# Patient Record
Sex: FEMALE | Race: BLACK OR AFRICAN AMERICAN | Employment: OTHER | URBAN - METROPOLITAN AREA
[De-identification: names, ages, dates, MRNs, and addresses within clinical notes are randomized per-mention and may not be internally consistent; named-entity substitution may affect disease eponyms.]

---

## 2018-01-16 PROBLEM — B19.20 HCV (HEPATITIS C VIRUS): Status: ACTIVE | Noted: 2018-01-16

## 2018-01-16 PROBLEM — J96.22 ACUTE ON CHRONIC RESPIRATORY FAILURE WITH HYPERCAPNIA (HCC): Status: ACTIVE | Noted: 2018-01-16

## 2018-07-25 ENCOUNTER — OFFICE VISIT (OUTPATIENT)
Dept: CARDIOLOGY CLINIC | Age: 58
End: 2018-07-25

## 2018-07-25 VITALS
OXYGEN SATURATION: 97 % | BODY MASS INDEX: 27.35 KG/M2 | HEART RATE: 60 BPM | WEIGHT: 191 LBS | DIASTOLIC BLOOD PRESSURE: 76 MMHG | HEIGHT: 70 IN | SYSTOLIC BLOOD PRESSURE: 126 MMHG

## 2018-07-25 DIAGNOSIS — R07.9 CHEST PAIN IN ADULT: Primary | ICD-10-CM

## 2018-07-25 DIAGNOSIS — F17.200 SMOKING ADDICTION: ICD-10-CM

## 2018-07-25 DIAGNOSIS — R06.02 SOB (SHORTNESS OF BREATH): ICD-10-CM

## 2018-07-25 DIAGNOSIS — I10 ESSENTIAL HYPERTENSION: ICD-10-CM

## 2018-07-25 DIAGNOSIS — R07.2 PRECORDIAL PAIN: ICD-10-CM

## 2018-07-25 PROCEDURE — G8598 ASA/ANTIPLAT THER USED: HCPCS | Performed by: INTERNAL MEDICINE

## 2018-07-25 PROCEDURE — 3017F COLORECTAL CA SCREEN DOC REV: CPT | Performed by: INTERNAL MEDICINE

## 2018-07-25 PROCEDURE — 99205 OFFICE O/P NEW HI 60 MIN: CPT | Performed by: INTERNAL MEDICINE

## 2018-07-25 PROCEDURE — G8419 CALC BMI OUT NRM PARAM NOF/U: HCPCS | Performed by: INTERNAL MEDICINE

## 2018-07-25 PROCEDURE — 93000 ELECTROCARDIOGRAM COMPLETE: CPT | Performed by: INTERNAL MEDICINE

## 2018-07-25 PROCEDURE — G8427 DOCREV CUR MEDS BY ELIG CLIN: HCPCS | Performed by: INTERNAL MEDICINE

## 2018-07-25 PROCEDURE — 4004F PT TOBACCO SCREEN RCVD TLK: CPT | Performed by: INTERNAL MEDICINE

## 2018-07-25 NOTE — PATIENT INSTRUCTIONS
Patient Education        Heart-Healthy Diet: Care Instructions  Your Care Instructions    A heart-healthy diet has lots of vegetables, fruits, nuts, beans, and whole grains, and is low in salt. It limits foods that are high in saturated fat, such as meats, cheeses, and fried foods. It may be hard to change your diet, but even small changes can lower your risk of heart attack and heart disease. Follow-up care is a key part of your treatment and safety. Be sure to make and go to all appointments, and call your doctor if you are having problems. It's also a good idea to know your test results and keep a list of the medicines you take. How can you care for yourself at home? Watch your portions  · Learn what a serving is. A \"serving\" and a \"portion\" are not always the same thing. Make sure that you are not eating larger portions than are recommended. For example, a serving of pasta is ½ cup. A serving size of meat is 2 to 3 ounces. A 3-ounce serving is about the size of a deck of cards. Measure serving sizes until you are good at [place]" them. Keep in mind that restaurants often serve portions that are 2 or 3 times the size of one serving. · To keep your energy level up and keep you from feeling hungry, eat often but in smaller portions. · Eat only the number of calories you need to stay at a healthy weight. If you need to lose weight, eat fewer calories than your body burns (through exercise and other physical activity). Eat more fruits and vegetables  · Eat a variety of fruit and vegetables every day. Dark green, deep orange, red, or yellow fruits and vegetables are especially good for you. Examples include spinach, carrots, peaches, and berries. · Keep carrots, celery, and other veggies handy for snacks. Buy fruit that is in season and store it where you can see it so that you will be tempted to eat it. · Cook dishes that have a lot of veggies in them, such as stir-fries and soups.   Limit saturated and trans fat  · Read food labels, and try to avoid saturated and trans fats. They increase your risk of heart disease. Trans fat is found in many processed foods such as cookies and crackers. · Use olive or canola oil when you cook. Try cholesterol-lowering spreads, such as Benecol or Take Control. · Bake, broil, grill, or steam foods instead of frying them. · Choose lean meats instead of high-fat meats such as hot dogs and sausages. Cut off all visible fat when you prepare meat. · Eat fish, skinless poultry, and meat alternatives such as soy products instead of high-fat meats. Soy products, such as tofu, may be especially good for your heart. · Choose low-fat or fat-free milk and dairy products. Eat fish  · Eat at least two servings of fish a week. Certain fish, such as salmon and tuna, contain omega-3 fatty acids, which may help reduce your risk of heart attack. Eat foods high in fiber  · Eat a variety of grain products every day. Include whole-grain foods that have lots of fiber and nutrients. Examples of whole-grain foods include oats, whole wheat bread, and brown rice. · Buy whole-grain breads and cereals, instead of white bread or pastries. Limit salt and sodium  · Limit how much salt and sodium you eat to help lower your blood pressure. · Taste food before you salt it. Add only a little salt when you think you need it. With time, your taste buds will adjust to less salt. · Eat fewer snack items, fast foods, and other high-salt, processed foods. Check food labels for the amount of sodium in packaged foods. · Choose low-sodium versions of canned goods (such as soups, vegetables, and beans). Limit sugar  · Limit drinks and foods with added sugar. These include candy, desserts, and soda pop. Limit alcohol  · Limit alcohol to no more than 2 drinks a day for men and 1 drink a day for women. Too much alcohol can cause health problems. When should you call for help?   Watch closely for changes in your

## 2018-07-25 NOTE — PROGRESS NOTES
descending artery and posterior lateral branch. There is DANE 3 flow     3.  The LAD arises in normal fashion from left coronary giving rise to diagonals and septal branches. There is DANE 3 flow     4.  The circumflex was given rise to several OMs and PDA and had luminal irregularities. There is DANE 3 flow              OM1:normal               OM2: normal   SUMMARY:  1. Normal coronary arteries  . Assessment/Plan:     Precordial pain  Patient is here as a new patient for chest pain evaluation. She had few episodes of chest pain and she went to emergency room in April 2018. She described her pain as sharp pain with radiation to her right arm. She had mild shortness of breath associated with this pain. EKG in the emergency room showed no acute changes. EKG today shows sinus rhythm with minor ST and T wave changes. Her chest pain appears atypical for angina but due to minor EKG changes, I will schedule her for a dobutamine stress echocardiogram to assess this further. Her coronary angiogram from 2014 was completely normal.    HTN (hypertension)  Blood pressure is stable today. Smoking addiction  Patient has a long-standing history of smoking but had recently quit. She unfortunately has started smoking back again after her mother's death. I have again stressed the importance of smoking cessation and spent 3-5 minutes doing so. Patient to follow up an as needed basis. Thank you very much for allowing me to participate in the care of your patient. Please do not hesitate to contact me if you have any questions.     Sincerely,  Murleen Nageotte, MD      [facility], [address] [facility]  Ph: [phone]  Fax: [phone]

## 2018-08-21 ENCOUNTER — HOSPITAL ENCOUNTER (OUTPATIENT)
Dept: NON INVASIVE DIAGNOSTICS | Age: 58
Discharge: HOME | End: 2018-08-21
Attending: INTERNAL MEDICINE | Admitting: INTERNAL MEDICINE

## 2018-08-21 VITALS — HEIGHT: 70 IN | BODY MASS INDEX: 28.06 KG/M2 | WEIGHT: 196 LBS

## 2018-08-21 DIAGNOSIS — R07.9 CHEST PAIN IN ADULT: ICD-10-CM

## 2018-08-21 DIAGNOSIS — R07.9 CHEST PAIN: ICD-10-CM

## 2018-08-21 DIAGNOSIS — R06.02 SOB (SHORTNESS OF BREATH): ICD-10-CM

## 2018-08-21 RX ORDER — ATROPINE SULFATE 0.1 MG/ML
1 INJECTION INTRAVENOUS ONCE
Status: COMPLETED | OUTPATIENT
Start: 2018-08-21 | End: 2018-08-21

## 2018-08-21 RX ORDER — DOBUTAMINE HYDROCHLORIDE 200 MG/100ML
10 INJECTION INTRAVENOUS CONTINUOUS
Status: ACTIVE | OUTPATIENT
Start: 2018-08-21 | End: 2018-08-21

## 2018-08-21 RX ADMIN — DOBUTAMINE HYDROCHLORIDE 10 MCG/KG/MIN: 200 INJECTION INTRAVENOUS at 13:14

## 2018-08-21 RX ADMIN — ATROPINE SULFATE 0.2 MG: 0.1 INJECTION INTRAVENOUS at 13:57

## 2018-09-17 ENCOUNTER — TELEPHONE (OUTPATIENT)
Dept: PULMONOLOGY | Age: 58
End: 2018-09-17

## 2018-09-17 NOTE — TELEPHONE ENCOUNTER
LVM to call back  Received a referral from [facility] to be seen for COPD. She had been seen in the hospital before with Dr Emmanuelle Haji consulting.  Schedule office visit with Emmanuelle Haji  Needs PFT

## 2018-10-10 LAB
HCV QNT BY NAAT IU/ML: NOT DETECTED IU/ML
HCV QNT BY NAAT LOG IU/ML: NOT DETECTED LOG IU/ML

## 2018-10-18 LAB — MISCELLANEOUS LAB TEST ORDER: NORMAL

## 2018-10-22 ENCOUNTER — ANESTHESIA EVENT (OUTPATIENT)
Dept: ENDOSCOPY | Age: 58
End: 2018-10-22
Payer: COMMERCIAL

## 2018-10-23 ENCOUNTER — ANESTHESIA (OUTPATIENT)
Dept: ENDOSCOPY | Age: 58
End: 2018-10-23
Payer: COMMERCIAL

## 2018-10-23 ENCOUNTER — HOSPITAL ENCOUNTER (OUTPATIENT)
Age: 58
Setting detail: OUTPATIENT SURGERY
Discharge: HOME | End: 2018-10-23
Attending: INTERNAL MEDICINE | Admitting: INTERNAL MEDICINE
Payer: COMMERCIAL

## 2018-10-23 VITALS
HEART RATE: 63 BPM | WEIGHT: 193.12 LBS | BODY MASS INDEX: 27.65 KG/M2 | RESPIRATION RATE: 14 BRPM | OXYGEN SATURATION: 93 % | TEMPERATURE: 97.4 F | HEIGHT: 70 IN | SYSTOLIC BLOOD PRESSURE: 115 MMHG | DIASTOLIC BLOOD PRESSURE: 86 MMHG

## 2018-10-23 VITALS — SYSTOLIC BLOOD PRESSURE: 110 MMHG | OXYGEN SATURATION: 98 % | DIASTOLIC BLOOD PRESSURE: 61 MMHG

## 2018-10-23 DIAGNOSIS — R19.5 FECAL OCCULT BLOOD TEST POSITIVE: ICD-10-CM

## 2018-10-23 PROCEDURE — 6360000002 HC RX W HCPCS: Performed by: NURSE ANESTHETIST, CERTIFIED REGISTERED

## 2018-10-23 PROCEDURE — 3609010600 HC COLONOSCOPY POLYPECTOMY SNARE/COLD BIOPSY: Performed by: INTERNAL MEDICINE

## 2018-10-23 PROCEDURE — 2709999900 HC NON-CHARGEABLE SUPPLY: Performed by: INTERNAL MEDICINE

## 2018-10-23 PROCEDURE — 3700000000 HC ANESTHESIA ATTENDED CARE: Performed by: INTERNAL MEDICINE

## 2018-10-23 PROCEDURE — 7100000011 HC PHASE II RECOVERY - ADDTL 15 MIN: Performed by: INTERNAL MEDICINE

## 2018-10-23 PROCEDURE — 3700000001 HC ADD 15 MINUTES (ANESTHESIA): Performed by: INTERNAL MEDICINE

## 2018-10-23 PROCEDURE — 88305 TISSUE EXAM BY PATHOLOGIST: CPT

## 2018-10-23 PROCEDURE — 7100000000 HC PACU RECOVERY - FIRST 15 MIN: Performed by: INTERNAL MEDICINE

## 2018-10-23 PROCEDURE — 2500000003 HC RX 250 WO HCPCS: Performed by: NURSE ANESTHETIST, CERTIFIED REGISTERED

## 2018-10-23 PROCEDURE — 2580000003 HC RX 258: Performed by: NURSE ANESTHETIST, CERTIFIED REGISTERED

## 2018-10-23 PROCEDURE — 2580000003 HC RX 258: Performed by: ANESTHESIOLOGY

## 2018-10-23 PROCEDURE — 7100000001 HC PACU RECOVERY - ADDTL 15 MIN: Performed by: INTERNAL MEDICINE

## 2018-10-23 PROCEDURE — 6360000002 HC RX W HCPCS

## 2018-10-23 PROCEDURE — 7100000010 HC PHASE II RECOVERY - FIRST 15 MIN: Performed by: INTERNAL MEDICINE

## 2018-10-23 RX ORDER — SODIUM CHLORIDE 9 MG/ML
INJECTION, SOLUTION INTRAVENOUS CONTINUOUS
Status: DISCONTINUED | OUTPATIENT
Start: 2018-10-23 | End: 2018-10-23 | Stop reason: HOSPADM

## 2018-10-23 RX ORDER — PROPOFOL 10 MG/ML
INJECTION, EMULSION INTRAVENOUS PRN
Status: DISCONTINUED | OUTPATIENT
Start: 2018-10-23 | End: 2018-10-23 | Stop reason: SDUPTHER

## 2018-10-23 RX ORDER — SODIUM CHLORIDE 0.9 % (FLUSH) 0.9 %
10 SYRINGE (ML) INJECTION PRN
Status: DISCONTINUED | OUTPATIENT
Start: 2018-10-23 | End: 2018-10-23 | Stop reason: HOSPADM

## 2018-10-23 RX ORDER — ONDANSETRON 2 MG/ML
4 INJECTION INTRAMUSCULAR; INTRAVENOUS
Status: DISCONTINUED | OUTPATIENT
Start: 2018-10-23 | End: 2018-10-23 | Stop reason: HOSPADM

## 2018-10-23 RX ORDER — SODIUM CHLORIDE 0.9 % (FLUSH) 0.9 %
10 SYRINGE (ML) INJECTION EVERY 12 HOURS SCHEDULED
Status: DISCONTINUED | OUTPATIENT
Start: 2018-10-23 | End: 2018-10-23 | Stop reason: HOSPADM

## 2018-10-23 RX ORDER — LIDOCAINE HYDROCHLORIDE 20 MG/ML
INJECTION, SOLUTION EPIDURAL; INFILTRATION; INTRACAUDAL; PERINEURAL PRN
Status: DISCONTINUED | OUTPATIENT
Start: 2018-10-23 | End: 2018-10-23 | Stop reason: SDUPTHER

## 2018-10-23 RX ADMIN — PROPOFOL 25 MG: 10 INJECTION, EMULSION INTRAVENOUS at 08:02

## 2018-10-23 RX ADMIN — PROPOFOL 25 MG: 10 INJECTION, EMULSION INTRAVENOUS at 08:13

## 2018-10-23 RX ADMIN — LIDOCAINE HYDROCHLORIDE 100 MG: 20 INJECTION, SOLUTION EPIDURAL; INFILTRATION; INTRACAUDAL; PERINEURAL at 07:57

## 2018-10-23 RX ADMIN — PROPOFOL 50 MG: 10 INJECTION, EMULSION INTRAVENOUS at 08:07

## 2018-10-23 RX ADMIN — PHENYLEPHRINE HYDROCHLORIDE 100 MCG: 10 INJECTION, SOLUTION INTRAMUSCULAR; INTRAVENOUS; SUBCUTANEOUS at 08:15

## 2018-10-23 RX ADMIN — PROPOFOL 50 MG: 10 INJECTION, EMULSION INTRAVENOUS at 08:16

## 2018-10-23 RX ADMIN — PROPOFOL 50 MG: 10 INJECTION, EMULSION INTRAVENOUS at 08:10

## 2018-10-23 RX ADMIN — PROPOFOL 50 MG: 10 INJECTION, EMULSION INTRAVENOUS at 08:04

## 2018-10-23 RX ADMIN — PROPOFOL 50 MG: 10 INJECTION, EMULSION INTRAVENOUS at 08:19

## 2018-10-23 RX ADMIN — SODIUM CHLORIDE: 9 INJECTION, SOLUTION INTRAVENOUS at 07:39

## 2018-10-23 RX ADMIN — PROPOFOL 25 MG: 10 INJECTION, EMULSION INTRAVENOUS at 08:00

## 2018-10-23 RX ADMIN — PROPOFOL 100 MG: 10 INJECTION, EMULSION INTRAVENOUS at 07:57

## 2018-10-23 RX ADMIN — PROPOFOL 50 MG: 10 INJECTION, EMULSION INTRAVENOUS at 08:22

## 2018-10-23 ASSESSMENT — PULMONARY FUNCTION TESTS
PIF_VALUE: 0

## 2018-10-23 ASSESSMENT — LIFESTYLE VARIABLES: SMOKING_STATUS: 1

## 2018-10-23 ASSESSMENT — PAIN SCALES - GENERAL
PAINLEVEL_OUTOF10: 0
PAINLEVEL_OUTOF10: 0

## 2018-10-23 ASSESSMENT — ENCOUNTER SYMPTOMS: SHORTNESS OF BREATH: 0

## 2018-10-23 ASSESSMENT — PAIN - FUNCTIONAL ASSESSMENT: PAIN_FUNCTIONAL_ASSESSMENT: 0-10

## 2018-10-23 NOTE — H&P
Pre-operative History and Physical    Patient: Chelsy Christiansne  : 1960  Acct#:     Intended Procedure:  Colonoscopy    HISTORY OF PRESENT ILLNESS:  The patient is a 56 y.o. female  who presents for colonoscopy due to colon cancer screening. 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 Current Outpatient Prescriptions on File Prior to Encounter   Medication Sig Dispense Refill    albuterol sulfate HFA (PROAIR HFA) 108 (90 Base) MCG/ACT inhaler Inhale 2 puffs into the lungs every 6 hours as needed for Wheezing 1 Inhaler 0    budesonide-formoterol (SYMBICORT) 160-4.5 MCG/ACT AERO Inhale 2 puffs into the lungs 2 times daily 1 Inhaler 0    albuterol (PROVENTIL) (5 MG/ML) 0.5% nebulizer solution Take 0.5 mLs by nebulization every 6 hours as needed for Wheezing 30 vial 0    ipratropium-albuterol (DUONEB) 0.5-2.5 (3) MG/3ML SOLN nebulizer solution Inhale 3 mLs into the lungs every 4 hours 360 mL 0    albuterol sulfate HFA (VENTOLIN HFA) 108 (90 Base) MCG/ACT inhaler Inhale 2 puffs into the lungs every 6 hours as needed for Wheezing 1 Inhaler 3    METHADONE HCL PO Take 80 mg by mouth daily       amLODIPine (NORVASC) 10 MG tablet Take 1 tablet by mouth daily. 30 tablet 3    diphenhydrAMINE (BENADRYL) 25 MG tablet Take 25 mg by mouth every 8 hours as needed for Itching or Allergies          Allergies:  Mucomyst [acetylcysteine];  Codeine; Levaquin [levofloxacin in d5w]; Pcn [penicillins]; and Ibuprofen    Social History:   TOBACCO:   reports that she has been smoking Cigarettes. She has been smoking about 0.25 packs per day. She has never used smokeless tobacco.  ETOH:   reports that she does not drink alcohol. DRUGS:   reports that she does not use drugs. PHYSICAL EXAM:      Vital Signs: BP (!) 132/91   Pulse 61   Temp 97.8 °F (36.6 °C) (Temporal)   Resp 16   Ht 5' 10\" (1.778 m)   Wt 193 lb 2 oz (87.6 kg)   LMP 09/13/2017   SpO2 95%   BMI 27.71 kg/m²    Airway: No stridor or wheezing noted. Good air movement  Pulmonary: without wheezes. Clear to auscultation  Cardiac:regular rate and rhythm without loud murmurs  Abdomen:soft, nontender,  Bowel sounds present    Pre-Procedure Assessment / Plan:  1) Colonoscopy    ASA Grade:  ASA 2 - Patient with mild systemic disease with no functional limitations  Mallampati Classification:  Class II    Level of Sedation Plan:MAC    Post Procedure plan: Return to same level of care    I assessed the patient and find that the patient is in satisfactory condition to proceed with the planned procedure and sedation plan. I have explained the risk, benefits, and alternatives to the procedure; the patient understands and agrees to proceed.        Lisseth Huang MD  10/23/2018

## 2018-10-23 NOTE — OP NOTE
Colonoscopy Procedure Note      Patient: Flaca Dallas  : 1960  Acct#:     Procedure: Colonoscopy with polypectomy (cold snare), polypectomy (cold biopsy)    Date:  10/23/2018    Surgeon:  Remington Posadas MD    Referring Physician:  Manuel Ovalle, APRN - CNP    Previous Colonoscopy: NO    Preoperative Diagnosis:  Colon cancer screening      Postoperative Diagnosis:    1. 6 mm sessile transverse colon polyp, resected  2. Three sessile polyps in the descending colon 2-5 mm in size, resected    Consent:  The patient or their legal guardian has signed a consent, and is aware of the potential risks, benefits, alternatives, and potential complications of this procedure. These include, but are not limited to hemorrhage, bleeding, post procedural pain, perforation, phlebitis, aspiration, hypotension, hypoxia, cardiovascular events such as arryhthmia, and possibly death. Additionally, the possibility of missed colonic polyps and interval colon cancer was discussed in the consent. Anesthesia:  MAC    Procedure: An informed consent was obtained from the patient after explanation of indications, benefits, possible risks and complications of the procedure. The patient was then taken to the endoscopy suite, placed in the left lateral decubitus position, and the above IV anesthesia was administered. A digital rectal examination was performed and revealed negative without mass, lesions or tenderness. The Olympus video colonoscope was placed in the patient's rectum under digital direction and advanced to the cecum. The cecum was identified by characteristic anatomy and ballottment. The ileocecal valve was identified. The preparation was good. The terminal ileum was not inspected. The scope was then withdrawn back through the cecum, ascending, transverse, descending, sigmoid colon, and rectum.   Careful circumferential examination of the mucosa in these areas demonstrated a 6 mm sessile transverse colon polyp, resected with cold snare and retrieved. Three sessile polyps were identified in the descending colon, ranging from 2-5 mm in size. The polyps were completely resected and retrieved with cold biopsy forceps and cold snare. In the rectosigmoid colon, multiple flat hyperplastic appearing 2-4 mm polyps were seen but not resected due to hyperplastic appearance. The scope was then withdrawn into the rectum and retroflexed. The retroflexed view of the anal verge and rectum was unremarkable. The scope was straightened, the colon was decompressed and the scope was withdrawn from the patient. The patient tolerated the procedure well and was taken to the PACU in good condition. Estimated blood loss: Minimal    Impression:  See post-procedure diagnoses. Recommendations:   1. Resume regular diet  2. Follow-up pathology results in 7 days  3. Repeat colonoscopy in 3 years for further surveillance.     GIANNI Byrd [address]  10/23/2018  [phone]

## 2018-11-05 ENCOUNTER — APPOINTMENT (OUTPATIENT)
Dept: GENERAL RADIOLOGY | Age: 58
End: 2018-11-05
Payer: COMMERCIAL

## 2018-11-05 ENCOUNTER — HOSPITAL ENCOUNTER (EMERGENCY)
Age: 58
Discharge: HOME | End: 2018-11-05
Attending: EMERGENCY MEDICINE
Payer: COMMERCIAL

## 2018-11-05 VITALS
DIASTOLIC BLOOD PRESSURE: 87 MMHG | BODY MASS INDEX: 28.69 KG/M2 | TEMPERATURE: 97.7 F | RESPIRATION RATE: 16 BRPM | HEART RATE: 72 BPM | WEIGHT: 200.4 LBS | OXYGEN SATURATION: 94 % | SYSTOLIC BLOOD PRESSURE: 122 MMHG | HEIGHT: 70 IN

## 2018-11-05 DIAGNOSIS — J44.1 COPD EXACERBATION (HCC): ICD-10-CM

## 2018-11-05 DIAGNOSIS — J06.9 ACUTE UPPER RESPIRATORY INFECTION: Primary | ICD-10-CM

## 2018-11-05 PROCEDURE — 6370000000 HC RX 637 (ALT 250 FOR IP): Performed by: EMERGENCY MEDICINE

## 2018-11-05 PROCEDURE — 94640 AIRWAY INHALATION TREATMENT: CPT

## 2018-11-05 PROCEDURE — 71046 X-RAY EXAM CHEST 2 VIEWS: CPT

## 2018-11-05 PROCEDURE — 99285 EMERGENCY DEPT VISIT HI MDM: CPT

## 2018-11-05 PROCEDURE — 94664 DEMO&/EVAL PT USE INHALER: CPT

## 2018-11-05 RX ORDER — PREDNISONE 20 MG/1
60 TABLET ORAL DAILY
Qty: 12 TABLET | Refills: 0 | Status: SHIPPED | OUTPATIENT
Start: 2018-11-05 | End: 2018-11-09

## 2018-11-05 RX ORDER — IPRATROPIUM BROMIDE AND ALBUTEROL SULFATE 2.5; .5 MG/3ML; MG/3ML
1 SOLUTION RESPIRATORY (INHALATION) ONCE
Status: COMPLETED | OUTPATIENT
Start: 2018-11-05 | End: 2018-11-05

## 2018-11-05 RX ORDER — PREDNISONE 20 MG/1
60 TABLET ORAL ONCE
Status: COMPLETED | OUTPATIENT
Start: 2018-11-05 | End: 2018-11-05

## 2018-11-05 RX ADMIN — IPRATROPIUM BROMIDE AND ALBUTEROL SULFATE 1 AMPULE: .5; 3 SOLUTION RESPIRATORY (INHALATION) at 09:14

## 2018-11-05 RX ADMIN — PREDNISONE 60 MG: 20 TABLET ORAL at 09:44

## 2018-11-05 ASSESSMENT — ENCOUNTER SYMPTOMS
VOMITING: 0
SHORTNESS OF BREATH: 0
TROUBLE SWALLOWING: 0
RHINORRHEA: 1
STRIDOR: 0
WHEEZING: 1
COUGH: 1
ABDOMINAL PAIN: 0
FACIAL SWELLING: 0
VOICE CHANGE: 0
COLOR CHANGE: 0
NAUSEA: 0

## 2018-11-05 NOTE — ED PROVIDER NOTES
Mother     Kidney Disease Mother     Heart Disease Mother           SOCIAL HISTORY       Social History     Social History    Marital status:      Spouse name: N/A    Number of children: N/A    Years of education: N/A     Social History Main Topics    Smoking status: Current Every Day Smoker     Packs/day: 0.25     Types: Cigarettes     Last attempt to quit: 6/14/2016    Smokeless tobacco: Never Used    Alcohol use No    Drug use: No      Comment: past h/o heroin on methadone    Sexual activity: No     Other Topics Concern    None     Social History Narrative    None         PHYSICAL EXAM    (up to 7 for level 4, 8 or more for level 5)     ED Triage Vitals [11/05/18 0908]   BP Temp Temp Source Pulse Resp SpO2 Height Weight   122/87 97.7 °F (36.5 °C) Oral 72 16 94 % 5' 10\" (1.778 m) 200 lb 6.4 oz (90.9 kg)       Physical Exam   Constitutional: She is oriented to person, place, and time. She appears well-developed and well-nourished. No distress (patient pleseant and cooperative. In no acute distress). HENT:   Head: Normocephalic and atraumatic. Right Ear: External ear normal.   Left Ear: External ear normal.   Eyes: Conjunctivae and EOM are normal.   Neck: Neck supple. No JVD present. No tracheal deviation present. Cardiovascular: Normal rate and intact distal pulses. Pulmonary/Chest: Effort normal. No respiratory distress. She has wheezes (scant end expiratory wheezing equal bilaterally). Abdominal: Soft. She exhibits no distension. There is no tenderness. There is no rebound and no guarding. Musculoskeletal: Normal range of motion. She exhibits no tenderness or deformity. Neurological: She is alert and oriented to person, place, and time. No cranial nerve deficit. 5/5 strength in all 4 extremities. Normal gait. Skin: Skin is warm and dry. She is not diaphoretic. Nursing note and vitals reviewed.       DIAGNOSTIC RESULTS         RADIOLOGY:     Interpretation per the Radiologist

## 2018-11-21 ENCOUNTER — TELEPHONE (OUTPATIENT)
Dept: PULMONOLOGY | Age: 58
End: 2018-11-21

## 2018-11-21 DIAGNOSIS — J43.9 PULMONARY EMPHYSEMA, UNSPECIFIED EMPHYSEMA TYPE (HCC): Primary | ICD-10-CM

## 2018-12-07 ENCOUNTER — OFFICE VISIT (OUTPATIENT)
Dept: PULMONOLOGY | Age: 58
End: 2018-12-07
Payer: COMMERCIAL

## 2018-12-07 VITALS
OXYGEN SATURATION: 98 % | BODY MASS INDEX: 28.06 KG/M2 | TEMPERATURE: 98 F | HEIGHT: 70 IN | HEART RATE: 74 BPM | SYSTOLIC BLOOD PRESSURE: 132 MMHG | RESPIRATION RATE: 20 BRPM | WEIGHT: 196 LBS | DIASTOLIC BLOOD PRESSURE: 85 MMHG

## 2018-12-07 DIAGNOSIS — B37.0 THRUSH: ICD-10-CM

## 2018-12-07 DIAGNOSIS — R09.02 HYPOXIA: ICD-10-CM

## 2018-12-07 DIAGNOSIS — J43.9 PULMONARY EMPHYSEMA, UNSPECIFIED EMPHYSEMA TYPE (HCC): ICD-10-CM

## 2018-12-07 DIAGNOSIS — J43.9 PULMONARY EMPHYSEMA, UNSPECIFIED EMPHYSEMA TYPE (HCC): Primary | ICD-10-CM

## 2018-12-07 PROBLEM — J96.22 ACUTE ON CHRONIC RESPIRATORY FAILURE WITH HYPERCAPNIA (HCC): Status: RESOLVED | Noted: 2018-01-16 | Resolved: 2018-12-07

## 2018-12-07 PROCEDURE — G8427 DOCREV CUR MEDS BY ELIG CLIN: HCPCS | Performed by: INTERNAL MEDICINE

## 2018-12-07 PROCEDURE — 3017F COLORECTAL CA SCREEN DOC REV: CPT | Performed by: INTERNAL MEDICINE

## 2018-12-07 PROCEDURE — G8926 SPIRO NO PERF OR DOC: HCPCS | Performed by: INTERNAL MEDICINE

## 2018-12-07 PROCEDURE — 4004F PT TOBACCO SCREEN RCVD TLK: CPT | Performed by: INTERNAL MEDICINE

## 2018-12-07 PROCEDURE — G8598 ASA/ANTIPLAT THER USED: HCPCS | Performed by: INTERNAL MEDICINE

## 2018-12-07 PROCEDURE — 99214 OFFICE O/P EST MOD 30 MIN: CPT | Performed by: INTERNAL MEDICINE

## 2018-12-07 PROCEDURE — 3023F SPIROM DOC REV: CPT | Performed by: INTERNAL MEDICINE

## 2018-12-07 PROCEDURE — G8484 FLU IMMUNIZE NO ADMIN: HCPCS | Performed by: INTERNAL MEDICINE

## 2018-12-07 PROCEDURE — G8419 CALC BMI OUT NRM PARAM NOF/U: HCPCS | Performed by: INTERNAL MEDICINE

## 2018-12-07 RX ORDER — HYDROXYZINE HYDROCHLORIDE 25 MG/1
25 TABLET, FILM COATED ORAL 2 TIMES DAILY PRN
COMMUNITY
End: 2021-09-18 | Stop reason: ALTCHOICE

## 2018-12-07 RX ORDER — INHALER, ASSIST DEVICES
1 SPACER (EA) MISCELLANEOUS DAILY
Qty: 1 DEVICE | Refills: 0 | COMMUNITY
Start: 2018-12-07 | End: 2021-07-13 | Stop reason: ALTCHOICE

## 2018-12-07 RX ORDER — ESCITALOPRAM OXALATE 10 MG/1
TABLET ORAL
COMMUNITY
End: 2019-01-05

## 2018-12-07 NOTE — PROGRESS NOTES
PO Take 80 mg by mouth daily       diphenhydrAMINE (BENADRYL) 25 MG tablet Take 25 mg by mouth every 8 hours as needed for Itching or Allergies      amLODIPine (NORVASC) 10 MG tablet Take 1 tablet by mouth daily. 30 tablet 3    escitalopram (LEXAPRO) 10 MG tablet escitalopram 10 mg tablet      hydrOXYzine (ATARAX) 25 MG tablet hydroxyzine HCl 25 mg tablet      Umeclidinium Bromide (INCRUSE ELLIPTA) 62.5 MCG/INH AEPB Incruse Ellipta 62.5 mcg/actuation powder for inhalation       No current facility-administered medications for this visit. Data Reviewed:   CBC and Renal reviewed  Last CBC  Lab Results   Component Value Date    WBC 5.3 08/15/2018    RBC 4.87 08/15/2018    HGB 14.7 08/15/2018    MCV 91.0 08/15/2018     08/15/2018     Last Renal  Lab Results   Component Value Date     08/15/2018    K 3.8 08/15/2018    K 4.1 04/10/2018    CL 99 08/15/2018    CO2 27 08/15/2018    CO2 29 04/10/2018    CO2 25 01/19/2018    BUN 8 08/15/2018    CREATININE 0.9 08/15/2018    GLUCOSE 88 08/15/2018    CALCIUM 9.5 08/15/2018       Last ABG  POC Blood Gas: No results found for: POCPH, POCPCO2, POCPO2, POCHCO3, NBEA, WNNN0BKS  No results for input(s): PH, PCO2, PO2, HCO3, BE, O2SAT in the last 72 hours. Radiology Review:  Pertinent images / reports were reviewed as a part of this visit. CT Chest w/ contrast: No results found for this or any previous visit. CT Chest w/o contrast: No results found for this or any previous visit. CTPA: No results found for this or any previous visit. CXR PA/LAT:   Results for orders placed during the hospital encounter of 11/05/18   XR CHEST STANDARD (2 VW)    Narrative EXAMINATION:  TWO VIEWS OF THE CHEST    11/5/2018 9:39 am    COMPARISON:  Two views of the chest 09/04/2018.     HISTORY:  ORDERING SYSTEM PROVIDED HISTORY: cough  TECHNOLOGIST PROVIDED HISTORY:  Reason for exam:->cough  Ordering Physician Provided Reason for Exam: sob and cough times couple

## 2018-12-07 NOTE — PATIENT INSTRUCTIONS
Daliresp  Adverse Reactions   2% to 10%:  Central nervous system: Headache (4%), dizziness (2%), insomnia (2%)  Endocrine & metabolic: Weight loss (5% to 10% of body weight: 8% to 20%; >10% loss: 7%)  Gastrointestinal: Diarrhea (10%), nausea (5%), decreased appetite (2%)  Infection: Influenza (3%)  Neuromuscular & skeletal: Back pain (3%)  <2%, postmarketing, and/or case reports: Abdominal pain, anemia, anxiety, arthralgia, arthritis, atrial fibrillation, constipation, depression, dysgeusia, dyspepsia, epistaxis, fatigue, gastritis, gastroesophageal reflux disease, gynecomastia, hematochezia, hypersensitivity, hypersensitivity reaction (including angioedema, urticaria, and rash), increased gamma-glutamyl transferase, increased lactate dehydrogenase, increased serum AST, limb pain, lung carcinoma, malaise, muscle spasm, myalgia, myasthenia, nervousness, pancreatitis, paresthesia, prostate carcinoma, renal failure, respiratory tract infection, rhinitis, sinusitis, suicidal ideation, suicidal tendencies, suicide, supraventricular cardiac arrhythmia, tremor, urinary tract infection, vertigo, vomiting, weakness

## 2018-12-10 ENCOUNTER — TELEPHONE (OUTPATIENT)
Dept: PULMONOLOGY | Age: 58
End: 2018-12-10

## 2018-12-10 RX ORDER — PREDNISONE 10 MG/1
TABLET ORAL
Qty: 30 TABLET | Refills: 0 | Status: SHIPPED | OUTPATIENT
Start: 2018-12-10 | End: 2018-12-20

## 2018-12-10 RX ORDER — PREDNISONE 10 MG/1
TABLET ORAL
Qty: 30 TABLET | Refills: 0 | Status: SHIPPED | OUTPATIENT
Start: 2018-12-10 | End: 2018-12-10 | Stop reason: SDUPTHER

## 2018-12-10 NOTE — TELEPHONE ENCOUNTER
Patient calling stating she couldn't get the Daliresp due to needing a PA done. She is requesting Prednisone. Advised patient she needs to get the PFT done per her insurance company to [address] [place] [address] and we can start her on the 250 mgs for now. Her PFT is schedule for later this week and she will come by for the 250 mg sample. She expressed understanding.     Please sign order

## 2018-12-10 NOTE — TELEPHONE ENCOUNTER
Patient advised and stated she wants the Prednisone to go to [facility] not her mail order. I will reorder for signature.

## 2018-12-12 ENCOUNTER — HOSPITAL ENCOUNTER (OUTPATIENT)
Dept: PULMONOLOGY | Age: 58
Discharge: HOME | End: 2018-12-12
Payer: COMMERCIAL

## 2018-12-12 DIAGNOSIS — J43.9 PULMONARY EMPHYSEMA, UNSPECIFIED EMPHYSEMA TYPE (HCC): ICD-10-CM

## 2018-12-12 PROCEDURE — 6370000000 HC RX 637 (ALT 250 FOR IP): Performed by: INTERNAL MEDICINE

## 2018-12-12 PROCEDURE — 94729 DIFFUSING CAPACITY: CPT | Performed by: INTERNAL MEDICINE

## 2018-12-12 PROCEDURE — 94060 EVALUATION OF WHEEZING: CPT | Performed by: INTERNAL MEDICINE

## 2018-12-12 PROCEDURE — 94726 PLETHYSMOGRAPHY LUNG VOLUMES: CPT | Performed by: INTERNAL MEDICINE

## 2018-12-12 RX ORDER — ALBUTEROL SULFATE 90 UG/1
4 AEROSOL, METERED RESPIRATORY (INHALATION) ONCE
Status: COMPLETED | OUTPATIENT
Start: 2018-12-12 | End: 2018-12-12

## 2018-12-12 RX ADMIN — ALBUTEROL SULFATE 4 PUFF: 90 AEROSOL, METERED RESPIRATORY (INHALATION) at 14:25

## 2018-12-13 ENCOUNTER — TELEPHONE (OUTPATIENT)
Dept: PULMONOLOGY | Age: 58
End: 2018-12-13

## 2018-12-13 DIAGNOSIS — B37.0 THRUSH: ICD-10-CM

## 2018-12-13 NOTE — TELEPHONE ENCOUNTER
Yadira from [employer] called and stated the medication sent in by Dr. Norberto Diaz is on back order and wanted to know if he wanted to send the prescription to a local pharmacy.  Please advise

## 2018-12-18 ENCOUNTER — TELEPHONE (OUTPATIENT)
Dept: PULMONOLOGY | Age: 58
End: 2018-12-18

## 2018-12-18 RX ORDER — NEBULIZER ACCESSORIES
1 KIT MISCELLANEOUS DAILY
Qty: 1 KIT | Refills: 0 | Status: ON HOLD | OUTPATIENT
Start: 2018-12-18 | End: 2022-02-05

## 2018-12-18 RX ORDER — IPRATROPIUM BROMIDE AND ALBUTEROL SULFATE 2.5; .5 MG/3ML; MG/3ML
1 SOLUTION RESPIRATORY (INHALATION) EVERY 4 HOURS
Qty: 360 ML | Refills: 5 | Status: SHIPPED | OUTPATIENT
Start: 2018-12-18 | End: 2019-08-22 | Stop reason: SDUPTHER

## 2019-01-02 ENCOUNTER — TELEPHONE (OUTPATIENT)
Dept: PULMONOLOGY | Age: 59
End: 2019-01-02

## 2019-01-02 RX ORDER — PREDNISONE 20 MG/1
40 TABLET ORAL DAILY
Qty: 10 TABLET | Refills: 0 | Status: SHIPPED | OUTPATIENT
Start: 2019-01-02 | End: 2019-01-07

## 2019-01-03 ENCOUNTER — TELEPHONE (OUTPATIENT)
Dept: PULMONOLOGY | Age: 59
End: 2019-01-03

## 2019-01-05 ENCOUNTER — HOSPITAL ENCOUNTER (EMERGENCY)
Age: 59
Discharge: HOME | End: 2019-01-06
Attending: EMERGENCY MEDICINE
Payer: COMMERCIAL

## 2019-01-05 VITALS
SYSTOLIC BLOOD PRESSURE: 135 MMHG | BODY MASS INDEX: 27.46 KG/M2 | RESPIRATION RATE: 18 BRPM | DIASTOLIC BLOOD PRESSURE: 82 MMHG | OXYGEN SATURATION: 94 % | WEIGHT: 191.8 LBS | HEIGHT: 70 IN | HEART RATE: 75 BPM | TEMPERATURE: 97.9 F

## 2019-01-05 DIAGNOSIS — B37.0 THRUSH: Primary | ICD-10-CM

## 2019-01-05 LAB — S PYO AG THROAT QL: NEGATIVE

## 2019-01-05 PROCEDURE — 87081 CULTURE SCREEN ONLY: CPT

## 2019-01-05 PROCEDURE — 99283 EMERGENCY DEPT VISIT LOW MDM: CPT

## 2019-01-05 PROCEDURE — 87880 STREP A ASSAY W/OPTIC: CPT

## 2019-01-05 ASSESSMENT — PAIN SCALES - GENERAL: PAINLEVEL_OUTOF10: 8

## 2019-01-05 ASSESSMENT — PAIN DESCRIPTION - LOCATION: LOCATION: THROAT

## 2019-01-06 ASSESSMENT — PAIN SCALES - GENERAL: PAINLEVEL_OUTOF10: 10

## 2019-01-08 ENCOUNTER — TELEPHONE (OUTPATIENT)
Dept: PULMONOLOGY | Age: 59
End: 2019-01-08

## 2019-01-08 LAB — S PYO THROAT QL CULT: NORMAL

## 2019-01-13 ENCOUNTER — HOSPITAL ENCOUNTER (EMERGENCY)
Age: 59
Discharge: HOME | End: 2019-01-13
Attending: EMERGENCY MEDICINE
Payer: COMMERCIAL

## 2019-01-13 VITALS
HEART RATE: 82 BPM | RESPIRATION RATE: 18 BRPM | TEMPERATURE: 98.8 F | HEIGHT: 70 IN | WEIGHT: 203.71 LBS | SYSTOLIC BLOOD PRESSURE: 121 MMHG | OXYGEN SATURATION: 97 % | BODY MASS INDEX: 29.16 KG/M2 | DIASTOLIC BLOOD PRESSURE: 92 MMHG

## 2019-01-13 DIAGNOSIS — K08.109 EDENTULOUS: ICD-10-CM

## 2019-01-13 DIAGNOSIS — R59.0 SUBMANDIBULAR LYMPHADENOPATHY: Primary | ICD-10-CM

## 2019-01-13 DIAGNOSIS — R05.9 COUGH: ICD-10-CM

## 2019-01-13 PROCEDURE — 99283 EMERGENCY DEPT VISIT LOW MDM: CPT

## 2019-01-13 RX ORDER — BENZONATATE 100 MG/1
100 CAPSULE ORAL 3 TIMES DAILY PRN
Qty: 20 CAPSULE | Refills: 0 | Status: SHIPPED | OUTPATIENT
Start: 2019-01-13 | End: 2019-01-20

## 2019-01-13 RX ORDER — CLINDAMYCIN HYDROCHLORIDE 150 MG/1
450 CAPSULE ORAL 3 TIMES DAILY
Qty: 90 CAPSULE | Refills: 0 | Status: SHIPPED | OUTPATIENT
Start: 2019-01-13 | End: 2019-01-23

## 2019-01-29 ENCOUNTER — TELEPHONE (OUTPATIENT)
Dept: PULMONOLOGY | Age: 59
End: 2019-01-29

## 2019-01-29 RX ORDER — FLUCONAZOLE 100 MG/1
100 TABLET ORAL DAILY
Qty: 7 TABLET | Refills: 0 | Status: SHIPPED | OUTPATIENT
Start: 2019-01-29 | End: 2019-02-05

## 2019-01-31 ENCOUNTER — HOSPITAL ENCOUNTER (EMERGENCY)
Age: 59
Discharge: HOME | End: 2019-01-31
Payer: COMMERCIAL

## 2019-01-31 VITALS
HEIGHT: 70 IN | WEIGHT: 204.81 LBS | HEART RATE: 91 BPM | OXYGEN SATURATION: 100 % | BODY MASS INDEX: 29.32 KG/M2 | DIASTOLIC BLOOD PRESSURE: 97 MMHG | SYSTOLIC BLOOD PRESSURE: 130 MMHG | TEMPERATURE: 98.5 F | RESPIRATION RATE: 16 BRPM

## 2019-01-31 DIAGNOSIS — K13.70 ORAL LESION: Primary | ICD-10-CM

## 2019-01-31 DIAGNOSIS — K14.8 DRY TONGUE: ICD-10-CM

## 2019-01-31 PROCEDURE — 99282 EMERGENCY DEPT VISIT SF MDM: CPT

## 2019-01-31 RX ORDER — FLUCONAZOLE 150 MG/1
150 TABLET ORAL ONCE
Qty: 1 TABLET | Refills: 0 | Status: SHIPPED | OUTPATIENT
Start: 2019-01-31 | End: 2019-01-31

## 2019-01-31 ASSESSMENT — ENCOUNTER SYMPTOMS
SORE THROAT: 0
TROUBLE SWALLOWING: 1
VOMITING: 0
NAUSEA: 0

## 2019-02-02 ENCOUNTER — HOSPITAL ENCOUNTER (EMERGENCY)
Age: 59
Discharge: HOME | End: 2019-02-02
Payer: COMMERCIAL

## 2019-02-02 ENCOUNTER — APPOINTMENT (OUTPATIENT)
Dept: CT IMAGING | Age: 59
End: 2019-02-02
Payer: COMMERCIAL

## 2019-02-02 VITALS
HEART RATE: 75 BPM | SYSTOLIC BLOOD PRESSURE: 124 MMHG | BODY MASS INDEX: 28.85 KG/M2 | DIASTOLIC BLOOD PRESSURE: 91 MMHG | OXYGEN SATURATION: 96 % | TEMPERATURE: 98 F | WEIGHT: 201.06 LBS | RESPIRATION RATE: 16 BRPM

## 2019-02-02 DIAGNOSIS — R13.10 DYSPHAGIA, UNSPECIFIED TYPE: Primary | ICD-10-CM

## 2019-02-02 LAB
ANION GAP SERPL CALCULATED.3IONS-SCNC: 11 MMOL/L (ref 3–16)
BASOPHILS ABSOLUTE: 0 K/UL (ref 0–0.2)
BASOPHILS RELATIVE PERCENT: 1.1 %
BUN BLDV-MCNC: 8 MG/DL (ref 7–20)
CALCIUM SERPL-MCNC: 9.7 MG/DL (ref 8.3–10.6)
CHLORIDE BLD-SCNC: 101 MMOL/L (ref 99–110)
CO2: 28 MMOL/L (ref 21–32)
CREAT SERPL-MCNC: 1 MG/DL (ref 0.6–1.1)
EOSINOPHILS ABSOLUTE: 0.3 K/UL (ref 0–0.6)
EOSINOPHILS RELATIVE PERCENT: 6 %
GFR AFRICAN AMERICAN: >60
GFR NON-AFRICAN AMERICAN: 57
GLUCOSE BLD-MCNC: 84 MG/DL (ref 70–99)
HCT VFR BLD CALC: 42.8 % (ref 36–48)
HEMOGLOBIN: 14.3 G/DL (ref 12–16)
LYMPHOCYTES ABSOLUTE: 1.7 K/UL (ref 1–5.1)
LYMPHOCYTES RELATIVE PERCENT: 39.3 %
MCH RBC QN AUTO: 29.7 PG (ref 26–34)
MCHC RBC AUTO-ENTMCNC: 33.4 G/DL (ref 31–36)
MCV RBC AUTO: 88.8 FL (ref 80–100)
MONOCYTES ABSOLUTE: 0.6 K/UL (ref 0–1.3)
MONOCYTES RELATIVE PERCENT: 12.5 %
NEUTROPHILS ABSOLUTE: 1.8 K/UL (ref 1.7–7.7)
NEUTROPHILS RELATIVE PERCENT: 41.1 %
PDW BLD-RTO: 13.9 % (ref 12.4–15.4)
PLATELET # BLD: 182 K/UL (ref 135–450)
PMV BLD AUTO: 8.3 FL (ref 5–10.5)
POTASSIUM SERPL-SCNC: 4.4 MMOL/L (ref 3.5–5.1)
RBC # BLD: 4.82 M/UL (ref 4–5.2)
SODIUM BLD-SCNC: 140 MMOL/L (ref 136–145)
WBC # BLD: 4.4 K/UL (ref 4–11)

## 2019-02-02 PROCEDURE — 99284 EMERGENCY DEPT VISIT MOD MDM: CPT

## 2019-02-02 PROCEDURE — 70491 CT SOFT TISSUE NECK W/DYE: CPT

## 2019-02-02 PROCEDURE — 80048 BASIC METABOLIC PNL TOTAL CA: CPT

## 2019-02-02 PROCEDURE — 6360000004 HC RX CONTRAST MEDICATION: Performed by: NURSE PRACTITIONER

## 2019-02-02 PROCEDURE — 85025 COMPLETE CBC W/AUTO DIFF WBC: CPT

## 2019-02-02 RX ORDER — PREDNISONE 20 MG/1
60 TABLET ORAL DAILY
Qty: 15 TABLET | Refills: 0 | Status: SHIPPED | OUTPATIENT
Start: 2019-02-02 | End: 2019-02-07

## 2019-02-02 RX ADMIN — IOPAMIDOL 75 ML: 755 INJECTION, SOLUTION INTRAVENOUS at 18:28

## 2019-02-02 ASSESSMENT — ENCOUNTER SYMPTOMS
TROUBLE SWALLOWING: 1
CONSTIPATION: 0
SORE THROAT: 0
FACIAL SWELLING: 0
RHINORRHEA: 0
WHEEZING: 0
COLOR CHANGE: 0
VOMITING: 0
DIARRHEA: 0
NAUSEA: 0
BLOOD IN STOOL: 0
CHEST TIGHTNESS: 0
COUGH: 0
ABDOMINAL PAIN: 0
SINUS PAIN: 0
VOICE CHANGE: 0
STRIDOR: 0
SHORTNESS OF BREATH: 0

## 2019-02-11 ENCOUNTER — OFFICE VISIT (OUTPATIENT)
Dept: PULMONOLOGY | Age: 59
End: 2019-02-11
Payer: COMMERCIAL

## 2019-02-11 VITALS
BODY MASS INDEX: 29.06 KG/M2 | WEIGHT: 203 LBS | RESPIRATION RATE: 16 BRPM | HEART RATE: 76 BPM | DIASTOLIC BLOOD PRESSURE: 80 MMHG | OXYGEN SATURATION: 98 % | TEMPERATURE: 98 F | SYSTOLIC BLOOD PRESSURE: 110 MMHG | HEIGHT: 70 IN

## 2019-02-11 DIAGNOSIS — J44.9 COPD WITH ASTHMA (HCC): Primary | ICD-10-CM

## 2019-02-11 DIAGNOSIS — B37.0 THRUSH: ICD-10-CM

## 2019-02-11 DIAGNOSIS — F33.0 MILD EPISODE OF RECURRENT MAJOR DEPRESSIVE DISORDER (HCC): ICD-10-CM

## 2019-02-11 DIAGNOSIS — J44.9 COPD WITH ASTHMA (HCC): ICD-10-CM

## 2019-02-11 DIAGNOSIS — E66.3 OVER WEIGHT: ICD-10-CM

## 2019-02-11 PROCEDURE — 3017F COLORECTAL CA SCREEN DOC REV: CPT | Performed by: INTERNAL MEDICINE

## 2019-02-11 PROCEDURE — 99214 OFFICE O/P EST MOD 30 MIN: CPT | Performed by: INTERNAL MEDICINE

## 2019-02-11 PROCEDURE — G8484 FLU IMMUNIZE NO ADMIN: HCPCS | Performed by: INTERNAL MEDICINE

## 2019-02-11 PROCEDURE — G8599 NO ASA/ANTIPLAT THER USE RNG: HCPCS | Performed by: INTERNAL MEDICINE

## 2019-02-11 PROCEDURE — G8427 DOCREV CUR MEDS BY ELIG CLIN: HCPCS | Performed by: INTERNAL MEDICINE

## 2019-02-11 PROCEDURE — G8419 CALC BMI OUT NRM PARAM NOF/U: HCPCS | Performed by: INTERNAL MEDICINE

## 2019-02-11 PROCEDURE — 3023F SPIROM DOC REV: CPT | Performed by: INTERNAL MEDICINE

## 2019-02-11 PROCEDURE — G8926 SPIRO NO PERF OR DOC: HCPCS | Performed by: INTERNAL MEDICINE

## 2019-02-11 PROCEDURE — 1036F TOBACCO NON-USER: CPT | Performed by: INTERNAL MEDICINE

## 2019-02-11 RX ORDER — MONTELUKAST SODIUM 10 MG/1
10 TABLET ORAL NIGHTLY
Qty: 30 TABLET | Refills: 3 | Status: SHIPPED | OUTPATIENT
Start: 2019-02-11 | End: 2019-07-31 | Stop reason: SDUPTHER

## 2019-02-14 LAB
ALLERGEN ASPERGILLUS ALTERNATA IGE: <0.1 KU/L
ALLERGEN ASPERGILLUS FUMIGATUS IGE: 0.33 KU/L
ALLERGEN BERMUDA GRASS IGE: <0.1 KU/L
ALLERGEN BIRCH IGE: <0.1 KU/L
ALLERGEN CAT DANDER IGE: 0.64 KU/L
ALLERGEN COMMON SHORT RAGWEED IGE: 0.11 KU/L
ALLERGEN COTTONWOOD: 0.13 KU/L
ALLERGEN COW MILK IGE: 0.35 KU/L
ALLERGEN DOG DANDER IGE: 0.21 KU/L
ALLERGEN ELM IGE: 0.18 KU/L
ALLERGEN FUNGI/MOLD M.RACEMOSUS IGE: <0.1 KU/L
ALLERGEN GERMAN COCKROACH IGE: 1.66 KU/L
ALLERGEN HORMODENDRUM HORDEI IGE: <0.1 KU/L
ALLERGEN MAPLE/BOX ELDER IGE: 0.11 KU/L
ALLERGEN MITE DUST FARINAE IGE: 1.25 KU/L
ALLERGEN MITE DUST PTERONYSSINUS IGE: 0.92 KU/L
ALLERGEN MOUNTAIN CEDAR: 0.15 KU/L
ALLERGEN MOUSE EPITHELIA IGE: <0.1 KU/L
ALLERGEN OAK TREE IGE: <0.1 KU/L
ALLERGEN PEANUT (F13) IGE: <0.1 KU/L
ALLERGEN PECAN TREE IGE: <0.1 KU/L
ALLERGEN PENICILLIUM NOTATUM: <0.1 KU/L
ALLERGEN ROUGH PIGWEED (W14) IGE: <0.1 KU/L
ALLERGEN RUSSIAN THISTLE IGE: 0.16 KU/L
ALLERGEN SEE NOTE: NORMAL
ALLERGEN SHEEP SORREL (W18) IGE: <0.1 KU/L
ALLERGEN TIMOTHY GRASS: <0.1 KU/L
ALLERGEN TREE SYCAMORE: <0.1 KU/L
ALLERGEN WALNUT TREE IGE: 0.14 KU/L
ALLERGEN WHITE MULBERRY TREE, IGE: <0.1 KU/L
ALLERGEN, TREE, WHITE ASH IGE: 0.22 KU/L
IGE: 611 KU/L

## 2019-02-22 ENCOUNTER — TELEPHONE (OUTPATIENT)
Dept: PULMONOLOGY | Age: 59
End: 2019-02-22

## 2019-03-05 ENCOUNTER — OFFICE VISIT (OUTPATIENT)
Dept: ENT CLINIC | Age: 59
End: 2019-03-05
Payer: COMMERCIAL

## 2019-03-05 VITALS
HEIGHT: 70 IN | SYSTOLIC BLOOD PRESSURE: 127 MMHG | DIASTOLIC BLOOD PRESSURE: 77 MMHG | HEART RATE: 69 BPM | WEIGHT: 205 LBS | BODY MASS INDEX: 29.35 KG/M2

## 2019-03-05 DIAGNOSIS — K13.79 MOUTH SORE: Primary | ICD-10-CM

## 2019-03-05 DIAGNOSIS — R13.10 DYSPHAGIA, UNSPECIFIED TYPE: ICD-10-CM

## 2019-03-05 PROCEDURE — G8427 DOCREV CUR MEDS BY ELIG CLIN: HCPCS | Performed by: OTOLARYNGOLOGY

## 2019-03-05 PROCEDURE — G8419 CALC BMI OUT NRM PARAM NOF/U: HCPCS | Performed by: OTOLARYNGOLOGY

## 2019-03-05 PROCEDURE — G8599 NO ASA/ANTIPLAT THER USE RNG: HCPCS | Performed by: OTOLARYNGOLOGY

## 2019-03-05 PROCEDURE — 1036F TOBACCO NON-USER: CPT | Performed by: OTOLARYNGOLOGY

## 2019-03-05 PROCEDURE — 31575 DIAGNOSTIC LARYNGOSCOPY: CPT | Performed by: OTOLARYNGOLOGY

## 2019-03-05 PROCEDURE — 3017F COLORECTAL CA SCREEN DOC REV: CPT | Performed by: OTOLARYNGOLOGY

## 2019-03-05 PROCEDURE — 99204 OFFICE O/P NEW MOD 45 MIN: CPT | Performed by: OTOLARYNGOLOGY

## 2019-03-05 PROCEDURE — G8484 FLU IMMUNIZE NO ADMIN: HCPCS | Performed by: OTOLARYNGOLOGY

## 2019-03-05 RX ORDER — TRIAMCINOLONE ACETONIDE 0.1 %
PASTE (GRAM) DENTAL
Qty: 1 TUBE | Refills: 0 | Status: SHIPPED | OUTPATIENT
Start: 2019-03-05 | End: 2019-03-12

## 2019-03-05 ASSESSMENT — ENCOUNTER SYMPTOMS
VOICE CHANGE: 0
SHORTNESS OF BREATH: 0
SINUS PAIN: 0
COLOR CHANGE: 0
WHEEZING: 0
SINUS PRESSURE: 0
DIARRHEA: 0
STRIDOR: 0
SORE THROAT: 0
RHINORRHEA: 0
CHOKING: 0
CHEST TIGHTNESS: 0
BACK PAIN: 0
EYE PAIN: 0
VOMITING: 0
COUGH: 0
EYE DISCHARGE: 0
FACIAL SWELLING: 0
NAUSEA: 0
TROUBLE SWALLOWING: 0
CONSTIPATION: 0
APNEA: 0
BLOOD IN STOOL: 0

## 2019-04-22 ENCOUNTER — TELEPHONE (OUTPATIENT)
Dept: PULMONOLOGY | Age: 59
End: 2019-04-22

## 2019-04-22 RX ORDER — PREDNISONE 20 MG/1
40 TABLET ORAL DAILY
Qty: 10 TABLET | Refills: 0 | Status: SHIPPED | OUTPATIENT
Start: 2019-04-22 | End: 2019-04-27

## 2019-04-22 NOTE — TELEPHONE ENCOUNTER
-- Message is from the [facility]--    Reason for Call: Patient is returning call to doctor Walt. Patient believes it was regarding results. She also mentioned doctor can prescribe any medication except for Lipitor and symbastant. Both medications give her very bad leg cramps. Patient would like a call back just to follow up.     Caller Information       Type Contact Phone    02/11/2019 05:17 PM Phone (Incoming) Ligia Ruano (Self) [phone] (H)          Alternative phone number: n/a    Turnaround time given to caller:   \"This message will be sent to [state Provider's name]. The clinical team will fulfill your request as soon as they review your message when the office opens tomorrow.\"     Told Tino Iniguez the Prednisone was called in.  Wants to know if Dr Bere Yee can send a Rx for cough syrup  Darby Breed Robitussin is not working

## 2019-04-22 NOTE — TELEPHONE ENCOUNTER
Complains of cough and SOB  Duration 7 days started after emptying vacuum    Cough with sputum production? yes  Color: yellow  Fever? No   Any other Symptoms? wheezing  Any current treatment tried? Nebulizer every 3-4 hours , Montelukast   Using inhalers? yes do they help?  Yes   Pharmacy? [address] [facility]

## 2019-04-23 RX ORDER — BENZONATATE 100 MG/1
100-200 CAPSULE ORAL 3 TIMES DAILY PRN
Qty: 60 CAPSULE | Refills: 0 | Status: SHIPPED | OUTPATIENT
Start: 2019-04-23 | End: 2019-04-30

## 2019-04-24 RX ORDER — FLUCONAZOLE 100 MG/1
100 TABLET ORAL DAILY
Qty: 3 TABLET | Refills: 0 | Status: SHIPPED | OUTPATIENT
Start: 2019-04-24 | End: 2019-04-27

## 2019-04-24 NOTE — TELEPHONE ENCOUNTER
Called to let her know Tessalon capsules were called and now she states she needs something for thrush please?

## 2019-05-29 ENCOUNTER — HOSPITAL ENCOUNTER (EMERGENCY)
Age: 59
Discharge: HOME | End: 2019-05-29
Attending: EMERGENCY MEDICINE
Payer: COMMERCIAL

## 2019-05-29 ENCOUNTER — APPOINTMENT (OUTPATIENT)
Dept: GENERAL RADIOLOGY | Age: 59
End: 2019-05-29
Payer: COMMERCIAL

## 2019-05-29 VITALS
RESPIRATION RATE: 16 BRPM | HEART RATE: 61 BPM | TEMPERATURE: 98.2 F | HEIGHT: 70 IN | SYSTOLIC BLOOD PRESSURE: 119 MMHG | BODY MASS INDEX: 31.59 KG/M2 | DIASTOLIC BLOOD PRESSURE: 83 MMHG | OXYGEN SATURATION: 95 % | WEIGHT: 220.68 LBS

## 2019-05-29 DIAGNOSIS — I10 HTN (HYPERTENSION), BENIGN: ICD-10-CM

## 2019-05-29 DIAGNOSIS — M54.50 ACUTE BILATERAL LOW BACK PAIN WITHOUT SCIATICA: Primary | ICD-10-CM

## 2019-05-29 LAB
ANION GAP SERPL CALCULATED.3IONS-SCNC: 12 MMOL/L (ref 3–16)
BACTERIA: ABNORMAL /HPF
BASOPHILS ABSOLUTE: 0.1 K/UL (ref 0–0.2)
BASOPHILS RELATIVE PERCENT: 1.7 %
BILIRUBIN URINE: NEGATIVE
BLOOD, URINE: NEGATIVE
BUN BLDV-MCNC: 7 MG/DL (ref 7–20)
CALCIUM SERPL-MCNC: 9.5 MG/DL (ref 8.3–10.6)
CHLORIDE BLD-SCNC: 101 MMOL/L (ref 99–110)
CLARITY: CLEAR
CO2: 28 MMOL/L (ref 21–32)
COLOR: YELLOW
CREAT SERPL-MCNC: 0.9 MG/DL (ref 0.6–1.1)
EKG ATRIAL RATE: 65 BPM
EKG DIAGNOSIS: NORMAL
EKG P AXIS: 68 DEGREES
EKG P-R INTERVAL: 162 MS
EKG Q-T INTERVAL: 384 MS
EKG QRS DURATION: 88 MS
EKG QTC CALCULATION (BAZETT): 399 MS
EKG R AXIS: 6 DEGREES
EKG T AXIS: -20 DEGREES
EKG VENTRICULAR RATE: 65 BPM
EOSINOPHILS ABSOLUTE: 0.1 K/UL (ref 0–0.6)
EOSINOPHILS RELATIVE PERCENT: 2.2 %
EPITHELIAL CELLS, UA: ABNORMAL /HPF
GFR AFRICAN AMERICAN: >60
GFR NON-AFRICAN AMERICAN: >60
GLUCOSE BLD-MCNC: 95 MG/DL (ref 70–99)
GLUCOSE URINE: NEGATIVE MG/DL
HCT VFR BLD CALC: 42.7 % (ref 36–48)
HEMOGLOBIN: 13.8 G/DL (ref 12–16)
KETONES, URINE: NEGATIVE MG/DL
LEUKOCYTE ESTERASE, URINE: ABNORMAL
LYMPHOCYTES ABSOLUTE: 1 K/UL (ref 1–5.1)
LYMPHOCYTES RELATIVE PERCENT: 25.2 %
MCH RBC QN AUTO: 29.5 PG (ref 26–34)
MCHC RBC AUTO-ENTMCNC: 32.4 G/DL (ref 31–36)
MCV RBC AUTO: 91.3 FL (ref 80–100)
MICROSCOPIC EXAMINATION: YES
MONOCYTES ABSOLUTE: 0.3 K/UL (ref 0–1.3)
MONOCYTES RELATIVE PERCENT: 8.8 %
NEUTROPHILS ABSOLUTE: 2.5 K/UL (ref 1.7–7.7)
NEUTROPHILS RELATIVE PERCENT: 62.1 %
NITRITE, URINE: NEGATIVE
PDW BLD-RTO: 13.9 % (ref 12.4–15.4)
PH UA: 7 (ref 5–8)
PLATELET # BLD: 169 K/UL (ref 135–450)
PMV BLD AUTO: 8.6 FL (ref 5–10.5)
POTASSIUM REFLEX MAGNESIUM: 4.5 MMOL/L (ref 3.5–5.1)
PROTEIN UA: NEGATIVE MG/DL
RBC # BLD: 4.68 M/UL (ref 4–5.2)
RBC UA: ABNORMAL /HPF (ref 0–2)
REASON FOR REJECTION: NORMAL
REJECTED TEST: NORMAL
SODIUM BLD-SCNC: 141 MMOL/L (ref 136–145)
SPECIFIC GRAVITY UA: 1.01 (ref 1–1.03)
TROPONIN: <0.01 NG/ML
URINE REFLEX TO CULTURE: YES
URINE TYPE: ABNORMAL
UROBILINOGEN, URINE: 0.2 E.U./DL
WBC # BLD: 4 K/UL (ref 4–11)
WBC UA: ABNORMAL /HPF (ref 0–5)

## 2019-05-29 PROCEDURE — 96361 HYDRATE IV INFUSION ADD-ON: CPT

## 2019-05-29 PROCEDURE — 6360000002 HC RX W HCPCS: Performed by: PHYSICIAN ASSISTANT

## 2019-05-29 PROCEDURE — 6370000000 HC RX 637 (ALT 250 FOR IP): Performed by: EMERGENCY MEDICINE

## 2019-05-29 PROCEDURE — 93010 ELECTROCARDIOGRAM REPORT: CPT | Performed by: INTERNAL MEDICINE

## 2019-05-29 PROCEDURE — 99284 EMERGENCY DEPT VISIT MOD MDM: CPT

## 2019-05-29 PROCEDURE — 96374 THER/PROPH/DIAG INJ IV PUSH: CPT

## 2019-05-29 PROCEDURE — 36415 COLL VENOUS BLD VENIPUNCTURE: CPT

## 2019-05-29 PROCEDURE — 71045 X-RAY EXAM CHEST 1 VIEW: CPT

## 2019-05-29 PROCEDURE — 84484 ASSAY OF TROPONIN QUANT: CPT

## 2019-05-29 PROCEDURE — 81001 URINALYSIS AUTO W/SCOPE: CPT

## 2019-05-29 PROCEDURE — 93005 ELECTROCARDIOGRAM TRACING: CPT | Performed by: EMERGENCY MEDICINE

## 2019-05-29 PROCEDURE — 87086 URINE CULTURE/COLONY COUNT: CPT

## 2019-05-29 PROCEDURE — 2580000003 HC RX 258: Performed by: EMERGENCY MEDICINE

## 2019-05-29 PROCEDURE — 80048 BASIC METABOLIC PNL TOTAL CA: CPT

## 2019-05-29 PROCEDURE — 85025 COMPLETE CBC W/AUTO DIFF WBC: CPT

## 2019-05-29 RX ORDER — 0.9 % SODIUM CHLORIDE 0.9 %
1000 INTRAVENOUS SOLUTION INTRAVENOUS ONCE
Status: COMPLETED | OUTPATIENT
Start: 2019-05-29 | End: 2019-05-29

## 2019-05-29 RX ORDER — LIDOCAINE 50 MG/G
1 PATCH TOPICAL DAILY
Qty: 10 PATCH | Refills: 0 | Status: SHIPPED | OUTPATIENT
Start: 2019-05-29 | End: 2019-08-22 | Stop reason: ALTCHOICE

## 2019-05-29 RX ORDER — ONDANSETRON 2 MG/ML
4 INJECTION INTRAMUSCULAR; INTRAVENOUS EVERY 30 MIN PRN
Status: DISCONTINUED | OUTPATIENT
Start: 2019-05-29 | End: 2019-05-29 | Stop reason: HOSPADM

## 2019-05-29 RX ORDER — ONDANSETRON 4 MG/1
4-8 TABLET, ORALLY DISINTEGRATING ORAL EVERY 8 HOURS PRN
Qty: 20 TABLET | Refills: 0 | Status: SHIPPED | OUTPATIENT
Start: 2019-05-29 | End: 2019-12-28 | Stop reason: SDUPTHER

## 2019-05-29 RX ORDER — ACETAMINOPHEN 500 MG
1000 TABLET ORAL EVERY 6 HOURS PRN
Qty: 30 TABLET | Refills: 0 | Status: SHIPPED | OUTPATIENT
Start: 2019-05-29 | End: 2022-08-06

## 2019-05-29 RX ORDER — IPRATROPIUM BROMIDE AND ALBUTEROL SULFATE 2.5; .5 MG/3ML; MG/3ML
1 SOLUTION RESPIRATORY (INHALATION) ONCE
Status: COMPLETED | OUTPATIENT
Start: 2019-05-29 | End: 2019-05-29

## 2019-05-29 RX ORDER — CYCLOBENZAPRINE HCL 10 MG
10 TABLET ORAL NIGHTLY PRN
Qty: 20 TABLET | Refills: 0 | Status: SHIPPED | OUTPATIENT
Start: 2019-05-29 | End: 2019-06-08

## 2019-05-29 RX ADMIN — SODIUM CHLORIDE 1000 ML: 9 INJECTION, SOLUTION INTRAVENOUS at 15:15

## 2019-05-29 RX ADMIN — ONDANSETRON 4 MG: 2 INJECTION INTRAMUSCULAR; INTRAVENOUS at 15:22

## 2019-05-29 RX ADMIN — IPRATROPIUM BROMIDE AND ALBUTEROL SULFATE 1 AMPULE: .5; 3 SOLUTION RESPIRATORY (INHALATION) at 15:19

## 2019-05-29 ASSESSMENT — PAIN DESCRIPTION - INTENSITY
RATING_2: 0
RATING_2: 0
RATING_2: 8
RATING_2: 0

## 2019-05-29 ASSESSMENT — PAIN DESCRIPTION - PROGRESSION
CLINICAL_PROGRESSION: GRADUALLY WORSENING
CLINICAL_PROGRESSION_2: GRADUALLY WORSENING

## 2019-05-29 ASSESSMENT — PAIN DESCRIPTION - LOCATION
LOCATION_2: ARM
LOCATION: BACK

## 2019-05-29 ASSESSMENT — PAIN DESCRIPTION - ORIENTATION
ORIENTATION: LEFT;RIGHT;LOWER
ORIENTATION: RIGHT;LEFT;LOWER
ORIENTATION: RIGHT;LEFT
ORIENTATION_2: LEFT;RIGHT
ORIENTATION: RIGHT;LEFT;LOWER
ORIENTATION: RIGHT;LEFT;LOWER
ORIENTATION_2: LEFT;RIGHT

## 2019-05-29 ASSESSMENT — PAIN DESCRIPTION - DESCRIPTORS
DESCRIPTORS: ACHING
DESCRIPTORS: SORE;ACHING
DESCRIPTORS: ACHING
DESCRIPTORS_2: ACHING
DESCRIPTORS: ACHING
DESCRIPTORS: ACHING;SORE

## 2019-05-29 ASSESSMENT — PAIN SCALES - GENERAL
PAINLEVEL_OUTOF10: 4
PAINLEVEL_OUTOF10: 4
PAINLEVEL_OUTOF10: 7
PAINLEVEL_OUTOF10: 8
PAINLEVEL_OUTOF10: 10

## 2019-05-29 ASSESSMENT — PAIN DESCRIPTION - ONSET
ONSET: SUDDEN
ONSET_2: ON-GOING

## 2019-05-29 ASSESSMENT — PAIN DESCRIPTION - FREQUENCY
FREQUENCY: INTERMITTENT
FREQUENCY: INTERMITTENT

## 2019-05-29 ASSESSMENT — PAIN - FUNCTIONAL ASSESSMENT
PAIN_FUNCTIONAL_ASSESSMENT: PREVENTS OR INTERFERES SOME ACTIVE ACTIVITIES AND ADLS
PAIN_FUNCTIONAL_ASSESSMENT: 0-10

## 2019-05-29 ASSESSMENT — PAIN DESCRIPTION - DURATION: DURATION_2: INTERMITTENT

## 2019-05-29 NOTE — ED NOTES
Addiction Navigator-Durga Samaniego      Conducted brief intervention with pt regarding heroin use and treatment services/options available. Pt reports that she is currently 3yrs sober and receives methadone at [facility]. Pt expresses that recovery is going well. Provided pt with recovery resources. Will continue to follow and assist as needed.       Milad Hamilton  05/29/19 [id number]

## 2019-05-29 NOTE — ED TRIAGE NOTES
Presents to ED with multiple complaints. Says that she has been feeling short of breath at home and has used her nebulizer more than usual.. Has also been having low back pain on both sides, nausea, and frequent urination. Also complaining of some blurry vision, and blood pressure alternating between low and high.

## 2019-05-29 NOTE — LETTER
[address] [facility]   [address] [address]  Phone: [phone]               May 29, 2019    Patient: Dilcia Bonilla   YOB: 1960   Date of Visit: 5/29/2019       To Whom It May Concern:    Dilcia Bonilla was seen and treated in our emergency department on 5/29/2019. She may return to work on May 30th. Off work May 29th.       Sincerely,       Sidney Brannon RN         Signature:__________________________________

## 2019-05-29 NOTE — ED NOTES
Dismissed with prescriptions and follow up instructions. Satisfied going home at current pain level.        Danie Solomon RN  05/29/19 [id number]

## 2019-05-29 NOTE — ED PROVIDER NOTES
[address] [address]  Dept: [phone]  Loc: [address] ENCOUNTER    Evaluated by the Advanced Practice Provider    CHIEF COMPLAINT    Chief Complaint   Patient presents with    Shortness of Breath    Back Pain     with nausea    Other     BP going high and then low and blurry vision       HPI    Enrique Mesa is a 66 y.o. female who presents with multiple complaints. She states that her blood pressure has been \"going up and down\". She complains of associated intermittent \"blurred vision\". She denies any headache, dizziness or double vision. Patient does state that she wears corrective glasses that recently broke and she has not been able to get new ones. Patient also complains of back pain, localized in the bilateral lumbar region of the back. The onset was 3 days ago. The duration has been intermittent since the onset. The quality of the pain is sharp. The pain worsens with movement. The context was a spontaneous onset. Patient states that she's had intermittent shortness of breath associated with her COPD, she denies any new or worsening shortness of breath to me. States she has not been wheezing and has not needed her albuterol more than normal.  She denies any chest pain. REVIEW OF SYSTEMS    General: No fevers, chills or night sweats  GI: No abdominal pain or vomiting  : No dysuria or hematuria  Musculoskeletal: see HPI  Neurologic: No bowel or bladder incontinence, No saddle anesthesia, No leg weakness  All other systems reviewed and are negative.     PAST MEDICAL & SURGICAL HISTORY    Past Medical History:   Diagnosis Date    [facility] obstructive [facility] disease) [facility] [address]     Empyema (HCC)     Hepatitis C     History of heroin use     Hypertension     Narcotic abuse ([facility] [address]     heroin, clean 1 year    UTI (lower urinary tract infection)      Past Surgical History:   Procedure Laterality Date    CHEST TUBE INSERTION      COLONOSCOPY  10/23/2018    Colonoscopy with polypectomy (cold snare), polypectomy (cold biopsy)    COLONOSCOPY N/A 10/23/2018    COLONOSCOPY POLYPECTOMY SNARE/COLD BIOPSY performed by Walter Cleveland MD at [facility] Left 12/16/2016    OTHER SURGICAL HISTORY  12/15/2016    EDMOND BUNIONECTOMY WITH APPLICATION OF AMNIOX LEFT FOOT       CURRENT MEDICATIONS  (may include discharge medications prescribed in the ED)  Current Outpatient Rx   Medication Sig Dispense Refill    montelukast (SINGULAIR) 10 MG tablet Take 1 tablet by mouth nightly 30 tablet 3    ipratropium-albuterol (DUONEB) 0.5-2.5 (3) MG/3ML SOLN nebulizer solution Inhale 3 mLs into the lungs every 4 hours 360 mL 5    Respiratory Therapy Supplies (NEBULIZER/TUBING/MOUTHPIECE) KIT 1 kit by Does not apply route daily 1 kit 0    hydrOXYzine (ATARAX) 25 MG tablet hydroxyzine HCl 25 mg tablet      Umeclidinium Bromide (INCRUSE ELLIPTA) 62.5 MCG/INH AEPB Incruse Ellipta 62.5 mcg/actuation powder for inhalation      Spacer/Aero Chamber Mouthpiece MISC 1 each by Does not apply route once as needed (with sybicort) 1 each 0    Spacer/Aero-Holding Chambers (VORTEX VALVED HOLDING CHAMBER) SAUL 1 each by Does not apply route daily 1 Device 0    albuterol (PROVENTIL) (5 MG/ML) 0.5% nebulizer solution Take 0.5 mLs by nebulization every 6 hours as needed for Wheezing 30 vial 0    albuterol sulfate HFA (PROAIR HFA) 108 (90 Base) MCG/ACT inhaler Inhale 2 puffs into the lungs every 6 hours as needed for Wheezing 1 Inhaler 0    budesonide-formoterol (SYMBICORT) 160-4.5 MCG/ACT AERO Inhale 2 puffs into the lungs 2 times daily 1 Inhaler 0    METHADONE HCL PO Take 80 mg by mouth daily       diphenhydrAMINE (BENADRYL) 25 MG tablet Take 25 mg by mouth every 8 hours as needed for Itching or Allergies      amLODIPine (NORVASC) 10 MG tablet Take 1 tablet by mouth daily.  27 tablet 3       ALLERGIES    Allergies   Allergen Reactions    Acetylcysteine Shortness Of Breath and Anaphylaxis     Increased bronchospasm    Codeine Hives     Pt states no recent problems     Levaquin [Levofloxacin In D5w] Itching    Penicillins Hives and Itching    Ibuprofen Diarrhea, Nausea And Vomiting and Other (See Comments)       SOCIAL HISTORY    Social History     Socioeconomic History    Marital status:      Spouse name: None    Number of children: None    Years of education: None    Highest education level: None   Occupational History    None   Social Needs    Financial resource strain: None    Food insecurity:     Worry: None     Inability: None    Transportation needs:     Medical: None     Non-medical: None   Tobacco Use    Smoking status: Former Smoker     Packs/day: 0.00     Years: 45.00     Pack years: 0.00     Types: Cigarettes     Last attempt to quit: 2016     Years since quittin.9    Smokeless tobacco: Never Used    Tobacco comment: smokes when nervous    Substance and Sexual Activity    Alcohol use: No    Drug use: No     Comment: past h/o heroin on methadone    Sexual activity: Never   Lifestyle    Physical activity:     Days per week: None     Minutes per session: None    Stress: None   Relationships    Social connections:     Talks on phone: None     Gets together: None     Attends [employer] service: None     Active member of club or organization: None     Attends meetings of clubs or organizations: None     Relationship status: None    Intimate partner violence:     Fear of current or ex partner: None     Emotionally abused: None     Physically abused: None     Forced sexual activity: None   Other Topics Concern    None   Social History Narrative    None       PHYSICAL EXAM    VITAL SIGNS: /83   Pulse 61   Temp 98.2 °F (36.8 °C)   Resp 16   Ht 5' 10\" (1.778 m)   Wt 220 lb 10.9 oz (100.1 kg)   LMP 2017   SpO2 95%   Breastfeeding? systemic complaints or atypical features, as well as the absence of neurological deficit, I have low suspicion at this time for epidural compression syndrome or infectious etiology. Patient's pain is most likely musculoskeletal in nature. Labs reveal no leukocytosis or anemia. Metabolic panel unremarkable. Urinalysis unremarkable. CXR findings as above. EKG interpreted by ED physician. Troponin negative. Reevaluation at 4 PM: Patient is resting comfortably. I believe the patient is safe for discharge at this time. The patient was instructed to follow up as an outpatient in 2 days with primary care. The patient was instructed to return to the ED immediately for any new or worsening symptoms, including bowel or bladder incontinence, saddle anesthesia or leg weakness. The patient verbalized understanding. The patient was also seen and evaluated by the ED attending, Dr. Kristy Colon. Please see the attending note for further details. FINAL IMPRESSION    1. Acute bilateral low back pain without sciatica    2.  HTN (hypertension), benign        PLAN  Discharge with outpatient follow-up (see EMR)      (Please note that this note was completed with a voice recognition program.  Every attempt was made to edit the dictations, but inevitably there remain words that are mis-transcribed.)            [place], [place]  05/29/19 [id number]

## 2019-05-29 NOTE — ED NOTES
IV attempts x 2 more sticks unsuccessful. Plan for ultrasound IV access and re-collect CBC at that time.          Kristen Finley RN  05/29/19 [id number]

## 2019-05-31 LAB — URINE CULTURE, ROUTINE: NORMAL

## 2019-06-14 DIAGNOSIS — J43.9 PULMONARY EMPHYSEMA, UNSPECIFIED EMPHYSEMA TYPE (HCC): ICD-10-CM

## 2019-06-14 RX ORDER — PREDNISONE 10 MG/1
TABLET ORAL
Qty: 30 TABLET | Refills: 10 | OUTPATIENT
Start: 2019-06-14

## 2019-06-14 RX ORDER — ROFLUMILAST 500 UG/1
TABLET ORAL
Qty: 30 TABLET | Refills: 3 | Status: SHIPPED | OUTPATIENT
Start: 2019-06-14 | End: 2019-08-22

## 2019-07-31 ENCOUNTER — HOSPITAL ENCOUNTER (EMERGENCY)
Age: 59
Discharge: HOME | End: 2019-07-31
Attending: EMERGENCY MEDICINE
Payer: COMMERCIAL

## 2019-07-31 ENCOUNTER — APPOINTMENT (OUTPATIENT)
Dept: GENERAL RADIOLOGY | Age: 59
End: 2019-07-31
Payer: COMMERCIAL

## 2019-07-31 VITALS
HEIGHT: 70 IN | BODY MASS INDEX: 31.15 KG/M2 | DIASTOLIC BLOOD PRESSURE: 87 MMHG | OXYGEN SATURATION: 98 % | SYSTOLIC BLOOD PRESSURE: 141 MMHG | RESPIRATION RATE: 22 BRPM | HEART RATE: 68 BPM | TEMPERATURE: 98 F | WEIGHT: 217.59 LBS

## 2019-07-31 DIAGNOSIS — J44.9 COPD WITH ASTHMA (HCC): ICD-10-CM

## 2019-07-31 DIAGNOSIS — J44.1 COPD EXACERBATION (HCC): Primary | ICD-10-CM

## 2019-07-31 LAB
ANION GAP SERPL CALCULATED.3IONS-SCNC: 14 MMOL/L (ref 3–16)
BASOPHILS ABSOLUTE: 0 K/UL (ref 0–0.2)
BASOPHILS RELATIVE PERCENT: 1 %
BUN BLDV-MCNC: 4 MG/DL (ref 7–20)
CALCIUM SERPL-MCNC: 9.6 MG/DL (ref 8.3–10.6)
CHLORIDE BLD-SCNC: 102 MMOL/L (ref 99–110)
CO2: 26 MMOL/L (ref 21–32)
CREAT SERPL-MCNC: 0.8 MG/DL (ref 0.6–1.1)
EOSINOPHILS ABSOLUTE: 0.4 K/UL (ref 0–0.6)
EOSINOPHILS RELATIVE PERCENT: 8.7 %
GFR AFRICAN AMERICAN: >60
GFR NON-AFRICAN AMERICAN: >60
GLUCOSE BLD-MCNC: 104 MG/DL (ref 70–99)
HCT VFR BLD CALC: 42.8 % (ref 36–48)
HEMOGLOBIN: 13.8 G/DL (ref 12–16)
LYMPHOCYTES ABSOLUTE: 1.6 K/UL (ref 1–5.1)
LYMPHOCYTES RELATIVE PERCENT: 35.8 %
MCH RBC QN AUTO: 29.1 PG (ref 26–34)
MCHC RBC AUTO-ENTMCNC: 32.3 G/DL (ref 31–36)
MCV RBC AUTO: 89.8 FL (ref 80–100)
MONOCYTES ABSOLUTE: 0.5 K/UL (ref 0–1.3)
MONOCYTES RELATIVE PERCENT: 10.8 %
NEUTROPHILS ABSOLUTE: 1.9 K/UL (ref 1.7–7.7)
NEUTROPHILS RELATIVE PERCENT: 43.7 %
PDW BLD-RTO: 14.3 % (ref 12.4–15.4)
PLATELET # BLD: 181 K/UL (ref 135–450)
PMV BLD AUTO: 8.2 FL (ref 5–10.5)
POTASSIUM REFLEX MAGNESIUM: 4.1 MMOL/L (ref 3.5–5.1)
RBC # BLD: 4.77 M/UL (ref 4–5.2)
SODIUM BLD-SCNC: 142 MMOL/L (ref 136–145)
WBC # BLD: 4.3 K/UL (ref 4–11)

## 2019-07-31 PROCEDURE — 71046 X-RAY EXAM CHEST 2 VIEWS: CPT

## 2019-07-31 PROCEDURE — 85025 COMPLETE CBC W/AUTO DIFF WBC: CPT

## 2019-07-31 PROCEDURE — 36415 COLL VENOUS BLD VENIPUNCTURE: CPT

## 2019-07-31 PROCEDURE — 80048 BASIC METABOLIC PNL TOTAL CA: CPT

## 2019-07-31 PROCEDURE — 6370000000 HC RX 637 (ALT 250 FOR IP): Performed by: EMERGENCY MEDICINE

## 2019-07-31 PROCEDURE — 96374 THER/PROPH/DIAG INJ IV PUSH: CPT

## 2019-07-31 PROCEDURE — 6360000002 HC RX W HCPCS: Performed by: EMERGENCY MEDICINE

## 2019-07-31 PROCEDURE — 99285 EMERGENCY DEPT VISIT HI MDM: CPT

## 2019-07-31 RX ORDER — GUAIFENESIN DEXTROMETHORPHAN HYDROBROMIDE ORAL SOLUTION 10; 100 MG/5ML; MG/5ML
10 SOLUTION ORAL EVERY 4 HOURS PRN
Qty: 473 ML | Refills: 0 | Status: SHIPPED | OUTPATIENT
Start: 2019-07-31 | End: 2021-09-18

## 2019-07-31 RX ORDER — METHYLPREDNISOLONE 4 MG/1
TABLET ORAL
Qty: 1 KIT | Refills: 0 | Status: SHIPPED | OUTPATIENT
Start: 2019-07-31 | End: 2019-12-02 | Stop reason: ALTCHOICE

## 2019-07-31 RX ORDER — IPRATROPIUM BROMIDE AND ALBUTEROL SULFATE 2.5; .5 MG/3ML; MG/3ML
1 SOLUTION RESPIRATORY (INHALATION) ONCE
Status: COMPLETED | OUTPATIENT
Start: 2019-07-31 | End: 2019-07-31

## 2019-07-31 RX ORDER — FLUCONAZOLE 150 MG/1
150 TABLET ORAL ONCE
Qty: 1 TABLET | Refills: 0 | Status: SHIPPED | OUTPATIENT
Start: 2019-07-31 | End: 2019-07-31

## 2019-07-31 RX ORDER — METHYLPREDNISOLONE SODIUM SUCCINATE 125 MG/2ML
125 INJECTION, POWDER, LYOPHILIZED, FOR SOLUTION INTRAMUSCULAR; INTRAVENOUS ONCE
Status: COMPLETED | OUTPATIENT
Start: 2019-07-31 | End: 2019-07-31

## 2019-07-31 RX ORDER — MONTELUKAST SODIUM 10 MG/1
10 TABLET ORAL NIGHTLY
Qty: 30 TABLET | Refills: 0 | Status: SHIPPED | OUTPATIENT
Start: 2019-07-31 | End: 2021-09-18

## 2019-07-31 RX ADMIN — IPRATROPIUM BROMIDE AND ALBUTEROL SULFATE 1 AMPULE: .5; 3 SOLUTION RESPIRATORY (INHALATION) at 08:33

## 2019-07-31 RX ADMIN — METHYLPREDNISOLONE SODIUM SUCCINATE 125 MG: 125 INJECTION, POWDER, FOR SOLUTION INTRAMUSCULAR; INTRAVENOUS at 08:41

## 2019-07-31 RX ADMIN — IPRATROPIUM BROMIDE AND ALBUTEROL SULFATE 1 AMPULE: .5; 3 SOLUTION RESPIRATORY (INHALATION) at 09:14

## 2019-07-31 NOTE — ED PROVIDER NOTES
eMERGENCY dEPARTMENT eNCOUnter      Pt Name: Antonio Aguilera  MRN: [id number]  [place] 1960  Date of evaluation: 7/31/2019  Provider: Luis Miguel Sigala MD     [address] [place] [address]       Chief Complaint   Patient presents with    Shortness of Breath     started about 1 week ago. has hx of COPD. HISTORY OF PRESENT ILLNESS   (Location/Symptom, Timing/Onset,Context/Setting, Quality, Duration, Modifying Factors, Severity) Note limiting factors. HPI    Antonio Aguilera is a 66 y.o. female who presents to the emergency department with increasing shortness of breath. Patient states she has history of COPD with asthma presents with 1 week history of shortness of breath. Patient states is progressively getting worse. Patient has been using her inhalers and nebulizer without effect. There has been no coughing. Patient denies any chest pain. She states she was real diaphoretic when she drove herself here. Patient was in labor respiration. Her pulse ox was 100%. Patient states she has never been intubated. States she stopped smoking. Nursing Notes were reviewed. REVIEW OFSYSTEMS    (2+ for level 4; 10+ for level 5)   Review of Systems    General: No fevers, chills or night sweats, No weight loss    Head:  No Sore throat,  No Ear Pain    Chest:  Nontender. No Cough, positive SOB,  Chest Pain    GI: No abdominal pain or vomiting    : No dysuria or hematuria    Musculoskeletal: No unrelenting pain or night pain    Neurologic: No bowel or bladder incontinence, No saddle anesthesia, No leg weakness    All other systems reviewed and are negative.         PAST MEDICAL HISTORY     Past Medical History:   Diagnosis Date    [facility] disease) ([facility] [address]     Empyema (HCC)     Hepatitis C     History of heroin use     Hypertension     Narcotic abuse ([facility] [address]     heroin, clean 1 year    UTI (lower urinary tract infection)        SURGICAL HISTORY       Past Surgical History: Physically abused: None     Forced sexual activity: None   Other Topics Concern    None   Social History Narrative    None       SCREENINGS           PHYSICAL EXAM    (up to 7 for level 4, 8 or more for level 5)     ED Triage Vitals [07/31/19 0825]   BP Temp Temp Source Pulse Resp SpO2 Height Weight   (!) 141/87 98 °F (36.7 °C) Oral 77 22 96 % 5' 10\" (1.778 m) 217 lb 9.5 oz (98.7 kg)       Physical Exam    General: Alert and awake ×3. Nontoxic appearance. Well-developed well-nourished 47-year-old black female in moderate respiratory distress. HEENT: Normocephalic atraumatic. Neck is supple. Airway intact. No adenopathy  Cardiac: Regular rate and rhythm with no murmurs rubs or gallops  Pulmonary: Lungs are clear in all lung fields. Diffuse wheezing  No Rales. Abdomen: Soft and nontender. Negative hepatosplenomegaly. Bowel sounds are active  Extremities: Moving all extremities. No calf tenderness. Peripheral pulses all intact  Skin: No skin lesions. No rashes  Neurologic: Cranial nerves II through XII was grossly intact. Nonfocal neurological exam  Psychiatric: Patient is pleasant. Mood is appropriate. DIAGNOSTIC RESULTS     EKG (Per Emergency Physician):       RADIOLOGY (Per Emergency Physician): Interpretation per the Radiologist below, if available at the time of this note:  Xr Chest Standard (2 Vw)    Result Date: 7/31/2019  EXAMINATION: TWO XRAY VIEWS OF THE CHEST 7/31/2019 8:32 am COMPARISON: Prior study(s) most recent 05/29/2019. HISTORY: ORDERING SYSTEM PROVIDED HISTORY: SOB TECHNOLOGIST PROVIDED HISTORY: Reason for exam:->SOB Reason for Exam: sob times one week Acuity: Acute Type of Exam: Initial Relevant Medical/Surgical History: copd FINDINGS: The heart and pulmonary vessels are normal.  No acute airspace disease. No significant free pleural fluid is seen dependently.   However, on the lateral view there is homogeneous thickening from superior to inferior along 1 of the major diffusely. Patient was treated with DuoNeb x2. Patient obtain relief. She was also given IV steroids. In the department. Chest x-ray shows a little bit of fluid in the fissure this is a incidental finding. Will need a follow-up in 2 to 3 weeks for repeat x-ray to make sure it resolves. Her CBC and electrolytes were normal.  Patient will be placed on a Medrol Dosepak homeward. And to follow-up. Encouraged to drink fluids. REVAL:     Patient reevaluated prior to discharge. Wheezing has subsided. Patient is breathing comfortably. Patient is okay to be discharged. CRITICAL CARE TIME   Total CriticalCare time was 0 minutes, excluding separately reportable procedures. There was a high probability of clinically significant/life threatening deterioration in the patient's condition which required my urgent intervention. CONSULTS:  None    PROCEDURES:  Unless otherwise noted below, none     [unfilled]    FINAL IMPRESSION      1. COPD exacerbation (HCC)          DISPOSITION/PLAN   DISPOSITION        PATIENT REFERRED TO:  No follow-up provider specified. DISCHARGE MEDICATIONS:  New Prescriptions    METHYLPREDNISOLONE (MEDROL, RENAN,) 4 MG TABLET    Take by mouth. (Please note:  Portions of this note were completed with a voice recognition program.Efforts were made to edit the dictations but occasionally words and phrases are mis-transcribed.)  Form v2016. J.5-cn    Good RODGERS MD (electronically signed)  Emergency Medicine Provider        Neil Solis MD  07/31/19 5412

## 2019-08-22 ENCOUNTER — OFFICE VISIT (OUTPATIENT)
Dept: PULMONOLOGY | Age: 59
End: 2019-08-22
Payer: COMMERCIAL

## 2019-08-22 VITALS
TEMPERATURE: 98.2 F | HEIGHT: 70 IN | WEIGHT: 212 LBS | RESPIRATION RATE: 16 BRPM | HEART RATE: 66 BPM | SYSTOLIC BLOOD PRESSURE: 128 MMHG | OXYGEN SATURATION: 95 % | BODY MASS INDEX: 30.35 KG/M2 | DIASTOLIC BLOOD PRESSURE: 84 MMHG

## 2019-08-22 DIAGNOSIS — J44.9 COPD WITH ASTHMA (HCC): Primary | ICD-10-CM

## 2019-08-22 PROCEDURE — G8599 NO ASA/ANTIPLAT THER USE RNG: HCPCS | Performed by: INTERNAL MEDICINE

## 2019-08-22 PROCEDURE — 3023F SPIROM DOC REV: CPT | Performed by: INTERNAL MEDICINE

## 2019-08-22 PROCEDURE — 99213 OFFICE O/P EST LOW 20 MIN: CPT | Performed by: INTERNAL MEDICINE

## 2019-08-22 PROCEDURE — G8427 DOCREV CUR MEDS BY ELIG CLIN: HCPCS | Performed by: INTERNAL MEDICINE

## 2019-08-22 PROCEDURE — G8417 CALC BMI ABV UP PARAM F/U: HCPCS | Performed by: INTERNAL MEDICINE

## 2019-08-22 PROCEDURE — G8926 SPIRO NO PERF OR DOC: HCPCS | Performed by: INTERNAL MEDICINE

## 2019-08-22 PROCEDURE — 1036F TOBACCO NON-USER: CPT | Performed by: INTERNAL MEDICINE

## 2019-08-22 PROCEDURE — 3017F COLORECTAL CA SCREEN DOC REV: CPT | Performed by: INTERNAL MEDICINE

## 2019-08-22 RX ORDER — IPRATROPIUM BROMIDE AND ALBUTEROL SULFATE 2.5; .5 MG/3ML; MG/3ML
1 SOLUTION RESPIRATORY (INHALATION) EVERY 4 HOURS
Qty: 360 ML | Refills: 5 | Status: ON HOLD | OUTPATIENT
Start: 2019-08-22 | End: 2022-02-05

## 2019-08-22 RX ORDER — BUDESONIDE AND FORMOTEROL FUMARATE DIHYDRATE 160; 4.5 UG/1; UG/1
2 AEROSOL RESPIRATORY (INHALATION) 2 TIMES DAILY
Qty: 1 INHALER | Refills: 0 | COMMUNITY
Start: 2019-08-22 | End: 2019-12-02 | Stop reason: SDUPTHER

## 2019-08-22 ASSESSMENT — ASTHMA QUESTIONNAIRES
QUESTION_4 LAST FOUR WEEKS HOW OFTEN HAVE YOU USED YOUR RESCUE INHALER OR NEBULIZER MEDICATION (SUCH AS ALBUTEROL): 1
QUESTION_2 LAST FOUR WEEKS HOW OFTEN HAVE YOU HAD SHORTNESS OF BREATH: 1
QUESTION_5 LAST FOUR WEEKS HOW WOULD YOU RATE YOUR ASTHMA CONTROL: 2
QUESTION_3 LAST FOUR WEEKS HOW OFTEN DID YOUR ASTHMA SYMPTOMS (WHEEZING, COUGHING, SHORTNESS OF BREATH, CHEST TIGHTNESS OR PAIN) WAKE YOU UP AT NIGHT OR EARLIER THAN USUAL IN THE MORNING: 1
ACT_TOTALSCORE: 7
QUESTION_1 LAST FOUR WEEKS HOW MUCH OF THE TIME DID YOUR ASTHMA KEEP YOU FROM GETTING AS MUCH DONE AT WORK, SCHOOL OR AT HOME: 2

## 2019-08-22 NOTE — PROGRESS NOTES
tablet Take 1-2 tablets by mouth every 8 hours as needed for Nausea May Sub regular tablet (non-ODT) if insurance does not cover ODT. 20 tablet 0    Respiratory Therapy Supplies (NEBULIZER/TUBING/MOUTHPIECE) KIT 1 kit by Does not apply route daily 1 kit 0    hydrOXYzine (ATARAX) 25 MG tablet hydroxyzine HCl 25 mg tablet      Spacer/Aero-Holding Chambers (VORTEX VALVED HOLDING CHAMBER) SAUL 1 each by Does not apply route daily 1 Device 0    albuterol (PROVENTIL) (5 MG/ML) 0.5% nebulizer solution Take 0.5 mLs by nebulization every 6 hours as needed for Wheezing 30 vial 0    albuterol sulfate HFA (PROAIR HFA) 108 (90 Base) MCG/ACT inhaler Inhale 2 puffs into the lungs every 6 hours as needed for Wheezing 1 Inhaler 0    budesonide-formoterol (SYMBICORT) 160-4.5 MCG/ACT AERO Inhale 2 puffs into the lungs 2 times daily 1 Inhaler 0    METHADONE HCL PO Take 80 mg by mouth daily       diphenhydrAMINE (BENADRYL) 25 MG tablet Take 25 mg by mouth every 8 hours as needed for Itching or Allergies      amLODIPine (NORVASC) 10 MG tablet Take 1 tablet by mouth daily. 30 tablet 3    methylPREDNISolone (MEDROL, RENAN,) 4 MG tablet Take by mouth. (Patient not taking: Reported on 8/22/2019) 1 kit 0    montelukast (SINGULAIR) 10 MG tablet TAKE 1 TABLET BY MOUTH NIGHTLY (Patient not taking: Reported on 8/22/2019) 30 tablet 0    Spacer/Aero Chamber Mouthpiece MISC 1 each by Does not apply route once as needed (with sybicort) 1 each 0     No current facility-administered medications for this visit.         Data Reviewed:   CBC and Renal reviewed  Last CBC  Lab Results   Component Value Date    WBC 4.3 07/31/2019    RBC 4.77 07/31/2019    HGB 13.8 07/31/2019    MCV 89.8 07/31/2019     07/31/2019     Last Renal  Lab Results   Component Value Date     07/31/2019    K 4.1 07/31/2019     07/31/2019    CO2 26 07/31/2019    CO2 28 05/29/2019    CO2 28 02/02/2019    BUN 4 07/31/2019    CREATININE 0.8 07/31/2019 secondary to depression rather than COPD. These may be intertwined. Exercise was encouraged. Relevant Medications    ipratropium-albuterol (DUONEB) 0.5-2.5 (3) MG/3ML SOLN nebulizer solution    budesonide-formoterol (SYMBICORT) 160-4.5 MCG/ACT AERO    Other Relevant Orders    Ambulatory referral to Pulmonary Rehab    6 Minute Walk Test             This note was transcribed using 38741 GLG. Please disregard any translational errors.     Michelle Pierre [facility] [phone]

## 2019-08-28 ENCOUNTER — TELEPHONE (OUTPATIENT)
Dept: PULMONOLOGY | Age: 59
End: 2019-08-28

## 2019-08-29 DIAGNOSIS — J45.909 UNCOMPLICATED ASTHMA, UNSPECIFIED ASTHMA SEVERITY, UNSPECIFIED WHETHER PERSISTENT: Primary | ICD-10-CM

## 2019-10-08 ENCOUNTER — TELEPHONE (OUTPATIENT)
Dept: PULMONOLOGY | Age: 59
End: 2019-10-08

## 2019-10-08 RX ORDER — PREDNISONE 20 MG/1
20 TABLET ORAL DAILY
Qty: 7 TABLET | Refills: 0 | Status: SHIPPED | OUTPATIENT
Start: 2019-10-08 | End: 2019-10-15

## 2019-10-18 ENCOUNTER — TELEPHONE (OUTPATIENT)
Dept: PULMONOLOGY | Age: 59
End: 2019-10-18

## 2019-10-18 DIAGNOSIS — J44.9 COPD WITH ASTHMA (HCC): Primary | ICD-10-CM

## 2019-10-18 RX ORDER — PREDNISONE 10 MG/1
TABLET ORAL
Qty: 30 TABLET | Refills: 0 | Status: SHIPPED | OUTPATIENT
Start: 2019-10-18 | End: 2019-10-28

## 2019-11-15 ENCOUNTER — TELEPHONE (OUTPATIENT)
Dept: PULMONOLOGY | Age: 59
End: 2019-11-15

## 2019-11-15 DIAGNOSIS — J44.9 COPD WITH ASTHMA (HCC): Primary | ICD-10-CM

## 2019-11-15 RX ORDER — PREDNISONE 10 MG/1
TABLET ORAL
Qty: 30 TABLET | Refills: 0 | Status: SHIPPED | OUTPATIENT
Start: 2019-11-15 | End: 2019-11-25

## 2019-11-26 NOTE — TELEPHONE ENCOUNTER
Scheduled 11/29/18 and placed on wait list for sooner appt.   Has PFT scheduled 10/3/18 Statement Selected

## 2019-11-29 RX ORDER — PREDNISONE 20 MG/1
20 TABLET ORAL DAILY
Qty: 7 TABLET | Refills: 0 | Status: SHIPPED | OUTPATIENT
Start: 2019-11-29 | End: 2019-12-06

## 2019-12-02 ENCOUNTER — OFFICE VISIT (OUTPATIENT)
Dept: PULMONOLOGY | Age: 59
End: 2019-12-02
Payer: COMMERCIAL

## 2019-12-02 VITALS
WEIGHT: 206 LBS | SYSTOLIC BLOOD PRESSURE: 126 MMHG | RESPIRATION RATE: 16 BRPM | DIASTOLIC BLOOD PRESSURE: 80 MMHG | TEMPERATURE: 98.4 F | HEIGHT: 70 IN | HEART RATE: 62 BPM | BODY MASS INDEX: 29.49 KG/M2 | OXYGEN SATURATION: 99 %

## 2019-12-02 DIAGNOSIS — J44.9 COPD WITH ASTHMA (HCC): Primary | ICD-10-CM

## 2019-12-02 DIAGNOSIS — J45.50 SEVERE PERSISTENT ASTHMA WITHOUT COMPLICATION: ICD-10-CM

## 2019-12-02 PROCEDURE — 3023F SPIROM DOC REV: CPT | Performed by: INTERNAL MEDICINE

## 2019-12-02 PROCEDURE — 1036F TOBACCO NON-USER: CPT | Performed by: INTERNAL MEDICINE

## 2019-12-02 PROCEDURE — G8599 NO ASA/ANTIPLAT THER USE RNG: HCPCS | Performed by: INTERNAL MEDICINE

## 2019-12-02 PROCEDURE — 3017F COLORECTAL CA SCREEN DOC REV: CPT | Performed by: INTERNAL MEDICINE

## 2019-12-02 PROCEDURE — G8926 SPIRO NO PERF OR DOC: HCPCS | Performed by: INTERNAL MEDICINE

## 2019-12-02 PROCEDURE — G8484 FLU IMMUNIZE NO ADMIN: HCPCS | Performed by: INTERNAL MEDICINE

## 2019-12-02 PROCEDURE — 99213 OFFICE O/P EST LOW 20 MIN: CPT | Performed by: INTERNAL MEDICINE

## 2019-12-02 PROCEDURE — G8427 DOCREV CUR MEDS BY ELIG CLIN: HCPCS | Performed by: INTERNAL MEDICINE

## 2019-12-02 PROCEDURE — G8417 CALC BMI ABV UP PARAM F/U: HCPCS | Performed by: INTERNAL MEDICINE

## 2019-12-02 RX ORDER — PREDNISONE 10 MG/1
10 TABLET ORAL DAILY
Qty: 30 TABLET | Refills: 3 | Status: SHIPPED | OUTPATIENT
Start: 2019-12-02 | End: 2020-01-01

## 2019-12-02 ASSESSMENT — ASTHMA QUESTIONNAIRES
ACT_TOTALSCORE: 9
QUESTION_5 LAST FOUR WEEKS HOW WOULD YOU RATE YOUR ASTHMA CONTROL: 3
QUESTION_1 LAST FOUR WEEKS HOW MUCH OF THE TIME DID YOUR ASTHMA KEEP YOU FROM GETTING AS MUCH DONE AT WORK, SCHOOL OR AT HOME: 2
QUESTION_4 LAST FOUR WEEKS HOW OFTEN HAVE YOU USED YOUR RESCUE INHALER OR NEBULIZER MEDICATION (SUCH AS ALBUTEROL): 2
QUESTION_3 LAST FOUR WEEKS HOW OFTEN DID YOUR ASTHMA SYMPTOMS (WHEEZING, COUGHING, SHORTNESS OF BREATH, CHEST TIGHTNESS OR PAIN) WAKE YOU UP AT NIGHT OR EARLIER THAN USUAL IN THE MORNING: 1
QUESTION_2 LAST FOUR WEEKS HOW OFTEN HAVE YOU HAD SHORTNESS OF BREATH: 1

## 2019-12-03 PROBLEM — J45.50 SEVERE PERSISTENT ASTHMA WITHOUT COMPLICATION: Status: ACTIVE | Noted: 2019-12-03

## 2019-12-28 ENCOUNTER — APPOINTMENT (OUTPATIENT)
Dept: GENERAL RADIOLOGY | Age: 59
End: 2019-12-28
Payer: COMMERCIAL

## 2019-12-28 ENCOUNTER — HOSPITAL ENCOUNTER (EMERGENCY)
Age: 59
Discharge: HOME | End: 2019-12-28
Attending: EMERGENCY MEDICINE
Payer: COMMERCIAL

## 2019-12-28 VITALS
DIASTOLIC BLOOD PRESSURE: 93 MMHG | WEIGHT: 205.91 LBS | SYSTOLIC BLOOD PRESSURE: 126 MMHG | HEART RATE: 78 BPM | OXYGEN SATURATION: 98 % | BODY MASS INDEX: 29.48 KG/M2 | HEIGHT: 70 IN | RESPIRATION RATE: 18 BRPM | TEMPERATURE: 99.3 F

## 2019-12-28 DIAGNOSIS — J44.1 COPD EXACERBATION (HCC): Primary | ICD-10-CM

## 2019-12-28 LAB
RAPID INFLUENZA  B AGN: NEGATIVE
RAPID INFLUENZA A AGN: NEGATIVE

## 2019-12-28 PROCEDURE — 6370000000 HC RX 637 (ALT 250 FOR IP): Performed by: EMERGENCY MEDICINE

## 2019-12-28 PROCEDURE — 87804 INFLUENZA ASSAY W/OPTIC: CPT

## 2019-12-28 PROCEDURE — 71046 X-RAY EXAM CHEST 2 VIEWS: CPT

## 2019-12-28 PROCEDURE — 94640 AIRWAY INHALATION TREATMENT: CPT

## 2019-12-28 PROCEDURE — 99285 EMERGENCY DEPT VISIT HI MDM: CPT

## 2019-12-28 RX ORDER — PREDNISONE 10 MG/1
TABLET ORAL
Qty: 30 TABLET | Refills: 0 | Status: SHIPPED | OUTPATIENT
Start: 2019-12-28 | End: 2020-03-03 | Stop reason: DRUGHIGH

## 2019-12-28 RX ORDER — PREDNISONE 20 MG/1
60 TABLET ORAL ONCE
Status: COMPLETED | OUTPATIENT
Start: 2019-12-28 | End: 2019-12-28

## 2019-12-28 RX ORDER — IPRATROPIUM BROMIDE AND ALBUTEROL SULFATE 2.5; .5 MG/3ML; MG/3ML
3 SOLUTION RESPIRATORY (INHALATION) ONCE
Status: COMPLETED | OUTPATIENT
Start: 2019-12-28 | End: 2019-12-28

## 2019-12-28 RX ORDER — DOXYCYCLINE HYCLATE 100 MG
100 TABLET ORAL 2 TIMES DAILY
Qty: 20 TABLET | Refills: 0 | Status: SHIPPED | OUTPATIENT
Start: 2019-12-28 | End: 2020-01-07

## 2019-12-28 RX ORDER — BENZONATATE 100 MG/1
100 CAPSULE ORAL 3 TIMES DAILY PRN
Qty: 30 CAPSULE | Refills: 0 | Status: SHIPPED | OUTPATIENT
Start: 2019-12-28 | End: 2020-01-07

## 2019-12-28 RX ORDER — ONDANSETRON 4 MG/1
4-8 TABLET, ORALLY DISINTEGRATING ORAL EVERY 8 HOURS PRN
Qty: 20 TABLET | Refills: 0 | Status: SHIPPED | OUTPATIENT
Start: 2019-12-28 | End: 2021-09-18

## 2019-12-28 RX ADMIN — PREDNISONE 60 MG: 20 TABLET ORAL at 12:40

## 2019-12-28 RX ADMIN — IPRATROPIUM BROMIDE AND ALBUTEROL SULFATE 3 AMPULE: .5; 3 SOLUTION RESPIRATORY (INHALATION) at 12:49

## 2020-01-30 ENCOUNTER — TELEPHONE (OUTPATIENT)
Dept: PULMONOLOGY | Age: 60
End: 2020-01-30

## 2020-02-13 NOTE — PROGRESS NOTES
C-Difficile admission screening and protocol:     * Admitted with diarrhea? no     *Prior history of C-Diff. In last 3 months? no     *Antibiotic use in the past 6-8 weeks? Yes for URI 2 months ago     *Prior hospitalization or nursing home in the last month?   no
cases, you may be provided with more specific instructions according to your surgery.

## 2020-02-20 ENCOUNTER — PREP FOR PROCEDURE (OUTPATIENT)
Dept: OPHTHALMOLOGY | Age: 60
End: 2020-02-20

## 2020-02-20 ENCOUNTER — ANESTHESIA EVENT (OUTPATIENT)
Dept: SURGERY | Age: 60
End: 2020-02-20
Payer: COMMERCIAL

## 2020-02-20 RX ORDER — SODIUM CHLORIDE 0.9 % (FLUSH) 0.9 %
10 SYRINGE (ML) INJECTION EVERY 12 HOURS SCHEDULED
Status: CANCELLED | OUTPATIENT
Start: 2020-02-20

## 2020-02-20 RX ORDER — SODIUM CHLORIDE 0.9 % (FLUSH) 0.9 %
10 SYRINGE (ML) INJECTION PRN
Status: CANCELLED | OUTPATIENT
Start: 2020-02-20

## 2020-02-20 RX ORDER — CIPROFLOXACIN HYDROCHLORIDE 3.5 MG/ML
1 SOLUTION/ DROPS TOPICAL SEE ADMIN INSTRUCTIONS
Status: CANCELLED | OUTPATIENT
Start: 2020-02-20

## 2020-02-20 RX ORDER — TETRACAINE HYDROCHLORIDE 5 MG/ML
1 SOLUTION OPHTHALMIC ONCE
Status: CANCELLED | OUTPATIENT
Start: 2020-02-20 | End: 2020-02-20

## 2020-02-21 ENCOUNTER — HOSPITAL ENCOUNTER (OUTPATIENT)
Age: 60
Setting detail: OUTPATIENT SURGERY
Discharge: HOME | End: 2020-02-21
Attending: OPHTHALMOLOGY | Admitting: OPHTHALMOLOGY
Payer: COMMERCIAL

## 2020-02-21 ENCOUNTER — ANESTHESIA (OUTPATIENT)
Dept: SURGERY | Age: 60
End: 2020-02-21
Payer: COMMERCIAL

## 2020-02-21 ENCOUNTER — PREP FOR PROCEDURE (OUTPATIENT)
Dept: OPHTHALMOLOGY | Age: 60
End: 2020-02-21

## 2020-02-21 VITALS
HEART RATE: 62 BPM | BODY MASS INDEX: 29.78 KG/M2 | SYSTOLIC BLOOD PRESSURE: 109 MMHG | HEIGHT: 70 IN | TEMPERATURE: 97.6 F | WEIGHT: 208 LBS | DIASTOLIC BLOOD PRESSURE: 81 MMHG | OXYGEN SATURATION: 100 % | RESPIRATION RATE: 15 BRPM

## 2020-02-21 VITALS — OXYGEN SATURATION: 100 % | SYSTOLIC BLOOD PRESSURE: 110 MMHG | DIASTOLIC BLOOD PRESSURE: 75 MMHG

## 2020-02-21 PROCEDURE — 2580000003 HC RX 258: Performed by: ANESTHESIOLOGY

## 2020-02-21 PROCEDURE — 3700000001 HC ADD 15 MINUTES (ANESTHESIA): Performed by: OPHTHALMOLOGY

## 2020-02-21 PROCEDURE — 7100000011 HC PHASE II RECOVERY - ADDTL 15 MIN: Performed by: OPHTHALMOLOGY

## 2020-02-21 PROCEDURE — 2709999900 HC NON-CHARGEABLE SUPPLY: Performed by: OPHTHALMOLOGY

## 2020-02-21 PROCEDURE — 3600000004 HC SURGERY LEVEL 4 BASE: Performed by: OPHTHALMOLOGY

## 2020-02-21 PROCEDURE — 6370000000 HC RX 637 (ALT 250 FOR IP): Performed by: OPHTHALMOLOGY

## 2020-02-21 PROCEDURE — 6360000002 HC RX W HCPCS: Performed by: NURSE ANESTHETIST, CERTIFIED REGISTERED

## 2020-02-21 PROCEDURE — 7100000010 HC PHASE II RECOVERY - FIRST 15 MIN: Performed by: OPHTHALMOLOGY

## 2020-02-21 PROCEDURE — 3600000014 HC SURGERY LEVEL 4 ADDTL 15MIN: Performed by: OPHTHALMOLOGY

## 2020-02-21 PROCEDURE — 2500000003 HC RX 250 WO HCPCS: Performed by: OPHTHALMOLOGY

## 2020-02-21 PROCEDURE — V2632 POST CHMBR INTRAOCULAR LENS: HCPCS | Performed by: OPHTHALMOLOGY

## 2020-02-21 PROCEDURE — 3700000000 HC ANESTHESIA ATTENDED CARE: Performed by: OPHTHALMOLOGY

## 2020-02-21 DEVICE — LENS IOL SN60WF 20.5D: Type: IMPLANTABLE DEVICE | Status: FUNCTIONAL

## 2020-02-21 RX ORDER — TETRACAINE HYDROCHLORIDE 5 MG/ML
SOLUTION OPHTHALMIC
Status: COMPLETED | OUTPATIENT
Start: 2020-02-21 | End: 2020-02-21

## 2020-02-21 RX ORDER — SODIUM CHLORIDE 9 MG/ML
INJECTION, SOLUTION INTRAVENOUS CONTINUOUS
Status: DISCONTINUED | OUTPATIENT
Start: 2020-02-21 | End: 2020-02-21 | Stop reason: HOSPADM

## 2020-02-21 RX ORDER — SODIUM CHLORIDE 0.9 % (FLUSH) 0.9 %
10 SYRINGE (ML) INJECTION EVERY 12 HOURS SCHEDULED
Status: CANCELLED | OUTPATIENT
Start: 2020-02-21

## 2020-02-21 RX ORDER — OFLOXACIN 3 MG/ML
SOLUTION/ DROPS OPHTHALMIC
Status: COMPLETED | OUTPATIENT
Start: 2020-02-21 | End: 2020-02-21

## 2020-02-21 RX ORDER — TETRACAINE HYDROCHLORIDE 5 MG/ML
1 SOLUTION OPHTHALMIC ONCE
Status: COMPLETED | OUTPATIENT
Start: 2020-02-21 | End: 2020-02-21

## 2020-02-21 RX ORDER — SODIUM CHLORIDE 0.9 % (FLUSH) 0.9 %
10 SYRINGE (ML) INJECTION PRN
Status: DISCONTINUED | OUTPATIENT
Start: 2020-02-21 | End: 2020-02-21 | Stop reason: SDUPTHER

## 2020-02-21 RX ORDER — SODIUM CHLORIDE 0.9 % (FLUSH) 0.9 %
10 SYRINGE (ML) INJECTION PRN
Status: CANCELLED | OUTPATIENT
Start: 2020-02-21

## 2020-02-21 RX ORDER — SODIUM CHLORIDE 0.9 % (FLUSH) 0.9 %
10 SYRINGE (ML) INJECTION EVERY 12 HOURS SCHEDULED
Status: DISCONTINUED | OUTPATIENT
Start: 2020-02-21 | End: 2020-02-21 | Stop reason: HOSPADM

## 2020-02-21 RX ORDER — FENTANYL CITRATE 50 UG/ML
INJECTION, SOLUTION INTRAMUSCULAR; INTRAVENOUS PRN
Status: DISCONTINUED | OUTPATIENT
Start: 2020-02-21 | End: 2020-02-21 | Stop reason: SDUPTHER

## 2020-02-21 RX ORDER — BRIMONIDINE TARTRATE 2 MG/ML
SOLUTION/ DROPS OPHTHALMIC
Status: COMPLETED | OUTPATIENT
Start: 2020-02-21 | End: 2020-02-21

## 2020-02-21 RX ORDER — BALANCED SALT SOLUTION 6.4; .75; .48; .3; 3.9; 1.7 MG/ML; MG/ML; MG/ML; MG/ML; MG/ML; MG/ML
SOLUTION OPHTHALMIC
Status: COMPLETED | OUTPATIENT
Start: 2020-02-21 | End: 2020-02-21

## 2020-02-21 RX ORDER — SODIUM CHLORIDE 0.9 % (FLUSH) 0.9 %
10 SYRINGE (ML) INJECTION EVERY 12 HOURS SCHEDULED
Status: DISCONTINUED | OUTPATIENT
Start: 2020-02-21 | End: 2020-02-21 | Stop reason: SDUPTHER

## 2020-02-21 RX ORDER — CIPROFLOXACIN HYDROCHLORIDE 3.5 MG/ML
1 SOLUTION/ DROPS TOPICAL SEE ADMIN INSTRUCTIONS
Status: DISCONTINUED | OUTPATIENT
Start: 2020-02-21 | End: 2020-02-21 | Stop reason: HOSPADM

## 2020-02-21 RX ORDER — MIDAZOLAM HYDROCHLORIDE 1 MG/ML
INJECTION INTRAMUSCULAR; INTRAVENOUS PRN
Status: DISCONTINUED | OUTPATIENT
Start: 2020-02-21 | End: 2020-02-21 | Stop reason: SDUPTHER

## 2020-02-21 RX ORDER — CIPROFLOXACIN HYDROCHLORIDE 3.5 MG/ML
1 SOLUTION/ DROPS TOPICAL SEE ADMIN INSTRUCTIONS
Status: CANCELLED | OUTPATIENT
Start: 2020-02-21

## 2020-02-21 RX ORDER — TETRACAINE HYDROCHLORIDE 5 MG/ML
1 SOLUTION OPHTHALMIC ONCE
Status: CANCELLED | OUTPATIENT
Start: 2020-02-21 | End: 2020-02-21

## 2020-02-21 RX ORDER — SODIUM CHLORIDE 0.9 % (FLUSH) 0.9 %
10 SYRINGE (ML) INJECTION PRN
Status: DISCONTINUED | OUTPATIENT
Start: 2020-02-21 | End: 2020-02-21 | Stop reason: HOSPADM

## 2020-02-21 RX ADMIN — Medication 0.5 ML: at 07:01

## 2020-02-21 RX ADMIN — SODIUM CHLORIDE: 9 INJECTION, SOLUTION INTRAVENOUS at 07:34

## 2020-02-21 RX ADMIN — SODIUM CHLORIDE: 9 INJECTION, SOLUTION INTRAVENOUS at 07:01

## 2020-02-21 RX ADMIN — MIDAZOLAM 1 MG: 1 INJECTION INTRAMUSCULAR; INTRAVENOUS at 07:48

## 2020-02-21 RX ADMIN — POVIDONE-IODINE: 5 SOLUTION OPHTHALMIC at 07:00

## 2020-02-21 RX ADMIN — FENTANYL CITRATE 50 MCG: 50 INJECTION INTRAMUSCULAR; INTRAVENOUS at 07:50

## 2020-02-21 RX ADMIN — TETRACAINE HYDROCHLORIDE 1 DROP: 5 SOLUTION OPHTHALMIC at 07:00

## 2020-02-21 RX ADMIN — MIDAZOLAM 1 MG: 1 INJECTION INTRAMUSCULAR; INTRAVENOUS at 07:49

## 2020-02-21 ASSESSMENT — PAIN - FUNCTIONAL ASSESSMENT: PAIN_FUNCTIONAL_ASSESSMENT: 0-10

## 2020-02-21 ASSESSMENT — ENCOUNTER SYMPTOMS: SHORTNESS OF BREATH: 0

## 2020-02-21 ASSESSMENT — PAIN SCALES - GENERAL
PAINLEVEL_OUTOF10: 0
PAINLEVEL_OUTOF10: 0

## 2020-02-21 ASSESSMENT — LIFESTYLE VARIABLES: SMOKING_STATUS: 1

## 2020-02-21 NOTE — ANESTHESIA POSTPROCEDURE EVALUATION
Department of Anesthesiology  Postprocedure Note    Patient: Amee Prather  MRN: [id number]  YOB: 1960  Date of evaluation: 2/21/2020  Time:  12:37 PM     Procedure Summary     Date:  02/21/20 Room / Location:  [facility]    Anesthesia Start:  7891 Anesthesia Stop:  0806    Procedure:  PHACOEMULSIFICATION WITH INTRAOCULAR LENS IMPLANT (Right Eye) Diagnosis:       Combined forms of age-related cataract of right eye      (cataract of right eye)    Surgeon:  Neha Buckner MD Responsible Provider:  Steven Morales MD    Anesthesia Type:  MAC ASA Status:  3          Anesthesia Type: MAC    Paulo Phase I: Paulo Score: 10    Paulo Phase II: Paulo Score: 10    Last vitals: Reviewed and per EMR flowsheets.        Anesthesia Post Evaluation    Patient location during evaluation: PACU  Patient participation: complete - patient participated  Level of consciousness: awake and alert  Pain score: 0  Airway patency: patent  Nausea & Vomiting: no nausea and no vomiting  Complications: no  Cardiovascular status: blood pressure returned to baseline  Respiratory status: acceptable  Hydration status: euvolemic

## 2020-02-21 NOTE — ANESTHESIA PRE PROCEDURE
Derrek Woodall MD   hydrOXYzine (ATARAX) 25 MG tablet Take 25 mg by mouth 2 times daily as needed     Historical Provider, MD   Spacer/Aero Chamber Mouthpiece MISC 1 each by Does not apply route once as needed (with sybicort) 12/7/18 12/7/18  Derrek Woodlal MD   Spacer/Aero-Holding Chambers (VORTEX VALVED HOLDING CHAMBER) [address] 1 each by Does not apply route daily 12/7/18   Derrek Woodall MD   albuterol (PROVENTIL) (5 MG/ML) 0.5% nebulizer solution Take 0.5 mLs by nebulization every 6 hours as needed for Wheezing 11/5/18   Anthony Garcia MD   albuterol sulfate HFA (PROAIR HFA) 108 (90 Base) MCG/ACT inhaler Inhale 2 puffs into the lungs every 6 hours as needed for Wheezing 8/15/18   Lauri Bland MD   budesonide-formoterol [facility]) 160-4.5 MCG/ACT AERO Inhale 2 puffs into the lungs 2 times daily 4/10/18   Margarita Kohler MD   METHADONE HCL PO Take 90 mg by mouth daily     Historical Provider, MD   diphenhydrAMINE (BENADRYL) 25 MG tablet Take 25 mg by mouth every 8 hours as needed for Itching or Allergies    Historical Provider, MD   amLODIPine (NORVASC) 10 MG tablet Take 1 tablet by mouth daily. 12/14/14   Kimberly Bourne MD       Current medications:    No current facility-administered medications for this visit. No current outpatient medications on file.      Facility-Administered Medications Ordered in Other Visits   Medication Dose Route Frequency Provider Last Rate Last Dose    0.9 % sodium chloride infusion   Intravenous Continuous Franki Purvis MD        sodium chloride flush 0.9 % injection 10 mL  10 mL Intravenous 2 times per day Franki Purvis MD        sodium chloride flush 0.9 % injection 10 mL  10 mL Intravenous PRN Franki Purvis MD        ciprofloxacin (CILOXAN) 0.3 % ophthalmic solution 1 drop  1 drop Right Eye See Admin Instructions Russ Ruiz MD        cyclopentolate 1%, phenylephrine 2.5%, tropicamide 1%, ketorolac 0.5% ophthalmic solution  0.5 mL Catrachito Campos MD  February 21, 2020  6:50 AM        Catrachito Campos MD   2/21/2020

## 2020-02-21 NOTE — OP NOTE
were found. The patient had ofloxacin and Alphagan solutions placed on the eye. The patient went to the PACU in excellent condition, having tolerated the procedure well.

## 2020-02-27 ENCOUNTER — ANESTHESIA EVENT (OUTPATIENT)
Dept: SURGERY | Age: 60
End: 2020-02-27
Payer: COMMERCIAL

## 2020-02-27 NOTE — PROGRESS NOTES
[address] [facility]  [phone]        Pre-Op Phone Call:     Patient Name: Errol Contreras     Telephone Information:   Mobile [phone]     Home phone:  [phone]     Surgery Time:    8:20 AM     Arrival Time:   0650 time change      Left extended Message:  Yes     Message left with: VM     Recent change in health status:  Call MD   Advised of transportation/ policy:  Yes     NPO policy reviewed:  Yes VM     Advised to take morning heart/blood pressure medications with sips of water morning of surgery? Yes     Instructed to bring eye drops, photo identification, and insurance card day of surgery? Yes     Advised to wear short sleeved button down shirt (no T-shirt underneath):  Yes     Advised not to wear jewelry, hairpins, or pantyhose day of surgery? Yes     Advised not to wear make-up and to wash face day of surgery?   Yes    Remarks:        Electronically signed by:  Nadege Gabriel RN at 2/27/2020 3:01 PM
C-Difficile admission screening and protocol:     * Admitted with diarrhea? no     *Prior history of C-Diff. In last 3 months? no     *Antibiotic use in the past 6-8 weeks? Yes drops     *Prior hospitalization or nursing home in the last month?     no
more specific instructions according to your surgery.

## 2020-02-28 ENCOUNTER — PREP FOR PROCEDURE (OUTPATIENT)
Dept: OPHTHALMOLOGY | Age: 60
End: 2020-02-28

## 2020-02-28 ENCOUNTER — ANESTHESIA (OUTPATIENT)
Dept: SURGERY | Age: 60
End: 2020-02-28
Payer: COMMERCIAL

## 2020-02-28 ENCOUNTER — HOSPITAL ENCOUNTER (OUTPATIENT)
Age: 60
Setting detail: OUTPATIENT SURGERY
Discharge: HOME | End: 2020-02-28
Attending: OPHTHALMOLOGY | Admitting: OPHTHALMOLOGY
Payer: COMMERCIAL

## 2020-02-28 VITALS
OXYGEN SATURATION: 100 % | SYSTOLIC BLOOD PRESSURE: 122 MMHG | DIASTOLIC BLOOD PRESSURE: 81 MMHG | RESPIRATION RATE: 9 BRPM

## 2020-02-28 VITALS
RESPIRATION RATE: 10 BRPM | SYSTOLIC BLOOD PRESSURE: 128 MMHG | HEIGHT: 70 IN | WEIGHT: 208 LBS | DIASTOLIC BLOOD PRESSURE: 80 MMHG | HEART RATE: 60 BPM | BODY MASS INDEX: 29.78 KG/M2 | OXYGEN SATURATION: 99 % | TEMPERATURE: 97.5 F

## 2020-02-28 PROCEDURE — 2580000003 HC RX 258: Performed by: ANESTHESIOLOGY

## 2020-02-28 PROCEDURE — 3600000014 HC SURGERY LEVEL 4 ADDTL 15MIN: Performed by: OPHTHALMOLOGY

## 2020-02-28 PROCEDURE — 7100000010 HC PHASE II RECOVERY - FIRST 15 MIN: Performed by: OPHTHALMOLOGY

## 2020-02-28 PROCEDURE — 6370000000 HC RX 637 (ALT 250 FOR IP): Performed by: OPHTHALMOLOGY

## 2020-02-28 PROCEDURE — 7100000011 HC PHASE II RECOVERY - ADDTL 15 MIN: Performed by: OPHTHALMOLOGY

## 2020-02-28 PROCEDURE — 2709999900 HC NON-CHARGEABLE SUPPLY: Performed by: OPHTHALMOLOGY

## 2020-02-28 PROCEDURE — 3700000000 HC ANESTHESIA ATTENDED CARE: Performed by: OPHTHALMOLOGY

## 2020-02-28 PROCEDURE — 6370000000 HC RX 637 (ALT 250 FOR IP)

## 2020-02-28 PROCEDURE — 3600000004 HC SURGERY LEVEL 4 BASE: Performed by: OPHTHALMOLOGY

## 2020-02-28 PROCEDURE — V2632 POST CHMBR INTRAOCULAR LENS: HCPCS | Performed by: OPHTHALMOLOGY

## 2020-02-28 PROCEDURE — 3700000001 HC ADD 15 MINUTES (ANESTHESIA): Performed by: OPHTHALMOLOGY

## 2020-02-28 PROCEDURE — 6360000002 HC RX W HCPCS: Performed by: NURSE ANESTHETIST, CERTIFIED REGISTERED

## 2020-02-28 PROCEDURE — 2500000003 HC RX 250 WO HCPCS

## 2020-02-28 PROCEDURE — 2500000003 HC RX 250 WO HCPCS: Performed by: OPHTHALMOLOGY

## 2020-02-28 DEVICE — LENS IOL SN60WF 20.5D: Type: IMPLANTABLE DEVICE | Status: FUNCTIONAL

## 2020-02-28 RX ORDER — SODIUM CHLORIDE 0.9 % (FLUSH) 0.9 %
10 SYRINGE (ML) INJECTION EVERY 12 HOURS SCHEDULED
Status: CANCELLED | OUTPATIENT
Start: 2020-02-28

## 2020-02-28 RX ORDER — OFLOXACIN 3 MG/ML
SOLUTION/ DROPS OPHTHALMIC
Status: COMPLETED | OUTPATIENT
Start: 2020-02-28 | End: 2020-02-28

## 2020-02-28 RX ORDER — FENTANYL CITRATE 50 UG/ML
INJECTION, SOLUTION INTRAMUSCULAR; INTRAVENOUS PRN
Status: DISCONTINUED | OUTPATIENT
Start: 2020-02-28 | End: 2020-02-28 | Stop reason: SDUPTHER

## 2020-02-28 RX ORDER — TETRACAINE HYDROCHLORIDE 5 MG/ML
SOLUTION OPHTHALMIC
Status: COMPLETED
Start: 2020-02-28 | End: 2020-02-28

## 2020-02-28 RX ORDER — SODIUM CHLORIDE 9 MG/ML
INJECTION, SOLUTION INTRAVENOUS CONTINUOUS
Status: DISCONTINUED | OUTPATIENT
Start: 2020-02-28 | End: 2020-02-28 | Stop reason: HOSPADM

## 2020-02-28 RX ORDER — TETRACAINE HYDROCHLORIDE 5 MG/ML
1 SOLUTION OPHTHALMIC ONCE
Status: COMPLETED | OUTPATIENT
Start: 2020-02-28 | End: 2020-02-28

## 2020-02-28 RX ORDER — TETRACAINE HYDROCHLORIDE 5 MG/ML
1 SOLUTION OPHTHALMIC ONCE
Status: CANCELLED | OUTPATIENT
Start: 2020-02-28 | End: 2020-02-28

## 2020-02-28 RX ORDER — MIDAZOLAM HYDROCHLORIDE 1 MG/ML
INJECTION INTRAMUSCULAR; INTRAVENOUS PRN
Status: DISCONTINUED | OUTPATIENT
Start: 2020-02-28 | End: 2020-02-28 | Stop reason: SDUPTHER

## 2020-02-28 RX ORDER — SODIUM CHLORIDE 0.9 % (FLUSH) 0.9 %
10 SYRINGE (ML) INJECTION EVERY 12 HOURS SCHEDULED
Status: DISCONTINUED | OUTPATIENT
Start: 2020-02-28 | End: 2020-02-28 | Stop reason: SDUPTHER

## 2020-02-28 RX ORDER — SODIUM CHLORIDE 0.9 % (FLUSH) 0.9 %
10 SYRINGE (ML) INJECTION EVERY 12 HOURS SCHEDULED
Status: DISCONTINUED | OUTPATIENT
Start: 2020-02-28 | End: 2020-02-28 | Stop reason: HOSPADM

## 2020-02-28 RX ORDER — BRIMONIDINE TARTRATE 2 MG/ML
SOLUTION/ DROPS OPHTHALMIC
Status: COMPLETED | OUTPATIENT
Start: 2020-02-28 | End: 2020-02-28

## 2020-02-28 RX ORDER — BALANCED SALT SOLUTION 6.4; .75; .48; .3; 3.9; 1.7 MG/ML; MG/ML; MG/ML; MG/ML; MG/ML; MG/ML
SOLUTION OPHTHALMIC
Status: COMPLETED | OUTPATIENT
Start: 2020-02-28 | End: 2020-02-28

## 2020-02-28 RX ORDER — SODIUM CHLORIDE 0.9 % (FLUSH) 0.9 %
10 SYRINGE (ML) INJECTION PRN
Status: DISCONTINUED | OUTPATIENT
Start: 2020-02-28 | End: 2020-02-28 | Stop reason: SDUPTHER

## 2020-02-28 RX ORDER — SODIUM CHLORIDE 0.9 % (FLUSH) 0.9 %
10 SYRINGE (ML) INJECTION PRN
Status: CANCELLED | OUTPATIENT
Start: 2020-02-28

## 2020-02-28 RX ORDER — SODIUM CHLORIDE 0.9 % (FLUSH) 0.9 %
10 SYRINGE (ML) INJECTION PRN
Status: DISCONTINUED | OUTPATIENT
Start: 2020-02-28 | End: 2020-02-28 | Stop reason: HOSPADM

## 2020-02-28 RX ORDER — TETRACAINE HYDROCHLORIDE 5 MG/ML
SOLUTION OPHTHALMIC
Status: COMPLETED | OUTPATIENT
Start: 2020-02-28 | End: 2020-02-28

## 2020-02-28 RX ADMIN — SODIUM CHLORIDE: 0.9 INJECTION, SOLUTION INTRAVENOUS at 07:55

## 2020-02-28 RX ADMIN — MIDAZOLAM 2 MG: 1 INJECTION INTRAMUSCULAR; INTRAVENOUS at 08:21

## 2020-02-28 RX ADMIN — FENTANYL CITRATE 50 MCG: 50 INJECTION INTRAMUSCULAR; INTRAVENOUS at 08:24

## 2020-02-28 RX ADMIN — TETRACAINE HYDROCHLORIDE 1 DROP: 5 SOLUTION OPHTHALMIC at 07:55

## 2020-02-28 RX ADMIN — FENTANYL CITRATE 50 MCG: 50 INJECTION INTRAMUSCULAR; INTRAVENOUS at 08:22

## 2020-02-28 RX ADMIN — POVIDONE-IODINE 0.1 ML: 5 SOLUTION OPHTHALMIC at 07:55

## 2020-02-28 RX ADMIN — Medication 0.5 ML: at 07:53

## 2020-02-28 ASSESSMENT — PULMONARY FUNCTION TESTS
PIF_VALUE: 1

## 2020-02-28 ASSESSMENT — ENCOUNTER SYMPTOMS: SHORTNESS OF BREATH: 0

## 2020-02-28 ASSESSMENT — PAIN - FUNCTIONAL ASSESSMENT: PAIN_FUNCTIONAL_ASSESSMENT: 0-10

## 2020-02-28 ASSESSMENT — PAIN SCALES - GENERAL
PAINLEVEL_OUTOF10: 0
PAINLEVEL_OUTOF10: 0

## 2020-02-28 NOTE — ANESTHESIA PRE PROCEDURE
BP Readings from Last 3 Encounters:   20 122/83   20 110/75   20 109/81     Vital Signs Statistics (for past 48 hrs)     Temp  Av.6 °F (36.4 °C)  Min: 97.6 °F (36.4 °C)   Min taken time: 2045  Max: 97.6 °F (36.4 °C)   Max taken time: 2045  Pulse  Av  Min: 77   Min taken time: 20 0745  Max: 77   Max taken time: 20 0745  Resp  Avg: 10  Min: 10   Min taken time: 20 0745  Max: 10   Max taken time: 20 0745  BP  Min: 122/83   Min taken time: 20 0745  Max: 122/83   Max taken time: 20 0745  SpO2  Av %  Min: 97 %   Min taken time: 2045  Max: 97 %   Max taken time: 20 0745  BP Readings from Last 3 Encounters:   20 122/83   20 110/75   20 109/81       BMI  Body mass index is 29.84 kg/m². Estimated body mass index is 29.84 kg/m² as calculated from the following:    Height as of this encounter: 5' 10\" (1.778 m). Weight as of this encounter: 208 lb (94.3 kg).     CBC   Lab Results   Component Value Date    WBC 4.3 2019    RBC 4.77 2019    HGB 13.8 2019    HCT 42.8 2019    MCV 89.8 2019    RDW 14.3 2019     2019     CMP    Lab Results   Component Value Date     2019    K 4.1 2019     2019    CO2 26 2019    BUN 4 2019    CREATININE 0.8 2019    GFRAA >60 2019    GFRAA >60 12/10/2011    AGRATIO 1.4 08/15/2018    LABGLOM >60 2019    GLUCOSE 104 2019    PROT 6.8 08/15/2018    PROT 6.9 12/10/2011    CALCIUM 9.6 2019    BILITOT 0.5 08/15/2018    ALKPHOS 70 08/15/2018    AST 13 08/15/2018    ALT 15 08/15/2018     BMP    Lab Results   Component Value Date     2019    K 4.1 2019     2019    CO2 26 2019    BUN 4 2019    CREATININE 0.8 2019    CALCIUM 9.6 2019    GFRAA >60 2019    GFRAA >60 12/10/2011    LABGLOM >60 2019    GLUCOSE 104 07/31/2019     POCGlucose  No results for input(s): GLUCOSE in the last 72 hours. Coags    Lab Results   Component Value Date    PROTIME 11.1 08/15/2018    INR 0.97 08/15/2018    APTT 28.3 14/72/1570     HCG (If Applicable)   Lab Results   Component Value Date    PREGTESTUR Negative 12/15/2016      ABGs   Lab Results   Component Value Date    PHART 7.384 08/15/2018    PO2ART 63.0 08/15/2018    JPJ9SKK 49.7 08/15/2018    SQV1GNX 29.7 08/15/2018    BEART 4.0 08/15/2018    A3QCONUX 91.0 08/15/2018      Type & Screen (If Applicable)  No results found for: LABABO, LABRH                         BMI: Wt Readings from Last 3 Encounters:       NPO Status:   Date of last liquid consumption: 02/27/20   Time of last liquid consumption: 2200   Date of last solid food consumption: 02/27/20      Time of last solid consumption: 2000       Anesthesia Evaluation  Patient summary reviewed  Airway: Mallampati: III  TM distance: >3 FB   Neck ROM: full  Mouth opening: > = 3 FB Dental:          Pulmonary:   (+) COPD:  asthma:     (-) shortness of breath                           Cardiovascular:    (+) hypertension:, angina:,     (-) valvular problems/murmurs, past MI, CAD, CABG/stent and dysrhythmias                Neuro/Psych:   (+) psychiatric history:   (-) seizures, TIA and CVA           GI/Hepatic/Renal:   (+) hepatitis: C, liver disease:,      (-) GERD, PUD and no renal disease       Endo/Other:        (-) diabetes mellitus               Abdominal:           Vascular: negative vascular ROS. Anesthesia Plan      MAC     ASA 3     (I discussed local anesthesia with intravenous sedation to the   patient's satisfaction and agreed including risks and alternatives. The  patient has no further questions. CARLA FERNANDEZ )  Induction: intravenous. Anesthetic plan and risks discussed with patient. Plan discussed with CRNA.                   This pre-anesthesia assessment may be used

## 2020-02-28 NOTE — ANESTHESIA POSTPROCEDURE EVALUATION
Component                Value               Date/Time                  NA                       142                 07/31/2019 08:42 AM        K                        4.1                 07/31/2019 08:42 AM        CL                       102                 07/31/2019 08:42 AM        CO2                      26                  07/31/2019 08:42 AM        BUN                      4 (L)               07/31/2019 08:42 AM        CREATININE               0.8                 07/31/2019 08:42 AM        GLUCOSE                  104 (H)             07/31/2019 08:42 AM   COAGS  Lab Results       Component                Value               Date/Time                  PROTIME                  11.1                08/15/2018 11:43 AM        INR                      0.97                08/15/2018 11:43 AM        APTT                     28.3                08/15/2018 11:43 AM     Intake & Output:  @65WYQN@    Nausea & Vomiting:  No    Level of Consciousness:  Awake    Pain Assessment:  Adequate analgesia    Anesthesia Complications:  No apparent anesthetic complications    SUMMARY      Vital signs stable  OK to discharge from Stage I post anesthesia care.   Care transferred from Anesthesiology department on discharge from perioperative area

## 2020-03-03 ENCOUNTER — OFFICE VISIT (OUTPATIENT)
Dept: PULMONOLOGY | Age: 60
End: 2020-03-03
Payer: COMMERCIAL

## 2020-03-03 VITALS
WEIGHT: 209 LBS | TEMPERATURE: 98.4 F | DIASTOLIC BLOOD PRESSURE: 76 MMHG | HEIGHT: 70 IN | BODY MASS INDEX: 29.92 KG/M2 | SYSTOLIC BLOOD PRESSURE: 124 MMHG | HEART RATE: 70 BPM | RESPIRATION RATE: 16 BRPM | OXYGEN SATURATION: 95 %

## 2020-03-03 PROBLEM — J30.89 OTHER ALLERGIC RHINITIS: Status: ACTIVE | Noted: 2020-03-03

## 2020-03-03 PROCEDURE — G8484 FLU IMMUNIZE NO ADMIN: HCPCS | Performed by: INTERNAL MEDICINE

## 2020-03-03 PROCEDURE — 99214 OFFICE O/P EST MOD 30 MIN: CPT | Performed by: INTERNAL MEDICINE

## 2020-03-03 PROCEDURE — 1036F TOBACCO NON-USER: CPT | Performed by: INTERNAL MEDICINE

## 2020-03-03 PROCEDURE — G8427 DOCREV CUR MEDS BY ELIG CLIN: HCPCS | Performed by: INTERNAL MEDICINE

## 2020-03-03 PROCEDURE — G8926 SPIRO NO PERF OR DOC: HCPCS | Performed by: INTERNAL MEDICINE

## 2020-03-03 PROCEDURE — 3017F COLORECTAL CA SCREEN DOC REV: CPT | Performed by: INTERNAL MEDICINE

## 2020-03-03 PROCEDURE — G8417 CALC BMI ABV UP PARAM F/U: HCPCS | Performed by: INTERNAL MEDICINE

## 2020-03-03 PROCEDURE — 3023F SPIROM DOC REV: CPT | Performed by: INTERNAL MEDICINE

## 2020-03-03 RX ORDER — PREDNISONE 10 MG/1
10 TABLET ORAL DAILY
COMMUNITY
End: 2020-11-13

## 2020-03-03 ASSESSMENT — ASTHMA QUESTIONNAIRES
QUESTION_2 LAST FOUR WEEKS HOW OFTEN HAVE YOU HAD SHORTNESS OF BREATH: 4
ACT_TOTALSCORE: 17
QUESTION_1 LAST FOUR WEEKS HOW MUCH OF THE TIME DID YOUR ASTHMA KEEP YOU FROM GETTING AS MUCH DONE AT WORK, SCHOOL OR AT HOME: 4
QUESTION_3 LAST FOUR WEEKS HOW OFTEN DID YOUR ASTHMA SYMPTOMS (WHEEZING, COUGHING, SHORTNESS OF BREATH, CHEST TIGHTNESS OR PAIN) WAKE YOU UP AT NIGHT OR EARLIER THAN USUAL IN THE MORNING: 3
QUESTION_4 LAST FOUR WEEKS HOW OFTEN HAVE YOU USED YOUR RESCUE INHALER OR NEBULIZER MEDICATION (SUCH AS ALBUTEROL): 3
QUESTION_5 LAST FOUR WEEKS HOW WOULD YOU RATE YOUR ASTHMA CONTROL: 3

## 2020-03-03 NOTE — PROGRESS NOTES
hours as needed for Cough 473 mL 0    montelukast (SINGULAIR) 10 MG tablet TAKE 1 TABLET BY MOUTH NIGHTLY 30 tablet 0    acetaminophen (APAP EXTRA STRENGTH) 500 MG tablet Take 2 tablets by mouth every 6 hours as needed for Pain DO NOT TAKE WITH OTHER MEDICATIONS CONTAINING ACETAMINOPHEN. 30 tablet 0    Respiratory Therapy Supplies (NEBULIZER/TUBING/MOUTHPIECE) KIT 1 kit by Does not apply route daily 1 kit 0    hydrOXYzine (ATARAX) 25 MG tablet Take 25 mg by mouth 2 times daily as needed       Spacer/Aero-Holding Chambers (VORTEX VALVED HOLDING CHAMBER) SAUL 1 each by Does not apply route daily 1 Device 0    albuterol (PROVENTIL) (5 MG/ML) 0.5% nebulizer solution Take 0.5 mLs by nebulization every 6 hours as needed for Wheezing 30 vial 0    albuterol sulfate HFA (PROAIR HFA) 108 (90 Base) MCG/ACT inhaler Inhale 2 puffs into the lungs every 6 hours as needed for Wheezing 1 Inhaler 0    budesonide-formoterol (SYMBICORT) 160-4.5 MCG/ACT AERO Inhale 2 puffs into the lungs 2 times daily 1 Inhaler 0    METHADONE HCL PO Take 90 mg by mouth daily       diphenhydrAMINE (BENADRYL) 25 MG tablet Take 25 mg by mouth every 8 hours as needed for Itching or Allergies      amLODIPine (NORVASC) 10 MG tablet Take 1 tablet by mouth daily. 30 tablet 3    Spacer/Aero Chamber Mouthpiece MISC 1 each by Does not apply route once as needed (with sybicort) 1 each 0     No current facility-administered medications for this visit.         Data Reviewed:   CBC and Renal reviewed  Last CBC  Lab Results   Component Value Date    WBC 4.3 07/31/2019    RBC 4.77 07/31/2019    HGB 13.8 07/31/2019    MCV 89.8 07/31/2019     07/31/2019     Last Renal  Lab Results   Component Value Date     07/31/2019    K 4.1 07/31/2019     07/31/2019    CO2 26 07/31/2019    CO2 28 05/29/2019    CO2 28 02/02/2019    BUN 4 07/31/2019    CREATININE 0.8 07/31/2019    GLUCOSE 104 07/31/2019    CALCIUM 9.6 07/31/2019       Last ABG  POC Blood

## 2020-03-03 NOTE — ASSESSMENT & PLAN NOTE
Significantly better control after starting Dupixent. She is very happy with her current level of control using Symbicort Spiriva Singulair and prednisone 10 mg a day. Therefore, we will start to wean the prednisone. She will decrease to 5 mg daily for the next 2 months. I will see her in 2 months, after spring is started. If she continues to do well, we will strongly consider stopping the prednisone altogether.

## 2020-03-05 LAB
HCV QNT BY NAAT IU/ML: NOT DETECTED IU/ML
HCV QNT BY NAAT LOG IU/ML: NOT DETECTED LOG IU/ML
HEPATITIS C VIRUS AB BY CIA INDEX: >11 IV
HEPATITIS C VIRUS AB BY CIA INTERPRETATION: ABNORMAL
INTERPRETATION: NOT DETECTED

## 2020-03-10 ENCOUNTER — TELEPHONE (OUTPATIENT)
Dept: PULMONOLOGY | Age: 60
End: 2020-03-10

## 2020-03-10 NOTE — TELEPHONE ENCOUNTER
Michelle from [facility] called states they spoke to patient. Patient took the starter dose of dupixent 2x. Breathing is great. But they are going to reach out to insurance to see if they can get an over ride for patient to get the dupixent treatment on time since she accidentally took it incorrectly.

## 2020-03-23 ENCOUNTER — TELEPHONE (OUTPATIENT)
Dept: SLEEP MEDICINE | Age: 60
End: 2020-03-23

## 2020-03-23 RX ORDER — PREDNISONE 10 MG/1
10 TABLET ORAL DAILY
Qty: 30 TABLET | Refills: 1 | Status: SHIPPED | OUTPATIENT
Start: 2020-03-23 | End: 2020-04-22

## 2020-04-07 ENCOUNTER — TELEPHONE (OUTPATIENT)
Dept: PULMONOLOGY | Age: 60
End: 2020-04-07

## 2020-04-07 NOTE — TELEPHONE ENCOUNTER
Pt called back stating that her [employer] is currently good and to wait a couple days before may the 10th.

## 2020-04-08 ENCOUNTER — TELEPHONE (OUTPATIENT)
Dept: PULMONOLOGY | Age: 60
End: 2020-04-08

## 2020-04-08 RX ORDER — ALBUTEROL SULFATE 90 UG/1
2 AEROSOL, METERED RESPIRATORY (INHALATION) EVERY 6 HOURS PRN
Qty: 1 INHALER | Refills: 3 | Status: SHIPPED | OUTPATIENT
Start: 2020-04-08 | End: 2022-10-25 | Stop reason: SDUPTHER

## 2020-04-08 NOTE — TELEPHONE ENCOUNTER
Pt called requesting a refill of her albuterol to her pharmacy    [facility].

## 2020-04-08 NOTE — TELEPHONE ENCOUNTER
Pt called back for her prescription for Albuterol and wants to know when will the PA will be signed for her refill.

## 2020-04-08 NOTE — TELEPHONE ENCOUNTER
Patient was called back and explained that we sent the refill request to the provider today when she requested and it has been sent to the pharmacy. I told her that if a prior Shannon Nipomo is needed the pharmacy will fax over the request to our office.

## 2020-04-13 ENCOUNTER — TELEPHONE (OUTPATIENT)
Dept: PULMONOLOGY | Age: 60
End: 2020-04-13

## 2020-04-13 NOTE — TELEPHONE ENCOUNTER
Received a message with our answering service on 4/10/20 stating patient woke up coughing up blood & mucus, left vm for pt to call us back to see how she is doing now.

## 2020-04-14 ENCOUNTER — TELEPHONE (OUTPATIENT)
Dept: PULMONOLOGY | Age: 60
End: 2020-04-14

## 2020-04-14 DIAGNOSIS — B37.0 THRUSH: Primary | ICD-10-CM

## 2020-04-14 RX ORDER — FLUCONAZOLE 100 MG/1
100 TABLET ORAL DAILY
Qty: 3 TABLET | Refills: 0 | Status: SHIPPED | OUTPATIENT
Start: 2020-04-14 | End: 2020-04-17

## 2020-05-14 ENCOUNTER — VIRTUAL VISIT (OUTPATIENT)
Dept: PULMONOLOGY | Age: 60
End: 2020-05-14
Payer: COMMERCIAL

## 2020-05-14 PROCEDURE — 1036F TOBACCO NON-USER: CPT | Performed by: INTERNAL MEDICINE

## 2020-05-14 PROCEDURE — 99214 OFFICE O/P EST MOD 30 MIN: CPT | Performed by: INTERNAL MEDICINE

## 2020-05-14 PROCEDURE — G8926 SPIRO NO PERF OR DOC: HCPCS | Performed by: INTERNAL MEDICINE

## 2020-05-14 PROCEDURE — G8428 CUR MEDS NOT DOCUMENT: HCPCS | Performed by: INTERNAL MEDICINE

## 2020-05-14 PROCEDURE — 3023F SPIROM DOC REV: CPT | Performed by: INTERNAL MEDICINE

## 2020-05-14 PROCEDURE — 3017F COLORECTAL CA SCREEN DOC REV: CPT | Performed by: INTERNAL MEDICINE

## 2020-05-14 PROCEDURE — G8417 CALC BMI ABV UP PARAM F/U: HCPCS | Performed by: INTERNAL MEDICINE

## 2020-05-14 RX ORDER — PREDNISONE 10 MG/1
10 TABLET ORAL DAILY
Qty: 30 TABLET | Refills: 5 | Status: SHIPPED | OUTPATIENT
Start: 2020-05-14 | End: 2020-06-13

## 2020-05-14 NOTE — PROGRESS NOTES
REASON FOR CONSULTATION/CC:    Chief Complaint   Patient presents with    Asthma        Consult at request of   Janine Pena MD for      PCP: Janine Pena MD    HISTORY OF PRESENT ILLNESS: Maria G Mcgovern is a 64y.o. year old female with a history of copd who presents              Asthma / copd  Dupexiant  Symbicort  Incruse . Changed to Spiriva secondary to insurance. side effects with hemoptysis. Does not want to return to [employer]. She was sick a few days ago and used prednisone as a burst.   Still needs prednisone daily. Using albuterol bid. Thurst  Using oral hydrogen peroxide. allergic rhinitis  Controlled with Dupexiant.                          PAST MEDICAL HISTORY:  Past Medical History:   Diagnosis Date    Asthma     B[facility] disorder ([facility]     CKD (chronic kidney disease)     stage 3    COPD (chronic obstructive pulmonary disease) (HCC)     Empyema (HCC)     Hepatitis C     Patient denies     History of heroin use     Hypertension     Narcotic abuse ([facility]     heroin, clean 1 year    UTI (lower urinary tract infection)        PAST SURGICAL HISTORY:  Past Surgical History:   Procedure Laterality Date    CHEST TUBE INSERTION      COLONOSCOPY  10/23/2018    Colonoscopy with polypectomy (cold snare), polypectomy (cold biopsy)    COLONOSCOPY N/A 10/23/2018    COLONOSCOPY POLYPECTOMY SNARE/COLD BIOPSY performed by Jenaro Hart MD at [address] [place] [address] Left 12/16/2016    INTRACAPSULAR CATARACT EXTRACTION Right 2/21/2020    PHACOEMULSIFICATION WITH INTRAOCULAR LENS IMPLANT performed by Moses Gonzales MD at [address] [facility] EXTRACTION Left 2/28/2020    PHACOEMULSIFICATION WITH INTRAOCULAR LENS IMPLANT performed by Moses Gonzales MD at [address] [place] [address] Right     26 yrs for lung infection     OTHER SURGICAL HISTORY  12/15/2016    EDMOND BUNIONECTOMY WITH APPLICATION OF AMNIOX LEFT FOOT       FAMILY CO2 28 02/02/2019    BUN 4 07/31/2019    CREATININE 0.8 07/31/2019    GLUCOSE 104 07/31/2019    CALCIUM 9.6 07/31/2019       Last ABG  POC Blood Gas: No results found for: POCPH, POCPCO2, POCPO2, POCHCO3, NBEA, YCMH4ZMI  No results for input(s): PH, PCO2, PO2, HCO3, BE, O2SAT in the last 72 hours. Assessment:     · COPD  · Shortness of breath with exertion  · History tobacco abuse  · History of polysubstance abuse  · Thrush      Plan:      Problem List Items Addressed This Visit     Thrush     Controlled with oral H2O2         Severe persistent asthma without complication - Primary    Relevant Medications    predniSONE (DELTASONE) 10 MG tablet    Other allergic rhinitis     Controlled with Dupexiant. This visit was completed virtually using Doxy. me    Provider: Aly Romero  Location of patient: Home   Location of provider: Home   Consent: given  Other parties on phone call: none                COPD with asthma ([facility]     Doing well with Dupexiant with Symbicort and prednisone 10 mg daily. She was on Incruse changed to Spiriva secondary to insurance with side effects to side effects. Since she is now doing well, she is not interested in returning to [facility]. Relevant Medications    predniSONE (DELTASONE) 10 MG tablet             This note was transcribed using [id number] [facility]. Please disregard any translational errors.     Michelle Pierre Pulmonary, Sleep and [facility] [phone]

## 2020-06-01 NOTE — TELEPHONE ENCOUNTER
PA completed 5/21/20 through [facility]    PA approved 5/20/20-5/20/21   Faxed approval to [facility]

## 2020-09-02 ENCOUNTER — OFFICE VISIT (OUTPATIENT)
Dept: PULMONOLOGY | Age: 60
End: 2020-09-02
Payer: COMMERCIAL

## 2020-09-02 VITALS
HEIGHT: 70 IN | RESPIRATION RATE: 16 BRPM | TEMPERATURE: 98.6 F | WEIGHT: 195 LBS | BODY MASS INDEX: 27.92 KG/M2 | OXYGEN SATURATION: 93 % | HEART RATE: 64 BPM | SYSTOLIC BLOOD PRESSURE: 126 MMHG | DIASTOLIC BLOOD PRESSURE: 82 MMHG

## 2020-09-02 PROCEDURE — G8417 CALC BMI ABV UP PARAM F/U: HCPCS | Performed by: INTERNAL MEDICINE

## 2020-09-02 PROCEDURE — 1036F TOBACCO NON-USER: CPT | Performed by: INTERNAL MEDICINE

## 2020-09-02 PROCEDURE — 3017F COLORECTAL CA SCREEN DOC REV: CPT | Performed by: INTERNAL MEDICINE

## 2020-09-02 PROCEDURE — 99214 OFFICE O/P EST MOD 30 MIN: CPT | Performed by: INTERNAL MEDICINE

## 2020-09-02 PROCEDURE — G8427 DOCREV CUR MEDS BY ELIG CLIN: HCPCS | Performed by: INTERNAL MEDICINE

## 2020-09-02 ASSESSMENT — ASTHMA QUESTIONNAIRES
QUESTION_2 LAST FOUR WEEKS HOW OFTEN HAVE YOU HAD SHORTNESS OF BREATH: 3
QUESTION_3 LAST FOUR WEEKS HOW OFTEN DID YOUR ASTHMA SYMPTOMS (WHEEZING, COUGHING, SHORTNESS OF BREATH, CHEST TIGHTNESS OR PAIN) WAKE YOU UP AT NIGHT OR EARLIER THAN USUAL IN THE MORNING: 2
QUESTION_1 LAST FOUR WEEKS HOW MUCH OF THE TIME DID YOUR ASTHMA KEEP YOU FROM GETTING AS MUCH DONE AT WORK, SCHOOL OR AT HOME: 4
QUESTION_4 LAST FOUR WEEKS HOW OFTEN HAVE YOU USED YOUR RESCUE INHALER OR NEBULIZER MEDICATION (SUCH AS ALBUTEROL): 3
QUESTION_5 LAST FOUR WEEKS HOW WOULD YOU RATE YOUR ASTHMA CONTROL: 4
ACT_TOTALSCORE: 16

## 2020-09-02 NOTE — LETTER
[facility]   [address] [facility]   Phone: [phone]  Fax: [phone]     9/6/2020    Dr. Leann Ramirez MD    I have seen your patient, Lorna Degroot on follow up. Here is the assessment and plan for your patient. Any question, please do not hesitate to call. Problem List Items Addressed This Visit     Lon Lopez     See above. Adding spacer, nystatin and attempting to reduce prednisone. Severe persistent asthma without complication - Primary     While Symbicort is working, she has had thrush multiple times. She is currently not using a spacer. prescription for spacer given. She was advised to rinse after use. She is doing well with Prednisone with Duoneb's  And dupixentn. Given the control and thrush, will wean prednisone to 5mg daily. Ultimately, the hope is to wean off completely if able. This would help with thrush. She is rinsing with hydrogen peroxide. Relevant Medications    Spacer/Aero-Holding Chambers SAUL    nystatin (MYCOSTATIN) 024907 UNIT/ML suspension    Numbness in left leg     The patient is having some new onset numbness of the left leg. Given the episodic nature of this, etiology is unclear. This could be anatomical being a spinal cord issue with this could be related to medications from prednisone. Lastly, Churg-Robert is in the consideration given her asthma.                  Sincerely,    Michelle Pierre Pulmonary, Sleep and Critical Care  [phone]

## 2020-09-02 NOTE — PROGRESS NOTES
REASON FOR CONSULTATION/CC: asthma  Chief Complaint   Patient presents with    Asthma     f/u Asthma           PCP: Terra Murrieta MD    HISTORY OF PRESENT ILLNESS: Arias Blas is a 64y.o. year old female with a history of asthma who presents :      Having tinging in the left foot with burning as well. Increase to mid shin. Having numbness of the left leg as well. Decreased appetite was well which as lead to loss of weight. Asthma / copd  She is doing well on prednisone, Symbicort, and Duoneb's  With dupixent. Having thrush. PAST MEDICAL HISTORY:  Past Medical History:   Diagnosis Date    Asthma     Bipolar disorder ([facility]     CKD (chronic kidney disease)     stage 3    COPD (chronic obstructive pulmonary disease) (HCC)     Empyema (HCC)     Hepatitis C     Patient denies     History of heroin use     Hypertension     Narcotic abuse ([facility]     heroin, clean 1 year    UTI (lower urinary tract infection)        PAST SURGICAL HISTORY:  Past Surgical History:   Procedure Laterality Date    CHEST TUBE INSERTION      COLONOSCOPY  10/23/2018    Colonoscopy with polypectomy (cold snare), polypectomy (cold biopsy)    COLONOSCOPY N/A 10/23/2018    COLONOSCOPY POLYPECTOMY SNARE/COLD BIOPSY performed by José Miguel Tyson MD at [address] [place] [address] Left 12/16/2016    INTRACAPSULAR CATARACT EXTRACTION Right 2/21/2020    PHACOEMULSIFICATION WITH INTRAOCULAR LENS IMPLANT performed by Mynor Olsen MD at [address] [facility] EXTRACTION Left 2/28/2020    PHACOEMULSIFICATION WITH INTRAOCULAR LENS IMPLANT performed by Mynor Olsen MD at [address] [place] [address] Right     26 yrs for lung infection     OTHER SURGICAL HISTORY  12/15/2016    EDMOND BUNIONECTOMY WITH APPLICATION OF AMNIOX LEFT FOOT       FAMILY HISTORY:  family history includes Cancer in her mother; Heart Disease in her mother; Kidney Disease in her mother.       SOCIAL suspension Take 5 mLs by mouth 4 times daily for 14 days 280 mL 0    albuterol sulfate HFA (PROAIR HFA) 108 (90 Base) MCG/ACT inhaler Inhale 2 puffs into the lungs every 6 hours as needed for Wheezing 1 Inhaler 3    predniSONE (DELTASONE) 10 MG tablet Take 10 mg by mouth daily      Dupilumab (DUPIXENT) 200 MG/1. 14ML SOSY injection Inject 200 mg into the skin once      ondansetron (ZOFRAN ODT) 4 MG disintegrating tablet Take 1-2 tablets by mouth every 8 hours as needed for Nausea May Sub regular tablet (non-ODT) if insurance does not cover ODT. 20 tablet 0    ipratropium-albuterol (DUONEB) 0.5-2.5 (3) MG/3ML SOLN nebulizer solution Inhale 3 mLs into the lungs every 4 hours 360 mL 5    montelukast (SINGULAIR) 10 MG tablet TAKE 1 TABLET BY MOUTH NIGHTLY 30 tablet 0    acetaminophen (APAP EXTRA STRENGTH) 500 MG tablet Take 2 tablets by mouth every 6 hours as needed for Pain DO NOT TAKE WITH OTHER MEDICATIONS CONTAINING ACETAMINOPHEN. 30 tablet 0    Respiratory Therapy Supplies (NEBULIZER/TUBING/MOUTHPIECE) KIT 1 kit by Does not apply route daily 1 kit 0    hydrOXYzine (ATARAX) 25 MG tablet Take 25 mg by mouth 2 times daily as needed       albuterol (PROVENTIL) (5 MG/ML) 0.5% nebulizer solution Take 0.5 mLs by nebulization every 6 hours as needed for Wheezing 30 vial 0    budesonide-formoterol (SYMBICORT) 160-4.5 MCG/ACT AERO Inhale 2 puffs into the lungs 2 times daily 1 Inhaler 0    METHADONE HCL PO Take 90 mg by mouth daily       diphenhydrAMINE (BENADRYL) 25 MG tablet Take 25 mg by mouth every 8 hours as needed for Itching or Allergies      amLODIPine (NORVASC) 10 MG tablet Take 1 tablet by mouth daily.  30 tablet 3    dextromethorphan-guaiFENesin (ROBITUSSIN COLD COUGH+ CHEST)  MG/5ML syrup Take 10 mLs by mouth every 4 hours as needed for Cough (Patient not taking: Reported on 9/2/2020) 473 mL 0    Spacer/Aero Chamber Mouthpiece MISC 1 each by Does not apply route once as needed (with sybicort) 1 each 0    Spacer/Aero-Holding Chambers (VORTEX VALVED HOLDING CHAMBER) SAUL 1 each by Does not apply route daily (Patient not taking: Reported on 9/2/2020) 1 Device 0     No current facility-administered medications for this visit. Data Reviewed:   CBC and Renal reviewed  Last CBC  Lab Results   Component Value Date    WBC 4.3 07/31/2019    RBC 4.77 07/31/2019    HGB 13.8 07/31/2019    MCV 89.8 07/31/2019     07/31/2019     Last Renal  Lab Results   Component Value Date     07/31/2019    K 4.1 07/31/2019     07/31/2019    CO2 26 07/31/2019    CO2 28 05/29/2019    CO2 28 02/02/2019    BUN 4 07/31/2019    CREATININE 0.8 07/31/2019    GLUCOSE 104 07/31/2019    CALCIUM 9.6 07/31/2019       Last ABG  POC Blood Gas: No results found for: POCPH, POCPCO2, POCPO2, POCHCO3, NBEA, SSFG6MCV  No results for input(s): PH, PCO2, PO2, HCO3, BE, O2SAT in the last 72 hours. Radiology Review:  Pertinent images / reports were reviewed as a part of this visit. CT Chest w/ contrast: No results found for this or any previous visit. CT Chest w/o contrast: No results found for this or any previous visit. CTPA: No results found for this or any previous visit. CXR PA/LAT:   Results for orders placed during the hospital encounter of 12/28/19   XR CHEST STANDARD (2 VW)    Narrative EXAMINATION:  TWO XRAY VIEWS OF THE CHEST    12/28/2019 12:36 pm    COMPARISON:  07/31/2019    HISTORY:  ORDERING SYSTEM PROVIDED HISTORY: fever, cough  TECHNOLOGIST PROVIDED HISTORY:  Reason for exam:->fever, cough  Reason for Exam: cough, congestion, and sob since joann  Acuity: Acute  Type of Exam: Initial    FINDINGS:  Frontal and lateral views of the chest are submitted for review. The cardiac  silhouette is normal in size. Lung parenchyma is clear without focal  airspace consolidation, sizeable pleural effusion, or pneumothorax. Trachea  is midline. Osseous structures and soft tissues are grossly intact. from prednisone. Lastly, EliRobert is in the consideration given her asthma. This note was transcribed using [id number] [employer]. Please disregard any translational errors.     Michelle Pierre Pulmonary, Sleep and [facility] [phone]

## 2020-09-03 PROBLEM — R20.0 NUMBNESS IN LEFT LEG: Status: ACTIVE | Noted: 2020-09-03

## 2020-09-03 NOTE — ASSESSMENT & PLAN NOTE
The patient is having some new onset numbness of the left leg. Given the episodic nature of this, etiology is unclear. This could be anatomical being a spinal cord issue with this could be related to medications from prednisone. Lastly, Churg-Robert is in the consideration given her asthma.

## 2020-11-02 ENCOUNTER — TELEPHONE (OUTPATIENT)
Dept: PULMONOLOGY | Age: 60
End: 2020-11-02

## 2020-11-02 RX ORDER — DOXYCYCLINE HYCLATE 100 MG
100 TABLET ORAL 2 TIMES DAILY
Qty: 14 TABLET | Refills: 0 | Status: SHIPPED | OUTPATIENT
Start: 2020-11-02 | End: 2020-11-09

## 2020-11-02 RX ORDER — PREDNISONE 10 MG/1
TABLET ORAL
Qty: 30 TABLET | Refills: 0 | Status: SHIPPED | OUTPATIENT
Start: 2020-11-02 | End: 2020-11-12

## 2020-11-02 NOTE — TELEPHONE ENCOUNTER
Ronda Jose calls in with cough and sob for 3 days. Productive cough with yellowish brown sputum. Denies fever and wheezing. Increased use of nebulizer helping. Request refill of Prednisone taper dosage and antibiotic if possible. Pharmacy on file.   LOV 9/2/2020

## 2020-11-11 ENCOUNTER — TELEPHONE (OUTPATIENT)
Dept: PULMONOLOGY | Age: 60
End: 2020-11-11

## 2020-11-11 DIAGNOSIS — R05.9 COUGH: Primary | ICD-10-CM

## 2020-11-12 RX ORDER — BROMPHENIRAMINE MALEATE, PSEUDOEPHEDRINE HYDROCHLORIDE, AND DEXTROMETHORPHAN HYDROBROMIDE 2; 30; 10 MG/5ML; MG/5ML; MG/5ML
5 SYRUP ORAL 4 TIMES DAILY PRN
Qty: 118 ML | Refills: 0 | Status: SHIPPED | OUTPATIENT
Start: 2020-11-12 | End: 2021-09-18

## 2020-11-19 ENCOUNTER — TELEPHONE (OUTPATIENT)
Dept: PULMONOLOGY | Age: 60
End: 2020-11-19

## 2020-11-19 RX ORDER — BUDESONIDE AND FORMOTEROL FUMARATE DIHYDRATE 160; 4.5 UG/1; UG/1
2 AEROSOL RESPIRATORY (INHALATION) 2 TIMES DAILY
Qty: 1 INHALER | Refills: 0 | COMMUNITY
Start: 2020-11-19 | End: 2021-01-20

## 2020-11-19 NOTE — TELEPHONE ENCOUNTER
Called Marian Sifuentes back  Haily she had left her Symbicort and other inhalers in a cab and they did not locate them  Sample at the   Will stop by to pick this up

## 2020-11-19 NOTE — TELEPHONE ENCOUNTER
Pt called asking if we had any samples in the office for Symbicort for her SOB since she cannot get a prescription filled at the moment.      LOV 9/02/20

## 2020-12-02 ENCOUNTER — TELEPHONE (OUTPATIENT)
Dept: PULMONOLOGY | Age: 60
End: 2020-12-02

## 2020-12-02 NOTE — TELEPHONE ENCOUNTER
Estephanie BERNARD stopped in the office with her new insurance card, [place]. Requesting we submit PA for Dupixent since we have new card.

## 2020-12-12 ENCOUNTER — APPOINTMENT (OUTPATIENT)
Dept: CT IMAGING | Age: 60
End: 2020-12-12
Payer: MEDICAID

## 2020-12-12 ENCOUNTER — HOSPITAL ENCOUNTER (EMERGENCY)
Age: 60
Discharge: HOME | End: 2020-12-12
Payer: MEDICAID

## 2020-12-12 VITALS
RESPIRATION RATE: 16 BRPM | OXYGEN SATURATION: 93 % | HEART RATE: 64 BPM | HEIGHT: 70 IN | WEIGHT: 198.19 LBS | BODY MASS INDEX: 28.37 KG/M2 | TEMPERATURE: 97.8 F | SYSTOLIC BLOOD PRESSURE: 133 MMHG | DIASTOLIC BLOOD PRESSURE: 84 MMHG

## 2020-12-12 LAB
A/G RATIO: 1.6 (ref 1.1–2.2)
ALBUMIN SERPL-MCNC: 3.9 G/DL (ref 3.4–5)
ALP BLD-CCNC: 82 U/L (ref 40–129)
ALT SERPL-CCNC: 10 U/L (ref 10–40)
ANION GAP SERPL CALCULATED.3IONS-SCNC: 8 MMOL/L (ref 3–16)
AST SERPL-CCNC: 15 U/L (ref 15–37)
BASOPHILS ABSOLUTE: 0 K/UL (ref 0–0.2)
BASOPHILS RELATIVE PERCENT: 1 %
BILIRUB SERPL-MCNC: <0.2 MG/DL (ref 0–1)
BILIRUBIN URINE: NEGATIVE
BLOOD, URINE: NEGATIVE
BUN BLDV-MCNC: 10 MG/DL (ref 7–20)
CALCIUM SERPL-MCNC: 9 MG/DL (ref 8.3–10.6)
CHLORIDE BLD-SCNC: 103 MMOL/L (ref 99–110)
CLARITY: ABNORMAL
CO2: 29 MMOL/L (ref 21–32)
COLOR: YELLOW
CREAT SERPL-MCNC: 0.9 MG/DL (ref 0.6–1.1)
EOSINOPHILS ABSOLUTE: 0.1 K/UL (ref 0–0.6)
EOSINOPHILS RELATIVE PERCENT: 2.2 %
EPITHELIAL CELLS, UA: 13 /HPF (ref 0–5)
GFR AFRICAN AMERICAN: >60
GFR NON-AFRICAN AMERICAN: >60
GLOBULIN: 2.5 G/DL
GLUCOSE BLD-MCNC: 98 MG/DL (ref 70–99)
GLUCOSE URINE: NEGATIVE MG/DL
HCG QUALITATIVE: NEGATIVE
HCT VFR BLD CALC: 43.9 % (ref 36–48)
HEMOGLOBIN: 14.5 G/DL (ref 12–16)
HYALINE CASTS: 9 /LPF (ref 0–8)
KETONES, URINE: NEGATIVE MG/DL
LEUKOCYTE ESTERASE, URINE: NEGATIVE
LYMPHOCYTES ABSOLUTE: 1 K/UL (ref 1–5.1)
LYMPHOCYTES RELATIVE PERCENT: 42.3 %
MCH RBC QN AUTO: 29.6 PG (ref 26–34)
MCHC RBC AUTO-ENTMCNC: 33.1 G/DL (ref 31–36)
MCV RBC AUTO: 89.4 FL (ref 80–100)
MICROSCOPIC EXAMINATION: YES
MONOCYTES ABSOLUTE: 0.3 K/UL (ref 0–1.3)
MONOCYTES RELATIVE PERCENT: 14 %
NEUTROPHILS ABSOLUTE: 1 K/UL (ref 1.7–7.7)
NEUTROPHILS RELATIVE PERCENT: 40.5 %
NITRITE, URINE: NEGATIVE
PDW BLD-RTO: 14 % (ref 12.4–15.4)
PH UA: 5 (ref 5–8)
PLATELET # BLD: 140 K/UL (ref 135–450)
PLATELET SLIDE REVIEW: ABNORMAL
PMV BLD AUTO: 8.6 FL (ref 5–10.5)
POTASSIUM SERPL-SCNC: 3.9 MMOL/L (ref 3.5–5.1)
PROTEIN UA: NEGATIVE MG/DL
RBC # BLD: 4.91 M/UL (ref 4–5.2)
RBC UA: 2 /HPF (ref 0–4)
SLIDE REVIEW: ABNORMAL
SODIUM BLD-SCNC: 140 MMOL/L (ref 136–145)
SPECIFIC GRAVITY UA: 1.02 (ref 1–1.03)
TOTAL CK: 138 U/L (ref 26–192)
TOTAL PROTEIN: 6.4 G/DL (ref 6.4–8.2)
URINE REFLEX TO CULTURE: ABNORMAL
URINE TYPE: ABNORMAL
UROBILINOGEN, URINE: 1 E.U./DL
WBC # BLD: 2.4 K/UL (ref 4–11)
WBC UA: 3 /HPF (ref 0–5)

## 2020-12-12 PROCEDURE — 96375 TX/PRO/DX INJ NEW DRUG ADDON: CPT

## 2020-12-12 PROCEDURE — 75635 CT ANGIO ABDOMINAL ARTERIES: CPT

## 2020-12-12 PROCEDURE — 80053 COMPREHEN METABOLIC PANEL: CPT

## 2020-12-12 PROCEDURE — 81001 URINALYSIS AUTO W/SCOPE: CPT

## 2020-12-12 PROCEDURE — 82550 ASSAY OF CK (CPK): CPT

## 2020-12-12 PROCEDURE — 96374 THER/PROPH/DIAG INJ IV PUSH: CPT

## 2020-12-12 PROCEDURE — 85025 COMPLETE CBC W/AUTO DIFF WBC: CPT

## 2020-12-12 PROCEDURE — U0003 INFECTIOUS AGENT DETECTION BY NUCLEIC ACID (DNA OR RNA); SEVERE ACUTE RESPIRATORY SYNDROME CORONAVIRUS 2 (SARS-COV-2) (CORONAVIRUS DISEASE [COVID-19]), AMPLIFIED PROBE TECHNIQUE, MAKING USE OF HIGH THROUGHPUT TECHNOLOGIES AS DESCRIBED BY CMS-2020-01-R: HCPCS

## 2020-12-12 PROCEDURE — 84703 CHORIONIC GONADOTROPIN ASSAY: CPT

## 2020-12-12 PROCEDURE — 6360000002 HC RX W HCPCS: Performed by: PHYSICIAN ASSISTANT

## 2020-12-12 PROCEDURE — 6360000004 HC RX CONTRAST MEDICATION: Performed by: PHYSICIAN ASSISTANT

## 2020-12-12 PROCEDURE — 99285 EMERGENCY DEPT VISIT HI MDM: CPT

## 2020-12-12 RX ORDER — DEXAMETHASONE SODIUM PHOSPHATE 10 MG/ML
10 INJECTION, SOLUTION INTRAMUSCULAR; INTRAVENOUS ONCE
Status: COMPLETED | OUTPATIENT
Start: 2020-12-12 | End: 2020-12-12

## 2020-12-12 RX ORDER — METHYLPREDNISOLONE 4 MG/1
TABLET ORAL
Qty: 1 KIT | Refills: 0 | Status: SHIPPED | OUTPATIENT
Start: 2020-12-12 | End: 2020-12-18

## 2020-12-12 RX ORDER — DIPHENHYDRAMINE HYDROCHLORIDE 50 MG/ML
12.5 INJECTION INTRAMUSCULAR; INTRAVENOUS ONCE
Status: COMPLETED | OUTPATIENT
Start: 2020-12-12 | End: 2020-12-12

## 2020-12-12 RX ORDER — METOCLOPRAMIDE HYDROCHLORIDE 5 MG/ML
5 INJECTION INTRAMUSCULAR; INTRAVENOUS ONCE
Status: COMPLETED | OUTPATIENT
Start: 2020-12-12 | End: 2020-12-12

## 2020-12-12 RX ORDER — CYCLOBENZAPRINE HCL 10 MG
10 TABLET ORAL 3 TIMES DAILY PRN
Qty: 12 TABLET | Refills: 0 | Status: SHIPPED | OUTPATIENT
Start: 2020-12-12 | End: 2021-03-15 | Stop reason: SDUPTHER

## 2020-12-12 RX ADMIN — METOCLOPRAMIDE HYDROCHLORIDE 5 MG: 5 INJECTION INTRAMUSCULAR; INTRAVENOUS at 20:35

## 2020-12-12 RX ADMIN — DEXAMETHASONE SODIUM PHOSPHATE 10 MG: 10 INJECTION, SOLUTION INTRAMUSCULAR; INTRAVENOUS at 20:35

## 2020-12-12 RX ADMIN — DIPHENHYDRAMINE HYDROCHLORIDE 12.5 MG: 50 INJECTION, SOLUTION INTRAMUSCULAR; INTRAVENOUS at 20:36

## 2020-12-12 RX ADMIN — IOPAMIDOL 75 ML: 755 INJECTION, SOLUTION INTRAVENOUS at 21:01

## 2020-12-12 ASSESSMENT — PAIN DESCRIPTION - ORIENTATION
ORIENTATION: LEFT

## 2020-12-12 ASSESSMENT — PAIN - FUNCTIONAL ASSESSMENT: PAIN_FUNCTIONAL_ASSESSMENT: 0-10

## 2020-12-12 ASSESSMENT — PAIN SCALES - GENERAL
PAINLEVEL_OUTOF10: 2
PAINLEVEL_OUTOF10: 3
PAINLEVEL_OUTOF10: 10

## 2020-12-12 ASSESSMENT — PAIN DESCRIPTION - PROGRESSION
CLINICAL_PROGRESSION: RAPIDLY IMPROVING
CLINICAL_PROGRESSION: NOT CHANGED
CLINICAL_PROGRESSION: RAPIDLY IMPROVING

## 2020-12-12 ASSESSMENT — PAIN DESCRIPTION - LOCATION
LOCATION: LEG;FOOT
LOCATION: GROIN;LEG
LOCATION: FOOT;LEG

## 2020-12-12 ASSESSMENT — ENCOUNTER SYMPTOMS
SHORTNESS OF BREATH: 0
NAUSEA: 1
BACK PAIN: 1
COLOR CHANGE: 0

## 2020-12-12 ASSESSMENT — PAIN DESCRIPTION - PAIN TYPE
TYPE: ACUTE PAIN
TYPE: ACUTE PAIN

## 2020-12-12 ASSESSMENT — PAIN DESCRIPTION - FREQUENCY
FREQUENCY: CONTINUOUS
FREQUENCY: CONTINUOUS

## 2020-12-12 ASSESSMENT — PAIN DESCRIPTION - DESCRIPTORS
DESCRIPTORS: ACHING
DESCRIPTORS: ACHING

## 2020-12-12 ASSESSMENT — PAIN DESCRIPTION - ONSET: ONSET: GRADUAL

## 2020-12-13 LAB — SARS-COV-2: DETECTED

## 2020-12-13 NOTE — RESULT ENCOUNTER NOTE
Culture reviewed, This patient was positive for Covid . Please provide the patient with a courtesy notification of their positive Covid results.

## 2020-12-13 NOTE — ED TRIAGE NOTES
Pt brought to ed by friend; pt alert and oriented x4; pt stated has had hx of left foot pain with numbness and left leg pain from groin to foot x2 months; has been seen by pcp for these concerns; however pain has increased over past week; came to ed to have pain evaluated; resp easy and even.

## 2020-12-13 NOTE — ED PROVIDER NOTES
Negative for chest pain. Gastrointestinal: Positive for nausea. Musculoskeletal: Positive for arthralgias and back pain. Negative for joint swelling and neck pain. Skin: Negative for color change, rash and wound. Neurological: Positive for numbness. Negative for weakness. Psychiatric/Behavioral: Negative for agitation, behavioral problems and confusion. Except as noted above the remainder of the review of systems was reviewed and negative. PAST MEDICAL HISTORY         Diagnosis Date    Asthma     B[facility] disorder ([facility]     CKD (chronic kidney disease)     stage 3    COPD (chronic obstructive pulmonary disease) (HCC)     Empyema (HCC)     Hepatitis C     Patient denies     History of heroin use     Hypertension     Narcotic abuse ([facility]     heroin, clean 1 year    UTI (lower urinary tract infection)        SURGICAL HISTORY           Procedure Laterality Date    CHEST TUBE INSERTION      COLONOSCOPY  10/23/2018    Colonoscopy with polypectomy (cold snare), polypectomy (cold biopsy)    COLONOSCOPY N/A 10/23/2018    COLONOSCOPY POLYPECTOMY SNARE/COLD BIOPSY performed by Missy Harris MD at [address] [place] [address] Left 12/16/2016    INTRACAPSULAR CATARACT EXTRACTION Right 2/21/2020    PHACOEMULSIFICATION WITH INTRAOCULAR LENS IMPLANT performed by Nader Jones MD at [address] [place] [address] EXTRACTION Left 2/28/2020    PHACOEMULSIFICATION WITH INTRAOCULAR LENS IMPLANT performed by Nader Jones MD at [address] [place] [address] Right     26 yrs for lung infection     OTHER SURGICAL HISTORY  12/15/2016    EDMOND BUNIONECTOMY WITH APPLICATION OF AMNIOX LEFT FOOT       CURRENT MEDICATIONS       Previous Medications    ACETAMINOPHEN (APAP EXTRA STRENGTH) 500 MG TABLET    Take 2 tablets by mouth every 6 hours as needed for Pain DO NOT TAKE WITH OTHER MEDICATIONS CONTAINING ACETAMINOPHEN.     ALBUTEROL (PROVENTIL) (5 MG/ML) 0.5% NEBULIZER SOLUTION needed (sob)       ALLERGIES     Acetylcysteine, Codeine, Levaquin [levofloxacin in d5w], Penicillins, and Ibuprofen    FAMILY HISTORY           Problem Relation Age of Onset    Cancer Mother     Kidney Disease Mother     Heart Disease Mother      Family Status   Relation Name Status    Mother      Father          SOCIAL HISTORY      reports that she quit smoking about 4 years ago. Her smoking use included cigarettes. She smoked 0.00 packs per day for 45.00 years. She has never used smokeless tobacco. She reports that she does not drink alcohol or use drugs. PHYSICAL EXAM    (up to 7 for level 4, 8 or more for level 5)     ED Triage Vitals   BP Temp Temp src Pulse Resp SpO2 Height Weight   20 1533 20 1533 -- 20 1533 20 1533 20 1533 20   116/74 97.8 °F (36.6 °C)  65 16 98 % 5' 10\" (1.778 m) 198 lb 3.1 oz (89.9 kg)       Physical Exam  Vitals signs and nursing note reviewed. Constitutional:       Appearance: Normal appearance. HENT:      Head: Normocephalic and atraumatic. Eyes:      Pupils: Pupils are equal, round, and reactive to light. Neck:      Musculoskeletal: Normal range of motion. Cardiovascular:      Rate and Rhythm: Normal rate. Pulses: Normal pulses. Pulmonary:      Effort: Pulmonary effort is normal. No respiratory distress. Abdominal:      Tenderness: There is no abdominal tenderness. Musculoskeletal: Normal range of motion. Lumbar back: She exhibits pain. She exhibits normal pulse. Back:    Skin:     General: Skin is warm. Findings: No erythema or rash. Neurological:      General: No focal deficit present. Mental Status: She is alert and oriented to person, place, and time. Cranial Nerves: No cranial nerve deficit. Sensory: No sensory deficit. Motor: No weakness.       Coordination: Coordination normal.      Gait: Gait normal.      Deep Tendon Reflexes: Reflexes normal.   Psychiatric:         Mood and Affect: Mood normal.         Behavior: Behavior normal.         DIAGNOSTIC RESULTS     RADIOLOGY:   Non-plain film images such as CT, Ultrasound and MRI are read by the radiologist. Plain radiographic images are visualized and preliminarily interpreted by ZANDER Wilson with the below findings:    Reviewed radiologist's interpretation. Interpretation per the Radiologist below, if available at the time of this note:    CTA ABDOMINAL AORTA W BILAT RUNOFF W CONTRAST   Preliminary Result   1. No acute vascular injury. No significant flow limiting stenosis. 2. Incidental left Lyn's cyst measuring 4.8 x 3.2 x 1.9 cm.                LABS:  Labs Reviewed   CBC WITH AUTO DIFFERENTIAL - Abnormal; Notable for the following components:       Result Value    WBC 2.4 (*)     Neutrophils Absolute 1.0 (*)     All other components within normal limits    Narrative:     Performed at:  [address] [employer]   Phone [phone]   URINE RT REFLEX TO CULTURE - Abnormal; Notable for the following components:    Clarity, UA CLOUDY (*)     All other components within normal limits    Narrative:     Performed at:  [address] [address]   Phone [phone]   MICROSCOPIC URINALYSIS - Abnormal; Notable for the following components:    Hyaline Casts, UA 9 (*)     Epithelial Cells, UA 13 (*)     All other components within normal limits    Narrative:     Performed at:  [address] [employer] [address]   Phone [phone]   COMPREHENSIVE METABOLIC PANEL    Narrative:     Performed at:  [address] [facility] [address]   Phone [phone]   CK    Narrative:     Performed at:  [facility]  [address] [facility], [address] [facility] [address] [facility] [address]   Phone [phone]   HCG, SERUM, QUALITATIVE    Narrative:     Performed at:  [facility]  [address] [facility] [address] [facility] [address]   Phone [phone]       All other labs were within normal range or not returned as of this dictation. EMERGENCY DEPARTMENT COURSE and DIFFERENTIAL DIAGNOSIS/MDM:   Vitals:    Vitals:    12/12/20 2030 12/12/20 2045 12/12/20 2100 12/12/20 2115   BP: 119/82 126/85 130/84 126/81   Pulse: 54 61 56 60   Resp: 9 14 11 10   Temp:       SpO2: (!) 89% 94%  94%   Weight:       Height:         I discussed with Roxanne Johnson and/or family the exam results, diagnosis, care, prognosis, reasons to return and the importance of follow up. Patient and/or family is in full agreement with plan and all questions have been answered. Specific discharge instructions explained, including reasons to return to the emergency department. Roxanne Johnson is well appearing, non-toxic, and afebrile at the time of discharge. Lab work is reassuring including a normal creatinine and GFR greater than 60. No anemia. No elevated white count. Pulses are intact in the foot able to wiggle the toes. CTA discussed at length including Baker's cyst.  She is having numbness and tingling for months in the leg. Some pain from the left low back discussed possibility of sciatica. Will treat with steroids otherwise Tylenol muscle relaxers as needed. Return for new, worsening or other concerns otherwise follow-up with orthopedics and primary care. I estimate there is LOW risk for ABDOMINAL AORTIC ANEURYSM, KIDNEY STONE, PYELONEPHRITIS, CAUDA EQUINA SYNDROME, EPIDURAL MASS LESION, OR CORD COMPRESSION, thus I consider the discharge disposition reasonable. We discussed returning to the Emergency Department immediately if new or worsening symptoms occur.      CONSULTS:  None    PROCEDURES:  Procedures      FINAL IMPRESSION      1. Left leg pain    2. Left low back pain, unspecified chronicity, unspecified whether sciatica present    3. Left leg paresthesias    4. Baker's cyst of knee, left          DISPOSITION/PLAN   DISPOSITION Decision To Discharge 12/12/2020 09:36:53 PM      PATIENT REFERRED TO:  Leticia Lopez MD  [address] [place] [address]. [address] [place] [address]  [phone]    Call in 1 day  For follow up with orthopedics      DISCHARGE MEDICATIONS:  New Prescriptions    CYCLOBENZAPRINE (FLEXERIL) 10 MG TABLET    Take 1 tablet by mouth 3 times daily as needed for Muscle spasms Sedation precautions    METHYLPREDNISOLONE (MEDROL, RENAN,) 4 MG TABLET    Take by mouth.        (Please note that portions of this note were completed with a voice recognition program.  Efforts were made to edit the dictations but occasionally words are mis-transcribed.)    Miranda Rankin, [address] , [place]  12/12/20 6565

## 2020-12-13 NOTE — RESULT ENCOUNTER NOTE
Patient's positive result has been appropriately evaluated by the provider pool. Patient was unable to be reached over the phone. Her voicemail was full. Unable to leave a message. Called four times. No one picked up the phone. Will try to reach patient again tomorrow per protocol.

## 2020-12-14 ENCOUNTER — CARE COORDINATION (OUTPATIENT)
Dept: CARE COORDINATION | Age: 60
End: 2020-12-14

## 2020-12-14 NOTE — CARE COORDINATION
Non-face-to-face services provided:  Obtained and reviewed discharge summary and/or continuity of care documents     Advance Care Planning:   Does patient have an Advance Directive:  Updated in Demographics. Patient has following risk factors of: COPD, chronic kidney disease and HTN, Hepatitis C, Asthma, Recent COVID dx and recent ED visit. CTN/ACM reviewed discharge instructions, medical action plan and red flags such as increased shortness of breath, increasing fever and signs of decompensation with patient who verbalized understanding. Discussed exposure protocols and quarantine with CDC Guidelines What to do if you are sick with coronavirus disease 2019.  Patient was given an opportunity for questions and concerns. The patient agrees to contact the [facility] exposure line [phone], local health department [place] Department of Health: ([phone]) and PCP office for questions related to their healthcare. CTN/ACM provided contact information for future needs. Reviewed and educated patient on any new and changed medications related to discharge diagnosis     Patient/family/caregiver given information for [facility] and agrees to enroll no    Plan for follow-up call in 3-5 days based on severity of symptoms and risk factors. AC sent pt a MyChart with [facility] education. Pt voiced appreciation. At time of call ACM spoke with pt's spouse, Mirela Person and pt's daughter. Decision makers updated in pt's Demographics. Pt voiced concerns about her great grand baby (boy) who pt saw and held prior to ED visit on Saturday. ACM recommended babies mother reach out to her Pediatrician to notify them of close contact and for further recommendations. Pt encouraged to notify all contacts she has had or been in close proximity to with no facial covering over last 7-14 days. Pt aware Health Department will be contacting her regarding symptoms and quarantine.

## 2020-12-18 ENCOUNTER — CARE COORDINATION (OUTPATIENT)
Dept: CARE COORDINATION | Age: 60
End: 2020-12-18

## 2020-12-18 NOTE — CARE COORDINATION
ED f/u call to pt attempted. Unable to reach pt. Message left with reason for call. ACM encouraged if pt has any questions or concerns regarding her health to f/u with her PCP or the [facility] Nurse Triage line. Patient contacted regarding COVID-19 risk and screening. Discussed COVID-19 related testing which was available at this time. Test results were positive. Patient informed of results, if available? Previously discussed. Plan for follow-up call in 7-14 days based on severity of symptoms and risk factors.     FU appts/Provider:    Future Appointments   Date Time Provider Dale Vega   1/20/2021  1:20 PM Apple Frey MD [facility]

## 2020-12-27 ENCOUNTER — CARE COORDINATION (OUTPATIENT)
Dept: CARE COORDINATION | Age: 60
End: 2020-12-27

## 2021-01-20 ENCOUNTER — OFFICE VISIT (OUTPATIENT)
Dept: PULMONOLOGY | Age: 61
End: 2021-01-20
Payer: MEDICAID

## 2021-01-20 VITALS
HEIGHT: 70 IN | BODY MASS INDEX: 27.43 KG/M2 | DIASTOLIC BLOOD PRESSURE: 72 MMHG | SYSTOLIC BLOOD PRESSURE: 118 MMHG | WEIGHT: 191.6 LBS | OXYGEN SATURATION: 96 % | HEART RATE: 70 BPM | RESPIRATION RATE: 16 BRPM | TEMPERATURE: 96.9 F

## 2021-01-20 DIAGNOSIS — J44.9 COPD WITH ASTHMA (HCC): Primary | ICD-10-CM

## 2021-01-20 DIAGNOSIS — J45.51 SEVERE PERSISTENT ASTHMA WITH EXACERBATION: ICD-10-CM

## 2021-01-20 PROCEDURE — 99215 OFFICE O/P EST HI 40 MIN: CPT | Performed by: INTERNAL MEDICINE

## 2021-01-20 RX ORDER — METHYLPREDNISOLONE ACETATE 80 MG/ML
80 INJECTION, SUSPENSION INTRA-ARTICULAR; INTRALESIONAL; INTRAMUSCULAR; SOFT TISSUE ONCE
Status: DISCONTINUED | OUTPATIENT
Start: 2021-01-20 | End: 2021-05-28

## 2021-01-20 RX ORDER — PREDNISONE 10 MG/1
TABLET ORAL
Qty: 30 TABLET | Refills: 0 | Status: SHIPPED | OUTPATIENT
Start: 2021-01-20 | End: 2021-01-30

## 2021-01-20 ASSESSMENT — ASTHMA QUESTIONNAIRES
ACT_TOTALSCORE: 8
QUESTION_1 LAST FOUR WEEKS HOW MUCH OF THE TIME DID YOUR ASTHMA KEEP YOU FROM GETTING AS MUCH DONE AT WORK, SCHOOL OR AT HOME: 3
QUESTION_4 LAST FOUR WEEKS HOW OFTEN HAVE YOU USED YOUR RESCUE INHALER OR NEBULIZER MEDICATION (SUCH AS ALBUTEROL): 1
QUESTION_2 LAST FOUR WEEKS HOW OFTEN HAVE YOU HAD SHORTNESS OF BREATH: 1

## 2021-01-20 NOTE — PROGRESS NOTES
REASON FOR CONSULTATION/CC: asthma  Chief Complaint   Patient presents with    Asthma     f/u Asthma          PCP: Ann Marie Grande MD    HISTORY OF PRESENT ILLNESS: Celina Wellington is a 64y.o. year old female with a history of asthma who presents :         Asthma/ copd  Prior to Monday, she was doing well. She states she lost her Symbicort Monday. Refill 1st of Feb.    Continues pred 5 mng. Singulair. Duoneb's  Every 4 hours over the last 2 days. Requesting pred burst.    She was supposed to start [address] but has not started this.         REVIEW OF SYSTEMS:  Constitutional: Negative for fever   HENT: Negative for sore throat  Eyes: Negative for redness   Respiratory: ++ for dyspnea, cough  Cardiovascular: Negative for chest pain  Gastrointestinal: Negative for vomiting, diarrhea   Genitourinary: Negative for hematuria   Musculoskeletal: Negative for arthralgias   Skin: Negative for rash  Neurological: Negative for syncope  Hematological: Negative for adenopathy  Psychiatric/Behavorial: Negative for anxiety              PAST MEDICAL HISTORY:  Past Medical History:   Diagnosis Date    Asthma     [facility] ([facility]     CKD (chronic kidney disease)     stage 3    COPD (chronic obstructive pulmonary disease) ([facility]     Empyema ([facility]     Hepatitis C     Patient denies     History of heroin use     Hypertension     Narcotic abuse ([facility]     heroin, clean 1 year    UTI (lower urinary tract infection)        PAST SURGICAL HISTORY:  Past Surgical History:   Procedure Laterality Date    CHEST TUBE INSERTION      COLONOSCOPY  10/23/2018    Colonoscopy with polypectomy (cold snare), polypectomy (cold biopsy)    COLONOSCOPY N/A 10/23/2018    COLONOSCOPY POLYPECTOMY SNARE/COLD BIOPSY performed by Abiodun Brower MD at [facility] [address] Left 12/16/2016    INTRACAPSULAR CATARACT EXTRACTION Right 2/21/2020    PHACOEMULSIFICATION WITH INTRAOCULAR LENS IMPLANT performed by Oma Castelan MD at [address] [facility] EXTRACTION Left 2/28/2020    PHACOEMULSIFICATION WITH INTRAOCULAR LENS IMPLANT performed by Kayla Siegel MD at [address] [place] [address] [address] Right     26 yrs for lung infection     OTHER SURGICAL HISTORY  12/15/2016    EDMOND BUNIONECTOMY WITH APPLICATION OF AMNIOX LEFT FOOT       FAMILY HISTORY:  family history includes Cancer in her mother; Heart Disease in her mother; Kidney Disease in her mother. SOCIAL HISTORY:   reports that she quit smoking about 4 years ago. Her smoking use included cigarettes. She smoked 0.00 packs per day for 45.00 years. She has never used smokeless tobacco.      ALLERGIES:  Patient is allergic to acetylcysteine; codeine; levaquin [levofloxacin in d5w]; penicillins; and ibuprofen. Objective:   PHYSICAL EXAM:  Blood pressure 118/72, pulse 70, temperature 96.9 °F (36.1 °C), temperature source Infrared, resp. rate 16, height 5' 10\" (1.778 m), weight 191 lb 9.6 oz (86.9 kg), last menstrual period 09/13/2017, SpO2 96 %, not currently breastfeeding.'    Gen: No distress. Eyes:    ENT:    Neck:    Resp: No accessory muscle use. No crackles. No wheezes. No rhonchi. CV: Regular rate. Regular rhythm. No murmur or rub. No edema. GI:    Skin:    Lymph:    M/S: No cyanosis. No clubbing. Neuro: Moves all four extremities. Psych: Oriented x 3. No anxiety. Awake. Alert. Intact judgement and insight. Current Outpatient Medications   Medication Sig Dispense Refill    budesonide-formoterol (SYMBICORT) 80-4.5 MCG/ACT AERO Inhale 2 puffs into the lungs 2 times daily 1 Inhaler 0    predniSONE (DELTASONE) 10 MG tablet Take 40 mg by mouth for 3 days 30 mg for 3 days 20 mg for 3 days 10 mg for 3 days.  30 tablet 0    predniSONE (DELTASONE) 5 MG tablet Take 1 tablet by mouth once daily 30 tablet 2    brompheniramine-pseudoephedrine-DM (BROMFED DM) 2-30-10 MG/5ML syrup Take 5 mLs by mouth 4 times daily as needed for Cough 118 mL 0    albuterol sulfate HFA (PROAIR HFA) 108 (90 Base) MCG/ACT inhaler Inhale 2 puffs into the lungs every 6 hours as needed for Wheezing 1 Inhaler 3    ondansetron (ZOFRAN ODT) 4 MG disintegrating tablet Take 1-2 tablets by mouth every 8 hours as needed for Nausea May Sub regular tablet (non-ODT) if insurance does not cover ODT. 20 tablet 0    ipratropium-albuterol (DUONEB) 0.5-2.5 (3) MG/3ML SOLN nebulizer solution Inhale 3 mLs into the lungs every 4 hours 360 mL 5    dextromethorphan-guaiFENesin (ROBITUSSIN COLD COUGH+ CHEST)  MG/5ML syrup Take 10 mLs by mouth every 4 hours as needed for Cough 473 mL 0    montelukast (SINGULAIR) 10 MG tablet TAKE 1 TABLET BY MOUTH NIGHTLY 30 tablet 0    acetaminophen (APAP EXTRA STRENGTH) 500 MG tablet Take 2 tablets by mouth every 6 hours as needed for Pain DO NOT TAKE WITH OTHER MEDICATIONS CONTAINING ACETAMINOPHEN. 30 tablet 0    Respiratory Therapy Supplies (NEBULIZER/TUBING/MOUTHPIECE) KIT 1 kit by Does not apply route daily 1 kit 0    hydrOXYzine (ATARAX) 25 MG tablet Take 25 mg by mouth 2 times daily as needed       Spacer/Aero-Holding Chambers (VORTEX VALVED HOLDING CHAMBER) SAUL 1 each by Does not apply route daily 1 Device 0    albuterol (PROVENTIL) (5 MG/ML) 0.5% nebulizer solution Take 0.5 mLs by nebulization every 6 hours as needed for Wheezing 30 vial 0    budesonide-formoterol (SYMBICORT) 160-4.5 MCG/ACT AERO Inhale 2 puffs into the lungs 2 times daily 1 Inhaler 0    METHADONE HCL PO Take 90 mg by mouth daily       diphenhydrAMINE (BENADRYL) 25 MG tablet Take 25 mg by mouth every 8 hours as needed for Itching or Allergies      amLODIPine (NORVASC) 10 MG tablet Take 1 tablet by mouth daily. 30 tablet 3    cyclobenzaprine (FLEXERIL) 10 MG tablet Take 1 tablet by mouth 3 times daily as needed for Muscle spasms Sedation precautions (Patient not taking: Reported on 1/20/2021) 12 tablet 0    Dupilumab (DUPIXENT) 200 MG/1. 14ML SOSY injection Inject 200 mg into the skin once      Spacer/Aero Chamber Mouthpiece MISC 1 each by Does not apply route once as needed (with sybicort) 1 each 0     Current Facility-Administered Medications   Medication Dose Route Frequency Provider Last Rate Last Admin    methylPREDNISolone acetate (DEPO-MEDROL) injection 80 mg  80 mg Intramuscular Once Capo Elizalde MD           Data Reviewed:       Category 1 Data points:      Last CBC  Lab Results   Component Value Date    WBC 2.4 12/12/2020    RBC 4.91 12/12/2020    HGB 14.5 12/12/2020    MCV 89.4 12/12/2020     12/12/2020     Last Renal  Lab Results   Component Value Date     12/12/2020    K 3.9 12/12/2020    K 4.1 07/31/2019     12/12/2020    CO2 29 12/12/2020    CO2 26 07/31/2019    CO2 28 05/29/2019    BUN 10 12/12/2020    CREATININE 0.9 12/12/2020    GLUCOSE 98 12/12/2020    CALCIUM 9.0 12/12/2020       Negative UA    Notes Reviewed: Er note reviewed. Seen for leg / groin pain. No recent chest imaging reports    Total labs reviewed 3    Category 2 Data points:    Radiology Review:  Independent interpretation of 0    Category 3 Data points:    Discussed management or interpretation of test with external provider: 0       Assessment:     ·  Asthma  · Leg numbness      Plan:      Problem List Items Addressed This Visit     COPD with asthma ([facility] [address] [address] - Primary     She is having another exacerbation of asthma / copd this is being trigger by poor control from losing Symbicort and no completing process to get Dupixent. Will try to keep her out of the ER / hospital with IM Depomedrol, prednisone and sample of inhaler. We discussed when to go to the ER. The patient expressed understanding for all.           Relevant Medications    methylPREDNISolone acetate (DEPO-MEDROL) injection 80 mg    predniSONE (DELTASONE) 10 MG tablet    budesonide-formoterol (SYMBICORT) 80-4.5 MCG/ACT AERO      Other Visit Diagnoses     Severe persistent asthma with exacerbation        Relevant Medications    methylPREDNISolone acetate (DEPO-MEDROL) injection 80 mg    predniSONE (DELTASONE) 10 MG tablet    budesonide-formoterol (SYMBICORT) 80-4.5 MCG/ACT AERO                This note was transcribed using [id number] [employer]. Please disregard any translational errors.     Michelle Pierre [facility], [facility] [phone]

## 2021-01-24 RX ORDER — BUDESONIDE AND FORMOTEROL FUMARATE DIHYDRATE 80; 4.5 UG/1; UG/1
2 AEROSOL RESPIRATORY (INHALATION) 2 TIMES DAILY
Qty: 1 INHALER | Refills: 0 | COMMUNITY
Start: 2021-01-24 | End: 2021-03-23

## 2021-01-24 NOTE — ASSESSMENT & PLAN NOTE
She is having another exacerbation of asthma / copd this is being trigger by poor control from losing Symbicort and no completing process to get Dupixent. Will try to keep her out of the ER / hospital with IM Depomedrol, prednisone and sample of inhaler. We discussed when to go to the ER. The patient expressed understanding for all.

## 2021-01-31 DIAGNOSIS — J44.9 COPD WITH ASTHMA (HCC): ICD-10-CM

## 2021-02-01 RX ORDER — PREDNISONE 1 MG/1
TABLET ORAL
Qty: 30 TABLET | Refills: 2 | Status: SHIPPED | OUTPATIENT
Start: 2021-02-01 | End: 2021-04-16

## 2021-02-15 NOTE — TELEPHONE ENCOUNTER
Received a fax from [facility] needing clarification on the Dupixent  They show the last fill on the [address] was 10/29/20  They want to know is she needs another loading dose since it has been so long, or continue with the maintenance dose of 200mg every 2 weeks?

## 2021-02-16 NOTE — TELEPHONE ENCOUNTER
Spoke to Silvana at [facility] and let her know that Tatum Prakash is to continue with maintenance dose no loading dose.

## 2021-03-09 ENCOUNTER — HOSPITAL ENCOUNTER (EMERGENCY)
Age: 61
Discharge: HOME | End: 2021-03-09
Payer: MEDICAID

## 2021-03-09 ENCOUNTER — APPOINTMENT (OUTPATIENT)
Dept: GENERAL RADIOLOGY | Age: 61
End: 2021-03-09
Payer: MEDICAID

## 2021-03-09 VITALS
TEMPERATURE: 97.4 F | BODY MASS INDEX: 26.72 KG/M2 | DIASTOLIC BLOOD PRESSURE: 67 MMHG | SYSTOLIC BLOOD PRESSURE: 99 MMHG | HEIGHT: 71 IN | RESPIRATION RATE: 20 BRPM | HEART RATE: 63 BPM | OXYGEN SATURATION: 98 %

## 2021-03-09 DIAGNOSIS — S76.012A HIP STRAIN, LEFT, INITIAL ENCOUNTER: ICD-10-CM

## 2021-03-09 DIAGNOSIS — M54.50 LOW BACK PAIN POTENTIALLY ASSOCIATED WITH RADICULOPATHY: Primary | ICD-10-CM

## 2021-03-09 LAB — HCG(URINE) PREGNANCY TEST: NEGATIVE

## 2021-03-09 PROCEDURE — 99284 EMERGENCY DEPT VISIT MOD MDM: CPT

## 2021-03-09 PROCEDURE — 73502 X-RAY EXAM HIP UNI 2-3 VIEWS: CPT

## 2021-03-09 PROCEDURE — 84703 CHORIONIC GONADOTROPIN ASSAY: CPT

## 2021-03-09 PROCEDURE — 6370000000 HC RX 637 (ALT 250 FOR IP): Performed by: PHYSICIAN ASSISTANT

## 2021-03-09 PROCEDURE — 72100 X-RAY EXAM L-S SPINE 2/3 VWS: CPT

## 2021-03-09 RX ORDER — OXYCODONE HYDROCHLORIDE AND ACETAMINOPHEN 5; 325 MG/1; MG/1
1 TABLET ORAL ONCE
Status: COMPLETED | OUTPATIENT
Start: 2021-03-09 | End: 2021-03-09

## 2021-03-09 RX ORDER — METHOCARBAMOL 750 MG/1
750 TABLET, FILM COATED ORAL ONCE
Status: COMPLETED | OUTPATIENT
Start: 2021-03-09 | End: 2021-03-09

## 2021-03-09 RX ORDER — METHOCARBAMOL 750 MG/1
750 TABLET, FILM COATED ORAL 2 TIMES DAILY
Qty: 20 TABLET | Refills: 0 | Status: SHIPPED | OUTPATIENT
Start: 2021-03-09 | End: 2021-09-18

## 2021-03-09 RX ORDER — OXYCODONE HYDROCHLORIDE AND ACETAMINOPHEN 5; 325 MG/1; MG/1
1 TABLET ORAL EVERY 6 HOURS PRN
Qty: 10 TABLET | Refills: 0 | Status: SHIPPED | OUTPATIENT
Start: 2021-03-09 | End: 2021-03-12

## 2021-03-09 RX ADMIN — METHOCARBAMOL 750 MG: 750 TABLET ORAL at 18:52

## 2021-03-09 RX ADMIN — OXYCODONE HYDROCHLORIDE AND ACETAMINOPHEN 1 TABLET: 5; 325 TABLET ORAL at 18:52

## 2021-03-09 ASSESSMENT — PAIN DESCRIPTION - PROGRESSION
CLINICAL_PROGRESSION: GRADUALLY IMPROVING
CLINICAL_PROGRESSION: GRADUALLY WORSENING
CLINICAL_PROGRESSION: GRADUALLY IMPROVING

## 2021-03-09 ASSESSMENT — ENCOUNTER SYMPTOMS
APNEA: 0
VOMITING: 0
CHOKING: 0
EYE DISCHARGE: 0
FACIAL SWELLING: 0
SHORTNESS OF BREATH: 0
NAUSEA: 0
EYE REDNESS: 0
SORE THROAT: 0
BACK PAIN: 1
ABDOMINAL PAIN: 0

## 2021-03-09 ASSESSMENT — PAIN SCALES - GENERAL
PAINLEVEL_OUTOF10: 5
PAINLEVEL_OUTOF10: 10

## 2021-03-09 ASSESSMENT — PAIN DESCRIPTION - FREQUENCY
FREQUENCY: CONTINUOUS

## 2021-03-09 ASSESSMENT — PAIN DESCRIPTION - ORIENTATION: ORIENTATION: LEFT

## 2021-03-09 ASSESSMENT — PAIN DESCRIPTION - DESCRIPTORS
DESCRIPTORS: SHOOTING
DESCRIPTORS: SHOOTING

## 2021-03-09 ASSESSMENT — PAIN DESCRIPTION - LOCATION
LOCATION: LEG
LOCATION: LEG

## 2021-03-09 ASSESSMENT — PAIN DESCRIPTION - PAIN TYPE: TYPE: ACUTE PAIN

## 2021-03-09 ASSESSMENT — PAIN DESCRIPTION - ONSET: ONSET: PROGRESSIVE

## 2021-03-09 NOTE — ED NOTES
Pt denies driving to the ED; per pt, a family friend drove to the ED and will be taking her home once she's d/c.      Evelin Teran RN  03/09/21 [id number]
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
## 2021-03-10 NOTE — ED NOTES
Discharge and education instructions reviewed. Patient verbalized understanding, teach-back successful. Patient denied questions at this time. No acute distress noted. Patient instructed to follow-up as noted - return to emergency department if symptoms worsen. Patient verbalized understanding. Discharged per EDMD with discharged instructions.        Jose Johnson RN  03/09/21 2040

## 2021-03-15 ENCOUNTER — HOSPITAL ENCOUNTER (EMERGENCY)
Age: 61
Discharge: HOME | End: 2021-03-15
Payer: MEDICAID

## 2021-03-15 ENCOUNTER — TELEPHONE (OUTPATIENT)
Dept: PULMONOLOGY | Age: 61
End: 2021-03-15

## 2021-03-15 ENCOUNTER — HOSPITAL ENCOUNTER (OUTPATIENT)
Dept: WOMENS IMAGING | Age: 61
Discharge: HOME | End: 2021-03-15
Payer: MEDICAID

## 2021-03-15 VITALS
HEIGHT: 70 IN | TEMPERATURE: 98.1 F | OXYGEN SATURATION: 97 % | HEART RATE: 68 BPM | BODY MASS INDEX: 27.49 KG/M2 | SYSTOLIC BLOOD PRESSURE: 133 MMHG | DIASTOLIC BLOOD PRESSURE: 81 MMHG | RESPIRATION RATE: 18 BRPM

## 2021-03-15 DIAGNOSIS — Z12.31 BREAST CANCER SCREENING BY MAMMOGRAM: ICD-10-CM

## 2021-03-15 DIAGNOSIS — G89.29 CHRONIC PAIN OF LEFT LOWER EXTREMITY: Primary | ICD-10-CM

## 2021-03-15 DIAGNOSIS — M79.605 CHRONIC PAIN OF LEFT LOWER EXTREMITY: Primary | ICD-10-CM

## 2021-03-15 LAB
A/G RATIO: 1.5 (ref 1.1–2.2)
ALBUMIN SERPL-MCNC: 4.1 G/DL (ref 3.4–5)
ALP BLD-CCNC: 75 U/L (ref 40–129)
ALT SERPL-CCNC: 19 U/L (ref 10–40)
ANION GAP SERPL CALCULATED.3IONS-SCNC: 8 MMOL/L (ref 3–16)
APTT: 31.9 SEC (ref 24.2–36.2)
AST SERPL-CCNC: 15 U/L (ref 15–37)
BASOPHILS ABSOLUTE: 0.1 K/UL (ref 0–0.2)
BASOPHILS RELATIVE PERCENT: 1.1 %
BILIRUB SERPL-MCNC: 0.4 MG/DL (ref 0–1)
BUN BLDV-MCNC: 8 MG/DL (ref 7–20)
CALCIUM SERPL-MCNC: 9.1 MG/DL (ref 8.3–10.6)
CHLORIDE BLD-SCNC: 103 MMOL/L (ref 99–110)
CO2: 29 MMOL/L (ref 21–32)
CREAT SERPL-MCNC: 0.9 MG/DL (ref 0.6–1.1)
EOSINOPHILS ABSOLUTE: 0.1 K/UL (ref 0–0.6)
EOSINOPHILS RELATIVE PERCENT: 3 %
GFR AFRICAN AMERICAN: >60
GFR NON-AFRICAN AMERICAN: >60
GLOBULIN: 2.8 G/DL
GLUCOSE BLD-MCNC: 77 MG/DL (ref 70–99)
HCT VFR BLD CALC: 44.1 % (ref 36–48)
HEMOGLOBIN: 14.6 G/DL (ref 12–16)
INR BLD: 0.98 (ref 0.86–1.14)
LYMPHOCYTES ABSOLUTE: 1.6 K/UL (ref 1–5.1)
LYMPHOCYTES RELATIVE PERCENT: 35.1 %
MCH RBC QN AUTO: 29.9 PG (ref 26–34)
MCHC RBC AUTO-ENTMCNC: 33 G/DL (ref 31–36)
MCV RBC AUTO: 90.6 FL (ref 80–100)
MONOCYTES ABSOLUTE: 0.4 K/UL (ref 0–1.3)
MONOCYTES RELATIVE PERCENT: 9.3 %
NEUTROPHILS ABSOLUTE: 2.3 K/UL (ref 1.7–7.7)
NEUTROPHILS RELATIVE PERCENT: 51.5 %
PDW BLD-RTO: 13.8 % (ref 12.4–15.4)
PLATELET # BLD: 181 K/UL (ref 135–450)
PMV BLD AUTO: 8 FL (ref 5–10.5)
POTASSIUM REFLEX MAGNESIUM: 4.3 MMOL/L (ref 3.5–5.1)
PROTHROMBIN TIME: 11.4 SEC (ref 10–13.2)
RBC # BLD: 4.87 M/UL (ref 4–5.2)
SODIUM BLD-SCNC: 140 MMOL/L (ref 136–145)
TOTAL PROTEIN: 6.9 G/DL (ref 6.4–8.2)
WBC # BLD: 4.5 K/UL (ref 4–11)

## 2021-03-15 PROCEDURE — 77067 SCR MAMMO BI INCL CAD: CPT

## 2021-03-15 PROCEDURE — 77063 BREAST TOMOSYNTHESIS BI: CPT

## 2021-03-15 PROCEDURE — 6370000000 HC RX 637 (ALT 250 FOR IP): Performed by: GENERAL ACUTE CARE HOSPITAL

## 2021-03-15 PROCEDURE — 80053 COMPREHEN METABOLIC PANEL: CPT

## 2021-03-15 PROCEDURE — 85610 PROTHROMBIN TIME: CPT

## 2021-03-15 PROCEDURE — 99283 EMERGENCY DEPT VISIT LOW MDM: CPT

## 2021-03-15 PROCEDURE — 85730 THROMBOPLASTIN TIME PARTIAL: CPT

## 2021-03-15 PROCEDURE — 85025 COMPLETE CBC W/AUTO DIFF WBC: CPT

## 2021-03-15 PROCEDURE — 93971 EXTREMITY STUDY: CPT

## 2021-03-15 RX ORDER — ACETAMINOPHEN 500 MG
1000 TABLET ORAL ONCE
Status: COMPLETED | OUTPATIENT
Start: 2021-03-15 | End: 2021-03-15

## 2021-03-15 RX ORDER — LIDOCAINE 50 MG/G
1 PATCH TOPICAL DAILY
Qty: 10 PATCH | Refills: 0 | Status: SHIPPED | OUTPATIENT
Start: 2021-03-15 | End: 2021-03-25

## 2021-03-15 RX ORDER — CYCLOBENZAPRINE HCL 10 MG
10 TABLET ORAL 3 TIMES DAILY PRN
Qty: 12 TABLET | Refills: 0 | Status: SHIPPED | OUTPATIENT
Start: 2021-03-15 | End: 2021-09-18

## 2021-03-15 RX ADMIN — ACETAMINOPHEN 1000 MG: 500 TABLET ORAL at 15:14

## 2021-03-15 ASSESSMENT — ENCOUNTER SYMPTOMS
SHORTNESS OF BREATH: 0
VOMITING: 0
CHEST TIGHTNESS: 0
NAUSEA: 0
ABDOMINAL PAIN: 0
EYE PAIN: 0
WHEEZING: 0
SORE THROAT: 0

## 2021-03-15 ASSESSMENT — PAIN SCALES - GENERAL: PAINLEVEL_OUTOF10: 9

## 2021-03-15 ASSESSMENT — PAIN DESCRIPTION - ORIENTATION: ORIENTATION: LEFT

## 2021-03-15 ASSESSMENT — PAIN DESCRIPTION - PROGRESSION: CLINICAL_PROGRESSION: NOT CHANGED

## 2021-03-15 ASSESSMENT — PAIN DESCRIPTION - FREQUENCY: FREQUENCY: CONTINUOUS

## 2021-03-15 ASSESSMENT — PAIN DESCRIPTION - ONSET: ONSET: ON-GOING

## 2021-03-15 ASSESSMENT — PAIN DESCRIPTION - PAIN TYPE: TYPE: ACUTE PAIN

## 2021-03-15 ASSESSMENT — PAIN DESCRIPTION - DESCRIPTORS: DESCRIPTORS: ACHING

## 2021-03-15 NOTE — ED PROVIDER NOTES
chills, or other symptoms. Nursing Notes were all reviewed and agreed with or any disagreements were addressed in the HPI. REVIEW OF SYSTEMS    (2-9 systems for level 4, 10 or more for level 5)     Review of Systems   Constitutional: Negative for chills and fever. HENT: Negative for congestion and sore throat. Eyes: Negative for pain and visual disturbance. Respiratory: Negative for chest tightness, shortness of breath and wheezing. Cardiovascular: Negative for chest pain, palpitations and leg swelling. Gastrointestinal: Negative for abdominal pain, nausea and vomiting. Endocrine: Negative for polydipsia and polyuria. Genitourinary: Negative for dysuria. Musculoskeletal: Positive for gait problem and myalgias. Skin: Negative for rash and wound. Allergic/Immunologic: Negative for immunocompromised state. Neurological: Negative for weakness and headaches. Hematological: Does not bruise/bleed easily. Psychiatric/Behavioral: Negative for suicidal ideas. Positives and Pertinent negatives as per HPI. Except as noted above in the ROS, all other systems were reviewed and negative.        PAST MEDICAL HISTORY     Past Medical History:   Diagnosis Date    Asthma     [facility] ([facility] [address]     CKD (chronic kidney disease)     stage 3    COPD (chronic obstructive pulmonary disease) ([facility] [address] [facility]     Empyema (HCC)     Hepatitis C     Patient denies     History of heroin use     Hypertension     Narcotic abuse ([facility] [address]     heroin, clean 1 year    UTI (lower urinary tract infection)          SURGICAL HISTORY     Past Surgical History:   Procedure Laterality Date    CHEST TUBE INSERTION      COLONOSCOPY  10/23/2018    Colonoscopy with polypectomy (cold snare), polypectomy (cold biopsy)    COLONOSCOPY N/A 10/23/2018    COLONOSCOPY POLYPECTOMY SNARE/COLD BIOPSY performed by Stefan Lopez MD at [facility] [address] Left 12/16/2016    INTRACAPSULAR CATARACT EXTRACTION Right 2/21/2020    PHACOEMULSIFICATION WITH INTRAOCULAR LENS IMPLANT performed by Andrew Lynch MD at [address] [facility] EXTRACTION Left 2/28/2020    PHACOEMULSIFICATION WITH INTRAOCULAR LENS IMPLANT performed by Andrew Lynch MD at [address] [place] [address] [address]     26 yrs for lung infection     OTHER SURGICAL HISTORY  12/15/2016    EDMOND BUNIONECTOMY WITH APPLICATION OF AMNIOX LEFT FOOT         [address]       Discharge Medication List as of 3/15/2021  3:52 PM      CONTINUE these medications which have NOT CHANGED    Details   methocarbamol (ROBAXIN-750) 750 MG tablet Take 1 tablet by mouth 2 times daily, Disp-20 tablet, R-0Print      predniSONE (DELTASONE) 5 MG tablet Take 1 tablet by mouth once daily, Disp-30 tablet, R-2Normal      budesonide-formoterol (SYMBICORT) 80-4.5 MCG/ACT AERO Inhale 2 puffs into the lungs 2 times daily, Disp-1 Inhaler, R-0LOT [id number] EXP 5/2021Sample      brompheniramine-pseudoephedrine-DM (BROMFED DM) 2-30-10 MG/5ML syrup Take 5 mLs by mouth 4 times daily as needed for Cough, Disp-118 mL, R-0Normal      albuterol sulfate HFA (PROAIR HFA) 108 (90 Base) MCG/ACT inhaler Inhale 2 puffs into the lungs every 6 hours as needed for Wheezing, Disp-1 Inhaler, R-3Normal      Dupilumab (DUPIXENT) 200 MG/1. 14ML SOSY injection Inject 200 mg into the skin onceHistorical Med      ondansetron (ZOFRAN ODT) 4 MG disintegrating tablet Take 1-2 tablets by mouth every 8 hours as needed for Nausea May Sub regular tablet (non-ODT) if insurance does not cover ODT., Disp-20 tablet, R-0Print      ipratropium-albuterol (DUONEB) 0.5-2.5 (3) MG/3ML SOLN nebulizer solution Inhale 3 mLs into the lungs every 4 hours, Disp-360 mL, R-5Normal      dextromethorphan-guaiFENesin (ROBITUSSIN COLD COUGH+ CHEST)  MG/5ML syrup Take 10 mLs by mouth every 4 hours as needed for Cough, Disp-473 mL, R-0Print      montelukast (SINGULAIR) 10 MG tablet TAKE 1 TABLET BY MOUTH NIGHTLY, Disp-30 tablet, R-0[facility] for future refillsNormal      acetaminophen (APAP EXTRA STRENGTH) 500 MG tablet Take 2 tablets by mouth every 6 hours as needed for Pain DO NOT TAKE WITH OTHER MEDICATIONS CONTAINING ACETAMINOPHEN., Disp-30 tablet, R-0Print      Respiratory Therapy Supplies (NEBULIZER/TUBING/MOUTHPIECE) KIT DAILY Starting 2018, Disp-1 kit, R-0, Normal      hydrOXYzine (ATARAX) 25 MG tablet Take 25 mg by mouth 2 times daily as needed Historical Med      [employer] MISC ONCE PRN Starting 2018, Until 2018, 1 dose, Disp-1 each, R-0, Normal      Spacer/Aero-Holding Chambers (VORTEX VALVED HOLDING CHAMBER) SAUL DAILY Starting 2018, Disp-1 Device, R-0, Sample      albuterol (PROVENTIL) (5 MG/ML) 0.5% nebulizer solution Take 0.5 mLs by nebulization every 6 hours as needed for Wheezing, Disp-30 vial, R-0Print      budesonide-formoterol (SYMBICORT) 160-4.5 MCG/ACT AERO Inhale 2 puffs into the lungs 2 times daily, Disp-1 Inhaler, R-0Print      METHADONE HCL PO Take 90 mg by mouth daily Historical Med      diphenhydrAMINE (BENADRYL) 25 MG tablet Take 25 mg by mouth every 8 hours as needed for Itching or Allergies      amLODIPine (NORVASC) 10 MG tablet Take 1 tablet by mouth daily. , Disp-30 tablet, R-3               ALLERGIES     Acetylcysteine, Codeine, Levaquin [levofloxacin in d5w], Penicillins, and Ibuprofen    FAMILYHISTORY       Family History   Problem Relation Age of Onset    Cancer Mother     Kidney Disease Mother     Heart Disease Mother           SOCIAL HISTORY       Social History     Tobacco Use    Smoking status: Former Smoker     Packs/day: 0.00     Years: 45.00     Pack years: 0.00     Types: Cigarettes     Quit date: 2016     Years since quittin.7    Smokeless tobacco: Never Used   Substance Use Topics    Alcohol use: No    Drug use: No     Comment: past h/o heroin on methadone       SCREENINGS and Affect: Mood normal.         Behavior: Behavior normal.         DIAGNOSTIC RESULTS   LABS:    Labs Reviewed   CBC WITH AUTO DIFFERENTIAL    Narrative:     Performed at:  [address] [address]   Phone [phone]   COMPREHENSIVE METABOLIC PANEL W/ REFLEX TO MG FOR LOW K    Narrative:     Performed at:  [address] [address]   Phone [phone]   PROTIME-INR    Narrative:     Performed at:  [facility]  [address] [place] [address]   Phone [phone]   APTT    Narrative:     Performed at:  [facility]  [address] [place] [address]   Phone [phone]       All other labs were within normal range or not returned as of this dictation. EKG: All EKG's are interpreted by the Emergency Department Physician in the absence of a cardiologist.  Please see their note for interpretation of EKG. RADIOLOGY:   Non-plain film images such as CT, Ultrasound and MRI are read by the radiologist. Plain radiographic images are visualized and preliminarily interpreted by the ED Provider with the below findings:        Interpretation per the Radiologist below, if available at the time of this note:    VL Extremity Venous Left           Xr Lumbar Spine (2-3 Views)    Result Date: 3/9/2021  EXAMINATION: THREE XRAY VIEWS OF THE LUMBAR SPINE 3/9/2021 4:01 pm COMPARISON: 01/25/2014 HISTORY: ORDERING SYSTEM PROVIDED HISTORY: Pain TECHNOLOGIST PROVIDED HISTORY: Reason for exam:->Pain Reason for Exam: pain, NKI, loss of control of bowels Acuity: Acute Type of Exam: Initial FINDINGS: No fractures or subluxations are seen. There is mild disc space narrowing with eburnation of the vertebral endplates at the  T1-3, L3-4, L4-5 levels. There is sclerosis of the facet joints in the mid and lower lumbar spine. The posterior elements are otherwise intact. The paraspinal soft tissues are unremarkable     No acute bony abnormalities are noted Mild lumbar degenerative disc disease. Facet arthropathy     Vl Extremity Venous Left    Result Date: 3/15/2021  Vascular Lower Extremities DVT Study Procedure -- PRELIMINARY SONOGRAPHER REPORT --   Demographics   Patient Name       Esme Coulter   Date of Study      03/15/2021         Gender              Female   Patient Number     [id number]         Date of Birth       1960   Visit Number       [id number]          Age                 64 year(s)   Accession Number   [id number]         Room Number         575   Corporate ID       [id number]            Sonographer         Kim Wheeler, IQRA                                                            CCT   Ordering Physician Syd Espinoza         Interpreting        [facility]                     APRN-CNP           Physician  Procedure Type of Study:   Veins:Lower Extremities DVT Study, VL EXTREMITY VENOUS DUPLEX LEFT. Tech Comments Left No evidence of deep vein or superficial vein thrombosis involving the left lower extremity and the right common femoral vein. Xr Hip 2-3 Vw W Pelvis Left    Result Date: 3/9/2021  EXAMINATION: ONE XRAY VIEW OF THE PELVIS AND TWO XRAY VIEWS LEFT HIP 3/9/2021 7:01 pm COMPARISON: None. HISTORY: ORDERING SYSTEM PROVIDED HISTORY: Pain TECHNOLOGIST PROVIDED HISTORY: Reason for exam:->Pain Reason for Exam: left hip and groin pain Acuity: Acute Type of Exam: Initial FINDINGS: There is mild-to-moderate joint space narrowing in both hips which is more prominent on the right with mild osteophytes around the right femoral head. No fracture or dislocation is seen. The pelvis is intact. The bones are osteopenic. The soft tissues are normal.     Mild-to-moderate osteoarthritic changes in both hips which is more prominent on the right and diffuse osteopenia with no acute bony abnormality.            PROCEDURES pain, any other worsening symptoms. FINAL IMPRESSION      1.  Chronic pain of left lower extremity          DISPOSITION/PLAN   DISPOSITION Decision To Discharge 03/15/2021 03:48:57 PM      PATIENT REFERREDTO:  Your orthopedic      on Wednesday at your scheduled appointment      DISCHARGE MEDICATIONS:  Discharge Medication List as of 3/15/2021  3:52 PM      START taking these medications    Details   lidocaine (LIDODERM) 5 % Place 1 patch onto the skin daily for 10 days 12 hours on, 12 hours off., Disp-10 patch, R-0Normal             DISCONTINUED MEDICATIONS:  Discharge Medication List as of 3/15/2021  3:52 PM                 (Please note that portions of this note were completed with a voice recognition program.  Efforts were made to edit the dictations but occasionally words are mis-transcribed.)    NATALIE Ji CNP (electronically signed)           NATALIE Ji CNP  03/15/21 9175

## 2021-03-15 NOTE — TELEPHONE ENCOUNTER
Made courtesy call to [facility] to make sure she received her Dupixant. She said she had received it 2/23/21 and took an injection into her abdomen. Said 4-5 days after the injection she felt some tingling and pain in her left leg that ended her up in the ER. She has an appt with orthopedics 3/17/21. She is worried/concerned that this might be from the [employer]. We moved her appt up from April to March 23.  She is holding off on the [employer] until she talks to Dr Sergio Qureshi

## 2021-03-17 ENCOUNTER — OFFICE VISIT (OUTPATIENT)
Dept: ORTHOPEDIC SURGERY | Age: 61
End: 2021-03-17
Payer: MEDICAID

## 2021-03-17 VITALS
SYSTOLIC BLOOD PRESSURE: 120 MMHG | HEIGHT: 70 IN | DIASTOLIC BLOOD PRESSURE: 80 MMHG | TEMPERATURE: 97.5 F | BODY MASS INDEX: 27.35 KG/M2 | WEIGHT: 191 LBS

## 2021-03-17 DIAGNOSIS — M54.32 LEFT SIDED SCIATICA: Primary | ICD-10-CM

## 2021-03-17 PROCEDURE — 99203 OFFICE O/P NEW LOW 30 MIN: CPT | Performed by: ORTHOPAEDIC SURGERY

## 2021-03-17 RX ORDER — PREDNISONE 10 MG/1
TABLET ORAL
Qty: 26 TABLET | Refills: 0 | Status: SHIPPED | OUTPATIENT
Start: 2021-03-17 | End: 2021-05-28

## 2021-03-17 NOTE — PROGRESS NOTES
CHIEF COMPLAINT: Left posterior hip pain/  Sciatica. HISTORY:  Juanjose Melendez is a 64y.o. year old female who is seen today for evaluation of left posterior hip pain that radiate to the leg. She reports that this started Feb 2021 with no specific injury that started the pain. She reports that the pain is worse with activity and better with rest.  She reports that the pain is aching and dull in nature. She has not been using ambulatory aids. She has a h/o LBP. She went to [address] [place] [address] twice. She reported having bowel movement with no control twice, and now using OTC Imodium with no more issues. Past Medical History:   Diagnosis Date    Asthma     Bipolar disorder ([facility] [address]     CKD (chronic kidney disease)     stage 3    COPD (chronic obstructive pulmonary disease) (HCC)     Empyema (HCC)     Hepatitis C     Patient denies     History of heroin use     Hypertension     Narcotic abuse ([facility] [address]     heroin, clean 1 year    UTI (lower urinary tract infection)        Past Surgical History:   Procedure Laterality Date    CHEST TUBE INSERTION      COLONOSCOPY  10/23/2018    Colonoscopy with polypectomy (cold snare), polypectomy (cold biopsy)    COLONOSCOPY N/A 10/23/2018    COLONOSCOPY POLYPECTOMY SNARE/COLD BIOPSY performed by Lennon Sandhoff, MD at [address] [place] [address] Road Left 12/16/2016    INTRACAPSULAR CATARACT EXTRACTION Right 2/21/2020    PHACOEMULSIFICATION WITH INTRAOCULAR LENS IMPLANT performed by Chey Olvera MD at [address] EXTRACTION Left 2/28/2020    PHACOEMULSIFICATION WITH INTRAOCULAR LENS IMPLANT performed by Chey Olvera MD at [address] [place] [address] Right     26 yrs for lung infection     OTHER SURGICAL HISTORY  12/15/2016    EDMOND BUNIONECTOMY WITH APPLICATION OF AMNIOX LEFT FOOT       Social History     Socioeconomic History    Marital status:       Spouse name: Not on file    Number of children: Not on file    Years of education: Not on file    Highest education level: Not on file   Occupational History    Not on file   Social Needs    Financial resource strain: Not on file    Food insecurity     Worry: Not on file     Inability: Not on file    Transportation needs     Medical: Not on file     Non-medical: Not on file   Tobacco Use    Smoking status: Former Smoker     Packs/day: 0.00     Years: 45.00     Pack years: 0.00     Types: Cigarettes     Quit date: 2016     Years since quittin.7    Smokeless tobacco: Never Used   Substance and Sexual Activity    Alcohol use: No    Drug use: No     Comment: past h/o heroin on methadone    Sexual activity: Never   Lifestyle    Physical activity     Days per week: Not on file     Minutes per session: Not on file    Stress: Not on file   Relationships    Social connections     Talks on phone: Not on file     Gets together: Not on file     Attends [employer] service: Not on file     Active member of club or organization: Not on file     Attends meetings of clubs or organizations: Not on file     Relationship status: Not on file    Intimate partner violence     Fear of current or ex partner: Not on file     Emotionally abused: Not on file     Physically abused: Not on file     Forced sexual activity: Not on file   Other Topics Concern    Not on file   Social History Narrative    Not on file       Family History   Problem Relation Age of Onset    Cancer Mother     Kidney Disease Mother     Heart Disease Mother        Current Outpatient Medications on File Prior to Visit   Medication Sig Dispense Refill    cyclobenzaprine (FLEXERIL) 10 MG tablet Take 1 tablet by mouth 3 times daily as needed for Muscle spasms Sedation precautions 12 tablet 0    lidocaine (LIDODERM) 5 % Place 1 patch onto the skin daily for 10 days 12 hours on, 12 hours off.  10 patch 0    methocarbamol (ROBAXIN-750) 750 MG tablet Take 1 tablet by mouth 2 times daily 20 tablet 0    predniSONE (DELTASONE) 5 MG tablet Take 1 tablet by mouth once daily 30 tablet 2    budesonide-formoterol (SYMBICORT) 80-4.5 MCG/ACT AERO Inhale 2 puffs into the lungs 2 times daily 1 Inhaler 0    brompheniramine-pseudoephedrine-DM (BROMFED DM) 2-30-10 MG/5ML syrup Take 5 mLs by mouth 4 times daily as needed for Cough 118 mL 0    albuterol sulfate HFA (PROAIR HFA) 108 (90 Base) MCG/ACT inhaler Inhale 2 puffs into the lungs every 6 hours as needed for Wheezing 1 Inhaler 3    Dupilumab (DUPIXENT) 200 MG/1. 14ML SOSY injection Inject 200 mg into the skin once      ondansetron (ZOFRAN ODT) 4 MG disintegrating tablet Take 1-2 tablets by mouth every 8 hours as needed for Nausea May Sub regular tablet (non-ODT) if insurance does not cover ODT. 20 tablet 0    ipratropium-albuterol (DUONEB) 0.5-2.5 (3) MG/3ML SOLN nebulizer solution Inhale 3 mLs into the lungs every 4 hours 360 mL 5    dextromethorphan-guaiFENesin (ROBITUSSIN COLD COUGH+ CHEST)  MG/5ML syrup Take 10 mLs by mouth every 4 hours as needed for Cough 473 mL 0    montelukast (SINGULAIR) 10 MG tablet TAKE 1 TABLET BY MOUTH NIGHTLY 30 tablet 0    acetaminophen (APAP EXTRA STRENGTH) 500 MG tablet Take 2 tablets by mouth every 6 hours as needed for Pain DO NOT TAKE WITH OTHER MEDICATIONS CONTAINING ACETAMINOPHEN.  30 tablet 0    Respiratory Therapy Supplies (NEBULIZER/TUBING/MOUTHPIECE) KIT 1 kit by Does not apply route daily 1 kit 0    hydrOXYzine (ATARAX) 25 MG tablet Take 25 mg by mouth 2 times daily as needed       Spacer/Aero-Holding Chambers (VORTEX VALVED HOLDING CHAMBER) SAUL 1 each by Does not apply route daily 1 Device 0    albuterol (PROVENTIL) (5 MG/ML) 0.5% nebulizer solution Take 0.5 mLs by nebulization every 6 hours as needed for Wheezing 30 vial 0    budesonide-formoterol (SYMBICORT) 160-4.5 MCG/ACT AERO Inhale 2 puffs into the lungs 2 times daily 1 Inhaler 0    METHADONE HCL PO Take 90 mg by mouth daily       diphenhydrAMINE (BENADRYL) 25 MG tablet Take 25 mg by mouth every 8 hours as needed for Itching or Allergies      amLODIPine (NORVASC) 10 MG tablet Take 1 tablet by mouth daily. 30 tablet 3    Spacer/Aero Chamber Mouthpiece MISC 1 each by Does not apply route once as needed (with sybicort) 1 each 0     Current Facility-Administered Medications on File Prior to Visit   Medication Dose Route Frequency Provider Last Rate Last Admin    methylPREDNISolone acetate (DEPO-MEDROL) injection 80 mg  80 mg Intramuscular Once Shyla Mackay MD           Pertinent items are noted in HPI  Review of systems reviewed from Patient History Form dated on 3/17/2021 and available in the patient's chart under the Media tab. No change noted. PHYSICAL EXAMINATION:  Ms. Praful Chaney is a very pleasant 64 y.o.  female who presents today in no acute distress, awake, alert, and oriented. She is well dressed, nourished and  groomed. Patient with normal affect. Height is  5' 10\" (1.778 m), weight is 191 lb (86.6 kg), Body mass index is 27.41 kg/m². Resting respiratory rate is 16. Examination of the gait, showed that the patient walks heel-toe with a non-antalgic gait and no limp. Examination of both knees and hips showing full ROM. She has intact sensation and good pedal pulses. Knee reflex 1+ bilaterally. She is nontender to palpation at the lateral aspect of either hip overlying the greater trochanteric bursa. Motor strength is 5/5 throughout both lower extremities. She has a positive straight leg raise at 80 degree. The skin overlying the left hip is intact without evidence of scar, lesion, laceration or abrasion. Distal pulses are 2+ and symmetric bilaterally. Sensation is grossly intact to light touch and symmetric over the bilateral lower extremities. IMAGING:  Xrays taken 3/9/2021 were reviewed.   There is an AP view of the pelvis, and AP and lateral view of the left hip which demonstrates no significant arthritis. IMPRESSION:  Left sciatica. PLAN: I have discussed the normal course and history of this condition with the patient, and various treatment options. We also discussed the use of NSAIDs and tylenol, as well as risks and benefits of SHELBI. The patient would like to try Meds, and we will give Medrol dose pack.  F/U with our spine team.      Sorin Villalta MD

## 2021-03-23 ENCOUNTER — OFFICE VISIT (OUTPATIENT)
Dept: PULMONOLOGY | Age: 61
End: 2021-03-23
Payer: MEDICAID

## 2021-03-23 VITALS
DIASTOLIC BLOOD PRESSURE: 86 MMHG | BODY MASS INDEX: 28.92 KG/M2 | RESPIRATION RATE: 16 BRPM | OXYGEN SATURATION: 99 % | TEMPERATURE: 97.3 F | HEIGHT: 70 IN | WEIGHT: 202 LBS | HEART RATE: 74 BPM | SYSTOLIC BLOOD PRESSURE: 124 MMHG

## 2021-03-23 DIAGNOSIS — J44.9 COPD WITH ASTHMA (HCC): ICD-10-CM

## 2021-03-23 DIAGNOSIS — J30.89 OTHER ALLERGIC RHINITIS: Primary | ICD-10-CM

## 2021-03-23 PROCEDURE — 99214 OFFICE O/P EST MOD 30 MIN: CPT | Performed by: INTERNAL MEDICINE

## 2021-03-23 RX ORDER — AZELASTINE 1 MG/ML
2 SPRAY, METERED NASAL 2 TIMES DAILY
Qty: 1 BOTTLE | Refills: 5 | Status: SHIPPED | OUTPATIENT
Start: 2021-03-23 | End: 2021-09-18

## 2021-03-23 ASSESSMENT — ASTHMA QUESTIONNAIRES
QUESTION_1 LAST FOUR WEEKS HOW MUCH OF THE TIME DID YOUR ASTHMA KEEP YOU FROM GETTING AS MUCH DONE AT WORK, SCHOOL OR AT HOME: 4
QUESTION_3 LAST FOUR WEEKS HOW OFTEN DID YOUR ASTHMA SYMPTOMS (WHEEZING, COUGHING, SHORTNESS OF BREATH, CHEST TIGHTNESS OR PAIN) WAKE YOU UP AT NIGHT OR EARLIER THAN USUAL IN THE MORNING: 2
QUESTION_4 LAST FOUR WEEKS HOW OFTEN HAVE YOU USED YOUR RESCUE INHALER OR NEBULIZER MEDICATION (SUCH AS ALBUTEROL): 3
QUESTION_2 LAST FOUR WEEKS HOW OFTEN HAVE YOU HAD SHORTNESS OF BREATH: 2
QUESTION_5 LAST FOUR WEEKS HOW WOULD YOU RATE YOUR ASTHMA CONTROL: 3

## 2021-03-23 NOTE — ASSESSMENT & PLAN NOTE
The patient was started on Dupixent along with Symbicort and chronic prednisone. With this, her breathing/asthma has done amazing with very little need for albuterol as needed, clear, no wheezing. Unfortunately, she has had significant left hip pain along with change in vision and loss of bowel control 4 days after the Dupixent medication. These are very atypical side effects. Joint pain and uveitis have been reported less than 1% of the time. Therefore, it is possible this is related to her injection. She is also been treated with steroids for some time and so therefore avascular necrosis of the hip is also possible. She is seen orthopedics and is scheduled to see a spine surgeon early next month. At this point, hold on Dupixent and wait for the specialist assessment. Continue Symbicort and chronic prednisone for now. She is currently being tapered down to her baseline 5 mg. Prior to the patient, she was controlled at 10 mg. Therefore, the prednisone may need to be increased.

## 2021-03-23 NOTE — LETTER
[facility]   [address] [facility] [facility]  Phone: [phone]  Fax: [phone]     3/23/2021    Dr. Kenan Ken MD and ZANDER Noguera    Today had the pleasure to see our mutual patient, Ivis Flaherty. My office note is attached. Please let me know if you have any questions.         Sincerely,    Michelle Pierre Pulmonary, Sleep and Critical Care  [phone]

## 2021-03-23 NOTE — PROGRESS NOTES
REASON FOR CONSULTATION/CC: asthma  Chief Complaint   Patient presents with    Asthma     f/u Asthma -  questions about Dupixent side effects          PCP: Willis Huitron MD    HISTORY OF PRESENT ILLNESS: Marcell Hickman is a 64y.o. year old female with a history of asthma who presents :               HIP pain   She was started on [address] Feb 23rd and after 4 days, developed left leg pain and bowel movement without her knowledge. She started on Immodium AD and this imrpoved and the issue resolved. She has seen the joint and leg pain that has continued and has seen orthopedics. Using tylenol and lidocaine patches. She believes she is worse secondary to dupixent. Asthma / copd  symptoms improved \  Symbicort. Prednisone daily with taper per hip pain  Using rescue medication. allergic rhinitis  Having drainage. REVIEW OF SYSTEMS:  Constitutional: Negative for fever   HENT: Negative for sore throat  Eyes: Negative for redness   Respiratory: no  for dyspnea, cough  Cardiovascular: Negative for chest pain  Gastrointestinal: Negative for vomiting, diarrhea   Genitourinary: Negative for hematuria   Musculoskeletal: Negative for arthralgias ++ right hip pain   Skin: Negative for rash  Neurological: Negative for syncope  Hematological: Negative for adenopathy  Psychiatric/Behavorial: Negative for anxiety             Objective:   PHYSICAL EXAM:  Blood pressure 124/86, pulse 74, temperature 97.3 °F (36.3 °C), temperature source Infrared, resp. rate 16, height 5' 10\" (1.778 m), weight 202 lb (91.6 kg), last menstrual period 09/13/2017, SpO2 99 %, not currently breastfeeding.'    Gen: No distress. Eyes:    ENT:    Neck:    Resp: No accessory muscle use. No crackles. No wheezes. No rhonchi. CV: Regular rate. Regular rhythm. No murmur or rub. No edema. GI:    Skin:    Lymph:    M/S: No cyanosis. No clubbing. Neuro: Moves all four extremities. Psych: Oriented x 3. No anxiety. Awake. Alert. Intact judgement and insight. Data Reviewed:       Category 1 Data points:      Last CBC  Lab Results   Component Value Date    WBC 4.5 03/15/2021    RBC 4.87 03/15/2021    HGB 14.6 03/15/2021    MCV 90.6 03/15/2021     03/15/2021     Last Renal  Lab Results   Component Value Date     03/15/2021    K 4.3 03/15/2021     03/15/2021    CO2 29 03/15/2021    CO2 29 12/12/2020    CO2 26 07/31/2019    BUN 8 03/15/2021    CREATININE 0.9 03/15/2021    GLUCOSE 77 03/15/2021    CALCIUM 9.1 03/15/2021       Negative UA    Notes Reviewed: Er note reviewed. Seen for leg / groin pain. No recent chest imaging reports    Total labs reviewed 3    Category 2 Data points:    Radiology Review:  Independent interpretation of 0    Category 3 Data points:    Discussed management or interpretation of test with external provider: 0       Assessment:     ·  Asthma  · Leg numbness      Plan:      Problem List Items Addressed This Visit     Other allergic rhinitis - Primary     She is having increasing symptoms. Start Astelin. Education given on proper use of medication. Relevant Medications    azelastine (ASTELIN) 0.1 % nasal spray    COPD with asthma ([facility] [address]     The patient was started on Dupixent along with Symbicort and chronic prednisone. With this, her breathing/asthma has done amazing with very little need for albuterol as needed, clear, no wheezing. Unfortunately, she has had significant left hip pain along with change in vision and loss of bowel control 4 days after the Dupixent medication. These are very atypical side effects. Joint pain and uveitis have been reported less than 1% of the time. Therefore, it is possible this is related to her injection. She is also been treated with steroids for some time and so therefore avascular necrosis of the hip is also possible. She is seen orthopedics and is scheduled to see a spine surgeon early next month.   At this point, hold on Dupixent and wait for the specialist assessment. Continue Symbicort and chronic prednisone for now. She is currently being tapered down to her baseline 5 mg. Prior to the patient, she was controlled at 10 mg. Therefore, the prednisone may need to be increased. Relevant Medications    azelastine (ASTELIN) 0.1 % nasal spray                This note was transcribed using 33455 "[employer]". Please disregard any translational errors.     Michelle Pierre Pulmonary, Sleep and [facility] [phone]

## 2021-04-06 ENCOUNTER — TELEPHONE (OUTPATIENT)
Dept: PULMONOLOGY | Age: 61
End: 2021-04-06

## 2021-04-06 RX ORDER — PREDNISONE 20 MG/1
20 TABLET ORAL DAILY
Qty: 7 TABLET | Refills: 0 | Status: SHIPPED | OUTPATIENT
Start: 2021-04-06 | End: 2021-04-13

## 2021-04-06 NOTE — TELEPHONE ENCOUNTER
Called Leonarda Melendez and let her know to increase her Prednisone to 20mg for the next 7 days.   Let her know this was sent in to [facility]

## 2021-04-06 NOTE — TELEPHONE ENCOUNTER
Received fax from [employer] [facility] indicating the PA for Tuan Bates was going to be expiring next month. Called Anyi Healy to determine if she was going to continue on the [address] because she had issues after her last injection that she was not sure were attributed to the [address]   She said for now she is holding off until she see Dr Orlando Vasquez again. She wanted to know if Dr Orlando Vasquez could call something in for her because she feels shorter of breath the past 5 days - no fever, no cough. Taking Symbicort, Incruse, Singulair and Prednisone 5mg daily. Has been taking Duonebs 3-4x daily and Rescue inhaler avg of 3 time daily.   Please advise

## 2021-04-16 DIAGNOSIS — J44.9 COPD WITH ASTHMA (HCC): ICD-10-CM

## 2021-04-16 RX ORDER — PREDNISONE 1 MG/1
TABLET ORAL
Qty: 30 TABLET | Refills: 1 | Status: SHIPPED | OUTPATIENT
Start: 2021-04-16 | End: 2021-05-28

## 2021-04-19 ENCOUNTER — TELEPHONE (OUTPATIENT)
Dept: PULMONOLOGY | Age: 61
End: 2021-04-19

## 2021-04-19 DIAGNOSIS — J44.9 COPD WITH ASTHMA (HCC): Primary | ICD-10-CM

## 2021-04-19 NOTE — TELEPHONE ENCOUNTER
We don't have samples of Symbicort but Breo 200 would be fine with me.   Just advised her its once daily instead of bid like Symbicort

## 2021-04-19 NOTE — TELEPHONE ENCOUNTER
Patient called and stated that she lost her Symbicort inhaler, she has 10 days until the pharmacy will fill this she said that she needs this inhaler as it is the one that she uses everyday. Patient is wondering if we have samples of this symbicort inhaler that we can give her to last these 10 days.     Please call patient back at [phone]

## 2021-04-26 ENCOUNTER — TELEPHONE (OUTPATIENT)
Dept: PULMONOLOGY | Age: 61
End: 2021-04-26

## 2021-04-26 DIAGNOSIS — J44.9 COPD WITH ASTHMA (HCC): Primary | ICD-10-CM

## 2021-04-26 RX ORDER — PREDNISONE 10 MG/1
TABLET ORAL
Qty: 30 TABLET | Refills: 0 | Status: SHIPPED | OUTPATIENT
Start: 2021-04-26 | End: 2021-05-06

## 2021-04-26 RX ORDER — BUDESONIDE AND FORMOTEROL FUMARATE DIHYDRATE 160; 4.5 UG/1; UG/1
2 AEROSOL RESPIRATORY (INHALATION) 2 TIMES DAILY
Qty: 1 INHALER | Refills: 5 | Status: SHIPPED | OUTPATIENT
Start: 2021-04-26 | End: 2021-10-22 | Stop reason: SDUPTHER

## 2021-04-26 NOTE — TELEPHONE ENCOUNTER
Faye Gavin calls. Sob with some wheezing for 1 1/2 weeks but worse last two days due to weather change. Faye Gavin was sob while talking with me on the phone. Breo sample given on 4/19 is not working. \"[facility] has not yet sent refills for Symbicort. \" Out of Symbicort for 1 1/2 weeks. Pt requesting script of Symbicort and Prednisone be sent to [facility] on file.

## 2021-05-03 ENCOUNTER — TELEPHONE (OUTPATIENT)
Dept: PULMONOLOGY | Age: 61
End: 2021-05-03

## 2021-05-24 ENCOUNTER — TELEPHONE (OUTPATIENT)
Dept: PULMONOLOGY | Age: 61
End: 2021-05-24

## 2021-05-24 DIAGNOSIS — J44.9 COPD WITH ASTHMA (HCC): Primary | ICD-10-CM

## 2021-05-24 NOTE — TELEPHONE ENCOUNTER
Complains of cough and SOB  Duration: had a cold three weeks ago and has lingering cough   Cough with sputum production? Not currently the sputum ended about a week ago    Color? Light brown and then clear and now none   Fever? No   Any other Symptoms? No   Any current treatment tried? Nyquil   Using inhalers? symbicort nebulizer  do they help? They help a little   Pharmacy?  [facility]     Patient states prednisone and cough syrup have always helped in the past, she does have an appointment scheduled 5/28

## 2021-05-25 RX ORDER — PREDNISONE 20 MG/1
40 TABLET ORAL DAILY
Qty: 10 TABLET | Refills: 0 | Status: SHIPPED | OUTPATIENT
Start: 2021-05-25 | End: 2021-05-28

## 2021-05-25 RX ORDER — BROMPHENIRAMINE MALEATE, PSEUDOEPHEDRINE HYDROCHLORIDE, AND DEXTROMETHORPHAN HYDROBROMIDE 2; 30; 10 MG/5ML; MG/5ML; MG/5ML
5 SYRUP ORAL 4 TIMES DAILY PRN
Qty: 118 ML | Refills: 0 | Status: SHIPPED | OUTPATIENT
Start: 2021-05-25 | End: 2021-06-17

## 2021-05-28 ENCOUNTER — OFFICE VISIT (OUTPATIENT)
Dept: PULMONOLOGY | Age: 61
End: 2021-05-28
Payer: MEDICAID

## 2021-05-28 VITALS
SYSTOLIC BLOOD PRESSURE: 120 MMHG | DIASTOLIC BLOOD PRESSURE: 74 MMHG | WEIGHT: 196.8 LBS | BODY MASS INDEX: 28.18 KG/M2 | TEMPERATURE: 97.6 F | RESPIRATION RATE: 12 BRPM | HEIGHT: 70 IN

## 2021-05-28 DIAGNOSIS — J44.9 COPD WITH ASTHMA (HCC): Primary | ICD-10-CM

## 2021-05-28 DIAGNOSIS — J30.89 OTHER ALLERGIC RHINITIS: ICD-10-CM

## 2021-05-28 PROCEDURE — 99214 OFFICE O/P EST MOD 30 MIN: CPT | Performed by: INTERNAL MEDICINE

## 2021-05-28 RX ORDER — PREDNISONE 10 MG/1
10 TABLET ORAL DAILY
Qty: 30 TABLET | Refills: 2 | Status: SHIPPED | OUTPATIENT
Start: 2021-05-28 | End: 2021-08-26 | Stop reason: SDUPTHER

## 2021-05-28 NOTE — PATIENT INSTRUCTIONS
oligospermia  Hematologic & oncologic: Bruise, petechia  Hepatic: Hepatomegaly, increased serum alanine aminotransferase, increased serum alkaline phosphatase, increased serum aspartate aminotransferase  Hypersensitivity: Anaphylaxis, angioedema  Infection: Infection, sterile abscess  Nervous system: Abnormal sensory symptoms, arachnoiditis, headache, increased intracranial pressure (with papilledema), malaise, meningitis, myasthenia, neuritis, neuropathy, paraplegia, paresthesia, seizure, vertigo  Neuromuscular & skeletal: Charcot arthropathy, myopathy, osteonecrosis (femoral and humeral heads), pathological fracture (long bones)  Ophthalmic: Glaucoma (Cassie 2012), increased intraocular pressure, retinopathy  Respiratory: Pulmonary edema  Miscellaneous: Wound healing impairment

## 2021-05-28 NOTE — PROGRESS NOTES
REASON FOR CONSULTATION/CC: asthma  Chief Complaint   Patient presents with    Asthma    Follow-up          PCP: Jessica Pierre MD    HISTORY OF PRESENT ILLNESS: Ami Blanchard is a 64y.o. year old female with a history of asthma who presents :               HIP pain   She was started on [address] Feb 23rd and after 4 days, developed left leg pain and bowel movement without her knowledge. She started on Immodium AD and this imrpoved and the issue resolved. She has seen the joint and leg pain that has continued and has seen orthopedics. Using tylenol and lidocaine patches. Asthma / copd   side effects of hip pain resolved with stoppeding dupexient. On chronic prednisone and helping symptoms. Using Symbicort. allergic rhinitis   Controlled with prednisone. Hip pain  Ortho and arthritis. Resolved. REVIEW OF SYSTEMS:  Constitutional: Negative for fever   HENT: Negative for sore throat  Eyes: Negative for redness   Respiratory: no  for dyspnea, cough  Cardiovascular: Negative for chest pain  Gastrointestinal: Negative for vomiting, diarrhea   Genitourinary: Negative for hematuria   Musculoskeletal: Negative for arthralgias resolved right hip pain   Skin: Negative for rash  Neurological: Negative for syncope  Hematological: Negative for adenopathy  Psychiatric/Behavorial: Negative for anxiety             Objective:   PHYSICAL EXAM:  Blood pressure 120/74, temperature 97.6 °F (36.4 °C), temperature source Infrared, resp. rate 12, height 5' 10\" (1.778 m), weight 196 lb 12.8 oz (89.3 kg), last menstrual period 09/13/2017, not currently breastfeeding.'    Gen: No distress. Eyes:    ENT:    Neck:    Resp: No accessory muscle use. No crackles. No wheezes. No rhonchi. CV: Regular rate. Regular rhythm. No murmur or rub. No edema. GI:    Skin:    Lymph:    M/S: No cyanosis. No clubbing. Neuro: Moves all four extremities. Psych: Oriented x 3. No anxiety.

## 2021-05-29 NOTE — ASSESSMENT & PLAN NOTE
She continues on Symbicort and chronic prednisone. While she had severe side effects to Dupexient, she had significant improvement with the biologic. She also has side effects to prednisone and risk of prednisone discussed. Therefore, she will trial Nucala. Paper work started.

## 2021-06-02 ENCOUNTER — TELEPHONE (OUTPATIENT)
Dept: PULMONOLOGY | Age: 61
End: 2021-06-02

## 2021-06-02 DIAGNOSIS — R05.9 COUGH: Primary | ICD-10-CM

## 2021-06-02 RX ORDER — DOXYCYCLINE HYCLATE 100 MG
100 TABLET ORAL 2 TIMES DAILY
Qty: 10 TABLET | Refills: 0 | Status: SHIPPED | OUTPATIENT
Start: 2021-06-02 | End: 2021-06-07

## 2021-06-02 RX ORDER — DOXYCYCLINE HYCLATE 100 MG
100 TABLET ORAL 2 TIMES DAILY
Qty: 10 TABLET | Refills: 0 | Status: SHIPPED | OUTPATIENT
Start: 2021-06-02 | End: 2021-06-02 | Stop reason: SDUPTHER

## 2021-06-02 NOTE — TELEPHONE ENCOUNTER
Patient calling back because the antibiotic was sent to the wrong pharmacy. [facility] Please resend Rx.

## 2021-06-02 NOTE — TELEPHONE ENCOUNTER
Complains of cough and SOB  Duration 3-4 weeks  Cough with sputum production? yes  Color yellow  Fever? no  Any other Symptoms? Runny nose   Any current treatment tried? Cough medicine   Using inhalers? Nebulizer and inhalers do they help? Not much  Pharmacy[facility]    Patient is requesting an antibiotic to help clear up the cough.

## 2021-06-10 DIAGNOSIS — J45.50 SEVERE PERSISTENT ASTHMA WITHOUT COMPLICATION: ICD-10-CM

## 2021-06-10 DIAGNOSIS — J44.9 CHRONIC OBSTRUCTIVE PULMONARY DISEASE, UNSPECIFIED COPD TYPE (HCC): ICD-10-CM

## 2021-06-10 RX ORDER — METHYLPREDNISOLONE SODIUM SUCCINATE 125 MG/2ML
125 INJECTION, POWDER, LYOPHILIZED, FOR SOLUTION INTRAMUSCULAR; INTRAVENOUS ONCE
Status: CANCELLED | OUTPATIENT
Start: 2021-06-17 | End: 2021-06-17

## 2021-06-10 RX ORDER — SODIUM CHLORIDE 9 MG/ML
INJECTION, SOLUTION INTRAVENOUS CONTINUOUS
Status: CANCELLED | OUTPATIENT
Start: 2021-06-17

## 2021-06-10 RX ORDER — EPINEPHRINE 1 MG/ML
0.3 INJECTION, SOLUTION, CONCENTRATE INTRAVENOUS PRN
Status: CANCELLED | OUTPATIENT
Start: 2021-06-17

## 2021-06-10 RX ORDER — DIPHENHYDRAMINE HYDROCHLORIDE 50 MG/ML
50 INJECTION INTRAMUSCULAR; INTRAVENOUS ONCE
Status: CANCELLED | OUTPATIENT
Start: 2021-06-17 | End: 2021-06-17

## 2021-06-17 ENCOUNTER — HOSPITAL ENCOUNTER (OUTPATIENT)
Dept: INFUSION THERAPY | Age: 61
Setting detail: INFUSION SERIES
Discharge: HOME | End: 2021-06-17
Payer: MEDICAID

## 2021-06-17 VITALS
RESPIRATION RATE: 16 BRPM | SYSTOLIC BLOOD PRESSURE: 116 MMHG | OXYGEN SATURATION: 98 % | TEMPERATURE: 99 F | DIASTOLIC BLOOD PRESSURE: 73 MMHG | HEART RATE: 64 BPM

## 2021-06-17 DIAGNOSIS — J45.50 SEVERE PERSISTENT ASTHMA WITHOUT COMPLICATION: Primary | ICD-10-CM

## 2021-06-17 DIAGNOSIS — J44.9 CHRONIC OBSTRUCTIVE PULMONARY DISEASE, UNSPECIFIED COPD TYPE (HCC): ICD-10-CM

## 2021-06-17 PROCEDURE — 2580000003 HC RX 258

## 2021-06-17 PROCEDURE — 96372 THER/PROPH/DIAG INJ SC/IM: CPT

## 2021-06-17 PROCEDURE — 6360000002 HC RX W HCPCS: Performed by: INTERNAL MEDICINE

## 2021-06-17 RX ORDER — DIPHENHYDRAMINE HYDROCHLORIDE 50 MG/ML
50 INJECTION INTRAMUSCULAR; INTRAVENOUS ONCE
Status: CANCELLED | OUTPATIENT
Start: 2021-07-15 | End: 2021-07-15

## 2021-06-17 RX ORDER — METHYLPREDNISOLONE SODIUM SUCCINATE 125 MG/2ML
125 INJECTION, POWDER, LYOPHILIZED, FOR SOLUTION INTRAMUSCULAR; INTRAVENOUS ONCE
Status: CANCELLED | OUTPATIENT
Start: 2021-07-15 | End: 2021-07-15

## 2021-06-17 RX ORDER — SODIUM CHLORIDE 9 MG/ML
INJECTION, SOLUTION INTRAVENOUS CONTINUOUS
Status: CANCELLED | OUTPATIENT
Start: 2021-07-15

## 2021-06-17 RX ORDER — EPINEPHRINE 1 MG/ML
0.3 INJECTION, SOLUTION, CONCENTRATE INTRAVENOUS PRN
Status: CANCELLED | OUTPATIENT
Start: 2021-07-15

## 2021-06-17 RX ADMIN — MEPOLIZUMAB 100 MG: 100 INJECTION, POWDER, FOR SOLUTION SUBCUTANEOUS at 13:38

## 2021-06-17 RX ADMIN — WATER 1.2 ML: 1 INJECTION INTRAMUSCULAR; INTRAVENOUS; SUBCUTANEOUS at 13:38

## 2021-06-17 NOTE — PROGRESS NOTES
[facility]patient [facility] 84 [facility] Injection Visit    NAME:  Domitila House  YOB: 1960  MEDICAL RECORD NUMBER:  [id number]  Episode Date:  6/17/2021    Patient arrived to Outpatient [facility]   [] per wheelchair   [x] ambulatory     Patient to [facility] 60 for 1st Nucala injection for the diagnosis of severe asthma and COPD. Patient alert and oriented x 4. Reports that she has had many asthma attacks and has had issues with her asthma for many years. Patient states after using multiple inhalers and steroids, she was started on Dupixent and had severe side effects from it. Reports that while on Dupixent, she developed severe fatigue and pain/tingling in left leg. Reports that at one point the pain was so bad could not stand or walk on it. Patient also reports that she had episodes of stool incontinence. Patient states that after this happened, she called to discuss this with Dr Erin Kwong. At that point Dr Erin Kwong told her to stop the [address] and wanted her to start Nucala. Most recent Eosinophil count? 0.1 (3.0%) on 3/15/2021 - full CBC report in EPIC    Opportunistic Infections  Does patient have history of Herpes Zoster? NO    Does patient have history of Parasitic Helminth infection? NO    Patient is aware of potential hypersensitivity reactions: anaphylaxsis, angioedema, bronchospasm, hypotension, urticaria, rash? These can occur within hours of injection but can have a delayed onset of few days. - YES - reviewed with patient and she verbalized understanding    Does patient have an EPI pen at home? No - instructed patient on signs and symptoms of hypersensitivity and allergic reactions and instructed patient to go to ER or to call 911 if she experiences these symptoms. Patient verbalized understanding.     Patient is instructed on less severe potential reactions: headache, injection site reaction, back pain, fatigue, influenza, urinary tract infection, upper abdominal pain, eczema, muscle spasms, allergic rhinitis - Reviewed with patient and she verbalized understanding    Is this the patient's first Nucala Injection? Yes       Patient Active Problem List   Diagnosis    Precordial pain    COPD with asthma ([address]    Unstable angina ([address]    HTN (hypertension)    HCV (hepatitis C virus)    Hypoxia    Smoking addiction    Thrush    Mild episode of recurrent major depressive disorder ([address]    Over weight    Severe persistent asthma without complication    Other allergic rhinitis    Numbness in left leg    Left sided sciatica    Severe persistent asthma    COPD (chronic obstructive pulmonary disease) ([address]       Has the patient experienced a decrease in frequency of exacerbations? This is patient's first Nucala injection. Prior to starting [place], patient reports having a lot of difficulty with her asthma. Maintenance therapies currently using: Symbicort inhaler twice daily, Singulair tablet nightly, Prednisone tablet daily, Astelin nasal spray twice daily, DuoNeb nebulizer PRN, Proventil Nebulizer PRN, Proair inhaler PRN, Bromfed DM syrup PRN, and Robitussin cough and chest syrup PRN. Breath Sounds: Respirations slightly labored upon arrival but became even and easy with short rest period. Patient denies shortness of breath. Lung sounds clear in bilateral upper lung fields and with slight wheezes in bilateral lower lung fields. No crackles or cough noted    Pulse Oximetry: 96 %    Premedicated: None ordered  [] Benadryl 25 mg IV  [] Benadryl 50 mg IV  [] Benadryl 25 mg oral   [] Benadryl 50 mg oral  [] Other    Nucala administered: Yes    Nucala dosage: 100 mg was administered slowly subcutaneously into the right upper arm. Education: Information regarding Nucala - mechanism of action, dose, schedule and side effects reviewed with patient prior to injection.  Patient verbalized understanding and written information given via AVS.    Response to treatment: Patient remained in [facility] for 30 minutes post injection and no side effects were noted. Vital signs remain stable with no significant change from those prior to injection. Well tolerated by patient. Scheduled to return for next dose of Nucala on July 15, 2021 .      Electronically signed by Helen Rhodes RN on 6/17/2021 at 5:58 PM

## 2021-06-29 ENCOUNTER — TELEPHONE (OUTPATIENT)
Dept: PULMONOLOGY | Age: 61
End: 2021-06-29

## 2021-06-29 NOTE — TELEPHONE ENCOUNTER
I would tell her to keep doing what she is doing with tylenol and benadryl. She can plan to take these for a few days after her next injection.

## 2021-06-29 NOTE — TELEPHONE ENCOUNTER
Vitaliy Infante called stating she had her Nucala injection 6/17/21 at the [facility] center. She said that night she got a migraine and took 2 Tylenol  and this took care of that. Said the next day she started itching. Had a small spot no bigger than a quarter on her right arm and her legs itched, but not rash or bumps. Took Benadryl for the itching and it went away. A week later she said she started with small bump rash on forehead, under her lip and under her eyes. Said she has been taking Benadryl every other day since then  Still has small amount of rash left on her face. Said she applied Minerin Cream and Eucerin Cream to the rash which helped as well. She said she has a history with itching problems but the doctor can't seem to identify the cause? Vitaliy Infante said she is definitely feeling better with the Sandra Golder as far as her breathing etc goes and does not want to have to stop the injections She said she was told by Lauren Valle to let Dr Ann Mota be aware of these issues. Any recommendations?

## 2021-07-06 ENCOUNTER — TELEPHONE (OUTPATIENT)
Dept: PULMONOLOGY | Age: 61
End: 2021-07-06

## 2021-07-06 DIAGNOSIS — B37.0 THRUSH: Primary | ICD-10-CM

## 2021-07-06 RX ORDER — FLUCONAZOLE 100 MG/1
100 TABLET ORAL DAILY
Qty: 3 TABLET | Refills: 0 | Status: SHIPPED | OUTPATIENT
Start: 2021-07-06 | End: 2021-07-09

## 2021-07-12 ENCOUNTER — TELEPHONE (OUTPATIENT)
Dept: PULMONOLOGY | Age: 61
End: 2021-07-12

## 2021-07-12 NOTE — TELEPHONE ENCOUNTER
Sounds like she needs to be seen by Vilma Mike MD or myself.   Ok to offer appointment tomorrow at  ~1.

## 2021-07-12 NOTE — TELEPHONE ENCOUNTER
Patient has finished med's for Thrush , she states it is still very bad . Her tounge is coated white and she is having trouble swallowing.  Please call something else in to  Cristina Maldonado

## 2021-07-13 ENCOUNTER — OFFICE VISIT (OUTPATIENT)
Dept: PULMONOLOGY | Age: 61
End: 2021-07-13
Payer: MEDICAID

## 2021-07-13 VITALS
DIASTOLIC BLOOD PRESSURE: 76 MMHG | RESPIRATION RATE: 16 BRPM | WEIGHT: 196 LBS | TEMPERATURE: 96.9 F | BODY MASS INDEX: 28.06 KG/M2 | HEART RATE: 68 BPM | OXYGEN SATURATION: 99 % | SYSTOLIC BLOOD PRESSURE: 116 MMHG | HEIGHT: 70 IN

## 2021-07-13 DIAGNOSIS — B37.0 THRUSH: ICD-10-CM

## 2021-07-13 DIAGNOSIS — Z79.52 CURRENT CHRONIC USE OF SYSTEMIC STEROIDS: ICD-10-CM

## 2021-07-13 DIAGNOSIS — J44.9 COPD WITH ASTHMA (HCC): Primary | ICD-10-CM

## 2021-07-13 PROCEDURE — 99214 OFFICE O/P EST MOD 30 MIN: CPT | Performed by: INTERNAL MEDICINE

## 2021-07-13 RX ORDER — FLUCONAZOLE 100 MG/1
100 TABLET ORAL DAILY
Qty: 7 TABLET | Refills: 0 | Status: SHIPPED | OUTPATIENT
Start: 2021-07-13 | End: 2021-07-20

## 2021-07-13 NOTE — ASSESSMENT & PLAN NOTE
Discussed weaning. Having complications of AI, Supraclavical fat pad, and thrush. Plan to wean next month to 5 mg daily for 1 month then 2.5 mg daily.

## 2021-07-13 NOTE — ASSESSMENT & PLAN NOTE
Significantly improved with addition of Nucala to Symbicort and chronic prednisone. We have discussed side effects of chronic prednisone and she is having complications of recurrent thrush and Supraclavical fat pads. She stopped prednisone from 10 mg without advise. Adrenal insuffiencey was discussed. Will continue 10 mg for now secondary to patient preference then wean next month.

## 2021-07-13 NOTE — ASSESSMENT & PLAN NOTE
This is likely secondary to chronic steroids, Symbicort without spacer, and mouth care. All discussed    prescription for spacer sent. Wean steroids wean able.

## 2021-07-13 NOTE — PROGRESS NOTES
REASON FOR CONSULTATION/CC: asthma  Chief Complaint   Patient presents with    Other     f/u thrush          PCP: Celina Meier MD    HISTORY OF PRESENT ILLNESS: Thiago Fatima is a 64y.o. year old female with a history of asthma who presents :               HIP pain    not addressed                . allergic rhinitis    not addressed        Asthma / copd   Symbicort and Nucala with slight rash. Mouth  With white plaques that is remvoed with H202 and brush. She stopped prednisone from 10 mg and had symptoms of weakness and fatigue. REVIEW OF SYSTEMS:  Constitutional: Negative for fever   HENT: Negative for sore throat  Eyes: Negative for redness   Respiratory: no  for dyspnea, cough  Cardiovascular: Negative for chest pain  Gastrointestinal: Negative for vomiting, diarrhea   Genitourinary: Negative for hematuria   Musculoskeletal: Negative for arthralgias resolved right hip pain   Skin: Negative for rash  Neurological: Negative for syncope  Hematological: Negative for adenopathy  Psychiatric/Behavorial: Negative for anxiety             Objective:   PHYSICAL EXAM:  Blood pressure 116/76, pulse 68, temperature 96.9 °F (36.1 °C), temperature source Infrared, resp. rate 16, height 5' 10\" (1.778 m), weight 196 lb (88.9 kg), last menstrual period 09/13/2017, SpO2 99 %, not currently breastfeeding.'    Gen: No distress. Eyes:    ENT:    Neck:    Resp: No accessory muscle use. No crackles. No wheezes. No rhonchi. CV: Regular rate. Regular rhythm. No murmur or rub. No edema. GI:    Skin:    Lymph:    M/S: No cyanosis. No clubbing. Neuro: Moves all four extremities. Psych: Oriented x 3. No anxiety. Awake. Alert. Intact judgement and insight. Data Reviewed:        Assessment:     ·  Asthma  · Leg numbness      Plan:      Problem List Items Addressed This Visit     Beecarrie Horace     This is likely secondary to chronic steroids, Symbicort without spacer, and mouth care.   All discussed    prescription for spacer sent. Wean steroids wean able. Current chronic use of systemic steroids     Discussed weaning. Having complications of AI, Supraclavical fat pad, and thrush. Plan to wean next month to 5 mg daily for 1 month then 2.5 mg daily. COPD with asthma ([address] - Primary     Significantly improved with addition of Nucala to Symbicort and chronic prednisone. We have discussed side effects of chronic prednisone and she is having complications of recurrent thrush and Supraclavical fat pads. She stopped prednisone from 10 mg without advise. Adrenal insuffiencey was discussed. Will continue 10 mg for now secondary to patient preference then wean next month. Relevant Medications    Spacer/Aero-Holding Chambers SAUL    fluconazole (DIFLUCAN) 100 MG tablet                This note was transcribed using [id number] [facility]. Please disregard any translational errors.     Michelle [facility], [phone]

## 2021-07-15 ENCOUNTER — HOSPITAL ENCOUNTER (OUTPATIENT)
Dept: INFUSION THERAPY | Age: 61
Setting detail: INFUSION SERIES
Discharge: HOME | End: 2021-07-15
Payer: MEDICAID

## 2021-07-15 VITALS
WEIGHT: 193 LBS | DIASTOLIC BLOOD PRESSURE: 80 MMHG | TEMPERATURE: 98.1 F | SYSTOLIC BLOOD PRESSURE: 120 MMHG | HEART RATE: 61 BPM | OXYGEN SATURATION: 100 % | HEIGHT: 70 IN | RESPIRATION RATE: 17 BRPM | BODY MASS INDEX: 27.63 KG/M2

## 2021-07-15 DIAGNOSIS — J44.9 CHRONIC OBSTRUCTIVE PULMONARY DISEASE, UNSPECIFIED COPD TYPE (HCC): ICD-10-CM

## 2021-07-15 DIAGNOSIS — J45.50 SEVERE PERSISTENT ASTHMA WITHOUT COMPLICATION: Primary | ICD-10-CM

## 2021-07-15 PROCEDURE — 6360000002 HC RX W HCPCS: Performed by: INTERNAL MEDICINE

## 2021-07-15 PROCEDURE — 96372 THER/PROPH/DIAG INJ SC/IM: CPT

## 2021-07-15 RX ORDER — EPINEPHRINE 1 MG/ML
0.3 INJECTION, SOLUTION, CONCENTRATE INTRAVENOUS PRN
Status: CANCELLED | OUTPATIENT
Start: 2021-08-12

## 2021-07-15 RX ORDER — SODIUM CHLORIDE 9 MG/ML
INJECTION, SOLUTION INTRAVENOUS CONTINUOUS
Status: CANCELLED | OUTPATIENT
Start: 2021-08-12

## 2021-07-15 RX ORDER — METHYLPREDNISOLONE SODIUM SUCCINATE 125 MG/2ML
125 INJECTION, POWDER, LYOPHILIZED, FOR SOLUTION INTRAMUSCULAR; INTRAVENOUS ONCE
Status: CANCELLED | OUTPATIENT
Start: 2021-08-12 | End: 2021-08-12

## 2021-07-15 RX ORDER — DIPHENHYDRAMINE HYDROCHLORIDE 50 MG/ML
50 INJECTION INTRAMUSCULAR; INTRAVENOUS ONCE
Status: CANCELLED | OUTPATIENT
Start: 2021-08-12 | End: 2021-08-12

## 2021-07-15 RX ADMIN — MEPOLIZUMAB 100 MG: 100 INJECTION, POWDER, FOR SOLUTION SUBCUTANEOUS at 13:21

## 2021-07-15 NOTE — PROGRESS NOTES
Outpatient [id number] [facility] [address] [facility] Injection Visit    NAME:  Rashid Esparza  YOB: 1960  MEDICAL RECORD NUMBER:  [id number]  Episode Date:  7/15/2021    Patient arrived to [facility]   [] per wheelchair   [x] ambulatory     Most recent Eosinophil count? Opportunistic Infections  Does patient have history of Herpes Zoster? Does patient have history of Parasitic Helminth infection? Patient is aware of potential hypersensitivity reactions: anaphylaxsis, angioedema, bronchospasm, hypotension, urticaria, rash? These can occur within hours of injection but can have a delayed onset of few days. Does patient have an EPI pen at home? Patient is instructed on less severe potential reactions: headache, injection site reaction, back pain, fatigue, influenza, urinary tract infection, upper abdominal pain, eczema, muscle spasms, allergic rhinitis    Is this the patient's first Nucala Injection? No   Did the patient experience any adverse reactions to previous Nucala Injection? Yes Headache    Patient Active Problem List   Diagnosis    Precordial pain    COPD with asthma [facility]    Unstable angina ([facility]    HTN (hypertension)    HCV (hepatitis C virus)    Hypoxia    Smoking addiction    Thrush    Mild episode of recurrent major depressive disorder ([facility]    Over weight    Severe persistent asthma without complication    Other allergic rhinitis    Numbness in left leg    Left sided sciatica    Severe persistent asthma    COPD (chronic obstructive pulmonary disease) (HCC)    Current chronic use of systemic steroids        /80   Pulse 61   Temp 98.1 °F (36.7 °C) (Oral)   Resp 17   Ht 5' 10\" (1.778 m)   Wt 193 lb (87.5 kg)   LMP 09/13/2017   SpO2 100%   BMI 27.69 kg/m²     Has the patient experienced frequency of exacerbations?  No   Maintenance therapies currently using: inhalers, singulair     Breath Sounds: No increased work of breathing, Breath sounds clear to auscultation bilaterally and Good air exchange  Pulse Oximetry: 100 %    Premedicated:  [] Benadryl 25 mg IV  [] Benadryl 50 mg IV  [] Benadryl 25 mg oral   [] Benadryl 50 mg oral  [] Other    Patient took 650 mg of tylenol to allay headache'    Nucala administered: Yes    Nucala dosage: 100 mg was administered slowly subcutaneously into the right upper arm. Will stay foe 20 minutes. Response to treatment:  Well tolerated by patient. Scheduled to return for next dose of Nucala on August 12, 2021 .      Electronically signed by Noemi Moses RN on 7/15/2021 at 1:13 PM

## 2021-07-22 ENCOUNTER — TELEPHONE (OUTPATIENT)
Dept: PULMONOLOGY | Age: 61
End: 2021-07-22

## 2021-07-22 NOTE — TELEPHONE ENCOUNTER
Patient calling stating she still has the white places in her mouth. No discomfort just the white areas she had when she was seen last week.   Please advise

## 2021-07-22 NOTE — TELEPHONE ENCOUNTER
Have her continue the hydrogen peroxide and decrease steroids to 5 mg per day. I know she wanted to stay at 10 mg but she must decrease secondary to infection (thrush).  She is to stay at 5 mg daily

## 2021-07-22 NOTE — TELEPHONE ENCOUNTER
Called patient to inform her of Dr. Moses Garcia advice. Patient verbalized understanding with no further questions at this time.

## 2021-08-12 ENCOUNTER — HOSPITAL ENCOUNTER (OUTPATIENT)
Dept: INFUSION THERAPY | Age: 61
Setting detail: INFUSION SERIES
Discharge: HOME | End: 2021-08-12
Payer: MEDICAID

## 2021-08-12 VITALS
RESPIRATION RATE: 16 BRPM | SYSTOLIC BLOOD PRESSURE: 108 MMHG | TEMPERATURE: 99.2 F | DIASTOLIC BLOOD PRESSURE: 73 MMHG | OXYGEN SATURATION: 100 % | HEART RATE: 65 BPM

## 2021-08-12 DIAGNOSIS — J44.9 CHRONIC OBSTRUCTIVE PULMONARY DISEASE, UNSPECIFIED COPD TYPE (HCC): ICD-10-CM

## 2021-08-12 DIAGNOSIS — J45.50 SEVERE PERSISTENT ASTHMA WITHOUT COMPLICATION: Primary | ICD-10-CM

## 2021-08-12 PROCEDURE — 96372 THER/PROPH/DIAG INJ SC/IM: CPT

## 2021-08-12 PROCEDURE — 6360000002 HC RX W HCPCS: Performed by: INTERNAL MEDICINE

## 2021-08-12 PROCEDURE — 2580000003 HC RX 258

## 2021-08-12 RX ORDER — SODIUM CHLORIDE 9 MG/ML
INJECTION, SOLUTION INTRAVENOUS CONTINUOUS
Status: CANCELLED | OUTPATIENT
Start: 2021-09-09

## 2021-08-12 RX ORDER — METHYLPREDNISOLONE SODIUM SUCCINATE 125 MG/2ML
125 INJECTION, POWDER, LYOPHILIZED, FOR SOLUTION INTRAMUSCULAR; INTRAVENOUS ONCE
Status: CANCELLED | OUTPATIENT
Start: 2021-09-09 | End: 2021-09-09

## 2021-08-12 RX ORDER — EPINEPHRINE 1 MG/ML
0.3 INJECTION, SOLUTION, CONCENTRATE INTRAVENOUS PRN
Status: CANCELLED | OUTPATIENT
Start: 2021-09-09

## 2021-08-12 RX ORDER — DIPHENHYDRAMINE HYDROCHLORIDE 50 MG/ML
50 INJECTION INTRAMUSCULAR; INTRAVENOUS ONCE
Status: CANCELLED | OUTPATIENT
Start: 2021-09-09 | End: 2021-09-09

## 2021-08-12 RX ADMIN — WATER 1.2 ML: 1 INJECTION INTRAMUSCULAR; INTRAVENOUS; SUBCUTANEOUS at 13:02

## 2021-08-12 RX ADMIN — MEPOLIZUMAB 100 MG: 100 INJECTION, POWDER, FOR SOLUTION SUBCUTANEOUS at 13:02

## 2021-08-12 NOTE — PROGRESS NOTES
Outpatient [address] [facility] [address] [facility] Injection Visit    NAME:  Antonieta Polanco  YOB: 1960  MEDICAL RECORD NUMBER:  [id number]  Episode Date:  8/12/2021    Patient arrived to [facility]   [] per wheelchair   [x] ambulatory     Alert and oriented X 4. States had Dose #1 of Covid vaccine  on Saturday 8/7/21. Denies any side effects from last injection. States she noticed improvement since starting Nucala, using rescue inhaler and Nebulizer less often. Most recent Eosinophil count? EOS abs= 0.1, EOS%=3.0    Opportunistic Infections  Does patient have history of Herpes Zoster? No  Does patient have history of Parasitic Helminth infection? No    Patient is aware of potential hypersensitivity reactions: anaphylaxsis, angioedema, bronchospasm, hypotension, urticaria, rash? These can occur within hours of injection but can have a delayed onset of few days. Yes    Does patient have an EPI pen at home? No    Patient is instructed on less severe potential reactions: headache, injection site reaction, back pain, fatigue, influenza, urinary tract infection, upper abdominal pain, eczema, muscle spasms, allergic rhinitis. yes    Is this the patient's first Nucala Injection? No   Did the patient experience any adverse reactions to previous Nucala Injection?  No    Patient Active Problem List   Diagnosis    Precordial pain    COPD with asthma ([address]    Unstable angina ([address]    HTN (hypertension)    HCV (hepatitis C virus)    Hypoxia    Smoking addiction    Thrush    Mild episode of recurrent major depressive disorder ([facility] [address]    Over weight    Severe persistent asthma without complication    Other allergic rhinitis    Numbness in left leg    Left sided sciatica    Severe persistent asthma    COPD (chronic obstructive pulmonary disease) (HCC)    Current chronic use of systemic steroids        /73   Pulse 65   Temp 99.2 °F (37.3 °C) (Oral) Comment: had covid vaccine on Saturday  Resp 16   LMP 09/13/2017   SpO2 100%     Has the patient experienced frequency of exacerbations? No   Maintenance therapies currently using: Proventil prn, ProAir inhaler prn, symbicort twice daily, Duoneb prn, singulair nightly, prednisone, Astelin nasal spray, benadryl prn,      Breath Sounds: No increased work of breathing, Breath sounds clear to auscultation bilaterally and Good air exchange  Pulse Oximetry: 100 %    Premedicated: None ordered  [] Benadryl 25 mg IV  [] Benadryl 50 mg IV  [] Benadryl 25 mg oral   [] Benadryl 50 mg oral  [] Other    Nucala administered: Yes    Nucala dosage: 100 mg was administered slowly subcutaneously into the right upper arm. Response to treatment:  Well tolerated by patient. Patient observed for 15 minutes post injection    Scheduled to return for next dose of Nucala on September 9, 2021 .      Electronically signed by Dion Meadows RN on 8/12/2021 at 1:07 PM

## 2021-08-24 ENCOUNTER — HOSPITAL ENCOUNTER (EMERGENCY)
Age: 61
Discharge: HOME | End: 2021-08-24
Attending: EMERGENCY MEDICINE
Payer: MEDICAID

## 2021-08-24 ENCOUNTER — APPOINTMENT (OUTPATIENT)
Dept: GENERAL RADIOLOGY | Age: 61
End: 2021-08-24
Payer: MEDICAID

## 2021-08-24 VITALS
WEIGHT: 199.08 LBS | HEART RATE: 68 BPM | HEIGHT: 70 IN | TEMPERATURE: 98 F | DIASTOLIC BLOOD PRESSURE: 87 MMHG | RESPIRATION RATE: 16 BRPM | SYSTOLIC BLOOD PRESSURE: 141 MMHG | OXYGEN SATURATION: 97 % | BODY MASS INDEX: 28.5 KG/M2

## 2021-08-24 DIAGNOSIS — J44.1 COPD EXACERBATION (HCC): Primary | ICD-10-CM

## 2021-08-24 LAB
ANION GAP SERPL CALCULATED.3IONS-SCNC: 9 MMOL/L (ref 3–16)
BASOPHILS ABSOLUTE: 0 K/UL (ref 0–0.2)
BASOPHILS RELATIVE PERCENT: 0.7 %
BUN BLDV-MCNC: 6 MG/DL (ref 7–20)
CALCIUM SERPL-MCNC: 9.7 MG/DL (ref 8.3–10.6)
CHLORIDE BLD-SCNC: 100 MMOL/L (ref 99–110)
CO2: 29 MMOL/L (ref 21–32)
CREAT SERPL-MCNC: 1 MG/DL (ref 0.6–1.1)
EKG ATRIAL RATE: 58 BPM
EKG DIAGNOSIS: NORMAL
EKG P AXIS: 62 DEGREES
EKG P-R INTERVAL: 162 MS
EKG Q-T INTERVAL: 434 MS
EKG QRS DURATION: 90 MS
EKG QTC CALCULATION (BAZETT): 426 MS
EKG R AXIS: -15 DEGREES
EKG T AXIS: 19 DEGREES
EKG VENTRICULAR RATE: 58 BPM
EOSINOPHILS ABSOLUTE: 0 K/UL (ref 0–0.6)
EOSINOPHILS RELATIVE PERCENT: 0.6 %
GFR AFRICAN AMERICAN: >60
GFR NON-AFRICAN AMERICAN: 57
GLUCOSE BLD-MCNC: 74 MG/DL (ref 70–99)
HCT VFR BLD CALC: 45 % (ref 36–48)
HEMOGLOBIN: 14.7 G/DL (ref 12–16)
LYMPHOCYTES ABSOLUTE: 1.5 K/UL (ref 1–5.1)
LYMPHOCYTES RELATIVE PERCENT: 30.1 %
MCH RBC QN AUTO: 29.8 PG (ref 26–34)
MCHC RBC AUTO-ENTMCNC: 32.6 G/DL (ref 31–36)
MCV RBC AUTO: 91.4 FL (ref 80–100)
MONOCYTES ABSOLUTE: 0.5 K/UL (ref 0–1.3)
MONOCYTES RELATIVE PERCENT: 9.7 %
NEUTROPHILS ABSOLUTE: 2.9 K/UL (ref 1.7–7.7)
NEUTROPHILS RELATIVE PERCENT: 58.9 %
PDW BLD-RTO: 13.9 % (ref 12.4–15.4)
PLATELET # BLD: 162 K/UL (ref 135–450)
PMV BLD AUTO: 8.8 FL (ref 5–10.5)
POTASSIUM REFLEX MAGNESIUM: 4.2 MMOL/L (ref 3.5–5.1)
PRO-BNP: 155 PG/ML (ref 0–124)
RBC # BLD: 4.92 M/UL (ref 4–5.2)
SODIUM BLD-SCNC: 138 MMOL/L (ref 136–145)
TROPONIN: <0.01 NG/ML
WBC # BLD: 5 K/UL (ref 4–11)

## 2021-08-24 PROCEDURE — 36415 COLL VENOUS BLD VENIPUNCTURE: CPT

## 2021-08-24 PROCEDURE — 94640 AIRWAY INHALATION TREATMENT: CPT

## 2021-08-24 PROCEDURE — 99284 EMERGENCY DEPT VISIT MOD MDM: CPT

## 2021-08-24 PROCEDURE — 85025 COMPLETE CBC W/AUTO DIFF WBC: CPT

## 2021-08-24 PROCEDURE — 6360000002 HC RX W HCPCS: Performed by: EMERGENCY MEDICINE

## 2021-08-24 PROCEDURE — 71046 X-RAY EXAM CHEST 2 VIEWS: CPT

## 2021-08-24 PROCEDURE — 96374 THER/PROPH/DIAG INJ IV PUSH: CPT

## 2021-08-24 PROCEDURE — 6370000000 HC RX 637 (ALT 250 FOR IP): Performed by: EMERGENCY MEDICINE

## 2021-08-24 PROCEDURE — 83880 ASSAY OF NATRIURETIC PEPTIDE: CPT

## 2021-08-24 PROCEDURE — 93005 ELECTROCARDIOGRAM TRACING: CPT | Performed by: EMERGENCY MEDICINE

## 2021-08-24 PROCEDURE — 84484 ASSAY OF TROPONIN QUANT: CPT

## 2021-08-24 PROCEDURE — 80048 BASIC METABOLIC PNL TOTAL CA: CPT

## 2021-08-24 PROCEDURE — 93010 ELECTROCARDIOGRAM REPORT: CPT | Performed by: INTERNAL MEDICINE

## 2021-08-24 RX ORDER — IPRATROPIUM BROMIDE AND ALBUTEROL SULFATE 2.5; .5 MG/3ML; MG/3ML
1 SOLUTION RESPIRATORY (INHALATION) ONCE
Status: COMPLETED | OUTPATIENT
Start: 2021-08-24 | End: 2021-08-24

## 2021-08-24 RX ORDER — IPRATROPIUM BROMIDE AND ALBUTEROL SULFATE 2.5; .5 MG/3ML; MG/3ML
SOLUTION RESPIRATORY (INHALATION)
Status: DISCONTINUED
Start: 2021-08-24 | End: 2021-08-24 | Stop reason: HOSPADM

## 2021-08-24 RX ORDER — PREDNISONE 10 MG/1
10 TABLET ORAL DAILY
Qty: 10 TABLET | Refills: 0 | Status: SHIPPED | OUTPATIENT
Start: 2021-08-24 | End: 2021-09-03

## 2021-08-24 RX ORDER — METHYLPREDNISOLONE SODIUM SUCCINATE 125 MG/2ML
125 INJECTION, POWDER, LYOPHILIZED, FOR SOLUTION INTRAMUSCULAR; INTRAVENOUS ONCE
Status: COMPLETED | OUTPATIENT
Start: 2021-08-24 | End: 2021-08-24

## 2021-08-24 RX ADMIN — METHYLPREDNISOLONE SODIUM SUCCINATE 125 MG: 125 INJECTION, POWDER, FOR SOLUTION INTRAMUSCULAR; INTRAVENOUS at 13:11

## 2021-08-24 RX ADMIN — IPRATROPIUM BROMIDE AND ALBUTEROL SULFATE 1 AMPULE: .5; 3 SOLUTION RESPIRATORY (INHALATION) at 13:51

## 2021-08-24 ASSESSMENT — PAIN SCALES - GENERAL
PAINLEVEL_OUTOF10: 0

## 2021-08-24 NOTE — ED NOTES
Discharge and education instructions reviewed. Patient verbalized understanding, teach-back successful. Patient denied questions at this time. No acute distress noted. Patient instructed to follow-up as noted - return to emergency department if symptoms worsen. Patient verbalized understanding. Discharged per EDMD with discharge instructions.         Henrik Mullins RN  08/24/21 [id number]

## 2021-08-24 NOTE — ED NOTES
Has not taken BP meds in about a month because her BP was running low     Robbin Varela, NORIS  08/24/21 0011

## 2021-08-24 NOTE — ED PROVIDER NOTES
eMERGENCY dEPARTMENT eNCOUnter      Pt Name: Elizabeth Elliott  MRN: [id number]  [place] 1960  Date of evaluation: 8/24/2021  Provider: MD Drew Castillo       Chief Complaint   Patient presents with    Shortness of Breath     had first COVID shot 3 weeks ago, SOB started a few days ago         HISTORY OF PRESENT ILLNESS   (Location/Symptom, Timing/Onset,Context/Setting, Quality, Duration, Modifying Factors, Severity) Note limiting factors. HPI    Elizabeth Elliott is a 64 y.o. female who presents to the emergency department with shortness of breath. Patient states she has history of COPD and asthma presents with shortness of breath for couple days. Patient states out of her medication and need some refills. Patient states she took her first Covid shot 3 weeks ago. Patient's been short of breath which is couple days. There has been mild cough. No fever. Blood pressure has been slightly elevated. Patient has no chest pain. Nursing Notes were reviewed. REVIEW OFSYSTEMS    (2+ for level 4; 10+ for level 5)   Review of Systems    General: No fevers, chills or night sweats, No weight loss    Head:  No Sore throat,  No Ear Pain    Chest:  Nontender. Mild cough, some SOB,  Chest Pain    GI: No abdominal pain or vomiting    : No dysuria or hematuria    Musculoskeletal: No unrelenting pain or night pain    Neurologic: No bowel or bladder incontinence, No saddle anesthesia, No leg weakness    All other systems reviewed and are negative.         PAST MEDICAL HISTORY     Past Medical History:   Diagnosis Date    Asthma     Bipolar disorder ([facility]     CKD (chronic kidney disease)     stage 3    COPD (chronic obstructive pulmonary disease) ([facility]     Empyema (HCC)     Hepatitis C     Patient denies     History of heroin use     Hypertension     Narcotic abuse ([facility]     heroin, clean 1 year    UTI (lower urinary tract infection)        SURGICAL HISTORY       Past Surgical History:   Procedure Laterality Date    CHEST TUBE INSERTION      COLONOSCOPY  10/23/2018    Colonoscopy with polypectomy (cold snare), polypectomy (cold biopsy)    COLONOSCOPY N/A 10/23/2018    COLONOSCOPY POLYPECTOMY SNARE/COLD BIOPSY performed by Beckie Benavides MD at [address] Left 12/16/2016    INTRACAPSULAR CATARACT EXTRACTION Right 2/21/2020    PHACOEMULSIFICATION WITH INTRAOCULAR LENS IMPLANT performed by Vic Saavedra MD at [address] EXTRACTION Left 2/28/2020    PHACOEMULSIFICATION WITH INTRAOCULAR LENS IMPLANT performed by Vic Saavedra MD at [address] [place] [address] 47 Right     26 yrs for lung infection     OTHER SURGICAL HISTORY  12/15/2016    EDMOND BUNIONECTOMY WITH APPLICATION OF AMNIOX LEFT FOOT       CURRENT MEDICATIONS       Discharge Medication List as of 8/24/2021  2:38 PM      CONTINUE these medications which have NOT CHANGED    Details   APPLE CIDER VINEGAR PO Take 2 capsules by mouth 4 times dailyHistorical Med      Spacer/Aero-Holding Chambers SAUL DAILY Starting Tue 7/13/2021, Disp-1 Device, R-0, Normal      !! predniSONE (DELTASONE) 10 MG tablet Take 1 tablet by mouth daily, Disp-30 tablet, R-2Normal      budesonide-formoterol (SYMBICORT) 160-4.5 MCG/ACT AERO Inhale 2 puffs into the lungs 2 times daily, Disp-1 Inhaler, R-5Normal      azelastine (ASTELIN) 0.1 % nasal spray 2 sprays by Nasal route 2 times daily Use in each nostril as directed, Disp-1 Bottle, R-5Normal      cyclobenzaprine (FLEXERIL) 10 MG tablet Take 1 tablet by mouth 3 times daily as needed for Muscle spasms Sedation precautions, Disp-12 tablet, R-0Normal      methocarbamol (ROBAXIN-750) 750 MG tablet Take 1 tablet by mouth 2 times daily, Disp-20 tablet, R-0Print      brompheniramine-pseudoephedrine-DM (BROMFED DM) 2-30-10 MG/5ML syrup Take 5 mLs by mouth 4 times daily as needed for Cough, Disp-118 mL, R-0Normal      albuterol sulfate HFA (PROAIR HFA) 108 (90 Base) MCG/ACT inhaler Inhale 2 puffs into the lungs every 6 hours as needed for Wheezing, Disp-1 Inhaler, R-3Normal      ondansetron (ZOFRAN ODT) 4 MG disintegrating tablet Take 1-2 tablets by mouth every 8 hours as needed for Nausea May Sub regular tablet (non-ODT) if insurance does not cover ODT., Disp-20 tablet, R-0Print      ipratropium-albuterol (DUONEB) 0.5-2.5 (3) MG/3ML SOLN nebulizer solution Inhale 3 mLs into the lungs every 4 hours, Disp-360 mL, R-5Normal      dextromethorphan-guaiFENesin (ROBITUSSIN COLD COUGH+ CHEST)  MG/5ML syrup Take 10 mLs by mouth every 4 hours as needed for Cough, Disp-473 mL, R-0Print      montelukast (SINGULAIR) 10 MG tablet TAKE 1 TABLET BY MOUTH NIGHTLY, Disp-30 tablet, R-0Must be seen at [facility] for future refillsNormal      acetaminophen (APAP EXTRA STRENGTH) 500 MG tablet Take 2 tablets by mouth every 6 hours as needed for Pain DO NOT TAKE WITH OTHER MEDICATIONS CONTAINING ACETAMINOPHEN., Disp-30 tablet, R-0Print      Respiratory Therapy Supplies (NEBULIZER/TUBING/MOUTHPIECE) KIT DAILY Starting Tue 12/18/2018, Disp-1 kit, R-0, Normal      hydrOXYzine (ATARAX) 25 MG tablet Take 25 mg by mouth 2 times daily as needed Historical Med      [employer] MISC ONCE PRN Starting Fri 12/7/2018, Until Fri 12/7/2018, 1 dose, Disp-1 each, R-0, Normal      METHADONE HCL PO Take 90 mg by mouth daily Historical Med      diphenhydrAMINE (BENADRYL) 25 MG tablet Take 25 mg by mouth every 8 hours as needed for Itching or Allergies      amLODIPine (NORVASC) 10 MG tablet Take 1 tablet by mouth daily. , Disp-30 tablet, R-3       !! - Potential duplicate medications found. Please discuss with provider.           ALLERGIES     Acetylcysteine, Codeine, Levaquin [levofloxacin in d5w], Penicillins, Dupixent [dupilumab], and Ibuprofen    FAMILY HISTORY       Family History   Problem Relation Age of Onset    Cancer Mother     Kidney Disease Mother     Heart Disease Mother         SOCIAL HISTORY       Social History     Socioeconomic History    Marital status: Life Partner     Spouse name: None    Number of children: None    Years of education: None    Highest education level: None   Occupational History    None   Tobacco Use    Smoking status: Former Smoker     Packs/day: 0.00     Years: 45.00     Pack years: 0.00     Types: Cigarettes     Quit date: 2016     Years since quittin.1    Smokeless tobacco: Never Used   Vaping Use    Vaping Use: Never used   Substance and Sexual Activity    Alcohol use: No    Drug use: No     Comment: past h/o heroin on methadone    Sexual activity: Not Currently   Other Topics Concern    None   Social History Narrative    None     Social Determinants of Health     Financial Resource Strain:     Difficulty of Paying Living Expenses:    Food Insecurity:     Worried About Running Out of Food in the Last Year:     Ran Out of Food in the Last Year:    Transportation Needs:     Lack of Transportation (Medical):  Lack of Transportation (Non-Medical):    Physical Activity:     Days of Exercise per Week:     Minutes of Exercise per Session:    Stress:     Feeling of Stress :    Social Connections:     Frequency of Communication with Friends and Family:     Frequency of Social Gatherings with Friends and Family:     Attends Voodoo Services:     Active Member of Clubs or Organizations:     Attends Club or Organization Meetings:     Marital Status:    Intimate Partner Violence:     Fear of Current or Ex-Partner:     Emotionally Abused:     Physically Abused:     Sexually Abused:        SCREENINGS           PHYSICAL EXAM    (up to 7 for level 4, 8 or more for level 5)     ED Triage Vitals [21 1116]   BP Temp Temp Source Pulse Resp SpO2 Height Weight   (!) 169/103 97.2 °F (36.2 °C) Temporal 63 24 96 % 5' 10\" (1.778 m) 199 lb 1.2 oz (90.3 kg)       Physical Exam    General: Alert and awake ×3.   Nontoxic appearance. Well-developed well-nourished obese 72-year-old black female in mild respiratory distress  HEENT: Normocephalic atraumatic. Neck is supple. Airway intact. No adenopathy  Cardiac: Regular rate and rhythm with no murmurs rubs or gallops  Pulmonary: Lungs are clear in all lung fields. No wheezing. No Rales. Abdomen: Soft and nontender. Negative hepatosplenomegaly. Bowel sounds are active  Extremities: Moving all extremities. No calf tenderness. Peripheral pulses all intact. No peripheral edema  Skin: No skin lesions. No rashes  Neurologic: Cranial nerves II through XII was grossly intact. Nonfocal neurological exam  Psychiatric: Patient is pleasant. Mood is appropriate. DIAGNOSTIC RESULTS     EKG (Per Emergency Physician):   Sinus bradycardia at a rate of 58 age-indeterminate septal infarct. Otherwise normal EKG    RADIOLOGY (Per Emergency Physician): Interpretation per the Radiologist below, if available at the time of this note:  XR CHEST (2 VW)    Result Date: 8/24/2021  EXAMINATION: TWO XRAY VIEWS OF THE CHEST 8/24/2021 12:30 pm COMPARISON: Chest x-ray dated 12/28/2019. HISTORY: ORDERING SYSTEM PROVIDED HISTORY: Shortness of breath TECHNOLOGIST PROVIDED HISTORY: Reason for exam:->Shortness of breath Reason for Exam: hx of asthma & copd Acuity: Acute Type of Exam: Initial FINDINGS: HEART/MEDIASTINUM: The cardiomediastinal silhouette is within normal limits. PLEURA/LUNGS: There are no focal consolidations or pleural effusions. There is no appreciable pneumothorax. Scarring versus atelectasis noted in the right mid lung. BONES/SOFT TISSUE: No acute abnormality. No radiographic evidence of acute pulmonary disease.        ED BEDSIDE ULTRASOUND:   Performed by ED Physician - none    LABS:  Labs Reviewed   BASIC METABOLIC PANEL W/ REFLEX TO MG FOR LOW K - Abnormal; Notable for the following components:       Result Value    BUN 6 (*)     GFR Non- 57 (*)     All other components within normal limits    Narrative:     Performed at:  [facility]  [address] [place] [address]   Phone [phone]   BRAIN NATRIURETIC PEPTIDE - Abnormal; Notable for the following components:    Pro- (*)     All other components within normal limits    Narrative:     Performed at:  [facility]  [address] [place] [address]   Phone [phone]   CBC WITH AUTO DIFFERENTIAL    Narrative:     Performed at:  [facility]  [address] [facility] [place] [address]   Phone [phone]   TROPONIN    Narrative:     Performed at:  [facility]  [address] [facility] [place] [address]   Phone [phone]        All other labs were within normal range or not returned as of this dictation. Procedures      EMERGENCY DEPARTMENT COURSE and DIFFERENTIAL DIAGNOSIS/MDM:   Vitals:    Vitals:    08/24/21 1116 08/24/21 1232 08/24/21 1315 08/24/21 1448   BP: (!) 169/103 (!) 137/95 (!) 147/83 (!) 141/87   Pulse: 63 69 57 68   Resp: 24 26 13 16   Temp: 97.2 °F (36.2 °C)   98 °F (36.7 °C)   TempSrc: Temporal   Oral   SpO2: 96% 96% 97% 97%   Weight: 199 lb 1.2 oz (90.3 kg)      Height: 5' 10\" (1.778 m)          Medications   ipratropium-albuterol (DUONEB) 0.5-2.5 (3) MG/3ML nebulizer solution (  Canceled Entry 8/24/21 1352)   ipratropium-albuterol (DUONEB) nebulizer solution 1 ampule (1 ampule Inhalation Given 8/24/21 1351)   methylPREDNISolone sodium (SOLU-MEDROL) injection 125 mg (125 mg IntraVENous Given 8/24/21 1311)       MDM. Patient is a 68-year-old history of asthma COPD presents with shortness of breath. Currently out of her medication. Patient given a DuoNeb treatment and Solu-Medrol and refill her medication. Reassess. Checking labs.   Chest x-ray is unremarkable    Patient was given steroids and breathing treatments with relief. Patient will be discharged refill meds. Follow-up. REVAL:         CRITICAL CARE TIME   Total CriticalCare time was 0 minutes, excluding separately reportable procedures. There was a high probability of clinically significant/life threatening deterioration in the patient's condition which required my urgent intervention. CONSULTS:  None    PROCEDURES:  Unless otherwise noted below, none     [unfilled]    FINAL IMPRESSION      1. COPD exacerbation ([address]          DISPOSITION/PLAN   DISPOSITION        PATIENT REFERRED TO:  Yanci Perry MD  [address] [place] [address]  [phone]    Schedule an appointment as soon as possible for a visit in 1 week  If symptoms worsen      DISCHARGE MEDICATIONS:  Discharge Medication List as of 8/24/2021  2:38 PM      START taking these medications    Details   nystatin (MYCOSTATIN) 822317 UNIT/ML suspension Take 5 mLs by mouth 4 times daily for 10 days Retain in mouth as long as possible, Oral, 4 TIMES DAILY Starting Tue 8/24/2021, Until Fri 9/3/2021, For 10 days, Disp-200 mL, R-1, Print      !! predniSONE (DELTASONE) 10 MG tablet Take 1 tablet by mouth daily for 10 days, Disp-10 tablet, R-0Print       !! - Potential duplicate medications found. Please discuss with provider. (Please note:  Portions of this note were completed with a voice recognition program.Efforts were made to edit the dictations but occasionally words and phrases are mis-transcribed.)  Form v2016. J.[id number]    Nolberto RODGERS MD (electronically signed)  Emergency Medicine Provider        Dedra Cabello MD  08/24/21 8331

## 2021-08-26 ENCOUNTER — OFFICE VISIT (OUTPATIENT)
Dept: PULMONOLOGY | Age: 61
End: 2021-08-26
Payer: MEDICAID

## 2021-08-26 VITALS
DIASTOLIC BLOOD PRESSURE: 82 MMHG | HEIGHT: 70 IN | RESPIRATION RATE: 16 BRPM | WEIGHT: 198.8 LBS | OXYGEN SATURATION: 94 % | HEART RATE: 66 BPM | SYSTOLIC BLOOD PRESSURE: 122 MMHG | TEMPERATURE: 96.9 F | BODY MASS INDEX: 28.46 KG/M2

## 2021-08-26 DIAGNOSIS — J44.9 COPD WITH ASTHMA (HCC): ICD-10-CM

## 2021-08-26 DIAGNOSIS — B37.0 THRUSH: ICD-10-CM

## 2021-08-26 DIAGNOSIS — J30.89 OTHER ALLERGIC RHINITIS: ICD-10-CM

## 2021-08-26 PROCEDURE — 99214 OFFICE O/P EST MOD 30 MIN: CPT | Performed by: INTERNAL MEDICINE

## 2021-08-26 RX ORDER — PREDNISONE 10 MG/1
10 TABLET ORAL DAILY
Qty: 30 TABLET | Refills: 5 | Status: SHIPPED | OUTPATIENT
Start: 2021-08-26 | End: 2021-09-18

## 2021-08-26 ASSESSMENT — ASTHMA QUESTIONNAIRES
ACT_TOTALSCORE: 12
QUESTION_2 LAST FOUR WEEKS HOW OFTEN HAVE YOU HAD SHORTNESS OF BREATH: 1
QUESTION_5 LAST FOUR WEEKS HOW WOULD YOU RATE YOUR ASTHMA CONTROL: 3
QUESTION_3 LAST FOUR WEEKS HOW OFTEN DID YOUR ASTHMA SYMPTOMS (WHEEZING, COUGHING, SHORTNESS OF BREATH, CHEST TIGHTNESS OR PAIN) WAKE YOU UP AT NIGHT OR EARLIER THAN USUAL IN THE MORNING: 2
QUESTION_1 LAST FOUR WEEKS HOW MUCH OF THE TIME DID YOUR ASTHMA KEEP YOU FROM GETTING AS MUCH DONE AT WORK, SCHOOL OR AT HOME: 3
QUESTION_4 LAST FOUR WEEKS HOW OFTEN HAVE YOU USED YOUR RESCUE INHALER OR NEBULIZER MEDICATION (SUCH AS ALBUTEROL): 3

## 2021-08-26 NOTE — PROGRESS NOTES
REASON FOR CONSULTATION/CC: asthma  Chief Complaint   Patient presents with    Asthma     f/u Asthma- Nucala          PCP: Seema Sanchez MD    HISTORY OF PRESENT ILLNESS: Kurt Corbett is a 64y.o. year old female with a history of asthma who presents : Thrush  Continues to be have symptoms . Started on nystatin  And improving. She states she was tested for HIV 2011, 2013, 2015and was negative. Asthma / copd  Symbicort   Nucala   Prednisone 5 mg daily, after decreasing from 10 mg with increase shortness of breath     allergic rhinitis  Astelin            REVIEW OF SYSTEMS:  Constitutional: Negative for fever   HENT: Negative for sore throat  Eyes: Negative for redness   Respiratory: no  for dyspnea, cough  Cardiovascular: Negative for chest pain  Gastrointestinal: Negative for vomiting, diarrhea   Genitourinary: Negative for hematuria   Musculoskeletal: Negative for arthralgias resolved right hip pain   Skin: Negative for rash  Neurological: Negative for syncope  Hematological: Negative for adenopathy  Psychiatric/Behavorial: Negative for anxiety             Objective:   PHYSICAL EXAM:  Blood pressure 122/82, pulse 66, temperature 96.9 °F (36.1 °C), temperature source Infrared, resp. rate 16, height 5' 10\" (1.778 m), weight 198 lb 12.8 oz (90.2 kg), last menstrual period 09/13/2017, SpO2 94 %, not currently breastfeeding.'    Gen: No distress. Eyes:    ENT:    Neck:    Resp: No accessory muscle use. No crackles. No wheezes. No rhonchi. CV: Regular rate. Regular rhythm. No murmur or rub. No edema. GI:    Skin:    Lymph:    M/S: No cyanosis. No clubbing. Neuro: Moves all four extremities. Psych: Oriented x 3. No anxiety. Awake. Alert. Intact judgement and insight. Data Reviewed:        Assessment:     ·  Asthma  · allergic rhinitis   · Leg numbness      Plan:      Problem List Items Addressed This Visit     Rick Reddy     She continues to complains of thrush. While her tongue is slightly white, it is not clear this is thrush. See pictures under exam.     She will continue brushing with tongue and nystatin as needed. She has been tested for HIV 3 times the past.          Other allergic rhinitis     Astelin          COPD with asthma ([facility]     She has improved control with Symbicort and Nucala but has been unable to wean off of steroids. Further, after weaning to 5 mg, she became worse and requested increase to 10 mg. Relevant Medications    predniSONE (DELTASONE) 10 MG tablet                 This note was transcribed using [address] [employer] ([employer]). Please disregard any translational errors.     Michelle Pierre [facility], [facility] [phone]

## 2021-08-26 NOTE — ASSESSMENT & PLAN NOTE
She continues to complains of thrush. While her tongue is slightly white, it is not clear this is thrush. See pictures under exam.     She will continue brushing with tongue and nystatin as needed.       She has been tested for HIV 3 times the past.

## 2021-08-26 NOTE — ASSESSMENT & PLAN NOTE
She has improved control with Symbicort and Nucala but has been unable to wean off of steroids. Further, after weaning to 5 mg, she became worse and requested increase to 10 mg.

## 2021-09-09 ENCOUNTER — HOSPITAL ENCOUNTER (OUTPATIENT)
Dept: INFUSION THERAPY | Age: 61
Setting detail: INFUSION SERIES
Discharge: HOME | End: 2021-09-09
Payer: MEDICAID

## 2021-09-09 VITALS
DIASTOLIC BLOOD PRESSURE: 49 MMHG | RESPIRATION RATE: 16 BRPM | HEART RATE: 65 BPM | SYSTOLIC BLOOD PRESSURE: 107 MMHG | TEMPERATURE: 98.7 F | OXYGEN SATURATION: 96 %

## 2021-09-09 DIAGNOSIS — J45.50 SEVERE PERSISTENT ASTHMA WITHOUT COMPLICATION: Primary | ICD-10-CM

## 2021-09-09 DIAGNOSIS — J44.9 CHRONIC OBSTRUCTIVE PULMONARY DISEASE, UNSPECIFIED COPD TYPE (HCC): ICD-10-CM

## 2021-09-09 PROCEDURE — 96372 THER/PROPH/DIAG INJ SC/IM: CPT

## 2021-09-09 PROCEDURE — 6360000002 HC RX W HCPCS: Performed by: INTERNAL MEDICINE

## 2021-09-09 RX ORDER — SODIUM CHLORIDE 9 MG/ML
INJECTION, SOLUTION INTRAVENOUS CONTINUOUS
Status: CANCELLED | OUTPATIENT
Start: 2021-10-07

## 2021-09-09 RX ORDER — DIPHENHYDRAMINE HYDROCHLORIDE 50 MG/ML
50 INJECTION INTRAMUSCULAR; INTRAVENOUS ONCE
Status: CANCELLED | OUTPATIENT
Start: 2021-10-07 | End: 2021-10-07

## 2021-09-09 RX ORDER — METHYLPREDNISOLONE SODIUM SUCCINATE 125 MG/2ML
125 INJECTION, POWDER, LYOPHILIZED, FOR SOLUTION INTRAMUSCULAR; INTRAVENOUS ONCE
Status: CANCELLED | OUTPATIENT
Start: 2021-10-07 | End: 2021-10-07

## 2021-09-09 RX ORDER — EPINEPHRINE 1 MG/ML
0.3 INJECTION, SOLUTION, CONCENTRATE INTRAVENOUS PRN
Status: CANCELLED | OUTPATIENT
Start: 2021-10-07

## 2021-09-09 RX ADMIN — MEPOLIZUMAB 100 MG: 100 INJECTION, POWDER, FOR SOLUTION SUBCUTANEOUS at 12:57

## 2021-09-09 ASSESSMENT — PAIN SCALES - GENERAL: PAINLEVEL_OUTOF10: 0

## 2021-09-09 NOTE — PROGRESS NOTES
Outpatient [address] [facility] [address] [facility] Injection Visit    NAME:  Silvana Martinez  YOB: 1960  MEDICAL RECORD NUMBER:  [id number]  Episode Date:  9/9/2021    Patient arrived to [facility]   [] per wheelchair   [x] ambulatory     Most recent Eosinophil count? 0.6 on 8/24/21    Opportunistic Infections  Does patient have history of Herpes Zoster? No  Does patient have history of Parasitic Helminth infection? no  Patient is aware of potential hypersensitivity reactions: anaphylaxsis, angioedema, bronchospasm, hypotension, urticaria, rash? These can occur within hours of injection but can have a delayed onset of few days. yes  Does patient have an EPI pen at home? No    Patient is instructed on less severe potential reactions: headache, injection site reaction, back pain, fatigue, influenza, urinary tract infection, upper abdominal pain, eczema, muscle spasms, allergic rhinitis  yes  Is this the patient's first Nucala Injection? No   Did the patient experience any adverse reactions to previous Nucala Injection? Yes    Patient Active Problem List   Diagnosis    Precordial pain    COPD with asthma ([address]    Unstable angina ([address]    HTN (hypertension)    HCV (hepatitis C virus)    Hypoxia    Smoking addiction    Thrush    Mild episode of recurrent major depressive disorder ([address]    Over weight    Severe persistent asthma without complication    Other allergic rhinitis    Numbness in left leg    Left sided sciatica    Severe persistent asthma    COPD (chronic obstructive pulmonary disease) (HCC)    Current chronic use of systemic steroids        BP (!) 107/49   Pulse 65   Temp 98.7 °F (37.1 °C) (Oral)   Resp 16   LMP 09/13/2017   SpO2 96%     Has the patient experienced frequency of exacerbations?  Yes, was in ER on 8/24/21 with COPD exacerbation and placed on steroids     Maintenance therapies currently using: proventil nebulizer prn, albuterol inhaler prn, symbicort inhaler daily, duonebs prn, singulair(pt. Stop taking)     Breath Sounds: No increased work of breathing, Breath sounds clear to auscultation bilaterally and Good air exchange  Pulse Oximetry: 96 %    Premedicated:  Patient took 2 tylenol of her own when she arrived here today    [] Benadryl 25 mg IV  [] Benadryl 50 mg IV  [] Benadryl 25 mg oral   [] Benadryl 50 mg oral  [] Other    Nucala administered: Yes    Nucala dosage: 100 mg was administered slowly subcutaneously into the right upper arm. Response to treatment:  Well tolerated by patient. Scheduled to return for next dose of Nucala on October 7, 2021 .      Electronically signed by Lula Cabello RN on 9/9/2021 at 1:11 PM

## 2021-09-18 ENCOUNTER — APPOINTMENT (OUTPATIENT)
Dept: GENERAL RADIOLOGY | Age: 61
End: 2021-09-18
Payer: MEDICAID

## 2021-09-18 ENCOUNTER — HOSPITAL ENCOUNTER (EMERGENCY)
Age: 61
Discharge: HOME | End: 2021-09-18
Attending: EMERGENCY MEDICINE
Payer: MEDICAID

## 2021-09-18 VITALS
BODY MASS INDEX: 28.28 KG/M2 | TEMPERATURE: 97.7 F | SYSTOLIC BLOOD PRESSURE: 144 MMHG | RESPIRATION RATE: 15 BRPM | WEIGHT: 197.53 LBS | HEART RATE: 87 BPM | OXYGEN SATURATION: 100 % | HEIGHT: 70 IN | DIASTOLIC BLOOD PRESSURE: 102 MMHG

## 2021-09-18 DIAGNOSIS — J44.1 COPD EXACERBATION (HCC): Primary | ICD-10-CM

## 2021-09-18 LAB
ANION GAP SERPL CALCULATED.3IONS-SCNC: 10 MMOL/L (ref 3–16)
BASE EXCESS VENOUS: 3.4 MMOL/L (ref -3–3)
BASOPHILS ABSOLUTE: 0 K/UL (ref 0–0.2)
BASOPHILS RELATIVE PERCENT: 0.8 %
BUN BLDV-MCNC: 6 MG/DL (ref 7–20)
CALCIUM SERPL-MCNC: 9.6 MG/DL (ref 8.3–10.6)
CHLORIDE BLD-SCNC: 102 MMOL/L (ref 99–110)
CO2: 29 MMOL/L (ref 21–32)
CREAT SERPL-MCNC: 0.9 MG/DL (ref 0.6–1.1)
EOSINOPHILS ABSOLUTE: 0 K/UL (ref 0–0.6)
EOSINOPHILS RELATIVE PERCENT: 0.7 %
GFR AFRICAN AMERICAN: >60
GFR NON-AFRICAN AMERICAN: >60
GLUCOSE BLD-MCNC: 75 MG/DL (ref 70–99)
HCO3 VENOUS: 28.5 MMOL/L (ref 23–29)
HCT VFR BLD CALC: 44.4 % (ref 36–48)
HEMOGLOBIN: 15 G/DL (ref 12–16)
LYMPHOCYTES ABSOLUTE: 1.7 K/UL (ref 1–5.1)
LYMPHOCYTES RELATIVE PERCENT: 27.2 %
MCH RBC QN AUTO: 30.5 PG (ref 26–34)
MCHC RBC AUTO-ENTMCNC: 33.7 G/DL (ref 31–36)
MCV RBC AUTO: 90.4 FL (ref 80–100)
MONOCYTES ABSOLUTE: 0.6 K/UL (ref 0–1.3)
MONOCYTES RELATIVE PERCENT: 9.2 %
NEUTROPHILS ABSOLUTE: 3.8 K/UL (ref 1.7–7.7)
NEUTROPHILS RELATIVE PERCENT: 62.1 %
O2 SAT, VEN: 93 %
O2 THERAPY: ABNORMAL
PCO2, VEN: 47.8 MMHG (ref 40–50)
PDW BLD-RTO: 14.1 % (ref 12.4–15.4)
PH VENOUS: 7.38 (ref 7.35–7.45)
PLATELET # BLD: 198 K/UL (ref 135–450)
PMV BLD AUTO: 7.7 FL (ref 5–10.5)
PO2, VEN: 70.3 MMHG (ref 25–40)
POTASSIUM REFLEX MAGNESIUM: 4.4 MMOL/L (ref 3.5–5.1)
PRO-BNP: 62 PG/ML (ref 0–124)
RBC # BLD: 4.91 M/UL (ref 4–5.2)
SODIUM BLD-SCNC: 141 MMOL/L (ref 136–145)
TCO2 CALC VENOUS: 30 MMOL/L
TROPONIN: <0.01 NG/ML
WBC # BLD: 6.1 K/UL (ref 4–11)

## 2021-09-18 PROCEDURE — 84484 ASSAY OF TROPONIN QUANT: CPT

## 2021-09-18 PROCEDURE — 6370000000 HC RX 637 (ALT 250 FOR IP): Performed by: EMERGENCY MEDICINE

## 2021-09-18 PROCEDURE — 93005 ELECTROCARDIOGRAM TRACING: CPT | Performed by: EMERGENCY MEDICINE

## 2021-09-18 PROCEDURE — 36415 COLL VENOUS BLD VENIPUNCTURE: CPT

## 2021-09-18 PROCEDURE — 96374 THER/PROPH/DIAG INJ IV PUSH: CPT

## 2021-09-18 PROCEDURE — 71045 X-RAY EXAM CHEST 1 VIEW: CPT

## 2021-09-18 PROCEDURE — 80048 BASIC METABOLIC PNL TOTAL CA: CPT

## 2021-09-18 PROCEDURE — 85025 COMPLETE CBC W/AUTO DIFF WBC: CPT

## 2021-09-18 PROCEDURE — 82803 BLOOD GASES ANY COMBINATION: CPT

## 2021-09-18 PROCEDURE — 99285 EMERGENCY DEPT VISIT HI MDM: CPT

## 2021-09-18 PROCEDURE — 83880 ASSAY OF NATRIURETIC PEPTIDE: CPT

## 2021-09-18 PROCEDURE — 6360000002 HC RX W HCPCS: Performed by: EMERGENCY MEDICINE

## 2021-09-18 RX ORDER — DEXAMETHASONE SODIUM PHOSPHATE 4 MG/ML
10 INJECTION, SOLUTION INTRA-ARTICULAR; INTRALESIONAL; INTRAMUSCULAR; INTRAVENOUS; SOFT TISSUE ONCE
Status: COMPLETED | OUTPATIENT
Start: 2021-09-18 | End: 2021-09-18

## 2021-09-18 RX ORDER — IPRATROPIUM BROMIDE AND ALBUTEROL SULFATE 2.5; .5 MG/3ML; MG/3ML
1 SOLUTION RESPIRATORY (INHALATION)
Status: DISCONTINUED | OUTPATIENT
Start: 2021-09-18 | End: 2021-09-18 | Stop reason: HOSPADM

## 2021-09-18 RX ORDER — PREDNISONE 10 MG/1
TABLET ORAL
Qty: 120 TABLET | Refills: 1 | Status: SHIPPED | OUTPATIENT
Start: 2021-09-18 | End: 2021-10-29 | Stop reason: ALTCHOICE

## 2021-09-18 RX ORDER — MONTELUKAST SODIUM 10 MG/1
10 TABLET ORAL NIGHTLY
Qty: 90 TABLET | Refills: 1 | Status: ON HOLD | OUTPATIENT
Start: 2021-09-18 | End: 2022-02-05

## 2021-09-18 RX ORDER — FLUCONAZOLE 150 MG/1
150 TABLET ORAL ONCE
Qty: 15 TABLET | Refills: 0 | Status: SHIPPED | OUTPATIENT
Start: 2021-09-18 | End: 2021-09-18

## 2021-09-18 RX ORDER — ALBUTEROL SULFATE 90 UG/1
2 AEROSOL, METERED RESPIRATORY (INHALATION) 4 TIMES DAILY PRN
Qty: 54 G | Refills: 1 | Status: SHIPPED | OUTPATIENT
Start: 2021-09-18 | End: 2021-10-07

## 2021-09-18 RX ADMIN — DEXAMETHASONE SODIUM PHOSPHATE 10 MG: 4 INJECTION, SOLUTION INTRAMUSCULAR; INTRAVENOUS at 15:27

## 2021-09-18 RX ADMIN — IPRATROPIUM BROMIDE AND ALBUTEROL SULFATE 1 AMPULE: .5; 3 SOLUTION RESPIRATORY (INHALATION) at 16:16

## 2021-09-18 RX ADMIN — IPRATROPIUM BROMIDE AND ALBUTEROL SULFATE 1 AMPULE: .5; 3 SOLUTION RESPIRATORY (INHALATION) at 15:10

## 2021-09-18 ASSESSMENT — PAIN DESCRIPTION - ORIENTATION: ORIENTATION: RIGHT;UPPER

## 2021-09-18 ASSESSMENT — PAIN SCALES - GENERAL
PAINLEVEL_OUTOF10: 7
PAINLEVEL_OUTOF10: 0

## 2021-09-18 ASSESSMENT — PAIN DESCRIPTION - DESCRIPTORS: DESCRIPTORS: ACHING

## 2021-09-18 ASSESSMENT — PAIN DESCRIPTION - FREQUENCY: FREQUENCY: INTERMITTENT

## 2021-09-18 ASSESSMENT — PAIN - FUNCTIONAL ASSESSMENT: PAIN_FUNCTIONAL_ASSESSMENT: PREVENTS OR INTERFERES SOME ACTIVE ACTIVITIES AND ADLS

## 2021-09-18 ASSESSMENT — PAIN DESCRIPTION - LOCATION: LOCATION: BACK

## 2021-09-18 ASSESSMENT — PAIN DESCRIPTION - PAIN TYPE: TYPE: ACUTE PAIN

## 2021-09-18 NOTE — ED PROVIDER NOTES
Emergency Department Encounter    Patient: Kiki Saab  MRN: [id number]  : 1960  Date of Evaluation: 2021  ED Provider:  Michael Weller MD    Triage Chief Complaint:   Shortness of Breath (SOB  x 1 1/5 week h/o asthma, has been using nebulizer and inhalers but not improving, denies chest pain no fever, has upper back pain right side, dry cough)    [place]:  Kiki Saab is a 64 y.o. female that presents to the ER for evaluation of positive dyspnea with a history of underlying COPD and asthma, prior smoker no active smoking fully vaccinated against Covid last second vaccination was in August.  No calf tenderness. Positive dyspnea. Recent accelerated hypertension. No true chest pain. Positive dyspnea. Afebrile. No productive sputum.     ROS - see HPI, below listed is current ROS at time of my eval:  General:  No fevers, no chills  Eyes:  No recent vison changes, no discharge  ENT:  No sore throat, no nasal congestion, no hearing changes  Cardiovascular:  No chest pain, no palpitations  Respiratory:  + shortness of breath, no cough, + wheezing  Gastrointestinal:  No pain, no nausea, no vomiting, no diarrhea  Musculoskeletal:  No muscle pain, no joint pain  Skin:  No rash, no pruritis  Neurologic:  No speech problems, no headache  Psychiatric:  No anxiety  Genitourinary:  No dysuria, no hematuria  Endocrine:  No unexpected weight gain, no unexpected weight loss  Extremities:  no edema, no pain      Past Medical History:   Diagnosis Date    Asthma     B[facility] disorder ([facility]     CKD (chronic kidney disease)     stage 3    COPD (chronic obstructive pulmonary disease) (HCC)     Empyema (HCC)     Hepatitis C     Patient denies     History of heroin use     Hypertension     Narcotic abuse ([facility]     heroin, clean 1 year    UTI (lower urinary tract infection)      Past Surgical History:   Procedure Laterality Date    CHEST TUBE INSERTION      COLONOSCOPY  10/23/2018    Colonoscopy with polypectomy (cold snare), polypectomy (cold biopsy)    COLONOSCOPY N/A 10/23/2018    COLONOSCOPY POLYPECTOMY SNARE/COLD BIOPSY performed by Ena Tony MD at [address] Left 2016    INTRACAPSULAR CATARACT EXTRACTION Right 2020    PHACOEMULSIFICATION WITH INTRAOCULAR LENS IMPLANT performed by Jazmin Tracy MD at [address] EXTRACTION Left 2020    PHACOEMULSIFICATION WITH INTRAOCULAR LENS IMPLANT performed by Jazmin Tracy MD at [address] [place] [address] [address]     26 yrs for lung infection     OTHER SURGICAL HISTORY  12/15/2016    [facility]MOND BUNIONECTOMY WITH APPLICATION OF AMNIOX LEFT FOOT     Family History   Problem Relation Age of Onset    Cancer Mother     Kidney Disease Mother     Heart Disease Mother      Social History     Socioeconomic History    Marital status: Life Partner     Spouse name: Not on file    Number of children: Not on file    Years of education: Not on file    Highest education level: Not on file   Occupational History    Not on file   Tobacco Use    Smoking status: Former Smoker     Packs/day: 0.00     Years: 45.00     Pack years: 0.00     Types: Cigarettes     Quit date: 2016     Years since quittin.2    Smokeless tobacco: Never Used   Vaping Use    Vaping Use: Never used   Substance and Sexual Activity    Alcohol use: No    Drug use: No     Comment: past h/o heroin on methadone    Sexual activity: Not Currently   Other Topics Concern    Not on file   Social History Narrative    Not on file     Social Determinants of Health     Financial Resource Strain:     Difficulty of Paying Living Expenses:    Food Insecurity:     Worried About Running Out of Food in the Last Year:     Ran Out of Food in the Last Year:    Transportation Needs:     Lack of Transportation (Medical):      Lack of Transportation (Non-Medical):    Physical Activity:     Days of Exercise per Week:     Minutes of Exercise per Session:    Stress:     Feeling of Stress :    Social Connections:     Frequency of Communication with Friends and Family:     Frequency of Social Gatherings with Friends and Family:     Attends [employer] Services:     Active Member of Clubs or Organizations:     Attends Club or Organization Meetings:     Marital Status:    Intimate Partner Violence:     Fear of Current or Ex-Partner:     Emotionally Abused:     Physically Abused:     Sexually Abused:      Current Facility-Administered Medications   Medication Dose Route Frequency Provider Last Rate Last Admin    ipratropium-albuterol (Miriam Beat) nebulizer solution 1 ampule  1 ampule Inhalation A7M WA Ja Calzada MD   1 ampule at 09/18/21 1616     Current Outpatient Medications   Medication Sig Dispense Refill    albuterol sulfate HFA (VENTOLIN HFA) 108 (90 Base) MCG/ACT inhaler Inhale 2 puffs into the lungs 4 times daily as needed for Wheezing 54 g 1    fluconazole (DIFLUCAN) 150 MG tablet Take 1 tablet by mouth once for 1 dose 15 tablet 0    nystatin (MYCOSTATIN) 697127 UNIT/ML suspension Take 5 mLs by mouth 4 times daily for 10 days Retain in mouth as long as possible 200 mL 0    predniSONE (DELTASONE) 10 MG tablet 40 mg po qd x 5 days  30 mg po qd x 5 days  20 mg po qd x 5 days  10 mg qd 120 tablet 1    nystatin (MYCOSTATIN) 587780 UNIT/ML suspension Take 5 mLs by mouth 4 times daily for 10 days Retain in mouth as long as possible 200 mL 0    montelukast (SINGULAIR) 10 MG tablet Take 1 tablet by mouth nightly 90 tablet 1    albuterol (PROVENTIL) (5 MG/ML) 0.5% nebulizer solution Take 0.5 mLs by nebulization every 6 hours as needed for Wheezing 30 vial 0    budesonide-formoterol (SYMBICORT) 160-4.5 MCG/ACT AERO Inhale 2 puffs into the lungs 2 times daily 1 Inhaler 5    albuterol sulfate HFA (PROAIR HFA) 108 (90 Base) MCG/ACT inhaler Inhale 2 puffs into the lungs every 6 hours as needed for Wheezing 1 Inhaler 3    ipratropium-albuterol (DUONEB) 0.5-2.5 (3) MG/3ML SOLN nebulizer solution Inhale 3 mLs into the lungs every 4 hours 360 mL 5    acetaminophen (APAP EXTRA STRENGTH) 500 MG tablet Take 2 tablets by mouth every 6 hours as needed for Pain DO NOT TAKE WITH OTHER MEDICATIONS CONTAINING ACETAMINOPHEN. 30 tablet 0    METHADONE HCL PO Take 90 mg by mouth daily       diphenhydrAMINE (BENADRYL) 25 MG tablet Take 25 mg by mouth every 8 hours as needed for Itching or Allergies      amLODIPine (NORVASC) 10 MG tablet Take 1 tablet by mouth daily. 30 tablet 3    Spacer/Aero-Holding Chambers SAUL 1 Device by Does not apply route daily 1 Device 0    Respiratory Therapy Supplies (NEBULIZER/TUBING/MOUTHPIECE) KIT 1 kit by Does not apply route daily 1 kit 0     Allergies   Allergen Reactions    Acetylcysteine Shortness Of Breath and Anaphylaxis     Increased bronchospasm    Codeine Hives     Pt states no recent problems     Levaquin [Levofloxacin In D5w] Itching    Penicillins Hives and Itching    Dupixent [Dupilumab] Other (See Comments)     Right leg pain    Ibuprofen Diarrhea, Nausea And Vomiting and Other (See Comments)       Nursing Notes Reviewed    Physical Exam:  Triage VS:    ED Triage Vitals [09/18/21 1450]   Enc Vitals Group      BP (!) 170/80      Pulse 82      Resp 20      Temp 97.7 °F (36.5 °C)      Temp Source Oral      SpO2 99 %      Weight 197 lb 8.5 oz (89.6 kg)      Height 5' 10\" (1.778 m)      Head Circumference       Peak Flow       Pain Score       Pain Loc       Pain Edu? Excl. in [address]? My pulse ox interpretation is - normal    General appearance:  No acute distress  Skin:  Warm. Dry. No pallor. No rash. Eye:  Normal conjuctiva. no Icterus. Ears, nose, mouth and throat:  Oral mucosa moist   Heart:  Regular rate and rhythm, normal S1 & S2, no extra heart sounds, no murmurs.     Perfusion:  intact  Respiratory:  inspiratory and expiratory wheezes in all lung fields, decreased air entry Therapy Unknown       Radiographs (if obtained):  Radiologist's Report Reviewed:  No results found. EKG (if obtained): (All EKG's are interpreted by myself in the absence of a cardiologist)      MDM:  Patient presenting to the emergency department with likely reactive airway disease exacerbation. Patient's pulse ox is normal.  Based on clinical presentation history and physical exam I do not see any evidence to suggest pneumonia, pulmonary embolus, acute coronary syndrome or aortic dissection. The patient did respond well to emergency department therapy, including aerosol treatments and steroids. I do believe it is reasonable to discharge the patient home at this time and treat on an outpatient basis. The patient was instructed on use of albuterol, they will also be provided a short course of prednisone via prescription. There is no evidence to suggest more malignant etiology for the patient's presentation at this time, these were discussed with the patient, the patient understands this and will follow up as an outpatient, they understand and agree with the plan, return warnings given. Is no evidence of pneumonia no hypoxia no acute kidney injury no evidence of pulmonary edema troponin leak. After respiratory treatments and steroids she had improvement in air exchange she is hemodynamically stable. She will be treated for COPD exacerbation she will be placed on a steroid taper and then her continuous prednisone which she ran out of. She had a refill of her albuterol she is to continue her Symbicort I suggested she utilize this 3 times daily for the next 10 days. She is return she is worse or new symptoms she does require nystatin swish and swallow and Diflucan due to mild oral pharyngeal fungal infection. Continue rinsing out her mouth. Return if worse or new symptoms. No clinical signs of COVID-19. Clinical Impression:  1. COPD exacerbation ([facility]      Disposition referral (if applicable):   Jonnathan Suárez Kurt Elizabeth MD  [address] [place]  [phone]          Disposition medications (if applicable):  New Prescriptions    ALBUTEROL SULFATE HFA (VENTOLIN HFA) 108 (90 BASE) MCG/ACT INHALER    Inhale 2 puffs into the lungs 4 times daily as needed for Wheezing    FLUCONAZOLE (DIFLUCAN) 150 MG TABLET    Take 1 tablet by mouth once for 1 dose    MONTELUKAST (SINGULAIR) 10 MG TABLET    Take 1 tablet by mouth nightly    NYSTATIN (MYCOSTATIN) 992065 UNIT/ML SUSPENSION    Take 5 mLs by mouth 4 times daily for 10 days Retain in mouth as long as possible    NYSTATIN (MYCOSTATIN) 338616 UNIT/ML SUSPENSION    Take 5 mLs by mouth 4 times daily for 10 days Retain in mouth as long as possible    PREDNISONE (DELTASONE) 10 MG TABLET    40 mg po qd x 5 days  30 mg po qd x 5 days  20 mg po qd x 5 days  10 mg qd       Comment: Please note this report has been produced using speech recognition software and may contain errors related to that system including errors in grammar, punctuation, and spelling, as well as words and phrases that may be inappropriate. Efforts were made to edit the dictations.       Pat Nice MD  17/80/02 4101

## 2021-09-18 NOTE — ED NOTES
Discharge and education instructions reviewed. Patient verbalized understanding, teach back successful. Patient denied questions at this time. Instructed to follow up with PCP and or return to ED if symptoms worsen.    Ambulatory to exit with prescriptions, feeling much better, denies c/o     Shiv Garcia RN  09/18/21 0363

## 2021-09-18 NOTE — ED NOTES
HHN completed, Lungs with increased air exchanged, patient feeling much better, denies pain     Shantanu Monet RN  09/18/21 9308

## 2021-09-19 LAB
EKG ATRIAL RATE: 63 BPM
EKG DIAGNOSIS: NORMAL
EKG P-R INTERVAL: 156 MS
EKG Q-T INTERVAL: 362 MS
EKG QRS DURATION: 86 MS
EKG QTC CALCULATION (BAZETT): 370 MS
EKG R AXIS: -22 DEGREES
EKG T AXIS: -18 DEGREES
EKG VENTRICULAR RATE: 63 BPM

## 2021-09-19 PROCEDURE — 93010 ELECTROCARDIOGRAM REPORT: CPT | Performed by: INTERNAL MEDICINE

## 2021-10-07 ENCOUNTER — HOSPITAL ENCOUNTER (OUTPATIENT)
Dept: INFUSION THERAPY | Age: 61
Setting detail: INFUSION SERIES
Discharge: HOME | End: 2021-10-07
Payer: MEDICAID

## 2021-10-07 VITALS
DIASTOLIC BLOOD PRESSURE: 97 MMHG | RESPIRATION RATE: 17 BRPM | TEMPERATURE: 98.3 F | OXYGEN SATURATION: 97 % | HEART RATE: 65 BPM | SYSTOLIC BLOOD PRESSURE: 118 MMHG

## 2021-10-07 DIAGNOSIS — J44.9 CHRONIC OBSTRUCTIVE PULMONARY DISEASE, UNSPECIFIED COPD TYPE (HCC): ICD-10-CM

## 2021-10-07 DIAGNOSIS — J45.50 SEVERE PERSISTENT ASTHMA WITHOUT COMPLICATION: Primary | ICD-10-CM

## 2021-10-07 PROCEDURE — 96372 THER/PROPH/DIAG INJ SC/IM: CPT

## 2021-10-07 PROCEDURE — 6360000002 HC RX W HCPCS: Performed by: INTERNAL MEDICINE

## 2021-10-07 RX ORDER — EPINEPHRINE 1 MG/ML
0.3 INJECTION, SOLUTION, CONCENTRATE INTRAVENOUS PRN
Status: CANCELLED | OUTPATIENT
Start: 2021-11-04

## 2021-10-07 RX ORDER — SODIUM CHLORIDE 9 MG/ML
INJECTION, SOLUTION INTRAVENOUS CONTINUOUS
Status: CANCELLED | OUTPATIENT
Start: 2021-11-04

## 2021-10-07 RX ORDER — DIPHENHYDRAMINE HYDROCHLORIDE 50 MG/ML
50 INJECTION INTRAMUSCULAR; INTRAVENOUS ONCE
Status: CANCELLED | OUTPATIENT
Start: 2021-11-04 | End: 2021-11-04

## 2021-10-07 RX ORDER — METHYLPREDNISOLONE SODIUM SUCCINATE 125 MG/2ML
125 INJECTION, POWDER, LYOPHILIZED, FOR SOLUTION INTRAMUSCULAR; INTRAVENOUS ONCE
Status: CANCELLED | OUTPATIENT
Start: 2021-11-04 | End: 2021-11-04

## 2021-10-07 RX ADMIN — MEPOLIZUMAB 100 MG: 100 INJECTION, POWDER, FOR SOLUTION SUBCUTANEOUS at 13:01

## 2021-10-07 ASSESSMENT — PAIN SCALES - GENERAL: PAINLEVEL_OUTOF10: 0

## 2021-10-07 NOTE — PROGRESS NOTES
[address] Injection Visit    NAME:  Torsten Hamilton  YOB: 1960  MEDICAL RECORD NUMBER:  [id number]  Episode Date:  10/7/2021    Patient arrived to [facility]   [] per wheelchair   [x] ambulatory     Alert and oriented x 4. Reports that since her last visit to the [facility], she had to make a trip to the ER due to severe shortness of breath. Patient states that she came to the ER on Sept. 18, 2021 wit and asthma attack and was given several nebulizer treatments, IV steroids and was sent home with a Prednisone taper and increased time for nebulizer treatments. Patient states that since completing this treatment, she is feeling much better. Patient has received both Covid-19 vaccines and is wearing a mask to prevent the spread of Covid-19. Most recent Eosinophil count? 0.0 (0.7%) on 9/18/2021    Opportunistic Infections  Does patient have history of Herpes Zoster? NO  Does patient have history of Parasitic Helminth infection? NO    Patient is aware of potential hypersensitivity reactions: anaphylaxsis, angioedema, bronchospasm, hypotension, urticaria, rash? These can occur within hours of injection but can have a delayed onset of few days. - reviewed with patient and she verbalized understanding    Does patient have an EPI pen at home? NO - patient states that she was told she did not need one. Patient is instructed on less severe potential reactions: headache, injection site reaction, back pain, fatigue, influenza, urinary tract infection, upper abdominal pain, eczema, muscle spasms, allergic rhinitis - reviewed with patient and she verbalized understanding    Is this the patient's first Nucala Injection? No     Did the patient experience any adverse reactions to previous Nucala Injection?  No    Patient Active Problem List   Diagnosis    Precordial pain    COPD with asthma ([facility]    Unstable angina ([facility]    HTN (hypertension)    HCV (hepatitis C virus)    Hypoxia    Smoking addiction    Thrush    Mild episode of recurrent major depressive disorder ([address]    Over weight    Severe persistent asthma without complication    Other allergic rhinitis    Numbness in left leg    Left sided sciatica    Severe persistent asthma    COPD (chronic obstructive pulmonary disease) (HCC)    Current chronic use of systemic steroids        Has the patient experienced a decrease in the frequency of exacerbations? Yes - Patient had to go to the ER Sept. 18, 2021 but states that since starting Nucala, she is usually able to control her asthma with her PRN inhalers and nebulizers. Patient states that she feels the asthma attack she had on Sept. 18, 2021 was caused by the carpet that is in the house she is renting. Patient states that it is being replaced so that should help with her asthma. Maintenance therapies currently using: Singulair tablet daily, Prednisone daily, Symbicort inhaler twice daily, Proventil nebulizer PRN, Proair inhaler PRN and Duoneb nebulizer PRN     Breath Sounds: Slight dyspnea noted upon arrival to [facility]. Respirations even and easy at rest. Lungs clear to auscultation x 4 lung fields. No wheezing, crackles or cough noted. Pulse Oximetry: 97 %    Premedicated: None ordered  [] Benadryl 25 mg IV  [] Benadryl 50 mg IV  [] Benadryl 25 mg oral   [] Benadryl 50 mg oral  [] Other    Nucala administered: Yes    Nucala dosage: 100 mg was administered slowly subcutaneously into the right upper arm. Response to treatment:  Well tolerated by patient. Scheduled to return for next dose of Nucala on November 4, 2021 .      Electronically signed by Jaison Dwyer RN on 10/7/2021 at 8:40 PM

## 2021-10-22 DIAGNOSIS — J44.9 COPD WITH ASTHMA (HCC): ICD-10-CM

## 2021-10-22 DIAGNOSIS — J44.9 COPD WITH ASTHMA (HCC): Primary | ICD-10-CM

## 2021-10-22 RX ORDER — BUDESONIDE AND FORMOTEROL FUMARATE DIHYDRATE 160; 4.5 UG/1; UG/1
2 AEROSOL RESPIRATORY (INHALATION) 2 TIMES DAILY
Qty: 1 EACH | Refills: 11 | Status: SHIPPED | OUTPATIENT
Start: 2021-10-22 | End: 2022-10-25 | Stop reason: SDUPTHER

## 2021-10-22 NOTE — TELEPHONE ENCOUNTER
Fax from pharmacy  Albuterol 0.5% conc 2o ml  Called in  Is it supposed to be the nebules?     Deepa Azevedo  [phone]

## 2021-10-24 RX ORDER — ALBUTEROL SULFATE 2.5 MG/3ML
2.5 SOLUTION RESPIRATORY (INHALATION) EVERY 6 HOURS PRN
Qty: 120 EACH | Refills: 11 | Status: SHIPPED | OUTPATIENT
Start: 2021-10-24 | End: 2022-06-21 | Stop reason: SDUPTHER

## 2021-10-27 DIAGNOSIS — B37.0 THRUSH: Primary | ICD-10-CM

## 2021-10-28 RX ORDER — FLUCONAZOLE 100 MG/1
100 TABLET ORAL DAILY
Qty: 3 TABLET | Refills: 0 | Status: SHIPPED | OUTPATIENT
Start: 2021-10-28 | End: 2021-10-28 | Stop reason: SDUPTHER

## 2021-10-28 RX ORDER — FLUCONAZOLE 100 MG/1
100 TABLET ORAL DAILY
Qty: 3 TABLET | Refills: 0 | Status: SHIPPED | OUTPATIENT
Start: 2021-10-28 | End: 2021-10-31

## 2021-10-28 NOTE — TELEPHONE ENCOUNTER
Can you please sign Rx for Diflucan to go to [facility] [address]?
Diflucan sent to [facility]. Still not clear this is thrush. Will refer to ID if this occurs again.
Patient calling to state she has thrush really bad and nystatin is not helping.
Pt notified
never used

## 2021-10-29 ENCOUNTER — APPOINTMENT (OUTPATIENT)
Dept: GENERAL RADIOLOGY | Age: 61
End: 2021-10-29
Payer: MEDICAID

## 2021-10-29 ENCOUNTER — HOSPITAL ENCOUNTER (EMERGENCY)
Age: 61
Discharge: HOME | End: 2021-10-29
Payer: MEDICAID

## 2021-10-29 VITALS
HEIGHT: 70 IN | RESPIRATION RATE: 18 BRPM | OXYGEN SATURATION: 95 % | SYSTOLIC BLOOD PRESSURE: 150 MMHG | BODY MASS INDEX: 28.53 KG/M2 | TEMPERATURE: 98 F | WEIGHT: 199.3 LBS | HEART RATE: 74 BPM | DIASTOLIC BLOOD PRESSURE: 94 MMHG

## 2021-10-29 DIAGNOSIS — J44.1 COPD WITH ACUTE EXACERBATION (HCC): Primary | ICD-10-CM

## 2021-10-29 LAB
A/G RATIO: 1.5 (ref 1.1–2.2)
ALBUMIN SERPL-MCNC: 4.6 G/DL (ref 3.4–5)
ALP BLD-CCNC: 67 U/L (ref 40–129)
ALT SERPL-CCNC: 19 U/L (ref 10–40)
ANION GAP SERPL CALCULATED.3IONS-SCNC: 11 MMOL/L (ref 3–16)
AST SERPL-CCNC: 16 U/L (ref 15–37)
BASOPHILS ABSOLUTE: 0.1 K/UL (ref 0–0.2)
BASOPHILS RELATIVE PERCENT: 0.9 %
BILIRUB SERPL-MCNC: 0.7 MG/DL (ref 0–1)
BUN BLDV-MCNC: 9 MG/DL (ref 7–20)
CALCIUM SERPL-MCNC: 9.9 MG/DL (ref 8.3–10.6)
CHLORIDE BLD-SCNC: 101 MMOL/L (ref 99–110)
CO2: 28 MMOL/L (ref 21–32)
CREAT SERPL-MCNC: 0.9 MG/DL (ref 0.6–1.1)
EKG ATRIAL RATE: 79 BPM
EKG DIAGNOSIS: NORMAL
EKG P AXIS: 51 DEGREES
EKG P-R INTERVAL: 136 MS
EKG Q-T INTERVAL: 344 MS
EKG QRS DURATION: 84 MS
EKG QTC CALCULATION (BAZETT): 394 MS
EKG R AXIS: -22 DEGREES
EKG T AXIS: -43 DEGREES
EKG VENTRICULAR RATE: 79 BPM
EOSINOPHILS ABSOLUTE: 0 K/UL (ref 0–0.6)
EOSINOPHILS RELATIVE PERCENT: 0.1 %
GFR AFRICAN AMERICAN: >60
GFR NON-AFRICAN AMERICAN: >60
GLUCOSE BLD-MCNC: 108 MG/DL (ref 70–99)
HCT VFR BLD CALC: 47.3 % (ref 36–48)
HEMOGLOBIN: 15.6 G/DL (ref 12–16)
LYMPHOCYTES ABSOLUTE: 1 K/UL (ref 1–5.1)
LYMPHOCYTES RELATIVE PERCENT: 11.4 %
MCH RBC QN AUTO: 30 PG (ref 26–34)
MCHC RBC AUTO-ENTMCNC: 33 G/DL (ref 31–36)
MCV RBC AUTO: 90.9 FL (ref 80–100)
MONOCYTES ABSOLUTE: 0.5 K/UL (ref 0–1.3)
MONOCYTES RELATIVE PERCENT: 5.7 %
NEUTROPHILS ABSOLUTE: 6.9 K/UL (ref 1.7–7.7)
NEUTROPHILS RELATIVE PERCENT: 81.9 %
PDW BLD-RTO: 14.8 % (ref 12.4–15.4)
PLATELET # BLD: 196 K/UL (ref 135–450)
PMV BLD AUTO: 7.7 FL (ref 5–10.5)
POTASSIUM REFLEX MAGNESIUM: 4.2 MMOL/L (ref 3.5–5.1)
PRO-BNP: 53 PG/ML (ref 0–124)
RBC # BLD: 5.21 M/UL (ref 4–5.2)
SODIUM BLD-SCNC: 140 MMOL/L (ref 136–145)
TOTAL PROTEIN: 7.6 G/DL (ref 6.4–8.2)
TROPONIN: <0.01 NG/ML
WBC # BLD: 8.5 K/UL (ref 4–11)

## 2021-10-29 PROCEDURE — 84484 ASSAY OF TROPONIN QUANT: CPT

## 2021-10-29 PROCEDURE — 96374 THER/PROPH/DIAG INJ IV PUSH: CPT

## 2021-10-29 PROCEDURE — 93005 ELECTROCARDIOGRAM TRACING: CPT | Performed by: PHYSICIAN ASSISTANT

## 2021-10-29 PROCEDURE — 85025 COMPLETE CBC W/AUTO DIFF WBC: CPT

## 2021-10-29 PROCEDURE — 6360000002 HC RX W HCPCS: Performed by: PHYSICIAN ASSISTANT

## 2021-10-29 PROCEDURE — 93010 ELECTROCARDIOGRAM REPORT: CPT | Performed by: INTERNAL MEDICINE

## 2021-10-29 PROCEDURE — 80053 COMPREHEN METABOLIC PANEL: CPT

## 2021-10-29 PROCEDURE — 83880 ASSAY OF NATRIURETIC PEPTIDE: CPT

## 2021-10-29 PROCEDURE — 99284 EMERGENCY DEPT VISIT MOD MDM: CPT

## 2021-10-29 PROCEDURE — 2580000003 HC RX 258: Performed by: PHYSICIAN ASSISTANT

## 2021-10-29 PROCEDURE — 36415 COLL VENOUS BLD VENIPUNCTURE: CPT

## 2021-10-29 PROCEDURE — U0005 INFEC AGEN DETEC AMPLI PROBE: HCPCS

## 2021-10-29 PROCEDURE — 71046 X-RAY EXAM CHEST 2 VIEWS: CPT

## 2021-10-29 PROCEDURE — 6370000000 HC RX 637 (ALT 250 FOR IP): Performed by: PHYSICIAN ASSISTANT

## 2021-10-29 PROCEDURE — U0003 INFECTIOUS AGENT DETECTION BY NUCLEIC ACID (DNA OR RNA); SEVERE ACUTE RESPIRATORY SYNDROME CORONAVIRUS 2 (SARS-COV-2) (CORONAVIRUS DISEASE [COVID-19]), AMPLIFIED PROBE TECHNIQUE, MAKING USE OF HIGH THROUGHPUT TECHNOLOGIES AS DESCRIBED BY CMS-2020-01-R: HCPCS

## 2021-10-29 RX ORDER — 0.9 % SODIUM CHLORIDE 0.9 %
500 INTRAVENOUS SOLUTION INTRAVENOUS ONCE
Status: COMPLETED | OUTPATIENT
Start: 2021-10-29 | End: 2021-10-29

## 2021-10-29 RX ORDER — PREDNISONE 20 MG/1
40 TABLET ORAL DAILY
Qty: 14 TABLET | Refills: 0 | Status: SHIPPED | OUTPATIENT
Start: 2021-10-29 | End: 2021-11-05

## 2021-10-29 RX ORDER — IPRATROPIUM BROMIDE AND ALBUTEROL SULFATE 2.5; .5 MG/3ML; MG/3ML
1 SOLUTION RESPIRATORY (INHALATION) ONCE
Status: COMPLETED | OUTPATIENT
Start: 2021-10-29 | End: 2021-10-29

## 2021-10-29 RX ORDER — AZITHROMYCIN 250 MG/1
TABLET, FILM COATED ORAL
Qty: 1 PACKET | Refills: 0 | Status: SHIPPED | OUTPATIENT
Start: 2021-10-29 | End: 2021-11-08

## 2021-10-29 RX ORDER — METHYLPREDNISOLONE SODIUM SUCCINATE 125 MG/2ML
125 INJECTION, POWDER, LYOPHILIZED, FOR SOLUTION INTRAMUSCULAR; INTRAVENOUS ONCE
Status: COMPLETED | OUTPATIENT
Start: 2021-10-29 | End: 2021-10-29

## 2021-10-29 RX ADMIN — SODIUM CHLORIDE 500 ML: 9 INJECTION, SOLUTION INTRAVENOUS at 14:21

## 2021-10-29 RX ADMIN — METHYLPREDNISOLONE SODIUM SUCCINATE 125 MG: 125 INJECTION, POWDER, FOR SOLUTION INTRAMUSCULAR; INTRAVENOUS at 14:22

## 2021-10-29 RX ADMIN — IPRATROPIUM BROMIDE AND ALBUTEROL SULFATE 1 AMPULE: .5; 2.5 SOLUTION RESPIRATORY (INHALATION) at 14:23

## 2021-10-29 ASSESSMENT — ENCOUNTER SYMPTOMS
ABDOMINAL PAIN: 0
EYES NEGATIVE: 1
VOMITING: 0
SHORTNESS OF BREATH: 1
RHINORRHEA: 0
COUGH: 1

## 2021-10-29 NOTE — ED PROVIDER NOTES
[address] [facility] [address]  Dept: [address]: [phone]  eMERGENCYdEPARTMENT eNCOUnter      Pt Name: Jono Quintanilla  MRN: [id number]  [place] 1960  Date of evaluation: 10/29/2021  Provider:Priya Lora PA-C    CHIEF COMPLAINT       Chief Complaint   Patient presents with    COPD     x2 weeks. CRITICAL CARE TIME   Total Critical Care time was 10 minutes, excluding separately reportable procedures. There was a high probability of clinically significant/life threatening deterioration in the patient's condition which required my urgentintervention. HISTORY OF PRESENT ILLNESS  (Location/Symptom, Timing/Onset, Context/Setting, Quality, Duration,Modifying Factors, Severity.)   Jono  is a 63 y.o. female with a past medical history of COPD, CKD, tobacco abuse, hypertension, chronic hepatitis C, depression, who presents to the emergency department by private vehicle. Patient states over the past week and half she has had shortness of breath, fatigue, and poor appetite. Patient has history of COPD, shortness breath consistent with previous COPD exacerbations. She has been using her albuterol nebulized with temporary relief in symptoms. Shortness of breath worsens with exertion. She has a mild nonproductive cough, this is unchanged and chronic in nature. She denies any fevers, chest pain. No known sick contacts. Has received 2 doses of Pfizer COVID-19 vaccine. Denies any recent travel/hospitalization/surgeries, calf pain or swelling, history of blood clots. Given persistence of symptoms without significant provement she sought evaluation in the ED. Nursing Notes were reviewedand agreed with or any disagreements were addressed in the HPI. REVIEW OF SYSTEMS    (2-9 systems for level 4, 10 or more for level 5)     Review of Systems   Constitutional: Negative for fever.    HENT: Negative. Negative for congestion and rhinorrhea. Eyes: Negative. Respiratory: Positive for cough and shortness of breath. Cardiovascular: Negative for chest pain. Gastrointestinal: Negative for abdominal pain and vomiting. Genitourinary: Negative. Musculoskeletal: Negative for arthralgias and myalgias. Skin: Negative. Neurological: Negative for dizziness and light-headedness. Psychiatric/Behavioral: Negative for behavioral problems and confusion. Except as noted above the remainder of the review of systems was reviewed and negative.        PAST MEDICAL HISTORY         Diagnosis Date    Asthma     [facility] [address]     CKD (chronic kidney disease)     stage 3    COPD (chronic obstructive pulmonary disease) (HCC)     Empyema (HCC)     Hepatitis C     Patient denies     History of heroin use     Hypertension     Narcotic abuse (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    UTI (lower urinary tract infection)        SURGICAL HISTORY           Procedure Laterality Date    CHEST TUBE INSERTION      COLONOSCOPY  10/23/2018    Colonoscopy with polypectomy (cold snare), polypectomy (cold biopsy)    COLONOSCOPY N/A 10/23/2018    COLONOSCOPY POLYPECTOMY SNARE/COLD BIOPSY performed by Jami Neff MD at [address] [place] [address] Left 12/16/2016    INTRACAPSULAR CATARACT EXTRACTION Right 2/21/2020    PHACOEMULSIFICATION WITH INTRAOCULAR LENS IMPLANT performed by Daniel Noriega MD at [address] [place] [address] EXTRACTION Left 2/28/2020    PHACOEMULSIFICATION WITH INTRAOCULAR LENS IMPLANT performed by Daniel Noriega MD at [address] [place] [address] Right     26 yrs for lung infection     OTHER SURGICAL HISTORY  12/15/2016    EDMOND BUNIONECTOMY WITH APPLICATION OF AMNIOX LEFT FOOT       CURRENT MEDICATIONS     [unfilled]    ALLERGIES     Acetylcysteine, Codeine, Levaquin [levofloxacin in d5w], Penicillins, Dupixent [dupilumab], and Ibuprofen    FAMILY HISTORY           Problem Relation Age of Onset    Cancer Mother     Kidney Disease Mother     Heart Disease Mother      Family Status   Relation Name Status    Mother      Father          SOCIAL HISTORY      reports that she quit smoking about 5 years ago. Her smoking use included cigarettes. She smoked 0.00 packs per day for 45.00 years. She has never used smokeless tobacco. She reports that she does not drink alcohol and does not use drugs. PHYSICAL EXAM    (up to 7 for level 4, 8 or more for level 5)     ED Triage Vitals [10/29/21 1321]   Enc Vitals Group      BP (!) 158/95      Pulse 89      Resp 24      Temp 99.1 °F (37.3 °C)      Temp Source Oral      SpO2 95 %      Weight 199 lb 4.7 oz (90.4 kg)      Height 5' 10\" (1.778 m)      Head Circumference       Peak Flow       Pain Score       Pain Loc       Pain Edu? Excl. in [address]? Physical Exam  Vitals reviewed. Constitutional:       Appearance: Normal appearance. HENT:      Head: Normocephalic and atraumatic. Cardiovascular:      Rate and Rhythm: Normal rate and regular rhythm. Pulmonary:      Effort: Pulmonary effort is normal. No respiratory distress. Breath sounds: Normal breath sounds. Comments: Diminished breath sounds in mid to lower lung fields  Abdominal:      Palpations: Abdomen is soft. Tenderness: There is no abdominal tenderness. Musculoskeletal:         General: Normal range of motion. Cervical back: Normal range of motion and neck supple. Skin:     General: Skin is warm. Neurological:      General: No focal deficit present. Mental Status: She is alert and oriented to person, place, and time.    Psychiatric:         Mood and Affect: Mood normal.         Behavior: Behavior normal.           DIAGNOSTIC RESULTS     EKG: All EKG's are interpreted by the Emergency Department Physician who either signs or Co-signs this chart in the absence of a cardiologist.    RADIOLOGY:   Sebastian Moy film images such as CT, Ultrasound and MRI are read by the radiologist. Plain radiographic images are visualized and preliminarilyinterpreted by the emergency physician with the below findings:    Interpretation per the Radiologist below,if available at the time of this note:    XR CHEST (2 VW)   Final Result   Bilateral peripheral linear pulmonary opacities could represent pulmonary   edema or atypical pneumonia               LABS:  Labs Reviewed   CBC WITH AUTO DIFFERENTIAL - Abnormal; Notable for the following components:       Result Value    RBC 5.21 (*)     All other components within normal limits    Narrative:     Performed at:  [facility]  [address] [place], [facility]   Phone [phone]   COMPREHENSIVE METABOLIC PANEL W/ REFLEX TO MG FOR LOW K - Abnormal; Notable for the following components:    Glucose 108 (*)     All other components within normal limits    Narrative:     Performed at:  [facility]  [address] [place], [facility]   Phone [phone]   BRAIN NATRIURETIC PEPTIDE    Narrative:     Performed at:  [address] [facility] [address] [facility] [address] [facility]  [address] [place], [facility]   Phone [phone]   TROPONIN    Narrative:     Performed at:  [address] [facility]  [address] [place], [facility]   Phone [phone]       All other labs were within normal range or not returned as of this dictation. EMERGENCY DEPARTMENT COURSE and DIFFERENTIAL DIAGNOSIS/MDM:   Vitals:    Vitals:    10/29/21 1515 10/29/21 1530 10/29/21 1615 10/29/21 1943   BP:  (!) 142/82 (!) 150/94    Pulse:  76 74    Resp:  19 18    Temp:  98 °F (36.7 °C)     TempSrc:  Oral     SpO2: 95% 94% 92% 95%   Weight:       Height:           MDM     Patient presents ED with HPI noted above. She is afebrile and nontoxic-appearing.       Patient has history of COPD. Diminished breath sounds on exam on arrival.  She was given DuoNeb treatments and IV Solu-Medrol in the ED. At reevaluation she reports improvement of symptoms. She states symptoms consistent previous COPD exacerbations. Chest x-ray showed findings of either interstitial edema versus atypical pneumonia per report. She has no leukocytosis. COVID-19 obtained and pending. She has received 2 doses of COVID-19 Pfizer vaccine. BNP normal.  She has no history of CHF. Did cover with Z-Bertram for COPD exacerbation chest x-ray findings. Cardiac work-up obtained. EKG showed no significant change from previous, please see my attendings note regarding interpretation. No STEMI. Troponin less than 0.01. She has no chest pain. Low suspicion for ACS. Suspect COPD exacerbation based on history as previously stated. Patient placed on prednisone. Oxygen saturation at multiple reevaluation 94 to 98% on room air. Patient did desat to 92% on room air with ambulation. Discussed management options with patient. Patient is comfortable with going home as she feels better. She has follow-up appoint with primary care doctor Monday. Patient educated on concerning symptoms that should prompt reevaluation in the ED prior to reevaluation. She is comfortable plan. She was discharged home in stable condition. The patient tolerated their visit well. I saw the patient independently with physician available for consultation as needed. I have discussed the findings of today's workup with the patient and addressed the patient's questions and concerns. Important warning signs as well as new or worsening symptoms which would necessitate immediate return to the ED were discussed. The plan is to discharge from the ED at this time, and the patient is in stable condition. The patient acknowledged understanding is agreeable with this plan.       CONSULTS:  None    PROCEDURES:  Procedures    FINAL IMPRESSION      1. COPD with acute exacerbation [facility])          DISPOSITION/PLAN   [unfilled]    PATIENT REFERRED TO:  [facility] [address]  [phone]  Go to   If symptoms worsen    Rosita Costello MD  [facility] [place]  [phone]    Go in 3 days  For follow up and reevaluation as scheduled. Essie Min, [address]  [address] [place] [address]  [phone]    Call in 3 days  For follow up and reevaluation.       DISCHARGE MEDICATIONS:  Discharge Medication List as of 10/29/2021  5:40 PM      START taking these medications    Details   azithromycin (ZITHROMAX) 250 MG tablet Take 2 tablets (500 mg) on Day 1, followed by 1 tablet (250 mg) once daily on Days 2 through 5., Disp-1 packet, R-0Normal      predniSONE (DELTASONE) 20 MG tablet Take 2 tablets by mouth daily for 7 days, Disp-14 tablet, R-0Normal             (Please note that portions of this note were completed with a voice recognition program.  Efforts were made to edit the dictations but occasionally words are mis-transcribed.)    [facility], PA-C          [address] [place] [address]  10/29/21 1958

## 2021-10-29 NOTE — ED TRIAGE NOTES
Pt presents to ED ambulatory via PV with c/o of COPD and asthma. States worsening symptoms x2 weeks. +fatigue. Resp even and unlabored. A/ox4. No acute distress noted. Denies any need at this time. Call light within reach. Bed in lowest position. Will continue to monitor.

## 2021-10-29 NOTE — ED NOTES
Pt reports feeling better. Resp even and unlabored. A/ox4. No acute distress noted. Denies any need at this time. Call light within reach. Bed in lowest position. Will continue to monitor.         Julien Murphy RN  10/29/21 5201 [FreeTextEntry1] : need to take metformin for diabetes discussed\par flu/prevnar given\par blood sent\par will discuss labs with wife

## 2021-10-30 LAB — SARS-COV-2: NOT DETECTED

## 2021-11-07 ENCOUNTER — APPOINTMENT (OUTPATIENT)
Dept: GENERAL RADIOLOGY | Age: 61
End: 2021-11-07
Payer: MEDICAID

## 2021-11-07 ENCOUNTER — HOSPITAL ENCOUNTER (EMERGENCY)
Age: 61
Discharge: HOME | End: 2021-11-07
Payer: MEDICAID

## 2021-11-07 VITALS
OXYGEN SATURATION: 94 % | WEIGHT: 203.26 LBS | HEART RATE: 67 BPM | TEMPERATURE: 97.4 F | SYSTOLIC BLOOD PRESSURE: 140 MMHG | DIASTOLIC BLOOD PRESSURE: 89 MMHG | HEIGHT: 70 IN | RESPIRATION RATE: 20 BRPM | BODY MASS INDEX: 29.1 KG/M2

## 2021-11-07 DIAGNOSIS — J18.9 PNEUMONIA OF LEFT LOWER LOBE DUE TO INFECTIOUS ORGANISM: Primary | ICD-10-CM

## 2021-11-07 DIAGNOSIS — K13.70 MOUTH LESION: ICD-10-CM

## 2021-11-07 LAB
BILIRUBIN URINE: NEGATIVE
BLOOD, URINE: NEGATIVE
CLARITY: CLEAR
COLOR: YELLOW
GLUCOSE URINE: NEGATIVE MG/DL
KETONES, URINE: NEGATIVE MG/DL
LEUKOCYTE ESTERASE, URINE: NEGATIVE
MICROSCOPIC EXAMINATION: NORMAL
NITRITE, URINE: NEGATIVE
PH UA: 8 (ref 5–8)
PROTEIN UA: NEGATIVE MG/DL
SPECIFIC GRAVITY UA: 1.01 (ref 1–1.03)
URINE REFLEX TO CULTURE: NORMAL
URINE TYPE: NORMAL
UROBILINOGEN, URINE: 0.2 E.U./DL

## 2021-11-07 PROCEDURE — 71045 X-RAY EXAM CHEST 1 VIEW: CPT

## 2021-11-07 PROCEDURE — U0003 INFECTIOUS AGENT DETECTION BY NUCLEIC ACID (DNA OR RNA); SEVERE ACUTE RESPIRATORY SYNDROME CORONAVIRUS 2 (SARS-COV-2) (CORONAVIRUS DISEASE [COVID-19]), AMPLIFIED PROBE TECHNIQUE, MAKING USE OF HIGH THROUGHPUT TECHNOLOGIES AS DESCRIBED BY CMS-2020-01-R: HCPCS

## 2021-11-07 PROCEDURE — U0005 INFEC AGEN DETEC AMPLI PROBE: HCPCS

## 2021-11-07 PROCEDURE — 6370000000 HC RX 637 (ALT 250 FOR IP): Performed by: PHYSICIAN ASSISTANT

## 2021-11-07 PROCEDURE — 99285 EMERGENCY DEPT VISIT HI MDM: CPT

## 2021-11-07 PROCEDURE — 81003 URINALYSIS AUTO W/O SCOPE: CPT

## 2021-11-07 RX ORDER — BENZONATATE 200 MG/1
200 CAPSULE ORAL 3 TIMES DAILY PRN
Qty: 20 CAPSULE | Refills: 0 | Status: ON HOLD | OUTPATIENT
Start: 2021-11-07 | End: 2022-02-05

## 2021-11-07 RX ORDER — DOXYCYCLINE HYCLATE 100 MG
100 TABLET ORAL 2 TIMES DAILY
Qty: 20 TABLET | Refills: 0 | Status: SHIPPED | OUTPATIENT
Start: 2021-11-07 | End: 2021-11-17

## 2021-11-07 RX ORDER — HYDROCODONE BITARTRATE AND ACETAMINOPHEN 5; 325 MG/1; MG/1
1 TABLET ORAL ONCE
Status: COMPLETED | OUTPATIENT
Start: 2021-11-07 | End: 2021-11-07

## 2021-11-07 RX ADMIN — HYDROCODONE BITARTRATE AND ACETAMINOPHEN 1 TABLET: 5; 325 TABLET ORAL at 18:04

## 2021-11-07 ASSESSMENT — PAIN SCALES - GENERAL
PAINLEVEL_OUTOF10: 8
PAINLEVEL_OUTOF10: 8

## 2021-11-07 ASSESSMENT — PAIN DESCRIPTION - LOCATION: LOCATION: GENERALIZED

## 2021-11-07 ASSESSMENT — PAIN DESCRIPTION - PAIN TYPE: TYPE: ACUTE PAIN

## 2021-11-07 NOTE — ED PROVIDER NOTES
[address] [facility]        Pt Name: Nneka Sotelo  MRN: [id number]  [place] 1960  Date of evaluation: 11/7/2021  Provider: ZANDER Crawford    This patient was not seen and evaluated by the attending physician No att. providers found. CHIEF COMPLAINT     Urinary frequency  myalgias      HISTORY OF PRESENT ILLNESS  (Location/Symptom, Timing/Onset, Context/Setting, Quality, Duration,Modifying Factors, Severity.)   Nneka Sotelo is a 63 y.o. female who presents to the emergencydepartment for urinary frequency and myalgias. Denies dysuria or hematuria. Denies vaginal discharge or bleeding. Also reports myalgias. Was recently seen for cough. Has clear sputum. Negative for hemoptysis. Completed antibiotics already. Has had some shortness of breath. Has asthma and COPD. Denies fever. Has had chills. denies increased activity recently. She is also worried about white spots in her mouth. She thinks it is thrush but reports her pulmonologist does not. Nursing Notes were reviewed and I agree. OF SYSTEMS    (2-9 systems for level 4, 10 or more for level 5)     Review of Systems   Constitutional: Positive for chills. Negative for fever. Respiratory: Positive for cough and shortness of breath. +sputum  -hemoptysis   Gastrointestinal: Negative for diarrhea, nausea and vomiting. Musculoskeletal: Positive for myalgias. Except as noted above the remainder of the review of systems was reviewed and negative.        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    UTI (lower urinary tract infection)        SURGICAL HISTORY Procedure Laterality Date    CHEST TUBE INSERTION      COLONOSCOPY  10/23/2018    Colonoscopy with polypectomy (cold snare), polypectomy (cold biopsy)    COLONOSCOPY N/A 10/23/2018    COLONOSCOPY POLYPECTOMY SNARE/COLD BIOPSY performed by Javier Dodson MD at [place] [address] Left 12/16/2016    INTRACAPSULAR CATARACT EXTRACTION Right 2/21/2020    PHACOEMULSIFICATION WITH INTRAOCULAR LENS IMPLANT performed by Yuridia Reagan MD at [address] EXTRACTION Left 2/28/2020    PHACOEMULSIFICATION WITH INTRAOCULAR LENS IMPLANT performed by Yuridia Reagan MD at [address] [place] [address] Right     26 yrs for lung infection     OTHER SURGICAL HISTORY  12/15/2016    EDMOND BUNIONECTOMY WITH APPLICATION OF AMNIOX LEFT FOOT       CURRENT MEDICATIONS       Discharge Medication List as of 11/7/2021  6:43 PM      CONTINUE these medications which have NOT CHANGED    Details   azithromycin (ZITHROMAX) 250 MG tablet Take 2 tablets (500 mg) on Day 1, followed by 1 tablet (250 mg) once daily on Days 2 through 5., Disp-1 packet, R-0Normal      albuterol (PROVENTIL) (2.5 MG/3ML) 0.083% nebulizer solution Take 3 mLs by nebulization every 6 hours as needed for Wheezing, Disp-120 each, R-11Normal      budesonide-formoterol (SYMBICORT) 160-4.5 MCG/ACT AERO Inhale 2 puffs into the lungs 2 times daily, Disp-1 each, R-11Normal      montelukast (SINGULAIR) 10 MG tablet Take 1 tablet by mouth nightly, Disp-90 tablet, R-1Print      Spacer/Aero-Holding Chambers SAUL DAILY Starting Tue 7/13/2021, Disp-1 Device, R-0, Normal      albuterol sulfate HFA (PROAIR HFA) 108 (90 Base) MCG/ACT inhaler Inhale 2 puffs into the lungs every 6 hours as needed for Wheezing, Disp-1 Inhaler, R-3Normal      ipratropium-albuterol (DUONEB) 0.5-2.5 (3) MG/3ML SOLN nebulizer solution Inhale 3 mLs into the lungs every 4 hours, Disp-360 mL, R-5Normal      acetaminophen (APAP EXTRA STRENGTH) 500 MG tablet Take 2 tablets by mouth every 6 hours as needed for Pain DO NOT TAKE WITH OTHER MEDICATIONS CONTAINING ACETAMINOPHEN., Disp-30 tablet, R-0Print      Respiratory Therapy Supplies (NEBULIZER/TUBING/MOUTHPIECE) KIT DAILY Starting 2018, Disp-1 kit, R-0, Normal      METHADONE HCL PO Take 90 mg by mouth daily Historical Med      diphenhydrAMINE (BENADRYL) 25 MG tablet Take 25 mg by mouth every 8 hours as needed for Itching or Allergies      amLODIPine (NORVASC) 10 MG tablet Take 1 tablet by mouth daily. , Disp-30 tablet, R-3             ALLERGIES     Acetylcysteine, Codeine, Levaquin [levofloxacin in d5w], Penicillins, Dupixent [dupilumab], Ibuprofen, and Oxycodone-acetaminophen    FAMILY HISTORY           Problem Relation Age of Onset    Cancer Mother     Kidney Disease Mother     Heart Disease Mother      Family Status   Relation Name Status    Mother      Father          SOCIAL HISTORY      reports that she quit smoking about 5 years ago. Her smoking use included cigarettes. She smoked 0.00 packs per day for 45.00 years. She has never used smokeless tobacco. She reports that she does not drink alcohol and does not use drugs. PHYSICAL EXAM    (up to 7 for level 4, 8 or more for level 5)     ED Triage Vitals [21 1437]   BP Temp Temp src Pulse Resp SpO2 Height Weight   (!) 141/78 97.4 °F (36.3 °C) -- 89 15 97 % 5' 10\" (1.778 m) 203 lb 4.2 oz (92.2 kg)       Physical Exam  Constitutional:       General: She is not in acute distress. Appearance: Normal appearance. She is well-developed. She is not ill-appearing, toxic-appearing or diaphoretic. HENT:      Head: Normocephalic and atraumatic. Right Ear: Tympanic membrane, ear canal and external ear normal.      Left Ear: Tympanic membrane, ear canal and external ear normal.      Mouth/Throat:      Mouth: Mucous membranes are moist.      Pharynx: Oropharynx is clear. No oropharyngeal exudate or posterior oropharyngeal erythema. Comments: Small area of whitish pigmentation on the inner cheeks. No white plaques. Does not appear like thrush. No ulceration  Cardiovascular:      Rate and Rhythm: Normal rate and regular rhythm. Heart sounds: Normal heart sounds. Pulmonary:      Effort: Pulmonary effort is normal. No respiratory distress. Breath sounds: Normal breath sounds. No stridor. No wheezing or rhonchi. Musculoskeletal:         General: Normal range of motion. Cervical back: Normal range of motion and neck supple. Skin:     General: Skin is warm. Neurological:      Mental Status: She is alert. Psychiatric:         Mood and Affect: Mood normal.         Behavior: Behavior normal.         Thought Content: Thought content normal.         Judgment: Judgment normal.         DIFFERENTIAL DIAGNOSIS   UTI, URI, COVIDM PNA, viral syndrome, other    DIAGNOSTICRESULTS         RADIOLOGY:   Non-plain film images such as CT, Ultrasound and MRI are read by the radiologist. Plain radiographic images are visualized and preliminarily interpreted by ZANDER Avina with the below findings:      Interpretation per the Radiologist below, if available at the time of this note:    XR CHEST PORTABLE   Final Result   Mild cardiomegaly. Patchy airspace disease identified left lung base. Question could represent   area of scarring or developing pneumonia. Follow-up may be beneficial to assure resolution.                LABS:  Labs Reviewed   URINE RT REFLEX TO CULTURE    Narrative:     Performed at:  [address] [address]   Phone [phone]    Narrative:     SARS-COV-2 PCR was cancelled on 11/07/2021 at 18:37 by [facility]; Cancelled:  In-house testing  Performed at:  [facility]  [address] [facility] [address]   Phone [phone]       All other labs were within normal range or not returned as of this dictation. EMERGENCY DEPARTMENT COURSE and DIFFERENTIALDIAGNOSIS/MDM:   Vitals:    Vitals:    11/07/21 1437 11/07/21 1827   BP: (!) 141/78 (!) 140/89   Pulse: 89 67   Resp: 15 20   Temp: 97.4 °F (36.3 °C)    SpO2: 97% 94%   Weight: 203 lb 4.2 oz (92.2 kg)    Height: 5' 10\" (1.778 m)        Patient wasnontoxic, well appearing, afebrile with normal vital signs with the exception of HTN. No wheezing on exam.      Reviewed ED visit from 10/29/2021. Covid test was negative. BNP was 53. Hezzie Dessert was negative. CMP was normal.  CBC was normal.  X-ray chest showed bilateral peripheral linear pulmonary opacities could represent pulmonary edema or atypical pneumonia. Was discharged with Z-Bertram which she completed. She is also on prednisone. repeat COVID sent. Urine negative. Chest x-ray is mild cardiomegaly. Patchy airspace disease identified left lung base. Question could represent area of scarring or developing pneumonia. Will treat with doxycycline. I discussed this with the patient and that she should get a repeat chest x-ray to reevaluate in a week. Myalgias may be viral in nature Follow-up with PCP next few days reevaluation and return for worsening. She agreed and understood. PROCEDURES:  None    FINAL IMPRESSION      1. Pneumonia of left lower lobe due to infectious organism    2.  Mouth lesion        DISPOSITION/PLAN   DISPOSITION Decision To Discharge 11/07/2021 05:43:23 PM      PATIENT REFERRED TO:  GIANNI [facility]  [address] [facility] [address] [place]. [address] [facility] [place] [address]  [phone]    Schedule an appointment as soon as possible for a visit in 2 days  for reevaluation of mouth concerns    Darwin Gregory MD  [address] [facility] [address] [facility]  [phone]    Schedule an appointment as soon as possible for a visit in 2 days  for reevaluation    [facility] Emergency Department  [address] [facility]  [phone]    As needed, If symptoms worsen      DISCHARGE MEDICATIONS:  Discharge Medication List as of 11/7/2021  6:43 PM      START taking these medications    Details   doxycycline hyclate (VIBRA-TABS) 100 MG tablet Take 1 tablet by mouth 2 times daily for 10 days, Disp-20 tablet, R-0Normal      benzonatate (TESSALON) 200 MG capsule Take 1 capsule by mouth 3 times daily as needed for Cough, Disp-20 capsule, R-0Normal             (Please note that portions ofthis note were completed with a voice recognition program.  Efforts were made to edit the dictations but occasionally words are mis-transcribed.)    ZANDER Salazar [place]  11/08/21 2342

## 2021-11-08 ENCOUNTER — CARE COORDINATION (OUTPATIENT)
Dept: CARE COORDINATION | Age: 61
End: 2021-11-08

## 2021-11-08 LAB — SARS-COV-2: NOT DETECTED

## 2021-11-08 ASSESSMENT — ENCOUNTER SYMPTOMS
SHORTNESS OF BREATH: 1
DIARRHEA: 0
COUGH: 1
NAUSEA: 0
VOMITING: 0

## 2021-11-08 NOTE — CARE COORDINATION
Patient contacted regarding COVID-19 risk. Discussed COVID-19 related testing which was available at this time. Test results were negative. Patient informed of results, if available? Yes. Ambulatory Care Manager contacted the patient by telephone to perform post discharge assessment. Call within 2 business days of discharge: Yes. Verified name and  with patient as identifiers. Provided introduction to self, and explanation of the CTN/ACM role, and reason for call due to risk factors for infection and/or exposure to COVID-19. Symptoms reviewed with patient who verbalized the following symptoms: no new symptoms and no worsening symptoms. Due to no new or worsening symptoms encounter was not routed to provider for escalation. Discussed follow-up appointments. If no appointment was previously scheduled, appointment scheduling offered: will fu with PCP. [facility] follow up appointment(s):   Future Appointments   Date Time Provider Dale Vega   11/15/2021  1:00 PM Alex Mercado, [address] [facility]   2021  9:40 AM Sudhir Meadows MD [facility]     Non-[facility] follow up appointment(s): see above    Non-face-to-face services provided:  see above     Advance Care Planning:   Does patient have an Advance Directive:  not on file. Educated patient about risk for severe COVID-19 due to risk factors according to CDC guidelines. ACM reviewed discharge instructions, medical action plan and red flag symptoms with the patient who verbalized understanding. Discussed COVID vaccination status: Yes. Education provided on COVID-19 vaccination as appropriate. Discussed exposure protocols and quarantine with CDC Guidelines. Patient was given an opportunity to verbalize any questions and concerns and agrees to contact ACM or health care provider for questions related to their healthcare.     Reviewed and educated patient on any new and changed medications related to discharge diagnosis     Was patient discharged with a pulse oximeter? No Discussed and confirmed pulse oximeter discharge instructions and when to notify provider or seek emergency care. ACM provided contact information. No further follow-up call identified based on severity of symptoms and risk factors.

## 2021-11-15 ENCOUNTER — OFFICE VISIT (OUTPATIENT)
Dept: ENT CLINIC | Age: 61
End: 2021-11-15
Payer: MEDICAID

## 2021-11-15 VITALS
HEART RATE: 82 BPM | DIASTOLIC BLOOD PRESSURE: 81 MMHG | BODY MASS INDEX: 29.63 KG/M2 | HEIGHT: 70 IN | WEIGHT: 207 LBS | SYSTOLIC BLOOD PRESSURE: 130 MMHG

## 2021-11-15 DIAGNOSIS — B37.0 THRUSH: Primary | ICD-10-CM

## 2021-11-15 DIAGNOSIS — K13.70 ORAL LESION: ICD-10-CM

## 2021-11-15 PROCEDURE — 99204 OFFICE O/P NEW MOD 45 MIN: CPT | Performed by: OTOLARYNGOLOGY

## 2021-11-15 RX ORDER — FLUCONAZOLE 100 MG/1
100 TABLET ORAL DAILY
Qty: 7 TABLET | Refills: 0 | Status: SHIPPED | OUTPATIENT
Start: 2021-11-15 | End: 2021-11-22

## 2021-11-15 NOTE — PROGRESS NOTES
[facility]      Patient Name: Melchor Logan  Medical Record Number:  [id number]  Primary Care Physician:  Herminia Syed MD  Date of Consultation: 11/15/2021    Chief Complaint: Oral lesions        HISTORY OF PRESENT ILLNESS  Horton Cheadle is a(n) 63 y.o. female who presents for evaluation of some oral lesions. Patient says her COPD has been acting up. She has had to be on some oral steroids. She developed thrush and has had both nystatin as well as 3 days of Diflucan. Still has concerns that she has thrush. Has also been on antibiotics recently. She says she stopped smoking several years ago. Patient does not really have symptoms with this, but says that it annoys her knowing that it is there. She is very concerned that it could be cancer. REVIEW OF SYSTEMS  As above  PHYSICAL EXAM  GENERAL: No Acute Distress, Alert and Oriented, no Hoarseness, strong voice  EYES: EOMI, Anti-icteric  HENT:   Head: Normocephalic and atraumatic. Face:  Symmetric, facial nerve intact, no sinus tenderness  Right Ear: Normal external ear, normal external auditory canal, intact tympanic membrane with normal mobility and aerated middle ear  Left Ear: Normal external ear, normal external auditory canal, intact tympanic membrane with normal mobility and aerated middle ear  Mouth/Oral Cavity: Patient does have some white discoloration of the tongue. There is some very mild white discoloration of the bilateral buccal mucosa in the region that you would expect a bite lines. There is a firm subcutaneous lesion in the left gingivobuccal sulcus as well. Oropharynx/Larynx:  normal oropharynx,  Nose:Normal external nasal appearance. NECK: Normal range of motion, no thyromegaly, trachea is midline, no lymphadenopathy, no neck masses, no crepitus            ASSESSMENT/PLAN  1. Thrush  This is somewhat equivocal for thrush.   Although she does have a white discoloration of the tongue, this could be secondary to medication she been on. It also does not seem to be causing her issues such as discomfort. She is very concerned so I am to give her a week of Diflucan to see if it will cleared up, but I told her not sure if it will make huge difference. 2. Oral lesion  Patient does have some white discoloration of the buccal mucosa is in the region near the expected bite line. Is not overly concerning. There is a firm submucosal mass on the left side as well this likely a fibroma or clogged minor salivary gland. None of this is concerning for malignancy. I have performed a head and neck physical exam personally or was physically present during the key or critical portions of the service. This note was generated completely or in part utilizing Dragon dictation speech recognition software. Occasionally, words are mistranscribed and despite editing, the text may contain inaccuracies due to incorrect word recognition. If further clarification is needed please contact the office at [phone].

## 2021-11-23 ENCOUNTER — OFFICE VISIT (OUTPATIENT)
Dept: PULMONOLOGY | Age: 61
End: 2021-11-23
Payer: MEDICAID

## 2021-11-23 VITALS
HEART RATE: 60 BPM | HEIGHT: 70 IN | SYSTOLIC BLOOD PRESSURE: 118 MMHG | WEIGHT: 205 LBS | RESPIRATION RATE: 14 BRPM | TEMPERATURE: 97.1 F | DIASTOLIC BLOOD PRESSURE: 82 MMHG | BODY MASS INDEX: 29.35 KG/M2 | OXYGEN SATURATION: 95 %

## 2021-11-23 DIAGNOSIS — J30.89 OTHER ALLERGIC RHINITIS: ICD-10-CM

## 2021-11-23 DIAGNOSIS — B37.0 THRUSH: ICD-10-CM

## 2021-11-23 DIAGNOSIS — J44.9 COPD WITH ASTHMA (HCC): Primary | ICD-10-CM

## 2021-11-23 PROCEDURE — 99214 OFFICE O/P EST MOD 30 MIN: CPT | Performed by: INTERNAL MEDICINE

## 2021-11-23 ASSESSMENT — ASTHMA QUESTIONNAIRES
ACT_TOTALSCORE: 11
QUESTION_5 LAST FOUR WEEKS HOW WOULD YOU RATE YOUR ASTHMA CONTROL: 3
QUESTION_2 LAST FOUR WEEKS HOW OFTEN HAVE YOU HAD SHORTNESS OF BREATH: 1
QUESTION_1 LAST FOUR WEEKS HOW MUCH OF THE TIME DID YOUR ASTHMA KEEP YOU FROM GETTING AS MUCH DONE AT WORK, SCHOOL OR AT HOME: 3
QUESTION_3 LAST FOUR WEEKS HOW OFTEN DID YOUR ASTHMA SYMPTOMS (WHEEZING, COUGHING, SHORTNESS OF BREATH, CHEST TIGHTNESS OR PAIN) WAKE YOU UP AT NIGHT OR EARLIER THAN USUAL IN THE MORNING: 2
QUESTION_4 LAST FOUR WEEKS HOW OFTEN HAVE YOU USED YOUR RESCUE INHALER OR NEBULIZER MEDICATION (SUCH AS ALBUTEROL): 2

## 2021-11-23 NOTE — PROGRESS NOTES
REASON FOR CONSULTATION/CC: asthma  Chief Complaint   Patient presents with    Asthma     f/u Asthma          PCP: Polly Chaudhary MD    HISTORY OF PRESENT ILLNESS: Xiao King is a 63y.o. year old female with a history of asthma who presents :               Asthma / Dayna Ortega removed from bedroom. Symbicort   Nucala, failed. Did not response  Dupexiant, side effects. Prednisone 10 mg , failed wean to 5 mg daily   Singulair. Concern for side effects of depression. Interested in [facility] again. side effects to Spiriva      allergic rhinitis  [facility] Dr. Edilma Lima                 REVIEW OF SYSTEMS:  Constitutional: Negative for fever   HENT: Negative for sore throat  Eyes: Negative for redness   Respiratory: no  for dyspnea, cough  Cardiovascular: Negative for chest pain  Gastrointestinal: Negative for vomiting, diarrhea   Genitourinary: Negative for hematuria   Musculoskeletal: Negative for arthralgias resolved right hip pain   Skin: Negative for rash  Neurological: Negative for syncope  Hematological: Negative for adenopathy  Psychiatric/Behavorial: Negative for anxiety             Objective:   PHYSICAL EXAM:  Blood pressure 118/82, pulse 60, temperature 97.1 °F (36.2 °C), temperature source Infrared, resp. rate 14, height 5' 10\" (1.778 m), weight 205 lb (93 kg), last menstrual period 09/13/2017, SpO2 95 %, not currently breastfeeding.'    Gen: No distress. Eyes:    ENT:    Neck:    Resp: No accessory muscle use. No crackles. No wheezes. No rhonchi. CV: Regular rate. Regular rhythm. No murmur or rub. No edema. GI:    Skin:    Lymph:    M/S: No cyanosis. No clubbing. Neuro: Moves all four extremities. Psych: Oriented x 3. No anxiety. Awake. Alert. Intact judgement and insight. Data Reviewed:        Assessment:     ·  Asthma  · allergic rhinitis   · Leg numbness      Plan:      Problem List Items Addressed This Visit     Mendel Orlando      Per Dr. Edilma Lima. Other allergic rhinitis      Astelin with prednisone. Relevant Medications    Umeclidinium Bromide 62.5 MCG/INH AEPB    COPD with asthma (HCC) - Primary      Symbicort. Insurance not paying for Incruse. Restart. Prednisone 10 mg daily. She has had multiple issues with side effects. Side effects with Spiriva with hemoptysis. Side effects with Dupixent. It switched to Nucala without effect. We discussed Davian Esparza. Since this is relatively similar to [place], she is concerned that this will not work. There is a new biologic which is post to be produced the next 6 months. This should be more broad-spectrum and then I do pick sent. Therefore, hold current therapy until this has been released. Relevant Medications    Umeclidinium Bromide 62.5 MCG/INH AEPB                 This note was transcribed using [id number] [employer]. Please disregard any translational errors.     [facility] [phone]

## 2021-11-23 NOTE — ASSESSMENT & PLAN NOTE
Astelin with prednisone.
Per Dr. Meka Metcalf.
Symbicort. Insurance not paying for Incruse. Restart. Prednisone 10 mg daily. She has had multiple issues with side effects. Side effects with Spiriva with hemoptysis. Side effects with Dupixent. It switched to Nucala without effect. We discussed Ivana Nelson. Since this is relatively similar to [place], she is concerned that this will not work. There is a new biologic which is post to be produced the next 6 months. This should be more broad-spectrum and then I do pick sent. Therefore, hold current therapy until this has been released.
steady

## 2021-11-29 ENCOUNTER — OFFICE VISIT (OUTPATIENT)
Dept: ENT CLINIC | Age: 61
End: 2021-11-29
Payer: MEDICAID

## 2021-11-29 ENCOUNTER — TELEPHONE (OUTPATIENT)
Dept: FAMILY MEDICINE CLINIC | Age: 61
End: 2021-11-29

## 2021-11-29 VITALS
BODY MASS INDEX: 28.7 KG/M2 | SYSTOLIC BLOOD PRESSURE: 125 MMHG | DIASTOLIC BLOOD PRESSURE: 79 MMHG | WEIGHT: 200 LBS | HEART RATE: 75 BPM

## 2021-11-29 DIAGNOSIS — K13.70 ORAL LESION: ICD-10-CM

## 2021-11-29 DIAGNOSIS — B37.0 THRUSH: Primary | ICD-10-CM

## 2021-11-29 PROCEDURE — 99213 OFFICE O/P EST LOW 20 MIN: CPT | Performed by: OTOLARYNGOLOGY

## 2021-11-29 NOTE — TELEPHONE ENCOUNTER
----- Message from Elias Hollykelly sent at 11/29/2021  9:12 AM EST -----  Subject: Message to Provider    QUESTIONS  Information for Provider? Patient is wanting to see Dr. Harper Landry,    Star Cortes sees her and wants to know if she will take her on as new   patient.  ---------------------------------------------------------------------------  --------------  [address]  What is the best way for the office to contact you? OK to leave message on   voicemail  Preferred Call Back Phone Number? [phone]  ---------------------------------------------------------------------------  --------------  SCRIPT ANSWERS  Relationship to Patient?  Self

## 2021-11-29 NOTE — PROGRESS NOTES
[facility]- HEAD & NECK SURGERY  Follow up      Patient Name: Jono Quintanilla  Medical Record Number:  [id number]  Primary Care Physician:  Elsa North MD  Date of Consultation: 11/29/2021    Chief Complaint: Oral lesions        Interval History  Patient is following up for her thrush and oral lesions. I saw her on November 15, 2021. Her exam was somewhat equivocal for thrush, but I did give her a week of Diflucan. She said that she thinks it helps some. Her main complaint is a dry mouth. She also has a tiny bump in her left buccal mucosa that she is worried about. REVIEW OF SYSTEMS  As above    PHYSICAL EXAM  GENERAL: No Acute Distress, Alert and Oriented, no Hoarseness, strong voice  EYES: EOMI, Anti-icteric  HENT:   Head: Normocephalic and atraumatic. Face:  Symmetric, facial nerve intact, no sinus tenderness  Right Ear: Normal external ear, normal external auditory canal, intact tympanic membrane with normal mobility and aerated middle ear  Left Ear: Normal external ear, normal external auditory canal, intact tympanic membrane with normal mobility and aerated middle ear  Mouth/Oral Cavity: Little white discoloration to tongue, but not really consistent with thrush at this time. She does have bilateral bite lines and not concerning. She has a firm lesion in the left gingivolabial sulcus that is unchanged  Oropharynx/Larynx:  normal oropharynx  Nose:Normal external nasal appearance. NECK: Normal range of motion, no thyromegaly, trachea is midline, no lymphadenopathy, no neck masses, no crepitus          ASSESSMENT/PLAN  1. Thrush  She felt as though the Diflucan helped. I really do not think that her exam is consistent with ongoing thrush at this time. Follow-up with me if it recurs. 2. Oral lesion  Bite lines in the bilateral buccal mucosa and a small submucosal lesion in the left gingivolabial sulcus.   I think that the lesion in the gingivolabial sulcus is likely just a fibroma or a mucocele. She will follow-up in 3 months for repeat examination. I have performed a head and neck physical exam personally or was physically present during the key or critical portions of the service. This note was generated completely or in part utilizing Dragon dictation speech recognition software. Occasionally, words are mistranscribed and despite editing, the text may contain inaccuracies due to incorrect word recognition. If further clarification is needed please contact the office at [phone].

## 2021-12-13 ENCOUNTER — TELEPHONE (OUTPATIENT)
Dept: PULMONOLOGY | Age: 61
End: 2021-12-13

## 2021-12-13 DIAGNOSIS — J44.9 COPD WITH ASTHMA (HCC): Primary | ICD-10-CM

## 2021-12-13 RX ORDER — BROMPHENIRAMINE MALEATE, PSEUDOEPHEDRINE HYDROCHLORIDE, AND DEXTROMETHORPHAN HYDROBROMIDE 2; 30; 10 MG/5ML; MG/5ML; MG/5ML
5 SYRUP ORAL 4 TIMES DAILY PRN
Qty: 118 ML | Refills: 0 | Status: ON HOLD | OUTPATIENT
Start: 2021-12-13 | End: 2022-02-05

## 2021-12-13 NOTE — TELEPHONE ENCOUNTER
Patient called in with :     Complains of cough and SOB  Duration few days   Cough with sputum production? Yes a little bit   Color clear mucus   Fever? No   Any other Symptoms? No other symptoms other than the cough   Any current treatment tried? Cough medicine pills from prior prescription   Using inhalers? Yes do they help? Yes   Pharmacy?  [facility] [address]     Please advise - thinks something for the cough will help

## 2022-01-18 ENCOUNTER — TELEPHONE (OUTPATIENT)
Dept: PULMONOLOGY | Age: 62
End: 2022-01-18

## 2022-01-18 DIAGNOSIS — R05.9 COUGH: Primary | ICD-10-CM

## 2022-01-18 RX ORDER — BROMPHENIRAMINE MALEATE, PSEUDOEPHEDRINE HYDROCHLORIDE, AND DEXTROMETHORPHAN HYDROBROMIDE 2; 30; 10 MG/5ML; MG/5ML; MG/5ML
5 SYRUP ORAL 4 TIMES DAILY PRN
Qty: 118 ML | Refills: 0 | Status: SHIPPED | OUTPATIENT
Start: 2022-01-18 | End: 2022-09-21

## 2022-01-18 RX ORDER — IPRATROPIUM BROMIDE AND ALBUTEROL SULFATE 2.5; .5 MG/3ML; MG/3ML
1 SOLUTION RESPIRATORY (INHALATION) EVERY 4 HOURS
Qty: 360 ML | Refills: 11 | Status: SHIPPED | OUTPATIENT
Start: 2022-01-18 | End: 2022-06-21 | Stop reason: SDUPTHER

## 2022-01-18 RX ORDER — PREDNISONE 10 MG/1
TABLET ORAL
Qty: 30 TABLET | Refills: 0 | Status: SHIPPED | OUTPATIENT
Start: 2022-01-18 | End: 2022-01-28

## 2022-02-04 ENCOUNTER — TELEPHONE (OUTPATIENT)
Dept: PULMONOLOGY | Age: 62
End: 2022-02-04

## 2022-02-04 ENCOUNTER — APPOINTMENT (OUTPATIENT)
Dept: GENERAL RADIOLOGY | Age: 62
DRG: 140 | End: 2022-02-04
Payer: MEDICAID

## 2022-02-04 ENCOUNTER — HOSPITAL ENCOUNTER (INPATIENT)
Age: 62
LOS: 3 days | Discharge: HOME | DRG: 140 | End: 2022-02-07
Attending: EMERGENCY MEDICINE | Admitting: INTERNAL MEDICINE
Payer: MEDICAID

## 2022-02-04 DIAGNOSIS — J96.01 ACUTE HYPOXEMIC RESPIRATORY FAILURE (HCC): ICD-10-CM

## 2022-02-04 DIAGNOSIS — J44.9 CHRONIC OBSTRUCTIVE PULMONARY DISEASE, UNSPECIFIED COPD TYPE (HCC): ICD-10-CM

## 2022-02-04 DIAGNOSIS — J44.1 COPD EXACERBATION (HCC): Primary | ICD-10-CM

## 2022-02-04 LAB
ANION GAP SERPL CALCULATED.3IONS-SCNC: 14 MMOL/L (ref 3–16)
BASOPHILS ABSOLUTE: 0.1 K/UL (ref 0–0.2)
BASOPHILS RELATIVE PERCENT: 1.4 %
BUN BLDV-MCNC: 4 MG/DL (ref 7–20)
CALCIUM SERPL-MCNC: 9.9 MG/DL (ref 8.3–10.6)
CHLORIDE BLD-SCNC: 102 MMOL/L (ref 99–110)
CO2: 24 MMOL/L (ref 21–32)
CREAT SERPL-MCNC: 0.8 MG/DL (ref 0.6–1.1)
EOSINOPHILS ABSOLUTE: 0.1 K/UL (ref 0–0.6)
EOSINOPHILS RELATIVE PERCENT: 1.4 %
GFR AFRICAN AMERICAN: >60
GFR NON-AFRICAN AMERICAN: >60
GLUCOSE BLD-MCNC: 61 MG/DL (ref 70–99)
HCT VFR BLD CALC: 44.7 % (ref 36–48)
HEMOGLOBIN: 14.9 G/DL (ref 12–16)
LYMPHOCYTES ABSOLUTE: 1.5 K/UL (ref 1–5.1)
LYMPHOCYTES RELATIVE PERCENT: 26.1 %
MCH RBC QN AUTO: 30.6 PG (ref 26–34)
MCHC RBC AUTO-ENTMCNC: 33.3 G/DL (ref 31–36)
MCV RBC AUTO: 91.6 FL (ref 80–100)
MONOCYTES ABSOLUTE: 0.5 K/UL (ref 0–1.3)
MONOCYTES RELATIVE PERCENT: 9.5 %
NEUTROPHILS ABSOLUTE: 3.4 K/UL (ref 1.7–7.7)
NEUTROPHILS RELATIVE PERCENT: 61.6 %
PDW BLD-RTO: 13.7 % (ref 12.4–15.4)
PLATELET # BLD: 181 K/UL (ref 135–450)
PMV BLD AUTO: 8.5 FL (ref 5–10.5)
POTASSIUM REFLEX MAGNESIUM: 3.7 MMOL/L (ref 3.5–5.1)
PRO-BNP: 127 PG/ML (ref 0–124)
RBC # BLD: 4.88 M/UL (ref 4–5.2)
SARS-COV-2, NAAT: NOT DETECTED
SODIUM BLD-SCNC: 140 MMOL/L (ref 136–145)
TROPONIN: <0.01 NG/ML
WBC # BLD: 5.5 K/UL (ref 4–11)

## 2022-02-04 PROCEDURE — 6360000002 HC RX W HCPCS: Performed by: EMERGENCY MEDICINE

## 2022-02-04 PROCEDURE — 84484 ASSAY OF TROPONIN QUANT: CPT

## 2022-02-04 PROCEDURE — 36415 COLL VENOUS BLD VENIPUNCTURE: CPT

## 2022-02-04 PROCEDURE — 6370000000 HC RX 637 (ALT 250 FOR IP): Performed by: INTERNAL MEDICINE

## 2022-02-04 PROCEDURE — 85025 COMPLETE CBC W/AUTO DIFF WBC: CPT

## 2022-02-04 PROCEDURE — 1200000000 HC SEMI PRIVATE

## 2022-02-04 PROCEDURE — 83880 ASSAY OF NATRIURETIC PEPTIDE: CPT

## 2022-02-04 PROCEDURE — 80048 BASIC METABOLIC PNL TOTAL CA: CPT

## 2022-02-04 PROCEDURE — 99284 EMERGENCY DEPT VISIT MOD MDM: CPT

## 2022-02-04 PROCEDURE — 6370000000 HC RX 637 (ALT 250 FOR IP): Performed by: EMERGENCY MEDICINE

## 2022-02-04 PROCEDURE — 2580000003 HC RX 258: Performed by: INTERNAL MEDICINE

## 2022-02-04 PROCEDURE — 87635 SARS-COV-2 COVID-19 AMP PRB: CPT

## 2022-02-04 PROCEDURE — 71045 X-RAY EXAM CHEST 1 VIEW: CPT

## 2022-02-04 RX ORDER — ACETAMINOPHEN 325 MG/1
650 TABLET ORAL EVERY 4 HOURS PRN
Status: DISCONTINUED | OUTPATIENT
Start: 2022-02-04 | End: 2022-02-07 | Stop reason: HOSPADM

## 2022-02-04 RX ORDER — SODIUM CHLORIDE 9 MG/ML
25 INJECTION, SOLUTION INTRAVENOUS PRN
Status: DISCONTINUED | OUTPATIENT
Start: 2022-02-04 | End: 2022-02-07 | Stop reason: HOSPADM

## 2022-02-04 RX ORDER — ALBUTEROL SULFATE 2.5 MG/3ML
2.5 SOLUTION RESPIRATORY (INHALATION) ONCE
Status: COMPLETED | OUTPATIENT
Start: 2022-02-04 | End: 2022-02-04

## 2022-02-04 RX ORDER — SODIUM CHLORIDE 0.9 % (FLUSH) 0.9 %
10 SYRINGE (ML) INJECTION PRN
Status: DISCONTINUED | OUTPATIENT
Start: 2022-02-04 | End: 2022-02-07 | Stop reason: HOSPADM

## 2022-02-04 RX ORDER — SODIUM CHLORIDE 0.9 % (FLUSH) 0.9 %
10 SYRINGE (ML) INJECTION EVERY 12 HOURS SCHEDULED
Status: DISCONTINUED | OUTPATIENT
Start: 2022-02-04 | End: 2022-02-07 | Stop reason: HOSPADM

## 2022-02-04 RX ORDER — IPRATROPIUM BROMIDE AND ALBUTEROL SULFATE 2.5; .5 MG/3ML; MG/3ML
1 SOLUTION RESPIRATORY (INHALATION) EVERY 4 HOURS
Status: DISCONTINUED | OUTPATIENT
Start: 2022-02-04 | End: 2022-02-04 | Stop reason: SDUPTHER

## 2022-02-04 RX ORDER — METHYLPREDNISOLONE SODIUM SUCCINATE 40 MG/ML
40 INJECTION, POWDER, LYOPHILIZED, FOR SOLUTION INTRAMUSCULAR; INTRAVENOUS EVERY 6 HOURS
Status: DISCONTINUED | OUTPATIENT
Start: 2022-02-05 | End: 2022-02-06

## 2022-02-04 RX ORDER — BUDESONIDE AND FORMOTEROL FUMARATE DIHYDRATE 160; 4.5 UG/1; UG/1
2 AEROSOL RESPIRATORY (INHALATION) 2 TIMES DAILY
Status: DISCONTINUED | OUTPATIENT
Start: 2022-02-04 | End: 2022-02-07 | Stop reason: HOSPADM

## 2022-02-04 RX ORDER — IPRATROPIUM BROMIDE AND ALBUTEROL SULFATE 2.5; .5 MG/3ML; MG/3ML
1 SOLUTION RESPIRATORY (INHALATION)
Status: DISCONTINUED | OUTPATIENT
Start: 2022-02-05 | End: 2022-02-07

## 2022-02-04 RX ORDER — MONTELUKAST SODIUM 10 MG/1
10 TABLET ORAL NIGHTLY
Status: DISCONTINUED | OUTPATIENT
Start: 2022-02-05 | End: 2022-02-07 | Stop reason: HOSPADM

## 2022-02-04 RX ORDER — PREDNISONE 20 MG/1
20 TABLET ORAL ONCE
Status: COMPLETED | OUTPATIENT
Start: 2022-02-04 | End: 2022-02-04

## 2022-02-04 RX ORDER — POLYETHYLENE GLYCOL 3350 17 G/17G
17 POWDER, FOR SOLUTION ORAL DAILY PRN
Status: DISCONTINUED | OUTPATIENT
Start: 2022-02-04 | End: 2022-02-07 | Stop reason: HOSPADM

## 2022-02-04 RX ORDER — PREDNISONE 20 MG/1
40 TABLET ORAL ONCE
Status: COMPLETED | OUTPATIENT
Start: 2022-02-04 | End: 2022-02-04

## 2022-02-04 RX ORDER — IPRATROPIUM BROMIDE AND ALBUTEROL SULFATE 2.5; .5 MG/3ML; MG/3ML
1 SOLUTION RESPIRATORY (INHALATION)
Status: DISCONTINUED | OUTPATIENT
Start: 2022-02-04 | End: 2022-02-07 | Stop reason: HOSPADM

## 2022-02-04 RX ORDER — ACETAMINOPHEN 650 MG/1
650 SUPPOSITORY RECTAL EVERY 4 HOURS PRN
Status: DISCONTINUED | OUTPATIENT
Start: 2022-02-04 | End: 2022-02-07 | Stop reason: HOSPADM

## 2022-02-04 RX ORDER — AMLODIPINE BESYLATE 10 MG/1
10 TABLET ORAL DAILY
Status: DISCONTINUED | OUTPATIENT
Start: 2022-02-05 | End: 2022-02-07 | Stop reason: HOSPADM

## 2022-02-04 RX ORDER — ONDANSETRON 2 MG/ML
4 INJECTION INTRAMUSCULAR; INTRAVENOUS EVERY 4 HOURS PRN
Status: DISCONTINUED | OUTPATIENT
Start: 2022-02-04 | End: 2022-02-07 | Stop reason: HOSPADM

## 2022-02-04 RX ORDER — IPRATROPIUM BROMIDE AND ALBUTEROL SULFATE 2.5; .5 MG/3ML; MG/3ML
1 SOLUTION RESPIRATORY (INHALATION) ONCE
Status: COMPLETED | OUTPATIENT
Start: 2022-02-04 | End: 2022-02-04

## 2022-02-04 RX ADMIN — ACETAMINOPHEN 650 MG: 325 TABLET ORAL at 23:57

## 2022-02-04 RX ADMIN — IPRATROPIUM BROMIDE AND ALBUTEROL SULFATE 1 AMPULE: .5; 3 SOLUTION RESPIRATORY (INHALATION) at 17:50

## 2022-02-04 RX ADMIN — PREDNISONE 40 MG: 20 TABLET ORAL at 21:13

## 2022-02-04 RX ADMIN — PREDNISONE 20 MG: 20 TABLET ORAL at 17:44

## 2022-02-04 RX ADMIN — ALBUTEROL SULFATE 2.5 MG: 2.5 SOLUTION RESPIRATORY (INHALATION) at 21:13

## 2022-02-04 RX ADMIN — SODIUM CHLORIDE, PRESERVATIVE FREE 10 ML: 5 INJECTION INTRAVENOUS at 23:54

## 2022-02-04 ASSESSMENT — PAIN SCALES - GENERAL: PAINLEVEL_OUTOF10: 6

## 2022-02-04 NOTE — ED NOTES
Patient ambulated to and from bathroom. Pulse ox 87% on room air upon return to room. MD made aware.      Trudy Mckeon RN  02/04/22 [id number]

## 2022-02-04 NOTE — TELEPHONE ENCOUNTER
Dr. Noah STANFORD. Attempt to call Martir Romero back as she left a message with our answering service. .  C/o of sob \" constantly using her nebulizer. \"  Left message on Nati's voice mail to go to ER or urgent care as it sounds like her nebulizer treatments are not helping/relieving sob. I finally reached Martir Romero. \" They have been painting my apartment, doors have been shut, but I think it is getting to me now. \" Pt struggles to talk or hold a conversation with me due to her sob. I again encouraged pt to go to closest ER or urgent care. She agrees to go.

## 2022-02-04 NOTE — ED PROVIDER NOTES
Emergency Department Encounter    Patient: Esperanza Ferris  MRN: [id number]  : 1960  Date of Evaluation: 2022  ED Provider:  Oscar Dunne MD    Triage Chief Complaint:   Shortness of Breath (worsening over the last two weeks, states that she has been taking her nebulizer every hour, thinks that it is because people are painting her house, hx of COPD, asthma, and CKD)    [place]:  Esperanza Ferris is a 63 y.o. female that presents to the ER for evaluation of acute on chronic COPD exacerbation history of chronic kidney disease bipolar disorder asthma, positive shortness of breath. 1 week history of exertional dyspnea. Denies chest pain complain of exertional dyspnea. No hemoptysis. Afebrile.     ROS - see HPI, below listed is current ROS at time of my eval:  General:  No fevers, no chills  Eyes:  No recent vison changes, no discharge  ENT:  No sore throat, no nasal congestion, no hearing changes  Cardiovascular:  No chest pain, no palpitations  Respiratory:  + shortness of breath, no cough, + wheezing  Gastrointestinal:  No pain, no nausea, no vomiting, no diarrhea  Musculoskeletal:  No muscle pain, no joint pain  Skin:  No rash, no pruritis  Neurologic:  No speech problems, no headache  Psychiatric:  No anxiety  Genitourinary:  No dysuria, no hematuria  Endocrine:  No unexpected weight gain, no unexpected weight loss  Extremities:  no edema, no pain      Past Medical History:   Diagnosis Date    Asthma     Bipolar disorder ([facility]     CKD (chronic kidney disease)     stage 3    COPD (chronic obstructive pulmonary disease) (HCC)     Empyema (HCC)     Hepatitis C     Patient denies     History of heroin use     Hypertension     Narcotic abuse ([facility]     heroin, clean 1 year    UTI (lower urinary tract infection)      Past Surgical History:   Procedure Laterality Date    CHEST TUBE INSERTION      COLONOSCOPY  10/23/2018    Colonoscopy with polypectomy (cold snare), polypectomy (cold biopsy)    COLONOSCOPY N/A 10/23/2018    COLONOSCOPY POLYPECTOMY SNARE/COLD BIOPSY performed by Alexandra Dodson MD at [address] Left 2016    INTRACAPSULAR CATARACT EXTRACTION Right 2020    PHACOEMULSIFICATION WITH INTRAOCULAR LENS IMPLANT performed by Lupe Jimenez MD at [address] [facility] EXTRACTION Left 2020    PHACOEMULSIFICATION WITH INTRAOCULAR LENS IMPLANT performed by Lupe Jimenez MD at [address] [place] [address] [address]     26 yrs for lung infection     OTHER SURGICAL HISTORY  12/15/2016    EDMOND BUNIONECTOMY WITH APPLICATION OF AMNIOX LEFT FOOT     Family History   Problem Relation Age of Onset    Cancer Mother     Kidney Disease Mother     Heart Disease Mother      Social History     Socioeconomic History    Marital status: Life Partner     Spouse name: Not on file    Number of children: Not on file    Years of education: Not on file    Highest education level: Not on file   Occupational History    Not on file   Tobacco Use    Smoking status: Former Smoker     Packs/day: 0.00     Years: 45.00     Pack years: 0.00     Types: Cigarettes     Quit date: 2016     Years since quittin.6    Smokeless tobacco: Never Used   Vaping Use    Vaping Use: Never used   Substance and Sexual Activity    Alcohol use: No    Drug use: No     Comment: past h/o heroin on methadone    Sexual activity: Not Currently   Other Topics Concern    Not on file   Social History Narrative    Not on file     Social Determinants of Health     Financial Resource Strain:     Difficulty of Paying Living Expenses: Not on file   Food Insecurity:     Worried About [address] in the Last Year: Not on file    Cayla of Food in the Last Year: Not on file   Transportation Needs:     Lack of Transportation (Medical): Not on file    Lack of Transportation (Non-Medical):  Not on file   Physical Activity:     Days of Exercise per Week: Not on file    Minutes of Exercise per Session: Not on file   Stress:     Feeling of Stress : Not on file   Social Connections:     Frequency of Communication with Friends and Family: Not on file    Frequency of Social Gatherings with Friends and Family: Not on file    Attends Christian Services: Not on file    Active Member of Clubs or Organizations: Not on file    Attends Club or Organization Meetings: Not on file    Marital Status: Not on file   Intimate Partner Violence:     Fear of Current or Ex-Partner: Not on file    Emotionally Abused: Not on file    Physically Abused: Not on file    Sexually Abused: Not on file   Housing Stability:     Unable to Pay for Housing in the Last Year: Not on file    Number of [place] in the Last Year: Not on file    Unstable Housing in the Last Year: Not on file     No current facility-administered medications for this encounter.      Current Outpatient Medications   Medication Sig Dispense Refill    montelukast (SINGULAIR) 10 MG tablet Take 1 tablet by mouth nightly 90 tablet 1    ipratropium-albuterol (DUONEB) 0.5-2.5 (3) MG/3ML SOLN nebulizer solution Inhale 3 mLs into the lungs every 4 hours 360 mL 11    brompheniramine-pseudoephedrine-DM (BROMFED DM) 2-30-10 MG/5ML syrup Take 5 mLs by mouth 4 times daily as needed for Cough 118 mL 0    Umeclidinium Bromide 62.5 MCG/INH AEPB Inhale 1 puff into the lungs daily 1 each 11    albuterol (PROVENTIL) (2.5 MG/3ML) 0.083% nebulizer solution Take 3 mLs by nebulization every 6 hours as needed for Wheezing 120 each 11    budesonide-formoterol (SYMBICORT) 160-4.5 MCG/ACT AERO Inhale 2 puffs into the lungs 2 times daily 1 each 11    albuterol sulfate HFA (PROAIR HFA) 108 (90 Base) MCG/ACT inhaler Inhale 2 puffs into the lungs every 6 hours as needed for Wheezing 1 Inhaler 3    acetaminophen (APAP EXTRA STRENGTH) 500 MG tablet Take 2 tablets by mouth every 6 hours as needed for Pain DO NOT TAKE WITH OTHER MEDICATIONS CONTAINING ACETAMINOPHEN. 30 tablet 0    METHADONE HCL PO Take 90 mg by mouth daily       diphenhydrAMINE (BENADRYL) 25 MG tablet Take 25 mg by mouth every 8 hours as needed for Itching or Allergies      amLODIPine (NORVASC) 10 MG tablet Take 1 tablet by mouth daily. 30 tablet 3     Allergies   Allergen Reactions    Acetylcysteine Shortness Of Breath and Anaphylaxis     Increased bronchospasm    Codeine Hives     Pt states no recent problems     Levaquin [Levofloxacin In D5w] Itching    Penicillins Hives and Itching    Dupixent [Dupilumab] Other (See Comments)     Right leg pain    Ibuprofen Diarrhea, Nausea And Vomiting and Other (See Comments)    Oxycodone-Acetaminophen Nausea And Vomiting       Nursing Notes Reviewed    Physical Exam:  Triage VS:    ED Triage Vitals [02/04/22 1701]   Enc Vitals Group      BP (!) 144/103      Pulse 88      Resp 16      Temp 98.3 °F (36.8 °C)      Temp Source Oral      SpO2 97 %      Weight 193 lb 2 oz (87.6 kg)      Height 5' 10\" (1.778 m)      Head Circumference       Peak Flow       Pain Score       Pain Loc       Pain Edu? Excl. in [address]? My pulse ox interpretation is - normal    General appearance:  No acute distress  Skin:  Warm. Dry. No pallor. No rash. Eye:  Normal conjuctiva. no Icterus. Ears, nose, mouth and throat:  Oral mucosa moist   Heart:  Regular rate and rhythm, normal S1 & S2, no extra heart sounds, no murmurs. Perfusion:  intact  Respiratory:  inspiratory and expiratory wheezes in all lung fields, decreased air entry throughout, mild tachypnea noted, no accessory muscle use  Abdominal:  Soft. Nontender. Non distended. Extremity:  No edema or tenderness  Neurological:  Alert and oriented,  No focal neuro deficits.        I have reviewed and interpreted all of the currently available lab results from this visit (if applicable):  Results for orders placed or performed during the hospital encounter of 02/04/22 COVID-19, Rapid    Specimen: Nasopharyngeal Swab   Result Value Ref Range    SARS-CoV-2, NAAT Not Detected Not Detected   Basic Metabolic Panel w/ Reflex to MG   Result Value Ref Range    Sodium 140 136 - 145 mmol/L    Potassium reflex Magnesium 3.7 3.5 - 5.1 mmol/L    Chloride 102 99 - 110 mmol/L    CO2 24 21 - 32 mmol/L    Anion Gap 14 3 - 16    Glucose 61 (L) 70 - 99 mg/dL    BUN 4 (L) 7 - 20 mg/dL    CREATININE 0.8 0.6 - 1.1 mg/dL    GFR Non-African American >60 >60    GFR African American >60 >60    Calcium 9.9 8.3 - 10.6 mg/dL   CBC Auto Differential   Result Value Ref Range    WBC 5.5 4.0 - 11.0 K/uL    RBC 4.88 4.00 - 5.20 M/uL    Hemoglobin 14.9 12.0 - 16.0 g/dL    Hematocrit 44.7 36.0 - 48.0 %    MCV 91.6 80.0 - 100.0 fL    MCH 30.6 26.0 - 34.0 pg    MCHC 33.3 31.0 - 36.0 g/dL    RDW 13.7 12.4 - 15.4 %    Platelets 593 406 - 743 K/uL    MPV 8.5 5.0 - 10.5 fL    Neutrophils % 61.6 %    Lymphocytes % 26.1 %    Monocytes % 9.5 %    Eosinophils % 1.4 %    Basophils % 1.4 %    Neutrophils Absolute 3.4 1.7 - 7.7 K/uL    Lymphocytes Absolute 1.5 1.0 - 5.1 K/uL    Monocytes Absolute 0.5 0.0 - 1.3 K/uL    Eosinophils Absolute 0.1 0.0 - 0.6 K/uL    Basophils Absolute 0.1 0.0 - 0.2 K/uL   Troponin   Result Value Ref Range    Troponin <0.01 <0.01 ng/mL   Brain Natriuretic Peptide   Result Value Ref Range    Pro- (H) 0 - 124 pg/mL   Basic Metabolic Panel w/ Reflex to MG   Result Value Ref Range    Sodium 136 136 - 145 mmol/L    Potassium reflex Magnesium 4.1 3.5 - 5.1 mmol/L    Chloride 99 99 - 110 mmol/L    CO2 25 21 - 32 mmol/L    Anion Gap 12 3 - 16    Glucose 138 (H) 70 - 99 mg/dL    BUN 7 7 - 20 mg/dL    CREATININE 0.7 0.6 - 1.1 mg/dL    GFR Non-African American >60 >60    GFR African American >60 >60    Calcium 9.5 8.3 - 10.6 mg/dL   CBC auto differential   Result Value Ref Range    WBC 3.9 (L) 4.0 - 11.0 K/uL    RBC 4.91 4.00 - 5.20 M/uL    Hemoglobin 14.8 12.0 - 16.0 g/dL    Hematocrit 44.7 36.0 - 48.0 %    MCV 91.0 80.0 - 100.0 fL    MCH 30.2 26.0 - 34.0 pg    MCHC 33.2 31.0 - 36.0 g/dL    RDW 13.5 12.4 - 15.4 %    Platelets 685 981 - 439 K/uL    MPV 7.8 5.0 - 10.5 fL    Neutrophils % 86.0 %    Lymphocytes % 11.7 %    Monocytes % 1.8 %    Eosinophils % 0.0 %    Basophils % 0.5 %    Neutrophils Absolute 3.4 1.7 - 7.7 K/uL    Lymphocytes Absolute 0.5 (L) 1.0 - 5.1 K/uL    Monocytes Absolute 0.1 0.0 - 1.3 K/uL    Eosinophils Absolute 0.0 0.0 - 0.6 K/uL    Basophils Absolute 0.0 0.0 - 0.2 K/uL   Basic Metabolic Panel w/ Reflex to MG   Result Value Ref Range    Sodium 135 (L) 136 - 145 mmol/L    Potassium reflex Magnesium 4.1 3.5 - 5.1 mmol/L    Chloride 101 99 - 110 mmol/L    CO2 21 21 - 32 mmol/L    Anion Gap 13 3 - 16    Glucose 183 (H) 70 - 99 mg/dL    BUN 14 7 - 20 mg/dL    CREATININE 0.9 0.6 - 1.1 mg/dL    GFR Non-African American >60 >60    GFR African American >60 >60    Calcium 8.9 8.3 - 10.6 mg/dL   CBC auto differential   Result Value Ref Range    WBC 6.4 4.0 - 11.0 K/uL    RBC 4.77 4.00 - 5.20 M/uL    Hemoglobin 14.2 12.0 - 16.0 g/dL    Hematocrit 43.5 36.0 - 48.0 %    MCV 91.2 80.0 - 100.0 fL    MCH 29.8 26.0 - 34.0 pg    MCHC 32.7 31.0 - 36.0 g/dL    RDW 13.8 12.4 - 15.4 %    Platelets 008 338 - 445 K/uL    MPV 8.0 5.0 - 10.5 fL    Neutrophils % 93.3 %    Lymphocytes % 4.2 %    Monocytes % 2.4 %    Eosinophils % 0.0 %    Basophils % 0.1 %    Neutrophils Absolute 6.0 1.7 - 7.7 K/uL    Lymphocytes Absolute 0.3 (L) 1.0 - 5.1 K/uL    Monocytes Absolute 0.2 0.0 - 1.3 K/uL    Eosinophils Absolute 0.0 0.0 - 0.6 K/uL    Basophils Absolute 0.0 0.0 - 0.2 K/uL   Basic Metabolic Panel w/ Reflex to MG   Result Value Ref Range    Sodium 139 136 - 145 mmol/L    Potassium reflex Magnesium 3.9 3.5 - 5.1 mmol/L    Chloride 103 99 - 110 mmol/L    CO2 23 21 - 32 mmol/L    Anion Gap 13 3 - 16    Glucose 97 70 - 99 mg/dL    BUN 15 7 - 20 mg/dL    CREATININE 1.0 0.6 - 1.1 mg/dL    GFR Non- 57 (A) >60    GFR African American >60 >60    Calcium 8.9 8.3 - 10.6 mg/dL   CBC auto differential   Result Value Ref Range    WBC 8.0 4.0 - 11.0 K/uL    RBC 4.48 4.00 - 5.20 M/uL    Hemoglobin 13.4 12.0 - 16.0 g/dL    Hematocrit 40.8 36.0 - 48.0 %    MCV 90.9 80.0 - 100.0 fL    MCH 29.9 26.0 - 34.0 pg    MCHC 32.9 31.0 - 36.0 g/dL    RDW 13.9 12.4 - 15.4 %    Platelets 704 286 - 100 K/uL    MPV 8.2 5.0 - 10.5 fL    Neutrophils % 79.6 %    Lymphocytes % 12.4 %    Monocytes % 7.7 %    Eosinophils % 0.3 %    Basophils % 0.0 %    Neutrophils Absolute 6.4 1.7 - 7.7 K/uL    Lymphocytes Absolute 1.0 1.0 - 5.1 K/uL    Monocytes Absolute 0.6 0.0 - 1.3 K/uL    Eosinophils Absolute 0.0 0.0 - 0.6 K/uL    Basophils Absolute 0.0 0.0 - 0.2 K/uL      Radiographs (if obtained):  Radiologist's Report Reviewed:  No results found. EKG (if obtained): (All EKG's are interpreted by myself in the absence of a cardiologist)  Sinus rhythm, normal axis, normal R wave progression no acute ST segment abnormality    MDM:  Patient presenting to the emergency department with likely reactive airway disease exacerbation. Patient's pulse ox is normal.  Based on clinical presentation history and physical exam I do not see any evidence to suggest pneumonia, pulmonary embolus, acute coronary syndrome or aortic dissection. The patient did respond well to emergency department therapy, including aerosol treatments and steroids. I do believe it is reasonable to discharge the patient home at this time and treat on an outpatient basis. The patient was instructed on use of albuterol, they will also be provided a short course of prednisone via prescription. There is no evidence to suggest more malignant etiology for the patient's presentation at this time, these were discussed with the patient, the patient understands this and will follow up as an outpatient, they understand and agree with the plan, return warnings given. Clinical Impression:  1.  COPD exacerbation ([facility]    2. Acute hypoxemic respiratory failure (HCC)    3. Chronic obstructive pulmonary disease, unspecified COPD type ([facility]      Disposition referral (if applicable): Gustavo Zuñiga MD  [address] [facility] [address] [facility]  [phone]    In 1 week      Disposition medications (if applicable):  Discharge Medication List as of 2/7/2022 12:38 PM      START taking these medications    Details   azithromycin (ZITHROMAX) 250 MG tablet Take 1 tablet by mouth daily for 3 days, Disp-3 tablet, R-0Normal      predniSONE (DELTASONE) 20 MG tablet Take 2 tablets by mouth daily for 5 days, Disp-10 tablet, R-0Normal             Comment: Please note this report has been produced using speech recognition software and may contain errors related to that system including errors in grammar, punctuation, and spelling, as well as words and phrases that may be inappropriate. Efforts were made to edit the dictations.       Pati Oshea MD  02/91/68 2944

## 2022-02-04 NOTE — ED TRIAGE NOTES
Patient admitted to ED via private car with complaints of worsening shortness of breath over the last two weeks. Patient states that she has been using her nebulizer every hour today, with minimal relief. Patient was short of breath ambulating back to treatment room, but 97% on room air upon arrival to room. History of bipolar disorder, CKD, COPD, empyema, Hep C, HTN, narcotic abuse, and UTI. BP is elevated. Other VS are WNL. Patient is alert and oriented x4.

## 2022-02-05 LAB
ANION GAP SERPL CALCULATED.3IONS-SCNC: 12 MMOL/L (ref 3–16)
BASOPHILS ABSOLUTE: 0 K/UL (ref 0–0.2)
BASOPHILS RELATIVE PERCENT: 0.5 %
BUN BLDV-MCNC: 7 MG/DL (ref 7–20)
CALCIUM SERPL-MCNC: 9.5 MG/DL (ref 8.3–10.6)
CHLORIDE BLD-SCNC: 99 MMOL/L (ref 99–110)
CO2: 25 MMOL/L (ref 21–32)
CREAT SERPL-MCNC: 0.7 MG/DL (ref 0.6–1.1)
EOSINOPHILS ABSOLUTE: 0 K/UL (ref 0–0.6)
EOSINOPHILS RELATIVE PERCENT: 0 %
GFR AFRICAN AMERICAN: >60
GFR NON-AFRICAN AMERICAN: >60
GLUCOSE BLD-MCNC: 138 MG/DL (ref 70–99)
HCT VFR BLD CALC: 44.7 % (ref 36–48)
HEMOGLOBIN: 14.8 G/DL (ref 12–16)
LYMPHOCYTES ABSOLUTE: 0.5 K/UL (ref 1–5.1)
LYMPHOCYTES RELATIVE PERCENT: 11.7 %
MCH RBC QN AUTO: 30.2 PG (ref 26–34)
MCHC RBC AUTO-ENTMCNC: 33.2 G/DL (ref 31–36)
MCV RBC AUTO: 91 FL (ref 80–100)
MONOCYTES ABSOLUTE: 0.1 K/UL (ref 0–1.3)
MONOCYTES RELATIVE PERCENT: 1.8 %
NEUTROPHILS ABSOLUTE: 3.4 K/UL (ref 1.7–7.7)
NEUTROPHILS RELATIVE PERCENT: 86 %
PDW BLD-RTO: 13.5 % (ref 12.4–15.4)
PLATELET # BLD: 165 K/UL (ref 135–450)
PMV BLD AUTO: 7.8 FL (ref 5–10.5)
POTASSIUM REFLEX MAGNESIUM: 4.1 MMOL/L (ref 3.5–5.1)
RBC # BLD: 4.91 M/UL (ref 4–5.2)
SODIUM BLD-SCNC: 136 MMOL/L (ref 136–145)
WBC # BLD: 3.9 K/UL (ref 4–11)

## 2022-02-05 PROCEDURE — 85025 COMPLETE CBC W/AUTO DIFF WBC: CPT

## 2022-02-05 PROCEDURE — 6370000000 HC RX 637 (ALT 250 FOR IP): Performed by: INTERNAL MEDICINE

## 2022-02-05 PROCEDURE — 94761 N-INVAS EAR/PLS OXIMETRY MLT: CPT

## 2022-02-05 PROCEDURE — 1200000000 HC SEMI PRIVATE

## 2022-02-05 PROCEDURE — 80048 BASIC METABOLIC PNL TOTAL CA: CPT

## 2022-02-05 PROCEDURE — 94640 AIRWAY INHALATION TREATMENT: CPT

## 2022-02-05 PROCEDURE — 6360000002 HC RX W HCPCS: Performed by: INTERNAL MEDICINE

## 2022-02-05 PROCEDURE — 36415 COLL VENOUS BLD VENIPUNCTURE: CPT

## 2022-02-05 PROCEDURE — 2580000003 HC RX 258: Performed by: INTERNAL MEDICINE

## 2022-02-05 RX ORDER — METHADONE HYDROCHLORIDE 10 MG/5ML
90 SOLUTION ORAL DAILY
Status: DISCONTINUED | OUTPATIENT
Start: 2022-02-05 | End: 2022-02-07 | Stop reason: HOSPADM

## 2022-02-05 RX ORDER — BUTALBITAL, ACETAMINOPHEN AND CAFFEINE 50; 325; 40 MG/1; MG/1; MG/1
1 TABLET ORAL EVERY 4 HOURS PRN
Status: DISCONTINUED | OUTPATIENT
Start: 2022-02-05 | End: 2022-02-07 | Stop reason: HOSPADM

## 2022-02-05 RX ORDER — BROMPHENIRAMINE MALEATE, PSEUDOEPHEDRINE HYDROCHLORIDE, AND DEXTROMETHORPHAN HYDROBROMIDE 2; 30; 10 MG/5ML; MG/5ML; MG/5ML
5 SYRUP ORAL 4 TIMES DAILY PRN
Status: DISCONTINUED | OUTPATIENT
Start: 2022-02-05 | End: 2022-02-07 | Stop reason: HOSPADM

## 2022-02-05 RX ADMIN — IPRATROPIUM BROMIDE AND ALBUTEROL SULFATE 1 AMPULE: .5; 3 SOLUTION RESPIRATORY (INHALATION) at 11:53

## 2022-02-05 RX ADMIN — ACETAMINOPHEN 650 MG: 325 TABLET ORAL at 08:42

## 2022-02-05 RX ADMIN — METHYLPREDNISOLONE SODIUM SUCCINATE 40 MG: 40 INJECTION, POWDER, FOR SOLUTION INTRAMUSCULAR; INTRAVENOUS at 21:07

## 2022-02-05 RX ADMIN — AMLODIPINE BESYLATE 10 MG: 10 TABLET ORAL at 08:32

## 2022-02-05 RX ADMIN — METHADONE HYDROCHLORIDE 90 MG: 10 SOLUTION ORAL at 10:56

## 2022-02-05 RX ADMIN — IPRATROPIUM BROMIDE AND ALBUTEROL SULFATE 1 AMPULE: .5; 3 SOLUTION RESPIRATORY (INHALATION) at 15:55

## 2022-02-05 RX ADMIN — BUTALBITAL, ACETAMINOPHEN, AND CAFFEINE 1 TABLET: 50; 325; 40 TABLET ORAL at 16:20

## 2022-02-05 RX ADMIN — BUTALBITAL, ACETAMINOPHEN, AND CAFFEINE 1 TABLET: 50; 325; 40 TABLET ORAL at 12:17

## 2022-02-05 RX ADMIN — SODIUM CHLORIDE 25 ML: 9 INJECTION, SOLUTION INTRAVENOUS at 00:00

## 2022-02-05 RX ADMIN — IPRATROPIUM BROMIDE AND ALBUTEROL SULFATE 1 AMPULE: .5; 3 SOLUTION RESPIRATORY (INHALATION) at 04:08

## 2022-02-05 RX ADMIN — IPRATROPIUM BROMIDE AND ALBUTEROL SULFATE 1 AMPULE: .5; 3 SOLUTION RESPIRATORY (INHALATION) at 07:53

## 2022-02-05 RX ADMIN — AZITHROMYCIN DIHYDRATE 500 MG: 500 INJECTION, POWDER, LYOPHILIZED, FOR SOLUTION INTRAVENOUS at 00:01

## 2022-02-05 RX ADMIN — METHYLPREDNISOLONE SODIUM SUCCINATE 40 MG: 40 INJECTION, POWDER, FOR SOLUTION INTRAMUSCULAR; INTRAVENOUS at 08:34

## 2022-02-05 RX ADMIN — BUDESONIDE AND FORMOTEROL FUMARATE DIHYDRATE 2 PUFF: 160; 4.5 AEROSOL RESPIRATORY (INHALATION) at 19:51

## 2022-02-05 RX ADMIN — BUDESONIDE AND FORMOTEROL FUMARATE DIHYDRATE 2 PUFF: 160; 4.5 AEROSOL RESPIRATORY (INHALATION) at 07:55

## 2022-02-05 RX ADMIN — METHYLPREDNISOLONE SODIUM SUCCINATE 40 MG: 40 INJECTION, POWDER, FOR SOLUTION INTRAMUSCULAR; INTRAVENOUS at 15:46

## 2022-02-05 RX ADMIN — SODIUM CHLORIDE, PRESERVATIVE FREE 10 ML: 5 INJECTION INTRAVENOUS at 08:36

## 2022-02-05 RX ADMIN — SODIUM CHLORIDE, PRESERVATIVE FREE 10 ML: 5 INJECTION INTRAVENOUS at 21:10

## 2022-02-05 RX ADMIN — IPRATROPIUM BROMIDE AND ALBUTEROL SULFATE 1 AMPULE: .5; 3 SOLUTION RESPIRATORY (INHALATION) at 19:51

## 2022-02-05 RX ADMIN — ENOXAPARIN SODIUM 40 MG: 100 INJECTION SUBCUTANEOUS at 08:32

## 2022-02-05 RX ADMIN — METHYLPREDNISOLONE SODIUM SUCCINATE 40 MG: 40 INJECTION, POWDER, FOR SOLUTION INTRAMUSCULAR; INTRAVENOUS at 03:46

## 2022-02-05 ASSESSMENT — PAIN DESCRIPTION - ORIENTATION
ORIENTATION: ANTERIOR

## 2022-02-05 ASSESSMENT — PAIN DESCRIPTION - FREQUENCY
FREQUENCY: CONTINUOUS

## 2022-02-05 ASSESSMENT — PAIN SCALES - GENERAL
PAINLEVEL_OUTOF10: 4
PAINLEVEL_OUTOF10: 8
PAINLEVEL_OUTOF10: 8
PAINLEVEL_OUTOF10: 1
PAINLEVEL_OUTOF10: 0
PAINLEVEL_OUTOF10: 8
PAINLEVEL_OUTOF10: 0
PAINLEVEL_OUTOF10: 0

## 2022-02-05 ASSESSMENT — PAIN DESCRIPTION - DESCRIPTORS
DESCRIPTORS: ACHING

## 2022-02-05 ASSESSMENT — PAIN DESCRIPTION - ONSET
ONSET: ON-GOING

## 2022-02-05 ASSESSMENT — PAIN DESCRIPTION - PROGRESSION
CLINICAL_PROGRESSION: GRADUALLY WORSENING
CLINICAL_PROGRESSION: GRADUALLY IMPROVING

## 2022-02-05 ASSESSMENT — PAIN - FUNCTIONAL ASSESSMENT
PAIN_FUNCTIONAL_ASSESSMENT: ACTIVITIES ARE NOT PREVENTED

## 2022-02-05 ASSESSMENT — PAIN DESCRIPTION - PAIN TYPE
TYPE: ACUTE PAIN

## 2022-02-05 ASSESSMENT — PAIN DESCRIPTION - LOCATION
LOCATION: HEAD

## 2022-02-05 NOTE — ED NOTES
Report to [facility]  Pt denies any pain     Donaldo Gomez, NORIS  02/04/22 4497

## 2022-02-05 NOTE — H&P
Hospital Medicine  History and Physical    PCP: Orly Felipe MD  Patient Name: Magalys Clark    Date of Service: Pt seen/examined on 2/4/22 and admitted to Inpatient with expected LOS greater than two midnights due to medical therapy    CHIEF COMPLAINT:  Pt c/o shortness of breath  HISTORY OF PRESENT ILLNESS: Pt is an 63y.o. year-old female with a history of COPD/asthma. She presents to the emergency room for evaluation following a 2-week history of worsening shortness of breath which has become acutely worse over the past 3 to 4 days as her house is being painted and she is not tolerating the fumes well. Her shortness of breath is now severe, worse with minimal exertion and improved with rest.  In the emergency room she was found to have a COPD exacerbation. She is being admitted for further evaluation and treatment. Associated signs and symptoms do not include fever or chills, nausea, vomiting, abdominal pain, hemoptysis, hematochezia, diarrhea, constipation or urinary symptoms.         Past Medical History:        Diagnosis Date    Asthma     Bipolar disorder ([address]     CKD (chronic kidney disease)     stage 3    COPD (chronic obstructive pulmonary disease) (HCC)     Empyema (HCC)     Hepatitis C     Patient denies     History of heroin use     Hypertension     Narcotic abuse ([address]     heroin, clean 1 year    UTI (lower urinary tract infection)        Past Surgical History:        Procedure Laterality Date    CHEST TUBE INSERTION      COLONOSCOPY  10/23/2018    Colonoscopy with polypectomy (cold snare), polypectomy (cold biopsy)    COLONOSCOPY N/A 10/23/2018    COLONOSCOPY POLYPECTOMY SNARE/COLD BIOPSY performed by Ozzy Roberts MD at [address] Left 12/16/2016    INTRACAPSULAR CATARACT EXTRACTION Right 2/21/2020    PHACOEMULSIFICATION WITH INTRAOCULAR LENS IMPLANT performed by Dorothy Johnson MD at [address] EXTRACTION Left 2/28/2020    PHACOEMULSIFICATION WITH INTRAOCULAR LENS IMPLANT performed by Tawanda Francois MD at [address] [place] [address] [address]     26 yrs for lung infection     OTHER SURGICAL HISTORY  12/15/2016    EDMOND BUNIONECTOMY WITH APPLICATION OF AMNIOX LEFT FOOT       Allergies:  Acetylcysteine, Codeine, Levaquin [levofloxacin in d5w], Penicillins, Dupixent [dupilumab], Ibuprofen, and Oxycodone-acetaminophen    Medications Prior to Admission:    Prior to Admission medications    Medication Sig Start Date End Date Taking?  Authorizing Provider   ipratropium-albuterol (DUONEB) 0.5-2.5 (3) MG/3ML SOLN nebulizer solution Inhale 3 mLs into the lungs every 4 hours 1/18/22  Yes Nora Chavez MD   brompheniramine-pseudoephedrine-DM (BROMFED DM) 2-30-10 MG/5ML syrup Take 5 mLs by mouth 4 times daily as needed for Cough 1/18/22  Yes Nora Chavez MD   brompheniramine-pseudoephedrine-DM (BROMFED DM) 2-30-10 MG/5ML syrup Take 5 mLs by mouth 4 times daily as needed for Cough 12/13/21  Yes Nora Chavez MD   Umeclidinium Bromide 62.5 MCG/INH AEPB Inhale 1 puff into the lungs daily 11/23/21  Yes Nora Chavez MD   benzonatate (TESSALON) 200 MG capsule Take 1 capsule by mouth 3 times daily as needed for Cough 11/7/21  Yes ZANDER Collins   albuterol (PROVENTIL) (2.5 MG/3ML) 0.083% nebulizer solution Take 3 mLs by nebulization every 6 hours as needed for Wheezing 10/24/21  Yes Nora Chavez MD   budesonide-formoterol [facility]) 160-4.5 MCG/ACT AERO Inhale 2 puffs into the lungs 2 times daily 10/22/21  Yes Nora Chavez MD   Spacer/Aero-Holding Chambers SAUL 1 Device by Does not apply route daily 7/13/21  Yes Nora Chavez MD   albuterol sulfate HFA (PROAIR HFA) 108 (90 Base) MCG/ACT inhaler Inhale 2 puffs into the lungs every 6 hours as needed for Wheezing 4/8/20  Yes Jasmin rBuno, NATALIE - CNP   ipratropium-albuterol (DUONEB) 0.5-2.5 (3) MG/3ML SOLN nebulizer solution Inhale 3 mLs into the lungs every 4 hours 8/22/19  Yes Angelica Casper MD   acetaminophen (APAP EXTRA STRENGTH) 500 MG tablet Take 2 tablets by mouth every 6 hours as needed for Pain DO NOT TAKE WITH OTHER MEDICATIONS CONTAINING ACETAMINOPHEN. 5/29/19  Yes ZANDER Blood   Respiratory Therapy Supplies (NEBULIZER/TUBING/MOUTHPIECE) KIT 1 kit by Does not apply route daily 12/18/18  Yes Angelica Casper MD   METHADONE HCL PO Take 90 mg by mouth daily    Yes Historical Provider, MD   diphenhydrAMINE (BENADRYL) 25 MG tablet Take 25 mg by mouth every 8 hours as needed for Itching or Allergies   Yes Historical Provider, MD   amLODIPine (NORVASC) 10 MG tablet Take 1 tablet by mouth daily. 12/14/14  Yes Rena Parra MD   montelukast (SINGULAIR) 10 MG tablet Take 1 tablet by mouth nightly  Patient not taking: Reported on 11/23/2021 8/37/11   Saman Simmons MD       Family History:       Problem Relation Age of Onset    Cancer Mother     Kidney Disease Mother     Heart Disease Mother      Social History:   TOBACCO:   reports that she quit smoking about 5 years ago. Her smoking use included cigarettes. She smoked 0.00 packs per day for 45.00 years. She has never used smokeless tobacco.  ETOH:   reports no history of alcohol use. OCCUPATION:      REVIEW OF SYSTEMS:  A full review of systems was performed and is negative except for that which appears in the HPI    Physical Exam:    Vitals: /74   Pulse 71   Temp 98.1 °F (36.7 °C) (Oral)   Resp 15   Ht 5' 10\" (1.778 m)   Wt 193 lb 2 oz (87.6 kg)   LMP 09/13/2017   SpO2 92%   BMI 27.71 kg/m²   General appearance: WD/WN 63y.o. year-old female who is alert, appears stated age and is cooperative  HEENT: Head: Normocephalic, no lesions, without obvious abnormality. Eye: Normal external eye, conjunctiva, lids cornea, PEERL. Ears: Normal external ears. Non-tender. Nose: Normal external nose, mucus membranes and septum.   Pharynx: Dental Hygiene adequate. Normal buccal mucosa. Normal pharynx. Neck: no adenopathy, no carotid bruit, no JVD, supple, symmetrical, trachea midline and thyroid not enlarged, symmetric, no tenderness/mass/nodules  Lungs: restricted air movement on auscultation bilaterally. + respiratory distress, air hunger, + use of accessory muscles, 4-5 word dyspnea  Heart: regular rate and rhythm, S1, S2 normal, no murmur, click, rub or gallop and normal apical impulse  Abdomen: soft, non-tender; bowel sounds normal; no masses, no organomegaly  Extremities: extremities atraumatic, no cyanosis or edema and Homans sign is negative, no sign of DVT. Capillary Refill: Acceptable < 3 seconds   Peripheral Pulses: +3 easily felt, not easily obliterated with pressures   Skin: Skin color, texture, turgor normal. No rashes or lesions on exposed skin  Neurologic: Neurovascularly intact without any focal sensory/motor deficits. Cranial nerves: II-XII intact, grossly non-focal. Gait was not tested. Mental Status: Alert and oriented, thought content appropriate, normal insight    CBC:   Recent Labs     02/04/22  0611   WBC 5.5   HGB 14.9        BMP:    Recent Labs     02/04/22  0611      K 3.7      CO2 24   BUN 4*   CREATININE 0.8   GLUCOSE 61*     Troponin:   Recent Labs     02/04/22  0611   TROPONINI <0.01     PT/INR:  No results found for: PTINR  U/A:    Lab Results   Component Value Date    LEUKOCYTESUR Negative 11/07/2021    RBCUA 2 12/12/2020    SPECGRAV 1.008 11/07/2021    UROBILINOGEN 0.2 11/07/2021    BILIRUBINUR Negative 11/07/2021    BILIRUBINUR NEGATIVE 12/10/2011    BLOODU Negative 11/07/2021    GLUCOSEU Negative 11/07/2021    GLUCOSEU NEGATIVE 12/10/2011    PROTEINU Negative 11/07/2021         RAD:   I have independently reviewed and interpreted the imaging studies below and based my recommendations to the patient on those findings.     XR CHEST PORTABLE    Result Date: 2/4/2022  EXAMINATION: ONE XRAY VIEW OF THE CHEST 2/4/2022 5:10 pm COMPARISON: 11/07/2021 HISTORY: ORDERING SYSTEM PROVIDED HISTORY: sob TECHNOLOGIST PROVIDED HISTORY: Reason for exam:->sob Reason for Exam: Shortness of Breath (worsening over the last two weeks FINDINGS: The heart is borderline enlarged but unchanged. The pulmonary vessels are slightly prominent centrally. The lungs are mildly hyperinflated and emphysematous. There is minimal blunting of the costophrenic sulci which is more apparent. No consolidation is seen. Borderline cardiomegaly with minimal central pulmonary congestion or pulmonary artery hypertension which is less prominent. Chronic obstructive lung changes with tiny bibasilar pleural effusions which is more prominent         Assessment:   Principal Problem:    COPD exacerbation (HCC)  Active Problems:    Essential hypertension  Resolved Problems:    * No resolved hospital problems. *      Plan:       COPD (acute exacerbation) - Patient has been started on Nebulizer treatments to be given on a scheduled basis every 4 hours, and on an as-needed basis every 2 hours based upon needs identified through close monitoring. In addition, Solumedrol and Antibiotics have been prescribed. The Solumedrol will be tapered as the patient improves. Patient will be monitored closely, and deep breathing and coughing will be encouraged while awake. Acute respiratory failure with hypoxia - as evidenced by respiratory distress, air hunger, + use of accessory muscles, 4-5 word dyspnea and an O2 saturation an oxygen saturation of less than 90% on room air  - Due to COPD exacerbaton  -We will provide oxygen as necessary to maintain an oxygen saturation of 92% or higher on room air    Essential (primary) hypertension - continue home meds and monitor blood pressure        DVT Prophylaxis: Lovenox  Diet: ADULT DIET;  Regular  Code Status: Full Code  (Advanced care planning has been discussed with patient and/or responsible family member and is reflected in the code status.  Further orders associated with this have been entered if appropriate)    Disposition: Anticipate that patient will remain in the hospital for 2 to 3 days depending on further evaluation and clinical course    Please note that over 50 minutes was spent in evaluating the patient, review of records and results, discussion with staff/family, etc.    Merari Knight MD

## 2022-02-05 NOTE — ED NOTES
Report attempt to Andrei Mooney unable to take it at this time, \"will call right back\"     Everton Orourke, RN  [address], RN  22 [phone]

## 2022-02-05 NOTE — ED NOTES
Access center called.    Dr Aryan Marie, hospitalist, calling back and speaking to Dr Mya Ken, RN  02/04/22 5954

## 2022-02-05 NOTE — PROGRESS NOTES
D: pt is having a migraine headache. I gave her Tylenol earlier and she said that her headache is still an 8 out of 10. She isn't sure if it might be the steroids causing this.  A: this RN sent secure message to Dr. Aly Vega, Dr. Aly Vega rounding on patients R: Dr. Aly Vega ordered medication for pt

## 2022-02-05 NOTE — PROGRESS NOTES
D: Pt. Arrived from [facility] ED, alert and oriented, short of breath on exertion, SpO2 92% on room air, lungs clear but diminished throughout. A: orinented to 4W and admission started.  Encouraged to call for assist as needed

## 2022-02-05 NOTE — PROGRESS NOTES
Methadone ordered for pt. Pt to take home supply. Plan of care confirmed with Deneen Vega, [facility] and pt.

## 2022-02-05 NOTE — ED PROVIDER NOTES
Emergency Department Encounter    Patient: Amirah Duran  MRN: [id number]  : 1960  Date of Evaluation: 2022  ED Provider:  Remedios Marcial MD    Triage Chief Complaint:   Shortness of Breath (worsening over the last two weeks, states that she has been taking her nebulizer every hour, thinks that it is because people are painting her house, hx of COPD, asthma, and CKD)    Pauma:  Amirah Duran is a 63 y.o. female that presents the ER for evaluation of positive dyspnea, positive shortness of breath, positive exertional dyspnea, positive expiratory wheezing. History of chronic kidney disease asthma and COPD. Afebrile.     ROS - see HPI, below listed is current ROS at time of my eval:  Unable to fully obtained given patient's clinical condition    Past Medical History:   Diagnosis Date    Asthma     B[facility]     CKD (chronic kidney disease)     stage 3    COPD (chronic obstructive pulmonary disease) ([facility]     Empyema ([facility]     Hepatitis C     Patient denies     History of heroin use     Hypertension     Narcotic abuse ([facility]     heroin, clean 1 year    UTI (lower urinary tract infection)      Past Surgical History:   Procedure Laterality Date    CHEST TUBE INSERTION      COLONOSCOPY  10/23/2018    Colonoscopy with polypectomy (cold snare), polypectomy (cold biopsy)    COLONOSCOPY N/A 10/23/2018    COLONOSCOPY POLYPECTOMY SNARE/COLD BIOPSY performed by Reanna Jensen MD at [facility] Left 2016    INTRACAPSULAR CATARACT EXTRACTION Right 2020    PHACOEMULSIFICATION WITH INTRAOCULAR LENS IMPLANT performed by Kezia Mao MD at [facility] EXTRACTION Left 2020    PHACOEMULSIFICATION WITH INTRAOCULAR LENS IMPLANT performed by Kezia Mao MD at [address] [place] [address] Right     26 yrs for lung infection     OTHER SURGICAL HISTORY  12/15/2016    EDMOND BUNIONECTOMY WITH APPLICATION OF AMNIOX LEFT FOOT     Family History   Problem Relation Age of Onset    Cancer Mother     Kidney Disease Mother     Heart Disease Mother      Social History     Socioeconomic History    Marital status: Life Partner     Spouse name: Not on file    Number of children: Not on file    Years of education: Not on file    Highest education level: Not on file   Occupational History    Not on file   Tobacco Use    Smoking status: Former Smoker     Packs/day: 0.00     Years: 45.00     Pack years: 0.00     Types: Cigarettes     Quit date: 2016     Years since quittin.6    Smokeless tobacco: Never Used   Vaping Use    Vaping Use: Never used   Substance and Sexual Activity    Alcohol use: No    Drug use: No     Comment: past h/o heroin on methadone    Sexual activity: Not Currently   Other Topics Concern    Not on file   Social History Narrative    Not on file     Social Determinants of Health     Financial Resource Strain:     Difficulty of Paying Living Expenses: Not on file   Food Insecurity:     Worried About [address] in the Last Year: Not on file    Cayla of Food in the Last Year: Not on file   Transportation Needs:     Lack of Transportation (Medical): Not on file    Lack of Transportation (Non-Medical):  Not on file   Physical Activity:     Days of Exercise per Week: Not on file    Minutes of Exercise per Session: Not on file   Stress:     Feeling of Stress : Not on file   Social Connections:     Frequency of Communication with Friends and Family: Not on file    Frequency of Social Gatherings with Friends and Family: Not on file    Attends Catholic Services: Not on file    Active Member of Clubs or Organizations: Not on file    Attends Club or Organization Meetings: Not on file    Marital Status: Not on file   Intimate Partner Violence:     Fear of Current or Ex-Partner: Not on file    Emotionally Abused: Not on file    Physically Abused: Not on file   Romaine Campos Sexually Abused: Not on file   Housing Stability:     Unable to Pay for Housing in the Last Year: Not on file    Number of Places Lived in the Last Year: Not on file    Unstable Housing in the Last Year: Not on file     Current Facility-Administered Medications   Medication Dose Route Frequency Provider Last Rate Last Admin    montelukast (SINGULAIR) tablet 10 mg  10 mg Oral Nightly Aroldo Ramirez MD        budesonide-formoterol [facility]) 160-4.5 MCG/ACT inhaler 2 puff  2 puff Inhalation BID Aroldo Ramirez MD   2 puff at 02/05/22 0755    amLODIPine (NORVASC) tablet 10 mg  10 mg Oral Daily Aroldo Ramirez MD   10 mg at 02/05/22 4724    azithromycin (ZITHROMAX) 500 mg in D5W 250ml addavial  500 mg IntraVENous Q24H Aroldo Ramirez MD   Stopped at 02/05/22 0215    ipratropium-albuterol (DUONEB) nebulizer solution 1 ampule  1 ampule Inhalation Q2H PRN Aroldo Ramirez MD   1 ampule at 02/05/22 0408    ipratropium-albuterol (DUONEB) nebulizer solution 1 ampule  1 ampule Inhalation Q4H WA Aroldo Ramirez MD   1 ampule at 02/05/22 0753    methylPREDNISolone sodium (SOLU-MEDROL) injection 40 mg  40 mg IntraVENous Q6H Aroldo Ramirez MD   40 mg at 02/05/22 0834    sodium chloride flush 0.9 % injection 10 mL  10 mL IntraVENous 2 times per day Aroldo Ramirez MD   10 mL at 02/05/22 0836    sodium chloride flush 0.9 % injection 10 mL  10 mL IntraVENous PRN Aroldo Ramirez MD        0.9 % sodium chloride infusion  25 mL IntraVENous PRN Aroldo Ramirez  mL/hr at 02/05/22 0000 25 mL at 02/05/22 0000    ondansetron (ZOFRAN) injection 4 mg  4 mg IntraVENous Q4H PRN Aroldo Ramirez MD        polyethylene glycol [facility]) packet 17 g  17 g Oral Daily PRN Aroldo Ramirez MD        acetaminophen (TYLENOL) tablet 650 mg  650 mg Oral Q4H PRN Aroldo Ramirez MD   650 mg at 02/05/22 3845    Or    acetaminophen (TYLENOL) suppository 650 mg  650 mg Rectal Q4H PRN Aroldo Ramirez MD        enoxaparin (LOVENOX) injection 40 mg  40 mg SubCUTAneous Daily Leigh Hoang MD   40 mg at 02/05/22 1008     Allergies   Allergen Reactions    Acetylcysteine Shortness Of Breath and Anaphylaxis     Increased bronchospasm    Codeine Hives     Pt states no recent problems     Levaquin [Levofloxacin In D5w] Itching    Penicillins Hives and Itching    Dupixent [Dupilumab] Other (See Comments)     Right leg pain    Ibuprofen Diarrhea, Nausea And Vomiting and Other (See Comments)    Oxycodone-Acetaminophen Nausea And Vomiting       Nursing Notes Reviewed    Physical Exam:  Triage VS:    ED Triage Vitals [02/04/22 1701]   Enc Vitals Group      BP (!) 144/103      Pulse 88      Resp 16      Temp 98.3 °F (36.8 °C)      Temp Source Oral      SpO2 97 %      Weight 193 lb 2 oz (87.6 kg)      Height 5' 10\" (1.778 m)      Head Circumference       Peak Flow       Pain Score       Pain Loc       Pain Edu? Excl. in [address]? My pulse ox interpretation is - abnormal    General appearance: In respiratory distress  Skin:  Warm. Dry. No pallor. No rash. Eye:  Normal conjuctiva. no Icterus. Ears, nose, mouth and throat:  Oral mucosa moist   Heart: Tachycardia, regular rhythm  Perfusion:  Within normal limits. Respiratory: Inspiratory and expiratory wheezing in all lung fields, decreased air entry, tachypnea with accessory muscle use  Abdominal:  Soft. Nontender. Non distended. Extremity:  No edema or tenderness  Neurological:  Alert and oriented,  No focal neuro deficits.     Pysch:  anxious    I have reviewed and interpreted all of the currently available lab results from this visit (if applicable):  Results for orders placed or performed during the hospital encounter of 02/04/22   COVID-19, Rapid    Specimen: Nasopharyngeal Swab   Result Value Ref Range    SARS-CoV-2, NAAT Not Detected Not Detected   Basic Metabolic Panel w/ Reflex to MG   Result Value Ref Range    Sodium 140 136 - 145 mmol/L    Potassium reflex Magnesium 3.7 3.5 - 5.1 mmol/L    Chloride 102 99 - 110 mmol/L    CO2 24 21 - 32 mmol/L    Anion Gap 14 3 - 16    Glucose 61 (L) 70 - 99 mg/dL    BUN 4 (L) 7 - 20 mg/dL    CREATININE 0.8 0.6 - 1.1 mg/dL    GFR Non-African American >60 >60    GFR African American >60 >60    Calcium 9.9 8.3 - 10.6 mg/dL   CBC Auto Differential   Result Value Ref Range    WBC 5.5 4.0 - 11.0 K/uL    RBC 4.88 4.00 - 5.20 M/uL    Hemoglobin 14.9 12.0 - 16.0 g/dL    Hematocrit 44.7 36.0 - 48.0 %    MCV 91.6 80.0 - 100.0 fL    MCH 30.6 26.0 - 34.0 pg    MCHC 33.3 31.0 - 36.0 g/dL    RDW 13.7 12.4 - 15.4 %    Platelets 190 348 - 073 K/uL    MPV 8.5 5.0 - 10.5 fL    Neutrophils % 61.6 %    Lymphocytes % 26.1 %    Monocytes % 9.5 %    Eosinophils % 1.4 %    Basophils % 1.4 %    Neutrophils Absolute 3.4 1.7 - 7.7 K/uL    Lymphocytes Absolute 1.5 1.0 - 5.1 K/uL    Monocytes Absolute 0.5 0.0 - 1.3 K/uL    Eosinophils Absolute 0.1 0.0 - 0.6 K/uL    Basophils Absolute 0.1 0.0 - 0.2 K/uL   Troponin   Result Value Ref Range    Troponin <0.01 <0.01 ng/mL   Brain Natriuretic Peptide   Result Value Ref Range    Pro- (H) 0 - 124 pg/mL   Basic Metabolic Panel w/ Reflex to MG   Result Value Ref Range    Sodium 136 136 - 145 mmol/L    Potassium reflex Magnesium 4.1 3.5 - 5.1 mmol/L    Chloride 99 99 - 110 mmol/L    CO2 25 21 - 32 mmol/L    Anion Gap 12 3 - 16    Glucose 138 (H) 70 - 99 mg/dL    BUN 7 7 - 20 mg/dL    CREATININE 0.7 0.6 - 1.1 mg/dL    GFR Non-African American >60 >60    GFR African American >60 >60    Calcium 9.5 8.3 - 10.6 mg/dL   CBC auto differential   Result Value Ref Range    WBC 3.9 (L) 4.0 - 11.0 K/uL    RBC 4.91 4.00 - 5.20 M/uL    Hemoglobin 14.8 12.0 - 16.0 g/dL    Hematocrit 44.7 36.0 - 48.0 %    MCV 91.0 80.0 - 100.0 fL    MCH 30.2 26.0 - 34.0 pg    MCHC 33.2 31.0 - 36.0 g/dL    RDW 13.5 12.4 - 15.4 %    Platelets 067 808 - 040 K/uL    MPV 7.8 5.0 - 10.5 fL    Neutrophils % 86.0 %    Lymphocytes % 11.7 %    Monocytes % 1.8 %    Eosinophils % 0.0 %    Basophils % 0.5 %    Neutrophils Absolute 3.4 1.7 - 7.7 K/uL    Lymphocytes Absolute 0.5 (L) 1.0 - 5.1 K/uL    Monocytes Absolute 0.1 0.0 - 1.3 K/uL    Eosinophils Absolute 0.0 0.0 - 0.6 K/uL    Basophils Absolute 0.0 0.0 - 0.2 K/uL      Radiographs (if obtained):  Radiologist's Report Reviewed:  XR CHEST PORTABLE    Result Date: 2/4/2022  EXAMINATION: ONE XRAY VIEW OF THE CHEST 2/4/2022 5:10 pm COMPARISON: 11/07/2021 HISTORY: ORDERING SYSTEM PROVIDED HISTORY: sob TECHNOLOGIST PROVIDED HISTORY: Reason for exam:->sob Reason for Exam: Shortness of Breath (worsening over the last two weeks FINDINGS: The heart is borderline enlarged but unchanged. The pulmonary vessels are slightly prominent centrally. The lungs are mildly hyperinflated and emphysematous. There is minimal blunting of the costophrenic sulci which is more apparent. No consolidation is seen. Borderline cardiomegaly with minimal central pulmonary congestion or pulmonary artery hypertension which is less prominent. Chronic obstructive lung changes with tiny bibasilar pleural effusions which is more prominent         EKG (if obtained): (All EKG's are interpreted by myself in the absence of a cardiologist)      MDM:  Is mild hypoxia, no significant concern for PE positive exertional dyspnea and exertional hypoxia. She will be admitted for status asthmaticus COPD exacerbation. Case discussed with many medical staff. Clinical Impression:  1. COPD exacerbation ([address]    2. Acute hypoxemic respiratory failure (HCC)      Disposition referral (if applicable):  No follow-up provider specified. Disposition medications (if applicable):  Current Discharge Medication List          Comment: Please note this report has been produced using speech recognition software and may contain errors related to that system including errors in grammar, punctuation, and spelling, as well as words and phrases that may be inappropriate. Efforts were made to edit the dictations.      Mona Trujillo MD  88/77/14 5731

## 2022-02-05 NOTE — PROGRESS NOTES
4 Eyes Skin Assessment     NAME:  Peder Bamberger  YOB: 1960  MEDICAL RECORD NUMBER:  [id number]    The patient is being assess for  Admission    I agree that 2 RN's have performed a thorough Head to Toe Skin Assessment on the patient. ALL assessment sites listed below have been assessed. Areas assessed by both nurses:    Head, Face, Ears, Shoulders, Back, Chest, Arms, Elbows, Hands, Sacrum. Buttock, Coccyx, Ischium and Legs. Feet and Heels        Does the Patient have a Wound?  No noted wound(s)       Oleg Prevention initiated:  No   Wound Care Orders initiated:  No    Pressure Injury (Stage 3,4, Unstageable, DTI, NWPT, and Complex wounds) if present place consult order under [de-identified] No    New and Established Ostomies if present place consult order under : No      Nurse 1 eSignature: Electronically signed by Alee Hidalgo RN on 2/5/22 at 12:47 AM EST    **SHARE this note so that the co-signing nurse is able to place an eSignature**    Nurse 2 eSignature: Electronically signed by Bo Nielsen RN on 2/5/22 at 12:54 AM EST

## 2022-02-05 NOTE — ED NOTES
Report given to Bertram Morrissey RN. Denies questions at this time.      Edd Bullock RN  02/04/22 4073

## 2022-02-05 NOTE — PROGRESS NOTES
Progress Note  Admit Date: 2/4/2022      PCP: Fabiana Avilez MD     CC: F/U for SOB    Days in hospital:  1    SUBJECTIVE / Interval History:  Breather better   cough+  headache        Allergies  Acetylcysteine, Codeine, Levaquin [levofloxacin in d5w], Penicillins, Dupixent [dupilumab], Ibuprofen, and Oxycodone-acetaminophen    Medications    Scheduled Meds:   montelukast  10 mg Oral Nightly    budesonide-formoterol  2 puff Inhalation BID    amLODIPine  10 mg Oral Daily    azithromycin  500 mg IntraVENous Q24H    ipratropium-albuterol  1 ampule Inhalation Q4H WA    methylPREDNISolone  40 mg IntraVENous Q6H    sodium chloride flush  10 mL IntraVENous 2 times per day    enoxaparin  40 mg SubCUTAneous Daily     Continuous Infusions:   sodium chloride 25 mL (02/05/22 0000)       PRN Meds:  ipratropium-albuterol, sodium chloride flush, sodium chloride, ondansetron, polyethylene glycol, acetaminophen **OR** acetaminophen    Vitals    /72   Pulse 63   Temp 97.5 °F (36.4 °C) (Oral)   Resp 16   Ht 5' 10\" (1.778 m)   Wt 190 lb 0.6 oz (86.2 kg)   LMP 09/13/2017   SpO2 98%   BMI 27.27 kg/m²     Exam:    Gen: No distress. Eyes: PERRL. No sclera icterus. No conjunctival injection. ENT: No discharge. Pharynx clear. External appearance of ears and nose normal.  Neck: Trachea midline. No obvious mass. Resp: No accessory muscle use. No crackles. Few  wheezes. No rhonchi. AE decreased  CV: Regular rate. Regular rhythm. No murmur or rub. No edema. GI: Non-tender. Non-distended. No hernia. Skin: Warm, dry, normal texture and turgor. No nodule on exposed extremities. Lymph: No cervical LAD. No supraclavicular LAD. M/S: No cyanosis. No clubbing. No joint deformity. Neuro: Moves all four extremities. CN 2-12 tested, no defect noted. Psych: Oriented x 3. No anxiety. Awake. Alert. Intact judgement and insight.     Data    LABS  CBC:   Recent Labs     02/04/22  0611 02/05/22  0503   WBC 5.5 3.9* HGB 14.9 14.8   HCT 44.7 44.7   MCV 91.6 91.0    165     BMP:   Recent Labs     02/04/22  0611 02/05/22  0503    136   K 3.7 4.1    99   CO2 24 25   BUN 4* 7   CREATININE 0.8 0.7   GLUCOSE 61* 138*     POC GLUCOSE:  No results for input(s): POCGLU in the last 72 hours. LIVER PROFILE: No results for input(s): AST, ALT, LIPASE, AMYLASE, LABALBU, BILIDIR, BILITOT, ALKPHOS in the last 72 hours. PT/INR: No results for input(s): PROTIME, INR in the last 72 hours. APTT: No results for input(s): APTT in the last 72 hours. UA:No results for input(s): NITRITE, COLORU, PHUR, LABCAST, WBCUA, RBCUA, MUCUS, TRICHOMONAS, YEAST, BACTERIA, CLARITYU, SPECGRAV, LEUKOCYTESUR, UROBILINOGEN, BILIRUBINUR, BLOODU, GLUCOSEU, KETUA, AMORPHOUS in the last 72 hours. Microbiology:  Wound Culture: No results for input(s): WNDABS, ORG in the last 72 hours. Invalid input(s):  LABGRAM  Nasal Culture: No results for input(s): ORG, MRSAPCR in the last 72 hours. Blood Culture: No results for input(s): BC, BLOODCULT2 in the last 72 hours. Fungal Culture:   No results for input(s): FUNGSM in the last 72 hours. No results for input(s): FUNCXBLD in the last 72 hours. CSF Culture:  No results for input(s): COLORCSF, APPEARCSF, CFTUBE, CLOTCSF, WBCCSF, RBCCSF, NEUTCSF, NUMCELLSCSF, LYMPHSCSF, MONOCSF, GLUCCSF, VOLCSF in the last 72 hours. Respiratory Culture:  No results for input(s): Charolotte Estimable in the last 72 hours. AFB:No results for input(s): AFBSMEAR in the last 72 hours. Urine Culture  No results for input(s): LABURIN in the last 72 hours. RADIOLOGY:    XR CHEST PORTABLE   Final Result   Borderline cardiomegaly with minimal central pulmonary congestion or   pulmonary artery hypertension which is less prominent.       Chronic obstructive lung changes with tiny bibasilar pleural effusions which   is more prominent             CONSULTS:    IP CONSULT TO HOSPITALIST    ASSESSMENT AND PLAN:      Principal Problem:    COPD exacerbation ([facility] [address]  Active Problems:    Essential hypertension  Resolved Problems:    * No resolved hospital problems. *    Patient is a 61-year-old female with a past medical history of asthma/COPD who presented with worsening shortness of breath for 3 to 4 days. Patient's house is being painted and the fumes       Acute respiratory failure with hypoxia improved     COPD/asthma exacerbation  -Continue current regimen  -Continue steroids  -Patient follows up with [facility]    Allergic rhinitis  -Continue Singulair     Essential (primary) hypertension - continue home meds and monitor blood pressure    Headache  - started on Fioricet     Chr pain  - resume methadone     DVT Prophylaxis: [place]  Diet: ADULT DIET; Regular  Code Status: Full Code    PT/OT Eval Status:    Discharge plan - possibly tomorrow    The patient and / or the family were informed of the results of any tests, a time was given to answer questions, a plan was proposed and they agreed with plan. Discussed with consulting physicians, nursing and social work     The note was completed using EMR. Every effort was made to ensure accuracy; however, inadvertent computerized transcription errors may be present.        Heriberto Quiroz MD

## 2022-02-05 NOTE — ED NOTES
Pt back from the bathroom, UAL  Sob during ambulation.  Requested a HHN     Tamar Goncalves RN  02/04/22 [id number]

## 2022-02-05 NOTE — PROGRESS NOTES
D: pt is asking for her scheduled methadone. She takes 90 mg daily.  A: this RN sent secure message to Dr. Kristen Ashby R: will continue to monitor pt

## 2022-02-05 NOTE — PROGRESS NOTES
Medication Reconciliation    List of medications patient is currently taking is complete. Source of information: 1. Conversation with patient                                      2. EPIC records      Allergies  Acetylcysteine, Codeine, Levaquin [levofloxacin in d5w], Penicillins, Dupixent [dupilumab], Ibuprofen, and Oxycodone-acetaminophen     Patient takes Methadone 90 mg po daily - has home supply with her at the hospital to take if/when ordered.     Ivan Barker RPH, PharmD, BCPS  2/5/2022 9:18 AM

## 2022-02-05 NOTE — ED PROVIDER NOTES
7:04 PM: I discussed the history, physical examination, laboratory and imaging studies, and treatment plan with Dr. Sherman Carrillo. Lionel Orozco was signed out to me in stable condition. Please see Dr. Jose Perez documentation for details of their history, physical, and laboratory studies. Upon re-examination, a summary of Napolean Bamberger Evans-James's history, physical examination, and studies are as follows:    Patient complains of several days of increasing shortness of breath. She has history of COPD and is using her nebulizer up to once an hour. She denies any fevers or chest pains. No URI symptoms. There is painting going on in her home the last few days and she thinks this has made things worse. She denies any nausea, vomiting or diarrhea. No Covid exposure. On exam, the patient has poor air exchange with occasional scattered wheezes. Heart regular rate and rhythm. No significant peripheral edema. I discussed with the patient her COPD exacerbation and that with her cough productive of clear sputum only that I am not concerned for infectious cause but rather an irritant causing her COPD exacerbation. For this reason I am not giving her antibiotics. Patient desaturates with ambulation and in fact is quite dyspneic with any ambulation. For this reason she is being admitted to the hospital.    The total Critical Care time is 40 minutes which excludes separately billable procedures. The critical care was concerning treatment with steroids and beta agonist for treatment of COPD exacerbation. This time is exclusive of any time documented by any other providers.       Results for orders placed or performed during the hospital encounter of 02/04/22   COVID-19, Rapid    Specimen: Nasopharyngeal Swab   Result Value Ref Range    SARS-CoV-2, NAAT Not Detected Not Detected   Basic Metabolic Panel w/ Reflex to MG   Result Value Ref Range    Sodium 140 136 - 145 mmol/L    Potassium reflex Magnesium 3.7 3.5 - 5.1 mmol/L    Chloride 102 99 - 110 mmol/L    CO2 24 21 - 32 mmol/L    Anion Gap 14 3 - 16    Glucose 61 (L) 70 - 99 mg/dL    BUN 4 (L) 7 - 20 mg/dL    CREATININE 0.8 0.6 - 1.1 mg/dL    GFR Non-African American >60 >60    GFR African American >60 >60    Calcium 9.9 8.3 - 10.6 mg/dL   CBC Auto Differential   Result Value Ref Range    WBC 5.5 4.0 - 11.0 K/uL    RBC 4.88 4.00 - 5.20 M/uL    Hemoglobin 14.9 12.0 - 16.0 g/dL    Hematocrit 44.7 36.0 - 48.0 %    MCV 91.6 80.0 - 100.0 fL    MCH 30.6 26.0 - 34.0 pg    MCHC 33.3 31.0 - 36.0 g/dL    RDW 13.7 12.4 - 15.4 %    Platelets 507 347 - 588 K/uL    MPV 8.5 5.0 - 10.5 fL    Neutrophils % 61.6 %    Lymphocytes % 26.1 %    Monocytes % 9.5 %    Eosinophils % 1.4 %    Basophils % 1.4 %    Neutrophils Absolute 3.4 1.7 - 7.7 K/uL    Lymphocytes Absolute 1.5 1.0 - 5.1 K/uL    Monocytes Absolute 0.5 0.0 - 1.3 K/uL    Eosinophils Absolute 0.1 0.0 - 0.6 K/uL    Basophils Absolute 0.1 0.0 - 0.2 K/uL   Troponin   Result Value Ref Range    Troponin <0.01 <0.01 ng/mL   Brain Natriuretic Peptide   Result Value Ref Range    Pro- (H) 0 - 124 pg/mL     XR CHEST PORTABLE  Borderline cardiomegaly with minimal central pulmonary congestion or pulmonary artery hypertension which is less prominent. Chronic obstructive lung changes with tiny bibasilar pleural effusions which is more prominent       I spoke with Dr. Bianca Sutton, hospitalist. We thoroughly discussed the history, physical exam, laboratory and imaging studies, as well as, emergency department course. Based upon that discussion, we've decided to admit Rajesh Garcia for further observation and evaluation of Candi Dover's dyspnea.   As I have deemed necessary from their history, physical, and studies, I have considered and evaluated Rajesh Garcia for the following diagnoses:  ACUTE CORONARY SYNDROME, CHRONIC OBSTRUCTIVE PULMONARY DISEASE, CONGESTIVE HEART FAILURE, PERICARDIAL TAMPONADE, PNEUMONIA, PNEUMOTHORAX, PULMONARY EMBOLISM, SEPSIS, and THORACIC DISSECTION. FINAL IMPRESSION  1. COPD exacerbation ([address]    2. Acute hypoxemic respiratory failure (HCC)        Vitals:  Blood pressure 136/88, pulse 67, temperature 98.3 °F (36.8 °C), temperature source Oral, resp. rate 16, height 5' 10\" (1.778 m), weight 193 lb 2 oz (87.6 kg), last menstrual period 09/13/2017, SpO2 (!) 89 %, not currently breastfeeding.        Jennifer Locke MD  02/04/22 2531

## 2022-02-06 LAB
ANION GAP SERPL CALCULATED.3IONS-SCNC: 13 MMOL/L (ref 3–16)
BASOPHILS ABSOLUTE: 0 K/UL (ref 0–0.2)
BASOPHILS RELATIVE PERCENT: 0.1 %
BUN BLDV-MCNC: 14 MG/DL (ref 7–20)
CALCIUM SERPL-MCNC: 8.9 MG/DL (ref 8.3–10.6)
CHLORIDE BLD-SCNC: 101 MMOL/L (ref 99–110)
CO2: 21 MMOL/L (ref 21–32)
CREAT SERPL-MCNC: 0.9 MG/DL (ref 0.6–1.1)
EOSINOPHILS ABSOLUTE: 0 K/UL (ref 0–0.6)
EOSINOPHILS RELATIVE PERCENT: 0 %
GFR AFRICAN AMERICAN: >60
GFR NON-AFRICAN AMERICAN: >60
GLUCOSE BLD-MCNC: 183 MG/DL (ref 70–99)
HCT VFR BLD CALC: 43.5 % (ref 36–48)
HEMOGLOBIN: 14.2 G/DL (ref 12–16)
LYMPHOCYTES ABSOLUTE: 0.3 K/UL (ref 1–5.1)
LYMPHOCYTES RELATIVE PERCENT: 4.2 %
MCH RBC QN AUTO: 29.8 PG (ref 26–34)
MCHC RBC AUTO-ENTMCNC: 32.7 G/DL (ref 31–36)
MCV RBC AUTO: 91.2 FL (ref 80–100)
MONOCYTES ABSOLUTE: 0.2 K/UL (ref 0–1.3)
MONOCYTES RELATIVE PERCENT: 2.4 %
NEUTROPHILS ABSOLUTE: 6 K/UL (ref 1.7–7.7)
NEUTROPHILS RELATIVE PERCENT: 93.3 %
PDW BLD-RTO: 13.8 % (ref 12.4–15.4)
PLATELET # BLD: 175 K/UL (ref 135–450)
PMV BLD AUTO: 8 FL (ref 5–10.5)
POTASSIUM REFLEX MAGNESIUM: 4.1 MMOL/L (ref 3.5–5.1)
RBC # BLD: 4.77 M/UL (ref 4–5.2)
SODIUM BLD-SCNC: 135 MMOL/L (ref 136–145)
WBC # BLD: 6.4 K/UL (ref 4–11)

## 2022-02-06 PROCEDURE — 6370000000 HC RX 637 (ALT 250 FOR IP): Performed by: INTERNAL MEDICINE

## 2022-02-06 PROCEDURE — 80048 BASIC METABOLIC PNL TOTAL CA: CPT

## 2022-02-06 PROCEDURE — 6360000002 HC RX W HCPCS: Performed by: INTERNAL MEDICINE

## 2022-02-06 PROCEDURE — 85025 COMPLETE CBC W/AUTO DIFF WBC: CPT

## 2022-02-06 PROCEDURE — 36415 COLL VENOUS BLD VENIPUNCTURE: CPT

## 2022-02-06 PROCEDURE — 94640 AIRWAY INHALATION TREATMENT: CPT

## 2022-02-06 PROCEDURE — 1200000000 HC SEMI PRIVATE

## 2022-02-06 PROCEDURE — 94760 N-INVAS EAR/PLS OXIMETRY 1: CPT

## 2022-02-06 PROCEDURE — 2580000003 HC RX 258: Performed by: INTERNAL MEDICINE

## 2022-02-06 RX ORDER — PREDNISONE 20 MG/1
40 TABLET ORAL DAILY
Status: DISCONTINUED | OUTPATIENT
Start: 2022-02-06 | End: 2022-02-07 | Stop reason: HOSPADM

## 2022-02-06 RX ORDER — PREDNISONE 20 MG/1
40 TABLET ORAL DAILY
Qty: 10 TABLET | Refills: 0 | Status: SHIPPED | OUTPATIENT
Start: 2022-02-06 | End: 2022-02-11

## 2022-02-06 RX ORDER — MONTELUKAST SODIUM 10 MG/1
10 TABLET ORAL NIGHTLY
Qty: 90 TABLET | Refills: 1 | Status: SHIPPED | OUTPATIENT
Start: 2022-02-06 | End: 2022-07-18 | Stop reason: ALTCHOICE

## 2022-02-06 RX ADMIN — SODIUM CHLORIDE 25 ML: 9 INJECTION, SOLUTION INTRAVENOUS at 00:42

## 2022-02-06 RX ADMIN — IPRATROPIUM BROMIDE AND ALBUTEROL SULFATE 1 AMPULE: .5; 3 SOLUTION RESPIRATORY (INHALATION) at 12:01

## 2022-02-06 RX ADMIN — METHYLPREDNISOLONE SODIUM SUCCINATE 40 MG: 40 INJECTION, POWDER, FOR SOLUTION INTRAMUSCULAR; INTRAVENOUS at 02:36

## 2022-02-06 RX ADMIN — AMLODIPINE BESYLATE 10 MG: 10 TABLET ORAL at 09:02

## 2022-02-06 RX ADMIN — BUTALBITAL, ACETAMINOPHEN, AND CAFFEINE 1 TABLET: 50; 325; 40 TABLET ORAL at 19:48

## 2022-02-06 RX ADMIN — IPRATROPIUM BROMIDE AND ALBUTEROL SULFATE 1 AMPULE: .5; 3 SOLUTION RESPIRATORY (INHALATION) at 08:52

## 2022-02-06 RX ADMIN — BUTALBITAL, ACETAMINOPHEN, AND CAFFEINE 1 TABLET: 50; 325; 40 TABLET ORAL at 09:07

## 2022-02-06 RX ADMIN — BUDESONIDE AND FORMOTEROL FUMARATE DIHYDRATE 2 PUFF: 160; 4.5 AEROSOL RESPIRATORY (INHALATION) at 19:44

## 2022-02-06 RX ADMIN — IPRATROPIUM BROMIDE AND ALBUTEROL SULFATE 1 AMPULE: .5; 3 SOLUTION RESPIRATORY (INHALATION) at 19:44

## 2022-02-06 RX ADMIN — BUTALBITAL, ACETAMINOPHEN, AND CAFFEINE 1 TABLET: 50; 325; 40 TABLET ORAL at 02:36

## 2022-02-06 RX ADMIN — BUDESONIDE AND FORMOTEROL FUMARATE DIHYDRATE 2 PUFF: 160; 4.5 AEROSOL RESPIRATORY (INHALATION) at 08:52

## 2022-02-06 RX ADMIN — PREDNISONE 40 MG: 20 TABLET ORAL at 09:02

## 2022-02-06 RX ADMIN — AZITHROMYCIN DIHYDRATE 500 MG: 500 INJECTION, POWDER, LYOPHILIZED, FOR SOLUTION INTRAVENOUS at 00:43

## 2022-02-06 RX ADMIN — SODIUM CHLORIDE, PRESERVATIVE FREE 10 ML: 5 INJECTION INTRAVENOUS at 09:04

## 2022-02-06 RX ADMIN — IPRATROPIUM BROMIDE AND ALBUTEROL SULFATE 1 AMPULE: .5; 3 SOLUTION RESPIRATORY (INHALATION) at 02:57

## 2022-02-06 RX ADMIN — ENOXAPARIN SODIUM 40 MG: 100 INJECTION SUBCUTANEOUS at 09:02

## 2022-02-06 RX ADMIN — AZITHROMYCIN DIHYDRATE 500 MG: 500 INJECTION, POWDER, LYOPHILIZED, FOR SOLUTION INTRAVENOUS at 23:51

## 2022-02-06 RX ADMIN — MONTELUKAST 10 MG: 10 TABLET, FILM COATED ORAL at 19:44

## 2022-02-06 RX ADMIN — SODIUM CHLORIDE, PRESERVATIVE FREE 10 ML: 5 INJECTION INTRAVENOUS at 19:45

## 2022-02-06 RX ADMIN — METHADONE HYDROCHLORIDE 90 MG: 10 SOLUTION ORAL at 09:04

## 2022-02-06 RX ADMIN — IPRATROPIUM BROMIDE AND ALBUTEROL SULFATE 1 AMPULE: .5; 3 SOLUTION RESPIRATORY (INHALATION) at 15:55

## 2022-02-06 ASSESSMENT — PAIN DESCRIPTION - ONSET: ONSET: GRADUAL

## 2022-02-06 ASSESSMENT — PAIN DESCRIPTION - FREQUENCY: FREQUENCY: INTERMITTENT

## 2022-02-06 ASSESSMENT — PAIN SCALES - GENERAL
PAINLEVEL_OUTOF10: 9
PAINLEVEL_OUTOF10: 3
PAINLEVEL_OUTOF10: 4
PAINLEVEL_OUTOF10: 0
PAINLEVEL_OUTOF10: 7

## 2022-02-06 ASSESSMENT — PAIN DESCRIPTION - ORIENTATION: ORIENTATION: ANTERIOR

## 2022-02-06 ASSESSMENT — PAIN - FUNCTIONAL ASSESSMENT: PAIN_FUNCTIONAL_ASSESSMENT: ACTIVITIES ARE NOT PREVENTED

## 2022-02-06 ASSESSMENT — PAIN DESCRIPTION - LOCATION: LOCATION: HEAD

## 2022-02-06 ASSESSMENT — PAIN DESCRIPTION - DESCRIPTORS: DESCRIPTORS: ACHING

## 2022-02-06 ASSESSMENT — PAIN DESCRIPTION - PAIN TYPE: TYPE: ACUTE PAIN

## 2022-02-06 ASSESSMENT — PAIN DESCRIPTION - PROGRESSION: CLINICAL_PROGRESSION: GRADUALLY WORSENING

## 2022-02-06 NOTE — PLAN OF CARE
Problem: Pain:  Goal: Control of acute pain  Description: Control of acute pain  Outcome: Ongoing  Note: D: Pt.  With complaint of migraine headache that she rates at a level 7 on the pain scale  A: medicated with Fiorcet as ordered  R: resting quietly afterwards

## 2022-02-06 NOTE — PROGRESS NOTES
Progress Note  Admit Date: 2/4/2022      PCP: Popeye Aviles MD     CC: F/U for SOB    Days in hospital:  2    SUBJECTIVE / Interval History:  Breather better   cough+  Very short of breath with minimal ambulation  headache        Allergies  Acetylcysteine, Codeine, Levaquin [levofloxacin in d5w], Penicillins, Dupixent [dupilumab], Ibuprofen, and Oxycodone-acetaminophen    Medications    Scheduled Meds:   methadone  90 mg Oral Daily    montelukast  10 mg Oral Nightly    budesonide-formoterol  2 puff Inhalation BID    amLODIPine  10 mg Oral Daily    azithromycin  500 mg IntraVENous Q24H    ipratropium-albuterol  1 ampule Inhalation Q4H WA    methylPREDNISolone  40 mg IntraVENous Q6H    sodium chloride flush  10 mL IntraVENous 2 times per day    enoxaparin  40 mg SubCUTAneous Daily     Continuous Infusions:   sodium chloride 25 mL (02/06/22 0042)       PRN Meds:  brompheniramine-pseudoephedrine-DM, butalbital-acetaminophen-caffeine, ipratropium-albuterol, sodium chloride flush, sodium chloride, ondansetron, polyethylene glycol, acetaminophen **OR** acetaminophen    Vitals    /72   Pulse 80   Temp 97.9 °F (36.6 °C) (Oral)   Resp 18   Ht 5' 10\" (1.778 m)   Wt 196 lb (88.9 kg)   LMP 09/13/2017   SpO2 93%   BMI 28.12 kg/m²     Exam:    Gen: No distress. Eyes: PERRL. No sclera icterus. No conjunctival injection. ENT: No discharge. Pharynx clear. External appearance of ears and nose normal.  Neck: Trachea midline. No obvious mass. Resp: No accessory muscle use. No crackles. Few  wheezes. No rhonchi. AE decreased  CV: Regular rate. Regular rhythm. No murmur or rub. No edema. GI: Non-tender. Non-distended. No hernia. Skin: Warm, dry, normal texture and turgor. No nodule on exposed extremities. Lymph: No cervical LAD. No supraclavicular LAD. M/S: No cyanosis. No clubbing. No joint deformity. Neuro: Moves all four extremities. CN 2-12 tested, no defect noted. Psych: Oriented x 3. No anxiety. Awake. Alert. Intact judgement and insight. Data    LABS  CBC:   Recent Labs     02/04/22  0611 02/05/22  0503 02/06/22  0523   WBC 5.5 3.9* 6.4   HGB 14.9 14.8 14.2   HCT 44.7 44.7 43.5   MCV 91.6 91.0 91.2    165 175     BMP:   Recent Labs     02/04/22  0611 02/05/22  0503 02/06/22  0523    136 135*   K 3.7 4.1 4.1    99 101   CO2 24 25 21   BUN 4* 7 14   CREATININE 0.8 0.7 0.9   GLUCOSE 61* 138* 183*     POC GLUCOSE:  No results for input(s): POCGLU in the last 72 hours. LIVER PROFILE: No results for input(s): AST, ALT, LIPASE, AMYLASE, LABALBU, BILIDIR, BILITOT, ALKPHOS in the last 72 hours. PT/INR: No results for input(s): PROTIME, INR in the last 72 hours. APTT: No results for input(s): APTT in the last 72 hours. UA:No results for input(s): NITRITE, COLORU, PHUR, LABCAST, WBCUA, RBCUA, MUCUS, TRICHOMONAS, YEAST, BACTERIA, CLARITYU, SPECGRAV, LEUKOCYTESUR, UROBILINOGEN, BILIRUBINUR, BLOODU, GLUCOSEU, KETUA, AMORPHOUS in the last 72 hours. Microbiology:  Wound Culture: No results for input(s): WNDABS, ORG in the last 72 hours. Invalid input(s):  LABGRAM  Nasal Culture: No results for input(s): ORG, MRSAPCR in the last 72 hours. Blood Culture: No results for input(s): BC, BLOODCULT2 in the last 72 hours. Fungal Culture:   No results for input(s): FUNGSM in the last 72 hours. No results for input(s): FUNCXBLD in the last 72 hours. CSF Culture:  No results for input(s): COLORCSF, APPEARCSF, CFTUBE, CLOTCSF, WBCCSF, RBCCSF, NEUTCSF, NUMCELLSCSF, LYMPHSCSF, MONOCSF, GLUCCSF, VOLCSF in the last 72 hours. Respiratory Culture:  No results for input(s): Darcus Ok in the last 72 hours. AFB:No results for input(s): AFBSMEAR in the last 72 hours. Urine Culture  No results for input(s): LABURIN in the last 72 hours.     RADIOLOGY:    XR CHEST PORTABLE   Final Result   Borderline cardiomegaly with minimal central pulmonary congestion or   pulmonary artery hypertension which is less prominent. Chronic obstructive lung changes with tiny bibasilar pleural effusions which   is more prominent             CONSULTS:    IP CONSULT TO HOSPITALIST    ASSESSMENT AND PLAN:      Principal Problem:    COPD exacerbation ([address]  Active Problems:    Essential hypertension  Resolved Problems:    * No resolved hospital problems. *    Patient is a 56-year-old female with a past medical history of asthma/COPD who presented with worsening shortness of breath for 3 to 4 days. Patient's house is being painted and the fumes       Acute respiratory failure with hypoxia improved     COPD/asthma exacerbation  -Continue current regimen  -Continue steroids  -Patient follows up with [facility] pulmonology    Allergic rhinitis  -Continue Singulair     Essential (primary) hypertension - continue home meds and monitor blood pressure    Headache  - started on Fioricet     Chr pain  - resume methadone     DVT Prophylaxis: [place]  Diet: ADULT DIET; Regular  Code Status: Full Code    PT/OT Eval Status:    Discharge plan -patient is very short of breath will discharge tomorrow    The patient and / or the family were informed of the results of any tests, a time was given to answer questions, a plan was proposed and they agreed with plan. Discussed with consulting physicians, nursing and social work     The note was completed using EMR. Every effort was made to ensure accuracy; however, inadvertent computerized transcription errors may be present.        Felix Yeboah MD

## 2022-02-06 NOTE — PLAN OF CARE
Problem: Breathing Pattern - Ineffective:  Goal: Ability to achieve and maintain a regular respiratory rate will improve  Description: Ability to achieve and maintain a regular respiratory rate will improve  Outcome: Ongoing  Note: D: PT. Breathing easily, lungs diminished to the bases, shortness of breath on exertion, SPO2 94% on room air  A: encouraged to cough and deep breathe  R: will continue to monitor

## 2022-02-07 VITALS
RESPIRATION RATE: 16 BRPM | DIASTOLIC BLOOD PRESSURE: 75 MMHG | BODY MASS INDEX: 28.06 KG/M2 | OXYGEN SATURATION: 95 % | WEIGHT: 195.99 LBS | SYSTOLIC BLOOD PRESSURE: 119 MMHG | HEIGHT: 70 IN | HEART RATE: 70 BPM | TEMPERATURE: 98.4 F

## 2022-02-07 LAB
ANION GAP SERPL CALCULATED.3IONS-SCNC: 13 MMOL/L (ref 3–16)
BASOPHILS ABSOLUTE: 0 K/UL (ref 0–0.2)
BASOPHILS RELATIVE PERCENT: 0 %
BUN BLDV-MCNC: 15 MG/DL (ref 7–20)
CALCIUM SERPL-MCNC: 8.9 MG/DL (ref 8.3–10.6)
CHLORIDE BLD-SCNC: 103 MMOL/L (ref 99–110)
CO2: 23 MMOL/L (ref 21–32)
CREAT SERPL-MCNC: 1 MG/DL (ref 0.6–1.1)
EOSINOPHILS ABSOLUTE: 0 K/UL (ref 0–0.6)
EOSINOPHILS RELATIVE PERCENT: 0.3 %
GFR AFRICAN AMERICAN: >60
GFR NON-AFRICAN AMERICAN: 57
GLUCOSE BLD-MCNC: 97 MG/DL (ref 70–99)
HCT VFR BLD CALC: 40.8 % (ref 36–48)
HEMOGLOBIN: 13.4 G/DL (ref 12–16)
LYMPHOCYTES ABSOLUTE: 1 K/UL (ref 1–5.1)
LYMPHOCYTES RELATIVE PERCENT: 12.4 %
MCH RBC QN AUTO: 29.9 PG (ref 26–34)
MCHC RBC AUTO-ENTMCNC: 32.9 G/DL (ref 31–36)
MCV RBC AUTO: 90.9 FL (ref 80–100)
MONOCYTES ABSOLUTE: 0.6 K/UL (ref 0–1.3)
MONOCYTES RELATIVE PERCENT: 7.7 %
NEUTROPHILS ABSOLUTE: 6.4 K/UL (ref 1.7–7.7)
NEUTROPHILS RELATIVE PERCENT: 79.6 %
PDW BLD-RTO: 13.9 % (ref 12.4–15.4)
PLATELET # BLD: 181 K/UL (ref 135–450)
PMV BLD AUTO: 8.2 FL (ref 5–10.5)
POTASSIUM REFLEX MAGNESIUM: 3.9 MMOL/L (ref 3.5–5.1)
RBC # BLD: 4.48 M/UL (ref 4–5.2)
SODIUM BLD-SCNC: 139 MMOL/L (ref 136–145)
WBC # BLD: 8 K/UL (ref 4–11)

## 2022-02-07 PROCEDURE — 80048 BASIC METABOLIC PNL TOTAL CA: CPT

## 2022-02-07 PROCEDURE — 6360000002 HC RX W HCPCS: Performed by: INTERNAL MEDICINE

## 2022-02-07 PROCEDURE — 85025 COMPLETE CBC W/AUTO DIFF WBC: CPT

## 2022-02-07 PROCEDURE — 2580000003 HC RX 258: Performed by: INTERNAL MEDICINE

## 2022-02-07 PROCEDURE — 36415 COLL VENOUS BLD VENIPUNCTURE: CPT

## 2022-02-07 PROCEDURE — 6370000000 HC RX 637 (ALT 250 FOR IP): Performed by: INTERNAL MEDICINE

## 2022-02-07 PROCEDURE — 94640 AIRWAY INHALATION TREATMENT: CPT

## 2022-02-07 PROCEDURE — 94761 N-INVAS EAR/PLS OXIMETRY MLT: CPT

## 2022-02-07 RX ORDER — AZITHROMYCIN 250 MG/1
250 TABLET, FILM COATED ORAL DAILY
Qty: 3 TABLET | Refills: 0 | Status: SHIPPED | OUTPATIENT
Start: 2022-02-07 | End: 2022-02-10

## 2022-02-07 RX ORDER — IPRATROPIUM BROMIDE AND ALBUTEROL SULFATE 2.5; .5 MG/3ML; MG/3ML
1 SOLUTION RESPIRATORY (INHALATION) 2 TIMES DAILY
Status: DISCONTINUED | OUTPATIENT
Start: 2022-02-07 | End: 2022-02-07 | Stop reason: HOSPADM

## 2022-02-07 RX ADMIN — POLYETHYLENE GLYCOL 3350 17 G: 17 POWDER, FOR SOLUTION ORAL at 09:04

## 2022-02-07 RX ADMIN — PREDNISONE 40 MG: 20 TABLET ORAL at 08:53

## 2022-02-07 RX ADMIN — METHADONE HYDROCHLORIDE 90 MG: 10 SOLUTION ORAL at 08:59

## 2022-02-07 RX ADMIN — SODIUM CHLORIDE, PRESERVATIVE FREE 10 ML: 5 INJECTION INTRAVENOUS at 08:53

## 2022-02-07 RX ADMIN — BUTALBITAL, ACETAMINOPHEN, AND CAFFEINE 1 TABLET: 50; 325; 40 TABLET ORAL at 08:52

## 2022-02-07 RX ADMIN — ENOXAPARIN SODIUM 40 MG: 100 INJECTION SUBCUTANEOUS at 08:52

## 2022-02-07 RX ADMIN — BUDESONIDE AND FORMOTEROL FUMARATE DIHYDRATE 2 PUFF: 160; 4.5 AEROSOL RESPIRATORY (INHALATION) at 07:50

## 2022-02-07 RX ADMIN — AMLODIPINE BESYLATE 10 MG: 10 TABLET ORAL at 08:53

## 2022-02-07 RX ADMIN — IPRATROPIUM BROMIDE AND ALBUTEROL SULFATE 1 AMPULE: .5; 3 SOLUTION RESPIRATORY (INHALATION) at 07:50

## 2022-02-07 ASSESSMENT — PAIN SCALES - GENERAL
PAINLEVEL_OUTOF10: 7
PAINLEVEL_OUTOF10: 3
PAINLEVEL_OUTOF10: 7
PAINLEVEL_OUTOF10: 7

## 2022-02-07 ASSESSMENT — PAIN DESCRIPTION - LOCATION
LOCATION: HEAD
LOCATION: HEAD

## 2022-02-07 NOTE — DISCHARGE SUMMARY
Hospital Medicine Discharge Summary      Patient ID: Yesy Villalta      Patient's PCP: Remy Manning MD    Admit Date: 2/4/2022     Discharge Date: 2/7/2022  The patient was seen and examined on day of discharge and this discharge summary is in conjunction with any daily progress note from day of discharge. Admitting Physician: Armando Lenz MD    Discharge Physician: Magdalene Opitz, MD     Admitted for   Chief Complaint   Patient presents with    Shortness of Breath     worsening over the last two weeks, states that she has been taking her nebulizer every hour, thinks that it is because people are painting her house, hx of COPD, asthma, and CKD       Admitting Diagnosis COPD exacerbation ([facility] [address] [J44.1]  Acute hypoxemic respiratory failure ([address] [J96.01]    Discharge Diagnoses: Active Hospital Problems    Diagnosis Date Noted    COPD exacerbation ([address] [J44.1] 02/04/2022    Essential hypertension [I10] 04/26/2016       Follow Up: Primary Care Physician in one week    PCP to Follow up on   Post discharge follow-up with PCP          Hospital Course:   Patient is a 79-year-old female with a past medical history of asthma/COPD who presented with worsening shortness of breath for 3 to 4 days. Patient's house is being painted and the fumes caused her worsening issues. She was admitted with acute hypoxic respiratory failure secondary to COPD/asthma exacerbation. Patient feels close to baseline at the time of discharge.   She will need to finish her azithromycin and prednisone course.        Acute respiratory failure with hypoxia improved      COPD/asthma exacerbation  -Continue current regimen  -Continue steroids  -Patient follows up with [facility] pulmonology     Allergic rhinitis  -Continue Singulair     Essential (primary) hypertension - continue home meds and monitor blood pressure     Headache  - started on Fioricet      Chr pain  - resume methadone      Consults:     IP CONSULT TO HOSPITALIST        Disposition: home    Discharged Condition: Stable    Code Status: Full Code    Activity: activity as tolerated    Diet: regular diet      Wound Care: none needed      Labs:  For convenience and continuity at follow-up the following most recent labs are provided:    CBC:   Lab Results   Component Value Date    WBC 8.0 02/07/2022    HGB 13.4 02/07/2022    HCT 40.8 02/07/2022     02/07/2022       RENAL:   Lab Results   Component Value Date     02/07/2022    K 3.9 02/07/2022     02/07/2022    CO2 23 02/07/2022    BUN 15 02/07/2022    CREATININE 1.0 02/07/2022           Discharge Medications:   Current Discharge Medication List           Details   azithromycin (ZITHROMAX) 250 MG tablet Take 1 tablet by mouth daily for 3 days  Qty: 3 tablet, Refills: 0    Associated Diagnoses: Chronic obstructive pulmonary disease, unspecified COPD type ([facility])      predniSONE (DELTASONE) 20 MG tablet Take 2 tablets by mouth daily for 5 days  Qty: 10 tablet, Refills: 0              Details   montelukast (SINGULAIR) 10 MG tablet Take 1 tablet by mouth nightly  Qty: 90 tablet, Refills: 1              Details   ipratropium-albuterol (DUONEB) 0.5-2.5 (3) MG/3ML SOLN nebulizer solution Inhale 3 mLs into the lungs every 4 hours  Qty: 360 mL, Refills: 11    Associated Diagnoses: Cough      brompheniramine-pseudoephedrine-DM (BROMFED DM) 2-30-10 MG/5ML syrup Take 5 mLs by mouth 4 times daily as needed for Cough  Qty: 118 mL, Refills: 0    Associated Diagnoses: Cough      Umeclidinium Bromide 62.5 MCG/INH AEPB Inhale 1 puff into the lungs daily  Qty: 1 each, Refills: 11    Associated Diagnoses: COPD with asthma (HCC)      albuterol (PROVENTIL) (2.5 MG/3ML) 0.083% nebulizer solution Take 3 mLs by nebulization every 6 hours as needed for Wheezing  Qty: 120 each, Refills: 11    Associated Diagnoses: COPD with asthma (HCC)      budesonide-formoterol (SYMBICORT) 160-4.5 MCG/ACT AERO Inhale 2 puffs into the lungs 2 times daily  Qty: 1 each, Refills: 11    Associated Diagnoses: COPD with asthma ([address]      albuterol sulfate HFA (PROAIR HFA) 108 (90 Base) MCG/ACT inhaler Inhale 2 puffs into the lungs every 6 hours as needed for Wheezing  Qty: 1 Inhaler, Refills: 3      acetaminophen (APAP EXTRA STRENGTH) 500 MG tablet Take 2 tablets by mouth every 6 hours as needed for Pain DO NOT TAKE WITH OTHER MEDICATIONS CONTAINING ACETAMINOPHEN. Qty: 30 tablet, Refills: 0      METHADONE HCL PO Take 90 mg by mouth daily       diphenhydrAMINE (BENADRYL) 25 MG tablet Take 25 mg by mouth every 8 hours as needed for Itching or Allergies      amLODIPine (NORVASC) 10 MG tablet Take 1 tablet by mouth daily. Qty: 30 tablet, Refills: 3             Future Appointments   Date Time Provider Dale Vega   2/28/2022  2:15 PM Melchor Galeas, [address] ENT [facility]   3/15/2022  1:20 PM Funmilayo Ruvalcaba MD [facility] [place]       Time Spent on discharge is more than 45 minutes in the examination, evaluation, counseling and review of medications and discharge plan. Signed:  Sharonda Akers MD   2/7/2022    The note was completed using EMR. Every effort was made to ensure accuracy; however, inadvertent computerized transcription errors may be present. Thank you Popeye Aviles MD for the opportunity to be involved in this patient's care. If you have any questions or concerns please feel free to contact me at [phone].

## 2022-02-07 NOTE — PROGRESS NOTES
Patient alert and oriented X4. Patient tolerated nightly medications well. Patient verbalizes understanding of education. Patient vital signs recorded. Needed items within reach. Call light within reach.

## 2022-02-07 NOTE — CARE COORDINATION
DISCHARGE PLAN: Pt plans to return home today. Pts child's father or her dgtr will transport her home. No d/c needs noted. Active with [facility]-dgtr is the aide. ______________________________________________  INITIAL ASSESSMENT  Met w/pt to address barriers to dc. HOME: pt reported that she resides in a two family home alone. Pt stated that her child's father stays in the home at times and is a good support. COVID Vaccination: Yes-Overdue for the booster    DME/O2: Nebulizer    ACTIVE SERVICES: Pt reported that she is active with [facility] and has her dgtr as an aide. Pt stated that her dgtr provides her with 4hrs a day of PC services, 7 days a week. Pt stated that her dgtr assists her with the general cleaning, bathing, dressing, cooking and grocery shopping. TRANSPORTATION: Pt stated that she does not drive much and relies on her dgtr or her children's father to assist with transport. PHARMACY: Denies difficulty obtaining/taking meds. Pt has all meds filled at [facility] on [address] PCP: Johan Worley    DEMOGRAPHICS: Verified address/phone number as correct    INSURANCE:  Medicaid  PRESCRIPTION COVERAGE: Medicaid    HD/PD: No    THERAPY RECS Not ordered    Discharge planning team will remain available for needs. Please consult for any specifics not addressed in this note.     Oracio AyersNortheast [facility]  [phone]  Electronically signed by María Elena Rodriguez on 2/7/2022 at 11:28 AM

## 2022-02-07 NOTE — ACP (ADVANCE CARE PLANNING)
Advance Care Planning     Advance Care Planning Activator (Inpatient)  Conversation Note      Date of ACP Conversation: 2/7/2022     Conversation Conducted with: Patient with Decision Making Capacity    ACP Activator: [address] Decision Maker:     Current Designated Health Care Decision Maker:     Primary Decision Maker: Marissa Calderon - Child - [phone]    Today we documented Decision Maker(s) consistent with Legal Next of Kin hierarchy. Care Preferences    Ventilation: \"If you were in your present state of health and suddenly became very ill and were unable to breathe on your own, what would your preference be about the use of a ventilator (breathing machine) if it were available to you? \"      Would the patient desire the use of ventilator (breathing machine)?: yes    \"If your health worsens and it becomes clear that your chance of recovery is unlikely, what would your preference be about the use of a ventilator (breathing machine) if it were available to you? \"     Would the patient desire the use of ventilator (breathing machine)?:\"unsure\"      Resuscitation  \"CPR works best to restart the heart when there is a sudden event, like a heart attack, in someone who is otherwise healthy. Unfortunately, CPR does not typically restart the heart for people who have serious health conditions or who are very sick. \"    \"In the event your heart stopped as a result of an underlying serious health condition, would you want attempts to be made to restart your heart (answer \"yes\" for attempt to resuscitate) or would you prefer a natural death (answer \"no\" for do not attempt to resuscitate)? \" yes       [] Yes   [x] No   Educated Patient / True Graven regarding differences between Advance Directives and portable DNR orders.     Length of ACP Conversation in minutes:  5    Conversation Outcomes:  [x] ACP discussion completed  [] Existing advance directive reviewed with patient; no changes to patient's previously recorded wishes  [] New Advance Directive completed  [] Portable Do Not Rescitate prepared for Provider review and signature  [] POLST/POST/MOLST/MOST prepared for Provider review and signature      Follow-up plan:    [] Schedule follow-up conversation to continue planning  [] Referred individual to Provider for additional questions/concerns   [] Advised patient/agent/surrogate to review completed ACP document and update if needed with changes in condition, patient preferences or care setting    [x] This note routed to one or more involved healthcare providers    Electronically signed by Yordan Hernandez on 2/7/2022 at 11:35 AM

## 2022-02-07 NOTE — PROGRESS NOTES
Progress Note  Admit Date: 2/4/2022      PCP: Maciej Briscoe MD     CC: F/U for SOB    Days in hospital:  3    SUBJECTIVE / Interval History:  Breather better   cough+  Able to ambulate well. Shortness of breath is improved        Allergies  Acetylcysteine, Codeine, Levaquin [levofloxacin in d5w], Penicillins, Dupixent [dupilumab], Ibuprofen, and Oxycodone-acetaminophen    Medications    Scheduled Meds:   predniSONE  40 mg Oral Daily    methadone  90 mg Oral Daily    montelukast  10 mg Oral Nightly    budesonide-formoterol  2 puff Inhalation BID    amLODIPine  10 mg Oral Daily    azithromycin  500 mg IntraVENous Q24H    ipratropium-albuterol  1 ampule Inhalation Q4H WA    sodium chloride flush  10 mL IntraVENous 2 times per day    enoxaparin  40 mg SubCUTAneous Daily     Continuous Infusions:   sodium chloride Stopped (02/07/22 0208)       PRN Meds:  brompheniramine-pseudoephedrine-DM, butalbital-acetaminophen-caffeine, ipratropium-albuterol, sodium chloride flush, sodium chloride, ondansetron, polyethylene glycol, acetaminophen **OR** acetaminophen    Vitals    /73   Pulse 65   Temp 98.2 °F (36.8 °C) (Oral)   Resp 18   Ht 5' 10\" (1.778 m)   Wt 195 lb 15.8 oz (88.9 kg)   LMP 09/13/2017   SpO2 95%   BMI 28.12 kg/m²     Exam:    Gen: No distress. Eyes: PERRL. No sclera icterus. No conjunctival injection. ENT: No discharge. Pharynx clear. External appearance of ears and nose normal.  Neck: Trachea midline. No obvious mass. Resp: No accessory muscle use. No crackles. Few  wheezes. No rhonchi. AE decreased  CV: Regular rate. Regular rhythm. No murmur or rub. No edema. GI: Non-tender. Non-distended. No hernia. Skin: Warm, dry, normal texture and turgor. No nodule on exposed extremities. Lymph: No cervical LAD. No supraclavicular LAD. M/S: No cyanosis. No clubbing. No joint deformity. Neuro: Moves all four extremities. CN 2-12 tested, no defect noted. Psych: Oriented x 3.  No anxiety. Awake. Alert. Intact judgement and insight. Data    LABS  CBC:   Recent Labs     02/05/22  0503 02/06/22  0523 02/07/22  0453   WBC 3.9* 6.4 8.0   HGB 14.8 14.2 13.4   HCT 44.7 43.5 40.8   MCV 91.0 91.2 90.9    175 181     BMP:   Recent Labs     02/05/22  0503 02/06/22  0523 02/07/22  0453    135* 139   K 4.1 4.1 3.9   CL 99 101 103   CO2 25 21 23   BUN 7 14 15   CREATININE 0.7 0.9 1.0   GLUCOSE 138* 183* 97     POC GLUCOSE:  No results for input(s): POCGLU in the last 72 hours. LIVER PROFILE: No results for input(s): AST, ALT, LIPASE, AMYLASE, LABALBU, BILIDIR, BILITOT, ALKPHOS in the last 72 hours. PT/INR: No results for input(s): PROTIME, INR in the last 72 hours. APTT: No results for input(s): APTT in the last 72 hours. UA:No results for input(s): NITRITE, COLORU, PHUR, LABCAST, WBCUA, RBCUA, MUCUS, TRICHOMONAS, YEAST, BACTERIA, CLARITYU, SPECGRAV, LEUKOCYTESUR, UROBILINOGEN, BILIRUBINUR, BLOODU, GLUCOSEU, KETUA, AMORPHOUS in the last 72 hours. Microbiology:  Wound Culture: No results for input(s): WNDABS, ORG in the last 72 hours. Invalid input(s):  LABGRAM  Nasal Culture: No results for input(s): ORG, MRSAPCR in the last 72 hours. Blood Culture: No results for input(s): BC, BLOODCULT2 in the last 72 hours. Fungal Culture:   No results for input(s): FUNGSM in the last 72 hours. No results for input(s): FUNCXBLD in the last 72 hours. CSF Culture:  No results for input(s): COLORCSF, APPEARCSF, CFTUBE, CLOTCSF, WBCCSF, RBCCSF, NEUTCSF, NUMCELLSCSF, LYMPHSCSF, MONOCSF, GLUCCSF, VOLCSF in the last 72 hours. Respiratory Culture:  No results for input(s): Monna Look in the last 72 hours. AFB:No results for input(s): AFBSMEAR in the last 72 hours. Urine Culture  No results for input(s): LABURIN in the last 72 hours.     RADIOLOGY:    XR CHEST PORTABLE   Final Result   Borderline cardiomegaly with minimal central pulmonary congestion or   pulmonary artery hypertension which is less prominent. Chronic obstructive lung changes with tiny bibasilar pleural effusions which   is more prominent             CONSULTS:    IP CONSULT TO HOSPITALIST    ASSESSMENT AND PLAN:      Principal Problem:    COPD exacerbation ([address]  Active Problems:    Essential hypertension  Resolved Problems:    * No resolved hospital problems. *    Patient is a 53-year-old female with a past medical history of asthma/COPD who presented with worsening shortness of breath for 3 to 4 days. Patient's house is being painted and the fumes caused her worsening issues. She was admitted with acute hypoxic respiratory failure secondary to COPD/asthma exacerbation. Patient feels close to baseline at the time of discharge. She will need to finish her azithromycin and prednisone course.       Acute respiratory failure with hypoxia improved     COPD/asthma exacerbation  -Continue current regimen  -Continue steroids  -Patient follows up with [facility] pulmonology    Allergic rhinitis  -Continue Singulair     Essential (primary) hypertension - continue home meds and monitor blood pressure    Headache  - started on Fioricet     Chr pain  - resume methadone     DVT Prophylaxis: [place]  Diet: ADULT DIET; Regular  Code Status: Full Code    PT/OT Eval Status:    Discharge plan -today    The patient and / or the family were informed of the results of any tests, a time was given to answer questions, a plan was proposed and they agreed with plan. Discussed with consulting physicians, nursing and social work     The note was completed using EMR. Every effort was made to ensure accuracy; however, inadvertent computerized transcription errors may be present.        Lila Valladares MD

## 2022-02-07 NOTE — PROGRESS NOTES
Pt and family member provided all discharge education and paperwork. Pt not on tele. IV removed, tip intact, pressure held. All belongings gathered. Pt dressed self for home. Pt walked down with family to vehicle. No issues. no

## 2022-02-07 NOTE — RT PROTOCOL NOTE
RT Inhaler-Nebulizer Bronchodilator Protocol Note    There is a bronchodilator order in the chart from a provider indicating to follow the RT Bronchodilator Protocol and there is an Initiate RT Inhaler-Nebulizer Bronchodilator Protocol order as well (see protocol at bottom of note). CXR Findings:  No results found. The findings from the last RT Protocol Assessment were as follows:   History Pulmonary Disease: Chronic pulmonary disease  Respiratory Pattern: Regular pattern and RR 12-20 bpm  Breath Sounds: Slightly diminished and/or crackles  Cough: Strong, spontaneous, non-productive  Indication for Bronchodilator Therapy: Decreased or absent breath sounds,On home bronchodilators  Bronchodilator Assessment Score: 4    Aerosolized bronchodilator medication orders have been revised according to the RT Inhaler-Nebulizer Bronchodilator Protocol below. Respiratory Therapist to perform RT Therapy Protocol Assessment initially then follow the protocol. Repeat RT Therapy Protocol Assessment PRN for score 0-3 or on second treatment, BID, and PRN for scores above 3. No Indications - adjust the frequency to every 6 hours PRN wheezing or bronchospasm, if no treatments needed after 48 hours then discontinue using Per Protocol order mode. If indication present, adjust the RT bronchodilator orders based on the Bronchodilator Assessment Score as indicated below. Use Inhaler orders unless patient has one or more of the following: on home nebulizer, not able to hold breath for 10 seconds, is not alert and oriented, cannot activate and use MDI correctly, or respiratory rate 25 breaths per minute or more, then use the equivalent nebulizer order(s) with same Frequency and PRN reasons based on the score. If a patient is on this medication at home then do not decrease Frequency below that used at home.     0-3 - enter or revise RT bronchodilator order(s) to equivalent RT Bronchodilator order with Frequency of every 4 hours PRN for wheezing or increased work of breathing using Per Protocol order mode. 4-6 - enter or revise RT Bronchodilator order(s) to two equivalent RT bronchodilator orders with one order with BID Frequency and one order with Frequency of every 4 hours PRN wheezing or increased work of breathing using Per Protocol order mode. 7-10 - enter or revise RT Bronchodilator order(s) to two equivalent RT bronchodilator orders with one order with TID Frequency and one order with Frequency of every 4 hours PRN wheezing or increased work of breathing using Per Protocol order mode. 11-13 - enter or revise RT Bronchodilator order(s) to one equivalent RT bronchodilator order with QID Frequency and an Albuterol order with Frequency of every 4 hours PRN wheezing or increased work of breathing using Per Protocol order mode. Greater than 13 - enter or revise RT Bronchodilator order(s) to one equivalent RT bronchodilator order with every 4 hours Frequency and an Albuterol order with Frequency of every 2 hours PRN wheezing or increased work of breathing using Per Protocol order mode. RT to enter RT Home Evaluation for COPD & MDI Assessment order using Per Protocol order mode.     Electronically signed by Moses Narayan RCP on 2/7/2022 at 7:56 AM

## 2022-02-17 ENCOUNTER — HOSPITAL ENCOUNTER (EMERGENCY)
Age: 62
Discharge: HOME | End: 2022-02-17
Attending: EMERGENCY MEDICINE
Payer: MEDICAID

## 2022-02-17 ENCOUNTER — TELEPHONE (OUTPATIENT)
Dept: OTHER | Facility: CLINIC | Age: 62
End: 2022-02-17

## 2022-02-17 VITALS
OXYGEN SATURATION: 99 % | HEART RATE: 70 BPM | RESPIRATION RATE: 20 BRPM | DIASTOLIC BLOOD PRESSURE: 75 MMHG | TEMPERATURE: 98.3 F | WEIGHT: 196.43 LBS | HEIGHT: 70 IN | SYSTOLIC BLOOD PRESSURE: 111 MMHG | BODY MASS INDEX: 28.12 KG/M2

## 2022-02-17 DIAGNOSIS — B37.0 THRUSH: ICD-10-CM

## 2022-02-17 DIAGNOSIS — R06.00 DYSPNEA, UNSPECIFIED TYPE: Primary | ICD-10-CM

## 2022-02-17 DIAGNOSIS — F41.1 ANXIETY STATE: ICD-10-CM

## 2022-02-17 PROCEDURE — 99283 EMERGENCY DEPT VISIT LOW MDM: CPT

## 2022-02-17 RX ORDER — HYDROXYZINE 50 MG/1
25 TABLET, FILM COATED ORAL 3 TIMES DAILY PRN
Qty: 30 TABLET | Refills: 0 | Status: SHIPPED | OUTPATIENT
Start: 2022-02-17 | End: 2022-02-27

## 2022-02-17 RX ORDER — FLUCONAZOLE 100 MG/1
100 TABLET ORAL DAILY
Qty: 5 TABLET | Refills: 0 | Status: SHIPPED | OUTPATIENT
Start: 2022-02-17 | End: 2022-02-22

## 2022-02-17 ASSESSMENT — ENCOUNTER SYMPTOMS
COUGH: 0
ABDOMINAL PAIN: 0
SORE THROAT: 0
VOMITING: 0
SHORTNESS OF BREATH: 0
NAUSEA: 0
EYE PAIN: 0
BACK PAIN: 0

## 2022-02-17 NOTE — ED TRIAGE NOTES
Patient presents to ED complaining of shortness of breath, patient rpeorts taking a breathing treatment at home prior to arrival. States she has had someone painting in her house and feels like it aggravated her COPD. Deneis fevers. Recent discharge from hospital. Vitals stable on arrival, reports ongoing shortness fo breath. No obvious dyspnea at this time. Patient states she has noticed intermittent bleeding of the tongue over the past week. No active bleed at this time. Patient resting on bed, respirations even and easy at this time. No obvious distress.

## 2022-02-17 NOTE — ED PROVIDER NOTES
[id number] [facility]  eMERGENCY dEPARTMENT eNCOUnter   Physician Attestation    Pt Name: Yoly German  MRN: [id number]  [place] 1960  Date of evaluation: 2/17/22        Physician Note:    This patient was seen by the advanced practice provider. I have personally performed a face to face diagnostic evaluation on this patient and performed a substantive portion of the visit including all aspects of the medical decision making. History: Briefly, 63 y.o. female presents with shortness of breath and also noticed some bleeding of the tongue. She was admitted in the beginning of this month with an asthma exacerbation. She was discharged on February 7. She returned about a week later with another asthma exacerbation. She apparently is being exposed to some paint fumes where her home is being painted. She thinks that is causing her problem. She does have episodes where she feels her heart racing. She was recently on oral steroids and she is also on Symbicort. Physical Exam  Vitals and nursing note reviewed. Constitutional:       Appearance: She is well-developed. She is not diaphoretic. HENT:      Head: Normocephalic and atraumatic. Comments: No obvious lesions on the tongue but she does have a very fine white film and minimal swelling but there is no airway compromise. Right Ear: External ear normal.      Left Ear: External ear normal.   Eyes:      General: No scleral icterus. Right eye: No discharge. Left eye: No discharge. Conjunctiva/sclera: Conjunctivae normal.   Neck:      Trachea: No tracheal deviation. Cardiovascular:      Rate and Rhythm: Normal rate. Pulmonary:      Effort: Pulmonary effort is normal. No respiratory distress. Breath sounds: Normal breath sounds. No stridor. Musculoskeletal:         General: Normal range of motion. Cervical back: Normal range of motion. Skin:     General: Skin is warm and dry. Neurological:      Mental Status: She is alert and oriented to person, place, and time. Coordination: Coordination normal.   Psychiatric:         Behavior: Behavior normal.         MDM: Under the setting that she is been on steroids twice in the last 3 weeks and she is now on Symbicort some oral thrush is definitely a possibility so we will treat her with Diflucan. She does seem to be having some anxiety but I also recommend she get away from the fumes for now and stay with a friend or family until this is done. She does have home aerosols. She is not in any respiratory distress at the moment. No results found for this visit on 02/17/22. CRITICAL CARE  I personally saw the patient and independently provided 0 minutes of non-concurrent critical care out of the total shared critical care time provided. This excludes seperately billable procedures. Critical care time was provided for 0 that required close evaluation and/or intervention with concern for potential patient decompensation. 1. Dyspnea, unspecified type    2. Anxiety state    3. Thrush          DISPOSITION/PLAN  PATIENT REFERRED TO:  Rosario Collins MD  [address] [place] [address]  [phone]    Call in 1 day      DISCHARGE MEDICATIONS:  New Prescriptions    FLUCONAZOLE (DIFLUCAN) 100 MG TABLET    Take 1 tablet by mouth daily for 5 days    HYDROXYZINE (ATARAX) 50 MG TABLET    Take 0.5 tablets by mouth 3 times daily as needed for Anxiety May sub Vistaril. Foreign Mccain MD        This report has been produced using speech recognition software and may contain errors related to that system including errors in grammar, punctuation, and spelling, as well as words and phrases that may be inappropriate. If there are any questions or concerns please feel free to contact the dictating provider for clarification.         Foreign Mccain MD  02/17/22 5738

## 2022-02-17 NOTE — ED NOTES
Patient notified that they should not drive while taking atarax due to potential for drowsiness. Patient verbalizes understanding.          Benji Loaiza RN  02/17/22 0766

## 2022-02-17 NOTE — TELEPHONE ENCOUNTER
Discharge orders were placed before writer contacted TENZIN Vizcaino to inform of 30 day readmission risk.

## 2022-02-17 NOTE — ED PROVIDER NOTES
[address] ENCOUNTER      Pt Name: Rajesh Garcia  EQR:[id number]  [place] 1960  Date of evaluation: 2/17/2022  Provider: Kirsten Mata PA-C     This patient was seen evaluated by attending physician Dr. Damaris Garza MD      Chief Complaint:    Chief Complaint   Patient presents with    Shortness of Breath     Reports shortness of breath, patient rpeorts taking a breathing treatment at home prior to arrival. States she has had someone painting in her house and feels like it aggravated her COPD. Deneis fevers. Recent discharge from hospital.         Nursing Notes, Past Medical Hx, Past Surgical Hx, Social Hx, Allergies, and Family Hx were all reviewed and agreed with or any disagreements were addressed in the HPI.    HPI: (Location, Duration, Timing, Severity, Quality, Assoc Sx, Context, Modifying factors)    Chief Complaint of shortness of breath and COPD flareup from being around pain in her house. Also complained of tongue bleeding when she takes her Singulair. She was recently hospitalized for COPD exacerbation. Got home says she felt good for a week and also on the last couple days she started feeling shortness of breath. Says she took a breathing treatment before she came today. She thinks the order for the pain stuff in her house is flared it up. She states that every morning since the last three nights when she took her Singulair she noticed blood on her time will be bleeding squirting blood [employer] water and then will go away. Denies chest pain, no abdominal pain, no weakness. Denies headache, no lightheaded or dizziness. No other complaints. This is a  63 y.o. female who presents to emergency room with the above complaint.     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    UTI (lower urinary tract infection)          Procedure Laterality Date    CHEST TUBE INSERTION      COLONOSCOPY  10/23/2018    Colonoscopy with polypectomy (cold snare), polypectomy (cold biopsy)    COLONOSCOPY N/A 10/23/2018    COLONOSCOPY POLYPECTOMY SNARE/COLD BIOPSY performed by Ozzy Roberts MD at [address] Left 12/16/2016    INTRACAPSULAR CATARACT EXTRACTION Right 2/21/2020    PHACOEMULSIFICATION WITH INTRAOCULAR LENS IMPLANT performed by Dorothy Johnson MD at [facility] EXTRACTION Left 2/28/2020    PHACOEMULSIFICATION WITH INTRAOCULAR LENS IMPLANT performed by Dorothy Johnson MD at [address] [place] [address] 47 Right     26 yrs for lung infection     OTHER SURGICAL HISTORY  12/15/2016    EDMOND BUNIONECTOMY WITH APPLICATION OF AMNIOX LEFT FOOT       Medications:  Previous Medications    ACETAMINOPHEN (APAP EXTRA STRENGTH) 500 MG TABLET    Take 2 tablets by mouth every 6 hours as needed for Pain DO NOT TAKE WITH OTHER MEDICATIONS CONTAINING ACETAMINOPHEN. ALBUTEROL (PROVENTIL) (2.5 MG/3ML) 0.083% NEBULIZER SOLUTION    Take 3 mLs by nebulization every 6 hours as needed for Wheezing    ALBUTEROL SULFATE HFA (PROAIR HFA) 108 (90 BASE) MCG/ACT INHALER    Inhale 2 puffs into the lungs every 6 hours as needed for Wheezing    AMLODIPINE (NORVASC) 10 MG TABLET    Take 1 tablet by mouth daily.     BROMPHENIRAMINE-PSEUDOEPHEDRINE-DM (BROMFED DM) 2-30-10 MG/5ML SYRUP    Take 5 mLs by mouth 4 times daily as needed for Cough    BUDESONIDE-FORMOTEROL (SYMBICORT) 160-4.5 MCG/ACT AERO    Inhale 2 puffs into the lungs 2 times daily    DIPHENHYDRAMINE (BENADRYL) 25 MG TABLET    Take 25 mg by mouth every 8 hours as needed for Itching or Allergies    IPRATROPIUM-ALBUTEROL (DUONEB) 0.5-2.5 (3) MG/3ML SOLN NEBULIZER SOLUTION    Inhale 3 mLs into the lungs every 4 hours    METHADONE HCL PO    Take 90 mg by mouth daily     MONTELUKAST (SINGULAIR) 10 MG TABLET    Take 1 tablet by mouth nightly    UMECLIDINIUM BROMIDE 62.5 MCG/INH AEPB    Inhale 1 puff into the lungs daily         Review of Systems:  (2-9 systems needed)  Review of Systems   Constitutional: Negative for chills and fever. HENT: Negative for congestion and sore throat. Eyes: Negative for pain and visual disturbance. Respiratory: Negative for cough and shortness of breath. Cardiovascular: Negative for chest pain and leg swelling. Gastrointestinal: Negative for abdominal pain, nausea and vomiting. Genitourinary: Negative for dysuria and frequency. Musculoskeletal: Negative for back pain and neck pain. Skin: Negative for rash and wound. Neurological: Negative for dizziness and light-headedness. \"Positives and Pertinent negatives as per HPI\"    Physical Exam:  Physical Exam  Constitutional:       Appearance: She is well-developed. She is not diaphoretic. HENT:      Head: Normocephalic and atraumatic. Nose: Nose normal.      Mouth/Throat:      Tongue: No lesions. Pharynx: Oropharynx is clear. Uvula midline. No pharyngeal swelling, oropharyngeal exudate, posterior oropharyngeal erythema or uvula swelling. Eyes:      General:         Right eye: No discharge. Left eye: No discharge. Cardiovascular:      Rate and Rhythm: Normal rate and regular rhythm. Heart sounds: Normal heart sounds. No murmur heard. No friction rub. No gallop. Pulmonary:      Effort: Pulmonary effort is normal. No respiratory distress. Breath sounds: Normal breath sounds. No wheezing or rales. Chest:      Chest wall: No tenderness. Musculoskeletal:         General: Normal range of motion. Cervical back: Normal range of motion and neck supple. Skin:     General: Skin is warm and dry.    Neurological:      General: No focal deficit present. Mental Status: She is alert and oriented to person, place, and time. Motor: Motor function is intact. Coordination: Coordination is intact. Gait: Gait is intact. Psychiatric:         Behavior: Behavior normal.         MEDICAL DECISION MAKING    Vitals:    Vitals:    02/17/22 1502   BP: 123/82   Pulse: 78   Resp: 20   Temp: 98.3 °F (36.8 °C)   TempSrc: Oral   SpO2: 94%   Weight: 196 lb 6.9 oz (89.1 kg)   Height: 5' 10\" (1.778 m)       LABS:Labs Reviewed - No data to display     Remainder of labs reviewed and were negative at this time or not returned at the time of this note. RADIOLOGY:   Non-plain film images such as CT, Ultrasound and MRI are read by the radiologist. Lona Trujillo PA-C have directly visualized the radiologic plain film image(s) with the below findings:      Interpretation per the Radiologist below, if available at the time of this note:    No orders to display        XR CHEST PORTABLE    Result Date: 2/4/2022  EXAMINATION: [address] 2/4/2022 5:10 pm COMPARISON: 11/07/2021 HISTORY: ORDERING SYSTEM PROVIDED HISTORY: sob TECHNOLOGIST PROVIDED HISTORY: Reason for exam:->sob Reason for Exam: Shortness of Breath (worsening over the last two weeks FINDINGS: The heart is borderline enlarged but unchanged. The pulmonary vessels are slightly prominent centrally. The lungs are mildly hyperinflated and emphysematous. There is minimal blunting of the costophrenic sulci which is more apparent. No consolidation is seen. Borderline cardiomegaly with minimal central pulmonary congestion or pulmonary artery hypertension which is less prominent. Chronic obstructive lung changes with tiny bibasilar pleural effusions which is more prominent          MEDICAL DECISION MAKING / ED COURSE:      PROCEDURES:   Procedures    None    Patient was given:  Medications - No data to display    Emergency room course: Patient on exam throat is clear.  Nonerythematous no exudate. Tongue so no lesions. There is no active bleeding. The rest of the buccal mucosa with clear roof of the mouth showed no lesion. Neck is supple full range of motion without tenderness. No stridor with auscultation. No adenopathy. Cardiovascular regular rhythm, lungs clear. No wheeze rales or rhonchi. . She does appear somewhat anxious. She has normal vitals. O2 saturation 90%. Heart rate 82. Normal respirations and blood pressure. She is alert oriented does not appear to be in acute distress. At this point did discuss with her discharge plan. Put her on fluconazole 200 mg once a day for 5 days. She is to continue her Singulair. Follow-up with primary care physician talk to her primary doctor about possible anxiety treatment. I'll put her on Vistaril. She'll be discharged stable condition. I also advised her to stay with her daughter and while they're working on her home so that the older will not trigger her COPD or asthma. The patient tolerated their visit well. I evaluated the patient. The physician was available for consultation as needed. The patient and / or the family were informed of the results of any tests, a time was given to answer questions, a plan was proposed and they agreed with plan. CLINICAL IMPRESSION:  1. Dyspnea, unspecified type    2. Anxiety state    3. Thrush        DISPOSITION Decision To Discharge 02/17/2022 03:32:55 PM      PATIENT REFERRED TO:  Jose Luis Song MD  [address] [place] [address]  [phone]    Call in 1 day        DISCHARGE MEDICATIONS:  New Prescriptions    FLUCONAZOLE (DIFLUCAN) 100 MG TABLET    Take 1 tablet by mouth daily for 5 days    HYDROXYZINE (ATARAX) 50 MG TABLET    Take 0.5 tablets by mouth 3 times daily as needed for Anxiety May sub Vistaril.        DISCONTINUED MEDICATIONS:  Discontinued Medications    No medications on file              (Please note the MDM and HPI sections of this note were completed with a voice recognition program.  Efforts were made to edit the dictations but occasionally words are mis-transcribed.)    Electronically signed, Caity Mahmood PA-C,          Caity Mahmood PA-C  02/17/22 6540

## 2022-02-18 ENCOUNTER — TELEPHONE (OUTPATIENT)
Dept: OTHER | Facility: CLINIC | Age: 62
End: 2022-02-18

## 2022-02-18 NOTE — TELEPHONE ENCOUNTER
[facility] contacted pt regarding ED follow up. Spoke with pt, she would like assistance with scheduling appt. Connected to Dr. Radha Hicks.

## 2022-02-28 ENCOUNTER — OFFICE VISIT (OUTPATIENT)
Dept: ENT CLINIC | Age: 62
End: 2022-02-28
Payer: MEDICAID

## 2022-02-28 VITALS
DIASTOLIC BLOOD PRESSURE: 65 MMHG | BODY MASS INDEX: 26.48 KG/M2 | WEIGHT: 185 LBS | HEART RATE: 65 BPM | HEIGHT: 70 IN | SYSTOLIC BLOOD PRESSURE: 105 MMHG

## 2022-02-28 DIAGNOSIS — B37.0 THRUSH: ICD-10-CM

## 2022-02-28 DIAGNOSIS — K13.70 ORAL LESION: Primary | ICD-10-CM

## 2022-02-28 PROCEDURE — 99213 OFFICE O/P EST LOW 20 MIN: CPT | Performed by: OTOLARYNGOLOGY

## 2022-02-28 NOTE — PROGRESS NOTES
[facility]- HEAD & NECK SURGERY  Follow up      Patient Name: Evin Ospina  Medical Record Number:  [id number]  Primary Care Physician:  Zach Farias MD  Date of Consultation: 2/28/2022    Chief Complaint: Mouth lesion        Interval History    Patient is following up for her mouth lesions. I saw her November 2021. Felt like she had some thrush at that time. She also had some white discoloration of her buccal mucosa or bite lines and secondary to her dentures. She said that she had an asthma exacerbation in February of this year. She got a lot of steroids during this time and says that she developed thrush again. She was treated Diflucan and thinks it cleared things up. REVIEW OF SYSTEMS  As above    PHYSICAL EXAM  GENERAL: No Acute Distress, Alert and Oriented, no Hoarseness, strong voice  EYES: EOMI, Anti-icteric  HENT:   Head: Normocephalic and atraumatic. Face:  Symmetric, facial nerve intact, no sinus tenderness  Right Ear: Normal external ear  Left Ear: Normal external ear,  Mouth/Oral Cavity: Mild white discoloration of the bilateral buccal mucosa unchanged. No evidence of ongoing thrush. High in the gingivolabial sulcus and the maxilla there is some white discoloration as well that seems to be adjacent to where her dentures said. Oropharynx/Larynx:  normal oropharynx  Nose:Normal external nasal appearance. NECK: Normal range of motion, no thyromegaly, trachea is midline, no lymphadenopathy, no neck masses, no crepitus      ASSESSMENT/PLAN  1. Oral lesion  She has some prominent bite lines and some discoloration where her dentures that. These are not overly concerning in appearance. If they change at all would like for her to follow-up immediately. 2. Thrush  Appears to have resolved since being treated on February 17. Follow-up if it comes back.              I have performed a head and neck physical exam personally or was physically present during the key or critical portions of the service. This note was generated completely or in part utilizing Dragon dictation speech recognition software. Occasionally, words are mistranscribed and despite editing, the text may contain inaccuracies due to incorrect word recognition. If further clarification is needed please contact the office at [phone].

## 2022-03-15 ENCOUNTER — TELEPHONE (OUTPATIENT)
Dept: PULMONOLOGY | Age: 62
End: 2022-03-15

## 2022-03-15 ENCOUNTER — OFFICE VISIT (OUTPATIENT)
Dept: PULMONOLOGY | Age: 62
End: 2022-03-15
Payer: MEDICAID

## 2022-03-15 VITALS
WEIGHT: 195 LBS | BODY MASS INDEX: 27.92 KG/M2 | HEIGHT: 70 IN | DIASTOLIC BLOOD PRESSURE: 80 MMHG | TEMPERATURE: 97.1 F | OXYGEN SATURATION: 94 % | SYSTOLIC BLOOD PRESSURE: 124 MMHG | HEART RATE: 62 BPM | RESPIRATION RATE: 12 BRPM

## 2022-03-15 DIAGNOSIS — J30.89 OTHER ALLERGIC RHINITIS: ICD-10-CM

## 2022-03-15 DIAGNOSIS — J44.9 COPD WITH ASTHMA (HCC): ICD-10-CM

## 2022-03-15 PROBLEM — J44.1 COPD EXACERBATION (HCC): Status: RESOLVED | Noted: 2022-02-04 | Resolved: 2022-03-15

## 2022-03-15 PROCEDURE — 99214 OFFICE O/P EST MOD 30 MIN: CPT | Performed by: INTERNAL MEDICINE

## 2022-03-15 RX ORDER — LANOLIN ALCOHOL/MO/W.PET/CERES
3 CREAM (GRAM) TOPICAL NIGHTLY PRN
COMMUNITY
End: 2022-07-18 | Stop reason: ALTCHOICE

## 2022-03-15 ASSESSMENT — COPD QUESTIONNAIRES
QUESTION1_COUGHFREQUENCY: 1
QUESTION2_CHESTPHLEGM: 4
QUESTION5_HOMEACTIVITIES: 4
QUESTION4_WALKINCLINE: 5
QUESTION3_CHESTTIGHTNESS: 0
CAT_TOTALSCORE: 23
QUESTION6_LEAVINGHOUSE: 3
QUESTION8_ENERGYLEVEL: 4
QUESTION7_SLEEPQUALITY: 2

## 2022-03-15 NOTE — ASSESSMENT & PLAN NOTE
Doing well on Symbicort with Incruse . symptoms improved after removing carpet from bedroom. We discussed Charline Huang but since doing well, will consider in the future.

## 2022-03-15 NOTE — TELEPHONE ENCOUNTER
Lisa Cedeno calls in. Was seen today by Dr. Rodo Hennessy and she failed to mention she has increased white mucus, kingsley at night that catches in her throat and makes her cough. Is there something you can prescribe to help this thick mucus? Wg on file.

## 2022-03-15 NOTE — PROGRESS NOTES
REASON FOR CONSULTATION/CC: asthma  Chief Complaint   Patient presents with    Follow-up          PCP: Bryce Daugherty MD    HISTORY OF PRESENT ILLNESS: Judi Tony is a 63y.o. year old female with a history of asthma who presents :               Asthma history   Nucala, failed. Did not response  Dupexiant, side effects. side effects to Spiriva   Hx of chronic steroids then weaned off    Currently   Singulair, Incruse and Symbicort bid. Using nebulizer. Off prednisone. Cleaned out carpet and breathing better. allergic rhinitis   Astelin     Decreased energy. May be depression  May be related to stopping steroids. Objective:   PHYSICAL EXAM:  Blood pressure 124/80, pulse 62, temperature 97.1 °F (36.2 °C), temperature source Infrared, resp. rate 12, height 5' 10\" (1.778 m), weight 195 lb (88.5 kg), last menstrual period 09/13/2017, SpO2 94 %, not currently breastfeeding.'    Gen: No distress. Eyes:    ENT:    Neck:    Resp: No accessory muscle use. No crackles. No wheezes. No rhonchi. CV: Regular rate. Regular rhythm. No murmur or rub. No edema. GI:    Skin:    Lymph:    M/S: No cyanosis. No clubbing. Neuro: Moves all four extremities. Psych: Oriented x 3. No anxiety. Awake. Alert. Intact judgement and insight. Data Reviewed:        Assessment:     ·  Asthma  · allergic rhinitis   · Leg numbness      Plan:      Problem List Items Addressed This Visit     Other allergic rhinitis      Astelin          COPD with asthma ([address]      Doing well on Symbicort with Incruse . symptoms improved after removing carpet from bedroom. We discussed Anice Mile but since doing well, will consider in the future. This note was transcribed using [id number]. Please disregard any translational errors.     Michelle [facility] [phone]

## 2022-04-12 ENCOUNTER — TELEPHONE (OUTPATIENT)
Dept: PULMONOLOGY | Age: 62
End: 2022-04-12

## 2022-04-12 DIAGNOSIS — J44.9 COPD WITH ASTHMA (HCC): Primary | ICD-10-CM

## 2022-04-12 RX ORDER — PREDNISONE 10 MG/1
TABLET ORAL
Qty: 30 TABLET | Refills: 0 | Status: SHIPPED | OUTPATIENT
Start: 2022-04-12 | End: 2022-04-22

## 2022-04-12 NOTE — TELEPHONE ENCOUNTER
Pt calls in asking for prednisone. She says she thinks that due to the change in weather she is having shortness of breath and [facility]

## 2022-05-22 ENCOUNTER — APPOINTMENT (OUTPATIENT)
Dept: GENERAL RADIOLOGY | Age: 62
End: 2022-05-22
Payer: MEDICAID

## 2022-05-22 ENCOUNTER — HOSPITAL ENCOUNTER (EMERGENCY)
Age: 62
Discharge: HOME | End: 2022-05-22
Attending: EMERGENCY MEDICINE
Payer: MEDICAID

## 2022-05-22 VITALS
WEIGHT: 184 LBS | SYSTOLIC BLOOD PRESSURE: 130 MMHG | HEART RATE: 76 BPM | HEIGHT: 70 IN | OXYGEN SATURATION: 93 % | RESPIRATION RATE: 20 BRPM | BODY MASS INDEX: 26.34 KG/M2 | TEMPERATURE: 98.3 F | DIASTOLIC BLOOD PRESSURE: 79 MMHG

## 2022-05-22 DIAGNOSIS — J44.1 COPD EXACERBATION (HCC): Primary | ICD-10-CM

## 2022-05-22 PROCEDURE — 96372 THER/PROPH/DIAG INJ SC/IM: CPT

## 2022-05-22 PROCEDURE — 99284 EMERGENCY DEPT VISIT MOD MDM: CPT

## 2022-05-22 PROCEDURE — 71046 X-RAY EXAM CHEST 2 VIEWS: CPT

## 2022-05-22 PROCEDURE — 6360000002 HC RX W HCPCS: Performed by: EMERGENCY MEDICINE

## 2022-05-22 PROCEDURE — 6370000000 HC RX 637 (ALT 250 FOR IP): Performed by: EMERGENCY MEDICINE

## 2022-05-22 RX ORDER — PREDNISONE 20 MG/1
20 TABLET ORAL DAILY
Qty: 5 TABLET | Refills: 0 | Status: SHIPPED | OUTPATIENT
Start: 2022-05-22 | End: 2022-05-27

## 2022-05-22 RX ORDER — IPRATROPIUM BROMIDE AND ALBUTEROL SULFATE 2.5; .5 MG/3ML; MG/3ML
1 SOLUTION RESPIRATORY (INHALATION) ONCE
Status: COMPLETED | OUTPATIENT
Start: 2022-05-22 | End: 2022-05-22

## 2022-05-22 RX ORDER — METHYLPREDNISOLONE SODIUM SUCCINATE 125 MG/2ML
125 INJECTION, POWDER, LYOPHILIZED, FOR SOLUTION INTRAMUSCULAR; INTRAVENOUS ONCE
Status: COMPLETED | OUTPATIENT
Start: 2022-05-22 | End: 2022-05-22

## 2022-05-22 RX ADMIN — METHYLPREDNISOLONE SODIUM SUCCINATE 125 MG: 125 INJECTION, POWDER, FOR SOLUTION INTRAMUSCULAR; INTRAVENOUS at 18:19

## 2022-05-22 RX ADMIN — IPRATROPIUM BROMIDE AND ALBUTEROL SULFATE 1 AMPULE: .5; 3 SOLUTION RESPIRATORY (INHALATION) at 18:19

## 2022-05-22 ASSESSMENT — PAIN - FUNCTIONAL ASSESSMENT: PAIN_FUNCTIONAL_ASSESSMENT: NONE - DENIES PAIN

## 2022-05-22 ASSESSMENT — LIFESTYLE VARIABLES: HOW OFTEN DO YOU HAVE A DRINK CONTAINING ALCOHOL: NEVER

## 2022-05-22 NOTE — ED NOTES
rosy treatment well  Awake alert  Resp easy and unlabored  Skin warm and dry  Sates she can feel a difference      Barb Khanna RN  05/22/22 5028

## 2022-05-22 NOTE — ED NOTES
dc'd to home  Awake alert  resp easy and unlabored  Aware to follow up with pmd for [facility] out with ease  To return any worsening     Barb Khanna RN  05/22/22 9167

## 2022-05-22 NOTE — ED PROVIDER NOTES
eMERGENCY dEPARTMENT eNCOUnter      Pt Name: Saint Gain  MRN: [id number]  [place] 1960  Date of evaluation: 5/22/2022  Provider: Joey Calderon MD     [address] [place] [address]       Chief Complaint   Patient presents with    Shortness of Breath     x2days worsening, hx COPD         HISTORY OF PRESENT ILLNESS   (Location/Symptom, Timing/Onset,Context/Setting, Quality, Duration, Modifying Factors, Severity) Note limiting factors. HPI    Saint Gain is a 63 y.o. female who presents to the emergency department with shortness of breath. Patient has history of COPD. Patient's been using her nebulizer at home and is not working. Patient has been on prednisone and steroids in the past.  Patient continues to smoke. Patient appears anxious on arrival with shortness of breath. There has been no fever. Patient denies any chest pain. Vital signs were normal.  Has been vaccinated for COVID. There has been no cough. Nursing Notes were reviewed. REVIEW OFSYSTEMS    (2+ for level 4; 10+ for level 5)   Review of Systems    General: No fevers, chills or night sweats, No weight loss    Head:  No Sore throat,  No Ear Pain    Chest:  Nontender. No Cough, positive SOB,  Chest Pain is negative    GI: No abdominal pain or vomiting    : No dysuria or hematuria    Musculoskeletal: No unrelenting pain or night pain    Neurologic: No bowel or bladder incontinence, No saddle anesthesia, No leg weakness    All other systems reviewed and are negative.         PAST MEDICAL HISTORY     Past Medical History:   Diagnosis Date    Asthma     Bipolar disorder ([facility]     CKD (chronic kidney disease)     stage 3    COPD (chronic obstructive pulmonary disease) ([facility]     Empyema (HCC)     Hepatitis C     Patient denies     History of heroin use     Hypertension     Narcotic abuse ([facility]     heroin, clean 1 year    UTI (lower urinary tract infection)        SURGICAL HISTORY       Past Surgical History:   Procedure Laterality Date    CHEST TUBE INSERTION      COLONOSCOPY  10/23/2018    Colonoscopy with polypectomy (cold snare), polypectomy (cold biopsy)    COLONOSCOPY N/A 10/23/2018    COLONOSCOPY POLYPECTOMY SNARE/COLD BIOPSY performed by Georgina Han MD at [facility] Left 12/16/2016    INTRACAPSULAR CATARACT EXTRACTION Right 2/21/2020    PHACOEMULSIFICATION WITH INTRAOCULAR LENS IMPLANT performed by Feliciano Yun MD at [facility] EXTRACTION Left 2/28/2020    PHACOEMULSIFICATION WITH INTRAOCULAR LENS IMPLANT performed by Feliciano Yun MD at [address] [place] [address] 472 Right     26 yrs for lung infection     OTHER SURGICAL HISTORY  12/15/2016    EDMOND BUNIONECTOMY WITH APPLICATION OF AMNIOX LEFT FOOT       CURRENT MEDICATIONS       Discharge Medication List as of 5/22/2022  6:43 PM      CONTINUE these medications which have NOT CHANGED    Details   melatonin 3 MG TABS tablet Take 3 mg by mouth nightly as neededHistorical Med      montelukast (SINGULAIR) 10 MG tablet Take 1 tablet by mouth nightly, Disp-90 tablet, R-1Normal      ipratropium-albuterol (DUONEB) 0.5-2.5 (3) MG/3ML SOLN nebulizer solution Inhale 3 mLs into the lungs every 4 hours, Disp-360 mL, R-11Print      brompheniramine-pseudoephedrine-DM (BROMFED DM) 2-30-10 MG/5ML syrup Take 5 mLs by mouth 4 times daily as needed for Cough, Disp-118 mL, R-0Normal      Umeclidinium Bromide 62.5 MCG/INH AEPB Inhale 1 puff into the lungs daily, Disp-1 each, R-11Normal      albuterol (PROVENTIL) (2.5 MG/3ML) 0.083% nebulizer solution Take 3 mLs by nebulization every 6 hours as needed for Wheezing, Disp-120 each, R-11Normal      budesonide-formoterol (SYMBICORT) 160-4.5 MCG/ACT AERO Inhale 2 puffs into the lungs 2 times daily, Disp-1 each, R-11Normal      albuterol sulfate HFA (PROAIR HFA) 108 (90 Base) MCG/ACT inhaler Inhale 2 puffs into the lungs every 6 hours as needed for Wheezing, Disp-1 Inhaler, R-3Normal      acetaminophen (APAP EXTRA STRENGTH) 500 MG tablet Take 2 tablets by mouth every 6 hours as needed for Pain DO NOT TAKE WITH OTHER MEDICATIONS CONTAINING ACETAMINOPHEN., Disp-30 tablet, R-0Print      METHADONE HCL PO Take 90 mg by mouth daily Historical Med      diphenhydrAMINE (BENADRYL) 25 MG tablet Take 25 mg by mouth every 8 hours as needed for Itching or Allergies      amLODIPine (NORVASC) 10 MG tablet Take 1 tablet by mouth daily. , Disp-30 tablet, R-3             ALLERGIES     Acetylcysteine, Codeine, Levaquin [levofloxacin in d5w], Penicillins, Dupixent [dupilumab], and Ibuprofen    FAMILY HISTORY       Family History   Problem Relation Age of Onset    Cancer Mother     Kidney Disease Mother     Heart Disease Mother         SOCIAL HISTORY       Social History     Socioeconomic History    Marital status: Life Partner     Spouse name: None    Number of children: None    Years of education: None    Highest education level: None   Occupational History    None   Tobacco Use    Smoking status: Former Smoker     Packs/day: 0.00     Years: 45.00     Pack years: 0.00     Types: Cigarettes     Quit date: 2016     Years since quittin.9    Smokeless tobacco: Never Used   Vaping Use    Vaping Use: Never used   Substance and Sexual Activity    Alcohol use: No    Drug use: No     Comment: past h/o heroin on methadone    Sexual activity: Not Currently   Other Topics Concern    None   Social History Narrative    None     Social Determinants of Health     Financial Resource Strain:     Difficulty of Paying Living Expenses: Not on file   Food Insecurity:     Worried About Running Out of Food in the Last Year: Not on file    Cayla of Food in the Last Year: Not on file   Transportation Needs:     Lack of Transportation (Medical): Not on file    Lack of Transportation (Non-Medical):  Not on file   Physical Activity:     Days of Exercise per Week: Not on file    Minutes of Exercise per Session: Not on file   Stress:     Feeling of Stress : Not on file   Social Connections:     Frequency of Communication with Friends and Family: Not on file    Frequency of Social Gatherings with Friends and Family: Not on file    Attends Sabianist Services: Not on file    Active Member of [address] [place] [address] [employer] or Organizations: Not on file    Attends Club or Organization Meetings: Not on file    Marital Status: Not on file   Intimate Partner Violence:     Fear of Current or Ex-Partner: Not on file    Emotionally Abused: Not on file    Physically Abused: Not on file    Sexually Abused: Not on file   Housing Stability:     Unable to Pay for Housing in the Last Year: Not on file    Number of [place] in the Last Year: Not on file    Unstable Housing in the Last Year: Not on file       SCREENINGS    [place] Coma Scale  Eye Opening: Spontaneous  Best Verbal Response: Oriented  Best Motor Response: Obeys commands  [place] Coma Scale Score: 15      PHYSICAL EXAM    (up to 7 for level 4, 8 or more for level 5)     ED Triage Vitals [05/22/22 1750]   BP Temp Temp Source Pulse Resp SpO2 Height Weight   (!) 146/90 98.3 °F (36.8 °C) Oral 76 22 95 % 5' 10\" (1.778 m) 184 lb (83.5 kg)       Physical Exam    General: Alert and awake ×3. Nontoxic appearance. Well-developed well-nourished 65-year-old in no acute distress  HEENT: Normocephalic atraumatic. Neck is supple. Airway intact. No adenopathy  Cardiac: Regular rate and rhythm with no murmurs rubs or gallops  Pulmonary: Lungs are clear in all lung fields. No wheezing. No Rales. No tightness. Abdomen: Soft and nontender. Negative hepatosplenomegaly. Bowel sounds are active  Extremities: Moving all extremities. No calf tenderness. Peripheral pulses all intact  Skin: No skin lesions. No rashes  Neurologic: Cranial nerves II through XII was grossly intact. Nonfocal neurological exam  Psychiatric: Patient is pleasant. Mood is appropriate.         DIAGNOSTIC RESULTS EKG (Per Emergency Physician):       RADIOLOGY (Per Emergency Physician): Interpretation per the Radiologist below, if available at the time of this note:  XR CHEST (2 VW)    Result Date: 5/22/2022  EXAMINATION: TWO XRAY VIEWS OF THE CHEST 5/22/2022 6:19 pm COMPARISON: 11/07/2021 and 02/04/2022 HISTORY: ORDERING SYSTEM PROVIDED HISTORY: sob TECHNOLOGIST PROVIDED HISTORY: Reason for exam:->sob Reason for Exam: PT. C/O SOB X 2 DAYS Relevant Medical/Surgical History: HX ASTHMA, HX COPD, HX EMPHYSEMA, HX HEP C, HX HTN, HX CHEST TUBE INSERTION, HX LUNG SURGERY FINDINGS: Lungs are mildly emphysematous. There is no acute confluent airspace disease, pneumothorax or pleural effusion. Heart size and configuration are normal.  Thoracic aorta is elongated and tortuous. No acute bone finding. Emphysema. No acute disease. ED BEDSIDE ULTRASOUND:   Performed by ED Physician - none    LABS:  Labs Reviewed - No data to display     All other labs were within normal range or not returned as of this dictation. Procedures      EMERGENCY DEPARTMENT COURSE and DIFFERENTIAL DIAGNOSIS/MDM:   Vitals:    Vitals:    05/22/22 1750 05/22/22 1821 05/22/22 1830   BP: (!) 146/90  130/79   Pulse: 76     Resp: 22 20    Temp: 98.3 °F (36.8 °C)     TempSrc: Oral     SpO2: 95% 99% 93%   Weight: 184 lb (83.5 kg)     Height: 5' 10\" (1.778 m)         Medications   ipratropium-albuterol (DUONEB) nebulizer solution 1 ampule (1 ampule Inhalation Given 5/22/22 1819)   methylPREDNISolone sodium (SOLU-MEDROL) injection 125 mg (125 mg IntraMUSCular Given 5/22/22 1819)       MDM. Patient is a 61-year-old COPD here with exacerbation. Patient's states been going on for couple days. Patient has been on steroids in the past.  Is not steroid-dependent. There has been no fever exam is reassuring. Patient will get be getting a DuoNeb treatments. Checking x-rays. No labs indicated at this point given a DuoNeb treatment.     REVAL: CRITICAL CARE TIME   Total CriticalCare time was 0 minutes, excluding separately reportable procedures. There was a high probability of clinically significant/life threatening deterioration in the patient's condition which required my urgent intervention. CONSULTS:  None    PROCEDURES:  Unless otherwise noted below, none     [unfilled]    FINAL IMPRESSION      1. COPD exacerbation ([address]          DISPOSITION/PLAN   DISPOSITION        PATIENT REFERRED TO:  Katie Allan MD  [address] [place] [address]  [phone]    Schedule an appointment as soon as possible for a visit in 1 week  If symptoms worsen      DISCHARGE MEDICATIONS:  Discharge Medication List as of 5/22/2022  6:43 PM      START taking these medications    Details   predniSONE (DELTASONE) 20 MG tablet Take 1 tablet by mouth daily for 5 days, Disp-5 tablet, R-0Print                (Please note:  Portions of this note were completed with a voice recognition program.Efforts were made to edit the dictations but occasionally words and phrases are mis-transcribed.)  Form v2016. J.5-cn    FERNIE RODGERS MD (electronically signed)  Emergency Medicine Provider       Jane Connolly MD  05/23/22 5043

## 2022-05-26 ENCOUNTER — OFFICE VISIT (OUTPATIENT)
Dept: ENT CLINIC | Age: 62
End: 2022-05-26
Payer: MEDICAID

## 2022-05-26 VITALS
DIASTOLIC BLOOD PRESSURE: 78 MMHG | WEIGHT: 191 LBS | OXYGEN SATURATION: 100 % | HEART RATE: 60 BPM | SYSTOLIC BLOOD PRESSURE: 117 MMHG | BODY MASS INDEX: 27.35 KG/M2 | RESPIRATION RATE: 16 BRPM | HEIGHT: 70 IN

## 2022-05-26 DIAGNOSIS — K13.70 ORAL LESION: Primary | ICD-10-CM

## 2022-05-26 PROCEDURE — 99213 OFFICE O/P EST LOW 20 MIN: CPT | Performed by: OTOLARYNGOLOGY

## 2022-05-26 NOTE — PROGRESS NOTES
[address] [facility] N[facility]K SURGERY  Follow up      Patient Name: Florence Bonilla  Medical Record Number:  [id number]  Primary Care Physician:  Geovanna Santo MD  Date of Consultation: 5/26/2022    Chief Complaint: Mouth lesion        Interval History  Patient is following up for her mouth lesion. I saw her in February of this year and she had had some thrush that resolved. She also has a prominent bite lines. Since I saw her she got some new dentures. She says she started having swelling on her lower jaw. They readjusted it and it helped with this. It is no longer hurting or bothering her. REVIEW OF SYSTEMS  As above    PHYSICAL EXAM  GENERAL: No Acute Distress, Alert and Oriented, no Hoarseness, strong voice  EYES: EOMI, Anti-icteric  HENT:   Head: Normocephalic and atraumatic. Face:  Symmetric, facial nerve intact, no sinus tenderness  Right Ear: Normal external ear, normal external auditory canal, intact tympanic membrane with normal mobility and aerated middle ear  Left Ear: Normal external ear, normal external auditory canal, intact tympanic membrane with normal mobility and aerated middle ear  Mouth/Oral Cavity: Patient is wanting to a posterior mandibular torus. The mucosa over top of it appears to be normal.  Oropharynx/Larynx:  normal oropharynx  Nose:Normal external nasal appearance. NECK: Normal range of motion, no thyromegaly, trachea is midline, no lymphadenopathy, no neck masses, no crepitus          ASSESSMENT/PLAN  1. Oral lesion  The area on her mandible that she is worried about is a mandibular torus. Likely her new dentures were irritating it, but now that they have been readjusted she is not having an issue. I told her that these are normal structures and nothing needs to be done about it.              I have performed a head and neck physical exam personally or was physically present during the key or critical portions of the service. This note was generated completely or in part utilizing Dragon dictation speech recognition software. Occasionally, words are mistranscribed and despite editing, the text may contain inaccuracies due to incorrect word recognition. If further clarification is needed please contact the office at [phone].

## 2022-06-10 ENCOUNTER — HOSPITAL ENCOUNTER (EMERGENCY)
Age: 62
Discharge: HOME | End: 2022-06-10
Attending: STUDENT IN AN ORGANIZED HEALTH CARE EDUCATION/TRAINING PROGRAM
Payer: MEDICAID

## 2022-06-10 ENCOUNTER — HOSPITAL ENCOUNTER (EMERGENCY)
Age: 62
Discharge: HOME | End: 2022-06-10
Attending: EMERGENCY MEDICINE
Payer: MEDICAID

## 2022-06-10 ENCOUNTER — APPOINTMENT (OUTPATIENT)
Dept: GENERAL RADIOLOGY | Age: 62
End: 2022-06-10
Payer: MEDICAID

## 2022-06-10 VITALS
DIASTOLIC BLOOD PRESSURE: 81 MMHG | BODY MASS INDEX: 28.16 KG/M2 | OXYGEN SATURATION: 96 % | SYSTOLIC BLOOD PRESSURE: 141 MMHG | RESPIRATION RATE: 16 BRPM | HEART RATE: 88 BPM | WEIGHT: 190.7 LBS | TEMPERATURE: 98.4 F

## 2022-06-10 VITALS
HEIGHT: 69 IN | TEMPERATURE: 98.1 F | BODY MASS INDEX: 28.44 KG/M2 | WEIGHT: 192.02 LBS | OXYGEN SATURATION: 96 % | SYSTOLIC BLOOD PRESSURE: 156 MMHG | RESPIRATION RATE: 16 BRPM | HEART RATE: 89 BPM | DIASTOLIC BLOOD PRESSURE: 93 MMHG

## 2022-06-10 DIAGNOSIS — K14.8 HEMORRHAGE OF TONGUE: Primary | ICD-10-CM

## 2022-06-10 DIAGNOSIS — B37.0 THRUSH: ICD-10-CM

## 2022-06-10 DIAGNOSIS — K14.8 TONGUE LESION: ICD-10-CM

## 2022-06-10 DIAGNOSIS — K14.8 TONGUE LESION: Primary | ICD-10-CM

## 2022-06-10 DIAGNOSIS — R06.02 SHORTNESS OF BREATH: ICD-10-CM

## 2022-06-10 PROCEDURE — 6370000000 HC RX 637 (ALT 250 FOR IP): Performed by: STUDENT IN AN ORGANIZED HEALTH CARE EDUCATION/TRAINING PROGRAM

## 2022-06-10 PROCEDURE — 99283 EMERGENCY DEPT VISIT LOW MDM: CPT

## 2022-06-10 PROCEDURE — 12011 RPR F/E/E/N/L/M 2.5 CM/<: CPT

## 2022-06-10 PROCEDURE — 6370000000 HC RX 637 (ALT 250 FOR IP): Performed by: NURSE PRACTITIONER

## 2022-06-10 PROCEDURE — 2500000003 HC RX 250 WO HCPCS: Performed by: NURSE PRACTITIONER

## 2022-06-10 PROCEDURE — 71046 X-RAY EXAM CHEST 2 VIEWS: CPT

## 2022-06-10 RX ORDER — ACETAMINOPHEN 500 MG
1000 TABLET ORAL ONCE
Status: COMPLETED | OUTPATIENT
Start: 2022-06-10 | End: 2022-06-10

## 2022-06-10 RX ORDER — LORAZEPAM 0.5 MG/1
0.5 TABLET ORAL ONCE
Status: COMPLETED | OUTPATIENT
Start: 2022-06-10 | End: 2022-06-10

## 2022-06-10 RX ORDER — RISPERIDONE 1 MG/1
TABLET, FILM COATED ORAL
COMMUNITY
Start: 2022-05-09 | End: 2022-07-18 | Stop reason: ALTCHOICE

## 2022-06-10 RX ORDER — DUPILUMAB 200 MG/1.14ML
INJECTION, SOLUTION SUBCUTANEOUS
COMMUNITY
Start: 2020-02-04 | End: 2022-07-18 | Stop reason: ALTCHOICE

## 2022-06-10 RX ORDER — LIDOCAINE HYDROCHLORIDE AND EPINEPHRINE 10; 10 MG/ML; UG/ML
20 INJECTION, SOLUTION INFILTRATION; PERINEURAL ONCE
Status: COMPLETED | OUTPATIENT
Start: 2022-06-10 | End: 2022-06-10

## 2022-06-10 RX ORDER — HYDROCHLOROTHIAZIDE 25 MG/1
TABLET ORAL
COMMUNITY
Start: 2022-06-08 | End: 2022-07-18 | Stop reason: ALTCHOICE

## 2022-06-10 RX ORDER — AMLODIPINE BESYLATE 5 MG/1
TABLET ORAL
COMMUNITY
Start: 2022-06-01 | End: 2022-06-10

## 2022-06-10 RX ORDER — FLUCONAZOLE 100 MG/1
TABLET ORAL
Qty: 11 TABLET | Refills: 0 | Status: SHIPPED | OUTPATIENT
Start: 2022-06-11 | End: 2022-06-21

## 2022-06-10 RX ORDER — LORAZEPAM 1 MG/1
2 TABLET ORAL ONCE
Status: COMPLETED | OUTPATIENT
Start: 2022-06-10 | End: 2022-06-10

## 2022-06-10 RX ORDER — UMECLIDINIUM 62.5 UG/1
AEROSOL, POWDER ORAL
COMMUNITY
End: 2022-06-10

## 2022-06-10 RX ORDER — LIDOCAINE HYDROCHLORIDE 20 MG/ML
15 SOLUTION OROPHARYNGEAL ONCE
Status: COMPLETED | OUTPATIENT
Start: 2022-06-10 | End: 2022-06-10

## 2022-06-10 RX ORDER — DIPHENHYDRAMINE HYDROCHLORIDE 25 MG/1
CAPSULE ORAL
COMMUNITY
Start: 2022-05-09 | End: 2022-07-18 | Stop reason: ALTCHOICE

## 2022-06-10 RX ORDER — PREDNISONE 20 MG/1
TABLET ORAL
COMMUNITY
End: 2022-07-18 | Stop reason: ALTCHOICE

## 2022-06-10 RX ORDER — LANOLIN ALCOHOL/MO/W.PET/CERES
CREAM (GRAM) TOPICAL
Status: ON HOLD | COMMUNITY
Start: 2022-05-09

## 2022-06-10 RX ADMIN — LIDOCAINE HYDROCHLORIDE,EPINEPHRINE BITARTRATE 20 ML: 10; .01 INJECTION, SOLUTION INFILTRATION; PERINEURAL at 09:29

## 2022-06-10 RX ADMIN — LIDOCAINE HYDROCHLORIDE 15 ML: 20 SOLUTION ORAL; TOPICAL at 10:51

## 2022-06-10 RX ADMIN — LORAZEPAM 2 MG: 1 TABLET ORAL at 07:19

## 2022-06-10 RX ADMIN — ACETAMINOPHEN 1000 MG: 500 TABLET ORAL at 10:50

## 2022-06-10 RX ADMIN — LORAZEPAM 0.5 MG: 0.5 TABLET ORAL at 11:07

## 2022-06-10 ASSESSMENT — PAIN - FUNCTIONAL ASSESSMENT
PAIN_FUNCTIONAL_ASSESSMENT: NONE - DENIES PAIN
PAIN_FUNCTIONAL_ASSESSMENT: 0-10
PAIN_FUNCTIONAL_ASSESSMENT: 0-10
PAIN_FUNCTIONAL_ASSESSMENT: NONE - DENIES PAIN
PAIN_FUNCTIONAL_ASSESSMENT: NONE - DENIES PAIN

## 2022-06-10 ASSESSMENT — LIFESTYLE VARIABLES: HOW OFTEN DO YOU HAVE A DRINK CONTAINING ALCOHOL: NEVER

## 2022-06-10 ASSESSMENT — ENCOUNTER SYMPTOMS
RESPIRATORY NEGATIVE: 1
EYE PAIN: 0
WHEEZING: 0
CHEST TIGHTNESS: 0
BACK PAIN: 0
NAUSEA: 0
ABDOMINAL PAIN: 0
SHORTNESS OF BREATH: 0
VOMITING: 0
SORE THROAT: 0
COUGH: 0
TROUBLE SWALLOWING: 0
DIARRHEA: 0
VOICE CHANGE: 0

## 2022-06-10 ASSESSMENT — PAIN DESCRIPTION - FREQUENCY: FREQUENCY: INTERMITTENT

## 2022-06-10 ASSESSMENT — PAIN DESCRIPTION - LOCATION
LOCATION: OTHER (COMMENT)
LOCATION: OTHER (COMMENT)

## 2022-06-10 ASSESSMENT — PAIN SCALES - GENERAL
PAINLEVEL_OUTOF10: 4
PAINLEVEL_OUTOF10: 7

## 2022-06-10 ASSESSMENT — PAIN DESCRIPTION - PAIN TYPE: TYPE: ACUTE PAIN

## 2022-06-10 ASSESSMENT — PAIN DESCRIPTION - DESCRIPTORS: DESCRIPTORS: THROBBING

## 2022-06-10 NOTE — ED PROVIDER NOTES
[facility] ENCOUNTER      Pt Name: Lizy Hernandez  MRN: [id number]  [place] 1960  Date of evaluation: 6/10/2022  Provider: Betzaida Ivy MD    CHIEF COMPLAINT       Chief Complaint   Patient presents with    Shortness of Breath     Pt appears very anxious states she is still SOB. Pt states she was here recently for same and placed on steroids. She was tapered down and off last week. She states she lasted 2 days then called her PMD to restart her steroids which she did 2 days ago, but pt states she still feels SOB. Pt states she is taking breathing txs every 2 hours. Pt also has an area in center of tongue that is bleeding. SOB    HISTORY OF PRESENT ILLNESS   (Location/Symptom, Timing/Onset,Context/Setting, Quality, Duration, Modifying Factors, Severity)  Note limiting factors. Lizy Hernandez is a 63 y.o. female who presents to the ED with a chief complaint of shortness of breath and tongue bleeding. States she was steroids for bronchitis and still feels short of breath. Improves with breathing txs, states taking every 2 hours. Main concern is tongue bleeding, states has lesion to the middle of her tongue that started bleeding, moderately severe, not associated with pain, follows with ENT for the lesion and has had h/o candidal infections in the past, states similar to the start of prior candidal infections. Denies chest pain, sore throat, nausea, vomiting, diarrhea. Symptoms not otherwise alleviated or exacerbated by other factors. NursingNotes were reviewed. REVIEW OF SYSTEMS    (2-9 systems for level 4, 10 or more for level 5)     Review of Systems   Constitutional: Negative for chills and fever. HENT: Negative for congestion and sore throat. Tongue bleeding   Eyes: Negative for pain and visual disturbance. Respiratory: Positive for shortness of breath. Negative for cough.     Cardiovascular: Negative for chest pain and palpitations. Gastrointestinal: Negative for abdominal pain, diarrhea, nausea and vomiting. Genitourinary: Negative for dysuria and frequency. Musculoskeletal: Negative for back pain and neck pain. Skin: Negative for rash and wound. Neurological: Negative for dizziness, weakness and light-headedness. PAST MEDICAL HISTORY     Past Medical History:   Diagnosis Date    Asthma     [facility]     CKD (chronic kidney disease)     stage 3    COPD (chronic obstructive pulmonary disease) ([facility]     Empyema (HCC)     Hepatitis C     Patient denies     History of heroin use     Hypertension     Narcotic abuse ([facility]     heroin, clean 1 year    UTI (lower urinary tract infection)          SURGICALHISTORY       Past Surgical History:   Procedure Laterality Date    CHEST TUBE INSERTION      COLONOSCOPY  10/23/2018    Colonoscopy with polypectomy (cold snare), polypectomy (cold biopsy)    COLONOSCOPY N/A 10/23/2018    COLONOSCOPY POLYPECTOMY SNARE/COLD BIOPSY performed by Sylvester Ryan MD at [address] [place] [address] Left 12/16/2016    INTRACAPSULAR CATARACT EXTRACTION Right 2/21/2020    PHACOEMULSIFICATION WITH INTRAOCULAR LENS IMPLANT performed by Marquis Lamas MD at [address] [place] [address] EXTRACTION Left 2/28/2020    PHACOEMULSIFICATION WITH INTRAOCULAR LENS IMPLANT performed by Marquis Lamas MD at [address] [place] [address] Right     26 yrs for lung infection     OTHER SURGICAL HISTORY  12/15/2016    EDMOND BUNIONECTOMY WITH APPLICATION OF AMNIOX LEFT FOOT         CURRENT MEDICATIONS       Previous Medications    ACETAMINOPHEN (APAP EXTRA STRENGTH) 500 MG TABLET    Take 2 tablets by mouth every 6 hours as needed for Pain DO NOT TAKE WITH OTHER MEDICATIONS CONTAINING ACETAMINOPHEN.     ALBUTEROL (PROVENTIL) (2.5 MG/3ML) 0.083% NEBULIZER SOLUTION    Take 3 mLs by nebulization every 6 hours as needed for Wheezing    ALBUTEROL SULFATE HFA (PROAIR HFA) 108 (90 BASE) MCG/ACT INHALER    Inhale 2 puffs into the lungs every 6 hours as needed for Wheezing    AMLODIPINE (NORVASC) 10 MG TABLET    Take 1 tablet by mouth daily. BANOPHEN 25 MG CAPSULE    TAKE 1 CAPSULE BY MOUTH THREE TIMES A DAY AS NEEDED FOR ITCHING    BROMPHENIRAMINE-PSEUDOEPHEDRINE-DM (BROMFED DM) 2-30-10 MG/5ML SYRUP    Take 5 mLs by mouth 4 times daily as needed for Cough    BUDESONIDE-FORMOTEROL (SYMBICORT) 160-4.5 MCG/ACT AERO    Inhale 2 puffs into the lungs 2 times daily    DIPHENHYDRAMINE (BENADRYL) 25 MG TABLET    Take 25 mg by mouth every 8 hours as needed for Itching or Allergies    DUPILUMAB (DUPIXENT) 200 MG/1. 14ML SOSY INJECTION    Dupixent 200 mg/1.14 mL subcutaneous syringe    HYDROCHLOROTHIAZIDE (HYDRODIURIL) 25 MG TABLET    TAKE 1 TABLET BY MOUTH ONCE DAILY    IPRATROPIUM-ALBUTEROL (DUONEB) 0.5-2.5 (3) MG/3ML SOLN NEBULIZER SOLUTION    Inhale 3 mLs into the lungs every 4 hours    MELATONIN 3 MG TABS TABLET    Take 3 mg by mouth nightly as needed    METHADONE HCL PO    Take 90 mg by mouth daily     MONTELUKAST (SINGULAIR) 10 MG TABLET    Take 1 tablet by mouth nightly    PREDNISONE (DELTASONE) 20 MG TABLET    prednisone 20 mg tablet   TAKE 2 TABLETS BY MOUTH DAILY FOR 4 DAYS, THEN 1 1/2 TABLETS DAILY FOR 4 DAYS, 1 TABLET DAILY FOR 4 DAYS, 1/2 TABLET DAILY UNTIL ALL TAKEN    RISPERIDONE (RISPERDAL) 1 MG TABLET    TAKE ONE TABLET BY MOUTH DAILY AT BEDTIME    SKIN PROTECTANTS, MISC.  (MINERIN CREME) CREA    APPLY TO AFFECTED AREA(S) AS NEEDED    UMECLIDINIUM BROMIDE 62.5 MCG/INH AEPB    Inhale 1 puff into the lungs daily       ALLERGIES     Acetylcysteine, Codeine, Levaquin [levofloxacin in d5w], Penicillins, Dupixent [dupilumab], Escitalopram, and Ibuprofen    FAMILY HISTORY       Family History   Problem Relation Age of Onset    Cancer Mother     Kidney Disease Mother     Heart Disease Mother           SOCIAL HISTORY       Social History     Socioeconomic History    Marital status: Life Partner     Spouse name: None    Number of children: None    Years of education: None    Highest education level: None   Occupational History    None   Tobacco Use    Smoking status: Former Smoker     Packs/day: 0.00     Years: 45.00     Pack years: 0.00     Types: Cigarettes     Quit date: 2016     Years since quittin.9    Smokeless tobacco: Never Used   Vaping Use    Vaping Use: Never used   Substance and Sexual Activity    Alcohol use: No    Drug use: No     Comment: past h/o heroin on methadone    Sexual activity: Not Currently   Other Topics Concern    None   Social History Narrative    None     Social Determinants of Health     Financial Resource Strain:     Difficulty of Paying Living Expenses: Not on file   Food Insecurity:     Worried About Running Out of Food in the Last Year: Not on file    Cayla of Food in the Last Year: Not on file   Transportation Needs:     Lack of Transportation (Medical): Not on file    Lack of Transportation (Non-Medical):  Not on file   Physical Activity:     Days of Exercise per Week: Not on file    Minutes of Exercise per Session: Not on file   Stress:     Feeling of Stress : Not on file   Social Connections:     Frequency of Communication with Friends and Family: Not on file    Frequency of Social Gatherings with Friends and Family: Not on file    Attends [employer] Services: Not on file    Active Member of [address] [place] [address] or Organizations: Not on file    Attends Club or Organization Meetings: Not on file    Marital Status: Not on file   Intimate Partner Violence:     Fear of Current or Ex-Partner: Not on file    Emotionally Abused: Not on file    Physically Abused: Not on file    Sexually Abused: Not on file   Housing Stability:     Unable to Pay for Housing in the Last Year: Not on file    Number of [place] in the Last Year: Not on file    Unstable Housing in the Last Year: Not on file       SCREENINGS    Rambo Coma Scale  Eye Opening: Spontaneous  Best Verbal Response: Oriented  Best Motor Response: Obeys commands  Rambo Coma Scale Score: 15        PHYSICAL EXAM    (up to 7 for level 4, 8 or more for level 5)     ED Triage Vitals [06/10/22 0524]   BP Temp Temp Source Heart Rate Resp SpO2 Height Weight   (!) 156/93 98.1 °F (36.7 °C) Oral 89 16 96 % 5' 9\" (1.753 m) 192 lb 0.3 oz (87.1 kg)       General: Alert and oriented appropriately for age, No acute distress. Eye: Normal conjunctiva. Sclera anicteric. HENT: Oral mucosa is moist.  Small right middle tongue contusion/violaceous lesion with small amt venous oozing. Respiratory: Respirations even and non-labored. CTAB. No increased work of breathing, no dysphonia, no stridor. Cardiovascular: Normal rate, Regular rhythm. Intact peripheral pulses. Gastrointestinal: Soft, Non-tender, Non-distended. : deferred. Musculoskeletal: No swelling. Integumentary: Warm, Dry. Neurologic: Alert and appropriate for age. No focal deficits. Psychiatric: Cooperative. DIAGNOSTIC RESULTS     RADIOLOGY:   Non-plain filmimages such as CT, Ultrasound and MRI are read by the radiologist. Plain radiographic images are visualized and preliminarily interpreted by the emergency physician with the below findings:      Interpretation per the Radiologist below, if available at the time ofthis note:    XR CHEST (2 VW)   Final Result   1. No acute cardiopulmonary disease.                  EMERGENCY DEPARTMENT COURSE and DIFFERENTIAL DIAGNOSIS/MDM:   Vitals:    Vitals:    06/10/22 0524   BP: (!) 156/93   Pulse: 89   Resp: 16   Temp: 98.1 °F (36.7 °C)   TempSrc: Oral   SpO2: 96%   Weight: 192 lb 0.3 oz (87.1 kg)   Height: 5' 9\" (1.753 m)         Medical decision makin yo F with sensation of SOB, is normoxic, no increased WOB, no auscultatory findings on exam to suggest obstructive lung process, pt is visibly anxious regarding her tongue bleeding and this is contributing to her feelings of SOB, given Ativan to resolution of SOB, remains HDS, normoxic, tongue bleeding managed with one absorbable suture after lingual block placed on the right side of the mouth, pt became hemostatic, tolerated well. No longer with bleeding or SOB, encouraged close ENT follow up. CXR negative for acute process. Covered for thrush as pt states has had bleeding when she has had thrush come on in the past.  Given d/c instructions and return precautions, pt voices understanding. D/c home, ambulated steadily from the ED. Medications   LORazepam (ATIVAN) tablet 2 mg (2 mg Oral Given 6/10/22 0719)             PROCEDURES:  Lac Repair    Date/Time: 6/12/2022 8:12 PM  Performed by: Tan Khan MD  Authorized by: Tan Khan MD     Consent:     Consent obtained:  Verbal    Consent given by:  Patient    Risks discussed:  Pain, infection and need for additional repair    Alternatives discussed:  No treatment and referral  Anesthesia (see MAR for exact dosages): Anesthesia method:  Nerve block    Block location:  R lingual    Block needle gauge:  27 G    Block anesthetic:  Lidocaine 1% w/o epi    Block technique:  R lingual nerve block placed just inside the R mandibular molars    Block injection procedure:  Anatomic landmarks identified, introduced needle, negative aspiration for blood, incremental injection and anatomic landmarks palpated    Block outcome:  Anesthesia achieved  Laceration details:     Location:  Mouth    Mouth location:  Tongue, anterior 2/3    Length (cm):  0.5    Depth (mm):  1  Repair type:     Repair type:  Simple  Exploration:     Hemostasis achieved with:  Direct pressure and tied off vessels  Mucous membrane repair:     Suture size:  4-0    Suture material:  Vicryl    Suture technique:  Simple interrupted    Number of sutures:  1  Approximation:     Approximation:  Close  Post-procedure details:     Dressing:  Open (no dressing)    Patient tolerance of procedure:   Tolerated well, no immediate complications          FINAL IMPRESSION      1. Hemorrhage of tongue    2. Shortness of breath    3. Tongue lesion    4. Thrush          DISPOSITION/PLAN   DISPOSITION Discharge - Pending Orders Complete 06/10/2022 06:58:24 AM      PATIENT REFERRED TO:  Katie Allan MD  [address] [facility] [address] [facility]  [phone]    Call on 6/10/2022      GIANNI Muhammad [facility]  [address] [address]  [address] [facility]  [phone]    Call today  to schedule an appointment for follow up as soon as possible      DISCHARGE MEDICATIONS:  New Prescriptions    FLUCONAZOLE (DIFLUCAN) 100 MG TABLET    Take 2 tablets by mouth daily for 1 day, THEN 1 tablet daily for 9 days.           (Please note that portions of this note were completed with a voice recognition program.Efforts were made to edit the dictations but occasionally words are mis-transcribed.)    Liliana Amaya MD (electronically signed)  Attending Emergency Physician          Liliana Amaya MD  06/12/22 2015

## 2022-06-10 NOTE — ED NOTES
Pt calls nurse in to inform that she is unable to get her  to come and get her. Unable to give pt med d/t driving self herPt wants to know if she can just get a script. EMD to bedside. Pt states then her daughter will come to get her.      Marino Cheatham RN  06/10/22 9212

## 2022-06-10 NOTE — ED PROVIDER NOTES
[address] [facility]        Pt Name: Josephine Montenegro  MRN: [id number]  [place] 1960  Date of evaluation: 6/10/2022  Provider: NATALIE Sutton - RYAN  PCP: Magdaleno Chapa MD  Note Started: 9:08 AM EDT        I have seen and evaluated this patient with my supervising physician Saira Barnhart MD.    [facility]       Chief Complaint   Patient presents with    Other     has tongue lesion, was seen at [address] [place] [address] @ 0500 this AM and has sutures placed on the tongue, over the past 4 days, noticed bleeding restarted 15 minutes after being discharged. has ENT MD     Shortness of Breath     has COPD.  just finished a steroid taper and noticed the SOB states almost two weeks. called her PCP and was given a 40 mg taper yesterday        HISTORY OF PRESENT ILLNESS   (Location, Timing/Onset, Context/Setting, Quality, Duration, Modifying Factors, Severity, Associated Signs and Symptoms)  Note limiting factors. Chief Complaint: Tongue bleeding    Josephine Montenegro is a 63 y.o. female who presents to the emergency department with main complaints of tongue bleeding. She states that the shortness of breath alleviated with a breathing treatment that she did at home prior to arrival.  The tongue bleeding has been ongoing since last night. She was just seen at [address] [place] [address] around 5 AM this morning. They had placed a dissolvable suture where the suspected bleeding was coming from. She was sent home as this did achieve hemostasis. She states that \"as soon as I got home I started bleeding again. \"  She states that she therefore came here to the [facility] emergency department. She has been seeing Dr. Shonda Blanchard on an outpatient basis with ENT for posterior mucosal lesions. She states that the color of the lesion has not changed since she last followed up with Dr. Shonda Blanchard about a month ago. However it spontaneously started bleeding last night.   She is not on any anticoagulation or antiplatelet medication. She is a smoker. No pharyngitis or feeling like her throat is closing. Again does not endorse any chest pain or shortness of breath currently. Came to the emergency department for further evaluation and treatment of the tongue bleeding only. Nursing Notes were all reviewed and agreed with or any disagreements were addressed in the HPI. REVIEW OF SYSTEMS    (2-9 systems for level 4, 10 or more for level 5)     Review of Systems   Constitutional: Negative for chills, fatigue and fever. HENT: Positive for mouth sores. Negative for congestion, sore throat, trouble swallowing and voice change. Eyes: Negative for pain and visual disturbance. Respiratory: Negative. Negative for cough, chest tightness, shortness of breath and wheezing. Cardiovascular: Negative. Negative for chest pain and leg swelling. Gastrointestinal: Negative for abdominal pain, diarrhea, nausea and vomiting. Genitourinary: Negative for dysuria and hematuria. Musculoskeletal: Negative for back pain and neck pain. Skin: Negative for rash and wound. Neurological: Negative for dizziness and light-headedness. All other systems reviewed and are negative. Positives and Pertinent negatives as per HPI. Except as noted above in the ROS, all other systems were reviewed and negative.        PAST MEDICAL HISTORY     Past Medical History:   Diagnosis Date    Asthma     [facility] disorder [facility]     CKD (chronic kidney disease)     stage 3    COPD ([facility]ronic obstructive pulmonary disease) ([facility]     Empyema ([facility]     Hepatitis C     Patient denies     History of heroin use     Hypertension     Narcotic abuse ([facility]     heroin, clean 1 year    UTI (lower urinary tract infection)          SURGICAL HISTORY     Past Surgical History:   Procedure Laterality Date    CHEST TUBE INSERTION      COLONOSCOPY  10/23/2018    Colonoscopy with polypectomy (cold snare), polypectomy (cold biopsy)    COLONOSCOPY N/A 10/23/2018    COLONOSCOPY POLYPECTOMY SNARE/COLD BIOPSY performed by Ramone Easton MD at [address] Left 12/16/2016    INTRACAPSULAR CATARACT EXTRACTION Right 2/21/2020    PHACOEMULSIFICATION WITH INTRAOCULAR LENS IMPLANT performed by Eleuterio Loo MD at [address] [facility] EXTRACTION Left 2/28/2020    PHACOEMULSIFICATION WITH INTRAOCULAR LENS IMPLANT performed by Eleuterio Loo MD at [address] [place] [address] [address] Right     26 yrs for lung infection     OTHER SURGICAL HISTORY  12/15/2016    EDMOND BUNIONECTOMY WITH APPLICATION OF AMNIOX LEFT FOOT         [address]       Discharge Medication List as of 6/10/2022 10:40 AM      CONTINUE these medications which have NOT CHANGED    Details   Dupilumab (DUPIXENT) 200 MG/1. 14ML SOSY injection Dupixent 200 mg/1.14 mL subcutaneous syringeHistorical Med      hydroCHLOROthiazide (HYDRODIURIL) 25 MG tablet TAKE 1 TABLET BY MOUTH ONCE DAILYHistorical Med      predniSONE (DELTASONE) 20 MG tablet prednisone 20 mg tablet   TAKE 2 TABLETS BY MOUTH DAILY FOR 4 DAYS, THEN 1 1/2 TABLETS DAILY FOR 4 DAYS, 1 TABLET DAILY FOR 4 DAYS, 1/2 TABLET DAILY UNTIL ALL TAKENHistorical Med      risperiDONE (RISPERDAL) 1 MG tablet TAKE ONE TABLET BY MOUTH DAILY AT BEDTIMEHistorical Med      Skin Protectants, Misc. (MINERIN CREME) CREA APPLY TO AFFECTED AREA(S) AS NEEDEDHistorical Med      BANOPHEN 25 MG capsule TAKE 1 CAPSULE BY MOUTH THREE TIMES A DAY AS NEEDED FOR ITCHING, DAWHistorical Med      fluconazole (DIFLUCAN) 100 MG tablet Take 2 tablets by mouth daily for 1 day, THEN 1 tablet daily for 9 days. , Disp-11 tablet, R-0Normal      melatonin 3 MG TABS tablet Take 3 mg by mouth nightly as neededHistorical Med      montelukast (SINGULAIR) 10 MG tablet Take 1 tablet by mouth nightly, Disp-90 tablet, R-1Normal      ipratropium-albuterol (DUONEB) 0.5-2.5 (3) MG/3ML SOLN nebulizer solution Inhale 3 mLs into the lungs every 4 hours, Disp-360 mL, R-11Print      brompheniramine-pseudoephedrine-DM (BROMFED DM) 2-30-10 MG/5ML syrup Take 5 mLs by mouth 4 times daily as needed for Cough, Disp-118 mL, R-0Normal      Umeclidinium Bromide 62.5 MCG/INH AEPB Inhale 1 puff into the lungs daily, Disp-1 each, R-11Normal      albuterol (PROVENTIL) (2.5 MG/3ML) 0.083% nebulizer solution Take 3 mLs by nebulization every 6 hours as needed for Wheezing, Disp-120 each, R-11Normal      budesonide-formoterol (SYMBICORT) 160-4.5 MCG/ACT AERO Inhale 2 puffs into the lungs 2 times daily, Disp-1 each, R-11Normal      albuterol sulfate HFA (PROAIR HFA) 108 (90 Base) MCG/ACT inhaler Inhale 2 puffs into the lungs every 6 hours as needed for Wheezing, Disp-1 Inhaler, R-3Normal      acetaminophen (APAP EXTRA STRENGTH) 500 MG tablet Take 2 tablets by mouth every 6 hours as needed for Pain DO NOT TAKE WITH OTHER MEDICATIONS CONTAINING ACETAMINOPHEN., Disp-30 tablet, R-0Print      METHADONE HCL PO Take 90 mg by mouth daily Historical Med      diphenhydrAMINE (BENADRYL) 25 MG tablet Take 25 mg by mouth every 8 hours as needed for Itching or Allergies      amLODIPine (NORVASC) 10 MG tablet Take 1 tablet by mouth daily. , Disp-30 tablet, R-3               ALLERGIES     Acetylcysteine, Codeine, Levaquin [levofloxacin in d5w], Penicillins, Dupixent [dupilumab], Escitalopram, and Ibuprofen    FAMILYHISTORY       Family History   Problem Relation Age of Onset    Cancer Mother     Kidney Disease Mother     Heart Disease Mother           SOCIAL HISTORY       Social History     Tobacco Use    Smoking status: Former Smoker     Packs/day: 0.00     Years: 45.00     Pack years: 0.00     Types: Cigarettes     Quit date: 2016     Years since quittin.9    Smokeless tobacco: Never Used   Vaping Use    Vaping Use: Never used   Substance Use Topics    Alcohol use: No    Drug use: No     Comment: past h/o heroin on methadone       SCREENINGS    Rambo No discharge. Extraocular Movements: Extraocular movements intact. Conjunctiva/sclera: Conjunctivae normal.   Neck:      Trachea: Phonation normal. No abnormal tracheal secretions or tracheal deviation. Comments: No meningismus   Cardiovascular:      Rate and Rhythm: Normal rate and regular rhythm. Pulses: Normal pulses. Heart sounds: Normal heart sounds. No murmur heard. No friction rub. No gallop. Pulmonary:      Effort: Pulmonary effort is normal. No respiratory distress. Breath sounds: Normal breath sounds. No stridor. No wheezing, rhonchi or rales. Musculoskeletal:         General: Normal range of motion. Cervical back: Full passive range of motion without pain, normal range of motion and neck supple. No rigidity. No spinous process tenderness or muscular tenderness. Lymphadenopathy:      Cervical: No cervical adenopathy. Skin:     General: Skin is warm and dry. Capillary Refill: Capillary refill takes less than 2 seconds. Neurological:      General: No focal deficit present. Mental Status: She is alert and oriented to person, place, and time. GCS: GCS eye subscore is 4. GCS verbal subscore is 5. GCS motor subscore is 6. Sensory: Sensation is intact. Motor: Motor function is intact. Gait: Gait is intact. Psychiatric:         Mood and Affect: Mood is anxious. DIAGNOSTIC RESULTS   LABS:    Labs Reviewed - No data to display    When ordered only abnormal lab results are displayed. All other labs were within normal range or not returned as of this dictation. EKG: When ordered, EKG's are interpreted by the Emergency Department Physician in the absence of a cardiologist.  Please see their note for interpretation of EKG.     RADIOLOGY:   Non-plain film images such as CT, Ultrasound and MRI are read by the radiologist. Plain radiographic images are visualized and preliminarily interpreted by the ED Provider with the below findings:        Interpretation per the Radiologist below, if available at the time of this note:    No orders to display     XR CHEST (2 VW)    Result Date: 6/10/2022  EXAMINATION: TWO XRAY VIEWS OF THE CHEST 6/10/2022 6:11 am COMPARISON: May 22, 2022 HISTORY: ORDERING SYSTEM PROVIDED HISTORY: shortness of breath TECHNOLOGIST PROVIDED HISTORY: Reason for exam:->shortness of breath Reason for Exam: SOB, patient is very anxious, says her mouth keeps bleeding Relevant Medical/Surgical History: Lung surgery, chest tube yrs ago due to lung infection, former drug abuse FINDINGS: No lines or tubes. Stable cardiomediastinal silhouette. Emphysematous changes are present. The lungs are clear without focal consolidation or pleural effusion. No suspicious pulmonary nodules. No pulmonary edema. No pneumothorax. No acute osseous abnormality. 1. No acute cardiopulmonary disease. PROCEDURES   Unless otherwise noted below, none     Lac Repair    Date/Time: 6/10/2022 11:37 AM  Performed by: NATALIE Presley - CNP  Authorized by: Marrion Pallas, MD     Consent:     Consent obtained:  Verbal    Consent given by:  Patient    Risks discussed:  Infection, pain, poor cosmetic result and need for additional repair    Alternatives discussed:  No treatment, delayed treatment, observation and referral  Anesthesia (see MAR for exact dosages):      Anesthesia method:  Local infiltration    Local anesthetic:  Lidocaine 1% WITH epi  Laceration details:     Location:  Mouth    Mouth location:  Tongue, anterior 2/3    Length (cm):  0.2  Repair type:     Repair type:  Simple  Exploration:     Hemostasis achieved with:  Epinephrine    Wound extent: no muscle damage noted and no vascular damage noted      Contaminated: no    Treatment:     Amount of cleaning:  Standard    Irrigation solution:  Sterile saline    Irrigation method:  Pressure wash  Skin repair:     Repair method:  Sutures    Suture size:  5-0    Suture material:  Fast-absorbing gut    Suture technique:  Simple interrupted    Number of sutures:  1  Approximation:     Approximation:  Close  Post-procedure details:     Dressing:  Open (no dressing)    Patient tolerance of procedure: Tolerated well, no immediate complications        CRITICAL CARE TIME   CRITICAL CARE NOTE:  There was a high probability of clinically significant life-threatening deterioration of the patient's condition requiring my urgent intervention. Total critical care time was at least 30 minutes. This includes vital sign monitoring, pulse oximetry monitoring, telemetry monitoring, clinical response to the IV medications, reviewing the nursing notes, consultation time, dictation/documentation time, and interpretation of the labwork. This excludes any separately billable procedures performed. CONSULTS:  ENT, Dr. Black Certain and DIFFERENTIAL DIAGNOSIS/MDM:   Vitals:    Vitals:    06/10/22 0832 06/10/22 1046 06/10/22 1110   BP: (!) 142/95 (!) 140/87 (!) 141/81   Pulse: 93 88 88   Resp: 17 16 16   Temp: 98.4 °F (36.9 °C) 98.4 °F (36.9 °C) 98.4 °F (36.9 °C)   TempSrc: Oral Oral Oral   SpO2: 96% 96%    Weight: 190 lb 11.2 oz (86.5 kg)         Patient was given the following medications:  Medications   lidocaine-EPINEPHrine 1 %-1:430703 injection 20 mL (20 mLs IntraDERmal Given 6/10/22 0929)   acetaminophen (TYLENOL) tablet 1,000 mg (1,000 mg Oral Given 6/10/22 1050)   lidocaine viscous hcl (XYLOCAINE) 2 % solution 15 mL (15 mLs Mouth/Throat Given 6/10/22 1051)   LORazepam (ATIVAN) tablet 0.5 mg (0.5 mg Oral Given 6/10/22 1107)             MDM: See HPI and above for full presentation physical exam.  Originally there was no oozing from a mucosal laceration. However when me and my attending went to reevaluate her there was a slow ooze. We did put pressure which did alleviate the oozing for a short period of time. However the bleeding did return.   Therefore the decision was made to numb the area with lidocaine and epinephrine and put in a suture to the point of bleeding which was visualized. See above after the procedure. Patient tolerated well with no immediate complications. She did verbalize that her tongue was throbbing after she was watched for short period of time after the suture was placed to make sure hemostasis was achieved for a significant portion of time and that the bleeding/slow ooze did not return. I spoke to Dr. Edna Leo over the phone, he can follow-up with the patient on Monday, a lot of her symptoms are likely secondary to her biting her tongue, he did notice that she was doing that in his office and she is very anxious about everything in general.  I did give her some Ativan here to help with her anxiety but she needs to follow-up on an outpatient basis with her PCP regarding her anxiety. She verbalized understanding of strict return parameters, has no further questions or concerns. Remained afebrile and hemodynamically stable with stable hemostasis of her tongue bleed throughout her entire ED course    FINAL IMPRESSION      1.  Tongue lesion          DISPOSITION/PLAN   DISPOSITION Decision To Discharge 06/10/2022 09:59:30 AM      PATIENT REFERRED TO:  Lazarus Mattock, [address]  [address] [address] [address]  [phone]    Schedule an appointment as soon as possible for a visit in 3 days      [facility] Emergency Department  [address] [address]  [phone]  Go to   As needed, If symptoms worsen      DISCHARGE MEDICATIONS:  Discharge Medication List as of 6/10/2022 10:40 AM          DISCONTINUED MEDICATIONS:  Discharge Medication List as of 6/10/2022 10:40 AM                 (Please note that portions of this note were completed with a voice recognition program.  Efforts were made to edit the dictations but occasionally words are mis-transcribed.)    Elpidio Edmondson, APRN - CNP (electronically signed)            Roly Boo, NATALIE - CNP  06/10/22 1145

## 2022-06-12 ASSESSMENT — ENCOUNTER SYMPTOMS
BACK PAIN: 0
COUGH: 0
VOMITING: 0
NAUSEA: 0
DIARRHEA: 0
SORE THROAT: 0
EYE PAIN: 0
ABDOMINAL PAIN: 0
SHORTNESS OF BREATH: 1

## 2022-06-13 ENCOUNTER — OFFICE VISIT (OUTPATIENT)
Dept: ENT CLINIC | Age: 62
End: 2022-06-13
Payer: MEDICAID

## 2022-06-13 VITALS
DIASTOLIC BLOOD PRESSURE: 83 MMHG | BODY MASS INDEX: 28.14 KG/M2 | WEIGHT: 190 LBS | HEART RATE: 87 BPM | SYSTOLIC BLOOD PRESSURE: 133 MMHG | HEIGHT: 69 IN | RESPIRATION RATE: 16 BRPM

## 2022-06-13 DIAGNOSIS — K14.8 TONGUE LESION: ICD-10-CM

## 2022-06-13 DIAGNOSIS — K13.79 ORAL BLEEDING: Primary | ICD-10-CM

## 2022-06-13 PROCEDURE — 99214 OFFICE O/P EST MOD 30 MIN: CPT | Performed by: OTOLARYNGOLOGY

## 2022-06-13 NOTE — PROGRESS NOTES
[facility]- HEAD & NECK SURGERY  Follow up      Patient Name: Juli Roberts  Medical Record Number:  [id number]  Primary Care Physician:  Tomas Bassett MD  Date of Consultation: 6/13/2022    Chief Complaint: Oral bleeding        Interval History    Patient started having bleeding from her tongue on Karla 10. She developed have stitches placed in the emergency room to control it. I actually seen her for some mouth lesions in the past that was mostly just bite lines from her dentures. We really had not done anything with her tongue. She denies any other symptoms. Is not on blood thinners          REVIEW OF SYSTEMS  As above    PHYSICAL EXAM  GENERAL: No Acute Distress, Alert and Oriented, no Hoarseness, strong voice  EYES: EOMI, Anti-icteric  HENT:   Head: Normocephalic and atraumatic. Face:  Symmetric, facial nerve intact, no sinus tenderness  Right Ear: Normal external ear  Left Ear: Normal external ear  Mouth/Oral Cavity: She does have some swelling of the right mid tongue. There is a 2 to 3 mm purple area consistent with a small lymphovascular malformation. Oropharynx/Larynx:  normal oropharynx,  Nose:Normal external nasal appearance. NECK: Normal range of motion, no thyromegaly, trachea is midline, no lymphadenopathy, no neck masses, no crepitus    ASSEMENT/PLAN    1. Oral bleeding  The patient appears to have a small hemangioma or lymphatic malformation on the tongue. I suspect this is what was bleeding. Is not overly concerning for a cancer. There could however be a larger lesion deep to the mucosa. I would get a CT scan for evaluation. If the CT scan does not suggest a large lesion I think we could probably remove this in clinic. If there is any concern for a larger lesion I probably need to do in the operating room. 2. Tongue lesion  Some sort of lymphovascular malformation.   Very small based on exam, but going get a CT scan just to make sure there is not more hiding under the mucosa. - CT SOFT TISSUE NECK W CONTRAST; Future             I have performed a head and neck physical exam personally or was physically present during the key or critical portions of the service. This note was generated completely or in part utilizing Dragon dictation speech recognition software. Occasionally, words are mistranscribed and despite editing, the text may contain inaccuracies due to incorrect word recognition. If further clarification is needed please contact the office at [phone].

## 2022-06-21 ENCOUNTER — HOSPITAL ENCOUNTER (OUTPATIENT)
Dept: CT IMAGING | Age: 62
Discharge: HOME | End: 2022-06-21
Payer: MEDICAID

## 2022-06-21 DIAGNOSIS — K14.8 TONGUE LESION: ICD-10-CM

## 2022-06-21 DIAGNOSIS — J44.9 COPD WITH ASTHMA (HCC): ICD-10-CM

## 2022-06-21 DIAGNOSIS — R05.9 COUGH: ICD-10-CM

## 2022-06-21 LAB
CREAT SERPL-MCNC: 0.8 MG/DL (ref 0.6–1.1)
GFR AFRICAN AMERICAN: >60
GFR NON-AFRICAN AMERICAN: >60

## 2022-06-21 PROCEDURE — 6360000004 HC RX CONTRAST MEDICATION: Performed by: OTOLARYNGOLOGY

## 2022-06-21 PROCEDURE — 70491 CT SOFT TISSUE NECK W/DYE: CPT

## 2022-06-21 PROCEDURE — 36415 COLL VENOUS BLD VENIPUNCTURE: CPT

## 2022-06-21 PROCEDURE — 82565 ASSAY OF CREATININE: CPT

## 2022-06-21 RX ADMIN — IOPAMIDOL 100 ML: 755 INJECTION, SOLUTION INTRAVENOUS at 10:29

## 2022-06-22 ENCOUNTER — TELEPHONE (OUTPATIENT)
Dept: ENT CLINIC | Age: 62
End: 2022-06-22

## 2022-06-22 NOTE — TELEPHONE ENCOUNTER
I see the note where you said that you need to see her in office so you want me to schedule her appt?  Please advise

## 2022-06-23 ENCOUNTER — OFFICE VISIT (OUTPATIENT)
Dept: ENT CLINIC | Age: 62
End: 2022-06-23
Payer: MEDICAID

## 2022-06-23 VITALS
BODY MASS INDEX: 27.85 KG/M2 | SYSTOLIC BLOOD PRESSURE: 149 MMHG | HEIGHT: 69 IN | HEART RATE: 70 BPM | DIASTOLIC BLOOD PRESSURE: 91 MMHG | WEIGHT: 188 LBS

## 2022-06-23 DIAGNOSIS — R22.1 NECK MASS: Primary | ICD-10-CM

## 2022-06-23 DIAGNOSIS — E04.1 THYROID NODULE: ICD-10-CM

## 2022-06-23 DIAGNOSIS — K14.8 TONGUE LESION: ICD-10-CM

## 2022-06-23 PROCEDURE — 99214 OFFICE O/P EST MOD 30 MIN: CPT | Performed by: OTOLARYNGOLOGY

## 2022-06-23 RX ORDER — ALBUTEROL SULFATE 2.5 MG/3ML
2.5 SOLUTION RESPIRATORY (INHALATION) EVERY 6 HOURS PRN
Qty: 120 EACH | Refills: 11 | Status: SHIPPED | OUTPATIENT
Start: 2022-06-23 | End: 2022-10-25 | Stop reason: SDUPTHER

## 2022-06-23 RX ORDER — IPRATROPIUM BROMIDE AND ALBUTEROL SULFATE 2.5; .5 MG/3ML; MG/3ML
1 SOLUTION RESPIRATORY (INHALATION) EVERY 4 HOURS
Qty: 360 ML | Refills: 11 | Status: SHIPPED | OUTPATIENT
Start: 2022-06-23 | End: 2022-07-18 | Stop reason: ALTCHOICE

## 2022-06-23 NOTE — PROGRESS NOTES
[facility] HEAD & NECK SURGERY  Follow up      Patient Name: Valentín Rico  Medical Record Number:  [id number]  Primary Care Physician:  Neto Sandoval MD  Date of Consultation: 6/23/2022    Chief Complaint: Tongue lesion        Interval History  She is following up for her tongue lesion. I last saw her in June 13, 2022. She had what appeared to be a small vascular lesion of the tongue that was bleeding. We decided get a CT scan just to make sure that this was not larger than it appeared. Patient says that she is not had any bleeding. She does state that she has developed trouble swallowing over the last year or so. She has no history of thyroid problems. No family history of thyroid cancer. No personal history of radiation exposure            REVIEW OF SYSTEMS  As above    PHYSICAL EXAM  GENERAL: No Acute Distress, Alert and Oriented, no Hoarseness, strong voice  EYES: EOMI, Anti-icteric  HENT:   Head: Normocephalic and atraumatic. Face:  Symmetric, facial nerve intact, no sinus tenderness  Right Ear: Normal external ear  Left Ear: Normal external ear  Mouth/Oral Cavity: The area of the tongue that was bleeding appears to have healed over. I really do not appreciate any tongue masses at this time  Oropharynx/Larynx:  normal oropharynx  Nose:Normal external nasal appearance. NECK: Can feel a left-sided thyroid nodule        RADIOLOGY  Summary of findings:  I reviewed the patient CT scan of the neck with contrast.  I really do not appreciate a tongue lesion. There was however a 4 cm left-sided thyroid nodule. I compared this to her CT scan of the neck from 2019 and I do not see any evidence of a thyroid nodule that time            ASSESSMENT/PLAN  1. Neck mass  The patient has a fairly large thyroid nodule in the left side that was not apparent at all on a CT scan in 2019. I recommended an ultrasound-guided biopsy.   Given the rapid growth and the fact that she has some trouble swallowing we will probably discuss a hemithyroidectomy. I want this biopsy first because if there is a concern for malignancy we would need to be ready to do a total thyroidectomy. She is going to follow-up with me after the biopsy to discuss. - US BIOPSY THYROID; Future    2. Thyroid nodule  This was from not being visible on CT scan in 2019 to 4 cm today. Given the rapid growth I would like this to be biopsy. Would also consider hemithyroidectomy even if the biopsy is benign given the rapid change in size. 3. Tongue lesion  This seems to have resolved. It must of been a small vascular lesion. I have performed a head and neck physical exam personally or was physically present during the key or critical portions of the service. This note was generated completely or in part utilizing Dragon dictation speech recognition software. Occasionally, words are mistranscribed and despite editing, the text may contain inaccuracies due to incorrect word recognition. If further clarification is needed please contact the office at [phone].

## 2022-06-27 ENCOUNTER — TELEPHONE (OUTPATIENT)
Dept: ENT CLINIC | Age: 62
End: 2022-06-27

## 2022-06-27 ENCOUNTER — HOSPITAL ENCOUNTER (OUTPATIENT)
Dept: ULTRASOUND IMAGING | Age: 62
Discharge: HOME | End: 2022-06-27
Payer: MEDICAID

## 2022-06-27 DIAGNOSIS — R22.1 NECK MASS: Primary | ICD-10-CM

## 2022-06-27 DIAGNOSIS — R22.1 NECK MASS: ICD-10-CM

## 2022-06-27 PROCEDURE — 76536 US EXAM OF HEAD AND NECK: CPT

## 2022-06-27 NOTE — TELEPHONE ENCOUNTER
Pt was scheduled for Ct today but [facility] called and said they want to do an [place] first because they \"don't know if it's just the one nodule. \". Pt states Dr Lanre Tapia was going to bypass the [place] and just get the CT. US is scheduled for today at 1:00. She wants to make sure this is OK with Dr Lanre Tapia? Please call to advise.

## 2022-06-29 ENCOUNTER — HOSPITAL ENCOUNTER (OUTPATIENT)
Dept: ULTRASOUND IMAGING | Age: 62
Discharge: HOME | End: 2022-06-29
Payer: MEDICAID

## 2022-06-29 PROCEDURE — 88173 CYTOPATH EVAL FNA REPORT: CPT

## 2022-06-29 PROCEDURE — 88305 TISSUE EXAM BY PATHOLOGIST: CPT

## 2022-06-29 PROCEDURE — 10005 FNA BX W/US GDN 1ST LES: CPT

## 2022-07-01 ENCOUNTER — TELEPHONE (OUTPATIENT)
Dept: ENT CLINIC | Age: 62
End: 2022-07-01

## 2022-07-01 NOTE — TELEPHONE ENCOUNTER
Pt called because she got my chart notice of FNA results and wanted to know that they mean, I told her that Arturo Sports would be back Tue and might have to wait till then     Note: Negative or non--specific diagnosis on FNA do not always exclude   malignancy. FNA results should be correlated with clinical, radiologic   and other findings.  A biopsy may still be indicated if:   There is a persistent or recurrent mass. The radiologic findings are indeterminant or suggestive of malignancy. There is a suspicion of malignancy based on a physical   examination. ........... Mark Simmons

## 2022-07-05 ENCOUNTER — TELEPHONE (OUTPATIENT)
Dept: ENT CLINIC | Age: 62
End: 2022-07-05

## 2022-07-06 ENCOUNTER — TELEPHONE (OUTPATIENT)
Dept: ENT CLINIC | Age: 62
End: 2022-07-06

## 2022-07-06 NOTE — TELEPHONE ENCOUNTER
I called the patient to discuss her FNA results. It was felt to be benign. We discussed management of this thyroid nodule. We have a CT scan from 2019 that shows no evidence of the nodule and now it is over 4 cm. The patient is also complaining of a lot of dysphagia that she thinks is getting worse. I told her that I cannot guarantee that this is the only cause of her swallowing issues, but I suspect is playing a role given the rapid increase in size. Also if it continues to grow at this rate it will certainly cause her significant issues fairly soon. 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 She has agreed to proceed. I will have my office call to schedule this.

## 2022-07-07 ENCOUNTER — TELEPHONE (OUTPATIENT)
Dept: ENT CLINIC | Age: 62
End: 2022-07-07

## 2022-07-08 ENCOUNTER — APPOINTMENT (OUTPATIENT)
Dept: GENERAL RADIOLOGY | Age: 62
End: 2022-07-08
Payer: MEDICAID

## 2022-07-08 ENCOUNTER — HOSPITAL ENCOUNTER (EMERGENCY)
Age: 62
Discharge: HOME | End: 2022-07-08
Attending: EMERGENCY MEDICINE
Payer: MEDICAID

## 2022-07-08 VITALS
DIASTOLIC BLOOD PRESSURE: 85 MMHG | OXYGEN SATURATION: 100 % | HEIGHT: 70 IN | TEMPERATURE: 98.5 F | BODY MASS INDEX: 26.83 KG/M2 | RESPIRATION RATE: 16 BRPM | HEART RATE: 67 BPM | WEIGHT: 187.39 LBS | SYSTOLIC BLOOD PRESSURE: 140 MMHG

## 2022-07-08 DIAGNOSIS — R13.10 DYSPHAGIA, UNSPECIFIED TYPE: ICD-10-CM

## 2022-07-08 DIAGNOSIS — R06.00 DYSPNEA, UNSPECIFIED TYPE: Primary | ICD-10-CM

## 2022-07-08 PROCEDURE — 71045 X-RAY EXAM CHEST 1 VIEW: CPT

## 2022-07-08 PROCEDURE — 6360000002 HC RX W HCPCS: Performed by: EMERGENCY MEDICINE

## 2022-07-08 PROCEDURE — 99284 EMERGENCY DEPT VISIT MOD MDM: CPT

## 2022-07-08 PROCEDURE — 93005 ELECTROCARDIOGRAM TRACING: CPT | Performed by: EMERGENCY MEDICINE

## 2022-07-08 PROCEDURE — 6370000000 HC RX 637 (ALT 250 FOR IP): Performed by: EMERGENCY MEDICINE

## 2022-07-08 RX ORDER — LORAZEPAM 0.5 MG/1
0.5 TABLET ORAL ONCE
Status: COMPLETED | OUTPATIENT
Start: 2022-07-08 | End: 2022-07-08

## 2022-07-08 RX ORDER — CEFUROXIME AXETIL 250 MG/1
250 TABLET ORAL 2 TIMES DAILY
Qty: 14 TABLET | Refills: 0 | Status: SHIPPED | OUTPATIENT
Start: 2022-07-08 | End: 2022-07-15

## 2022-07-08 RX ORDER — DEXAMETHASONE 4 MG/1
4 TABLET ORAL ONCE
Status: COMPLETED | OUTPATIENT
Start: 2022-07-08 | End: 2022-07-08

## 2022-07-08 RX ORDER — MONTELUKAST SODIUM 10 MG/1
10 TABLET ORAL NIGHTLY
Qty: 90 TABLET | Refills: 1 | Status: SHIPPED | OUTPATIENT
Start: 2022-07-08 | End: 2022-07-18 | Stop reason: ALTCHOICE

## 2022-07-08 RX ORDER — DOXYCYCLINE HYCLATE 100 MG
100 TABLET ORAL 2 TIMES DAILY
Qty: 10 TABLET | Refills: 0 | Status: SHIPPED | OUTPATIENT
Start: 2022-07-08 | End: 2022-07-13

## 2022-07-08 RX ORDER — HYDROXYZINE PAMOATE 25 MG/1
25 CAPSULE ORAL 3 TIMES DAILY PRN
Qty: 20 CAPSULE | Refills: 0 | Status: SHIPPED | OUTPATIENT
Start: 2022-07-08 | End: 2022-07-18 | Stop reason: ALTCHOICE

## 2022-07-08 RX ORDER — DEXAMETHASONE 4 MG/1
4 TABLET ORAL
Qty: 5 TABLET | Refills: 0 | Status: SHIPPED | OUTPATIENT
Start: 2022-07-08 | End: 2022-07-13

## 2022-07-08 RX ADMIN — LORAZEPAM 0.5 MG: 0.5 TABLET ORAL at 19:28

## 2022-07-08 RX ADMIN — DEXAMETHASONE 4 MG: 4 TABLET ORAL at 19:28

## 2022-07-08 NOTE — ED PROVIDER NOTES
Emergency Department Encounter    Patient: Kalen Clifford  MRN: [id number]  : 1960  Date of Evaluation: 2022  ED Provider:  Marialuisa Rose MD    Triage Chief Complaint:    swallowing Problems, shortness of breath  [place]:  Kalen Clifford is a 63 y.o. female that presents to the ER for evaluation of sensation of difficulty swallowing, positive dyspnea, she is in no distress on arrival she is not hypoxic, not tachycardic, she is had multiple evaluations for this to occur respiratory treatments, and started steroids. She is pending possible hemithyroidectomy for mass. She has history of bipolar disorder prior history of substance abuse history of COPD. No significant wheezing. No hypoxia. No excessively large palpable mass of the throat. No active oropharyngeal bleeding. She has been seen by ENT in the ER multiple times for this. She has no stridor, no solid or liquid dysphagia.     ROS - see HPI, below listed is current ROS at time of my eval:  General:  No fevers, no weakness  Eyes:  no discharge  ENT:  No sore throat, no nasal congestion, swallowing sensation  Cardiovascular:  + chest pain, no palpitations  Respiratory:  No shortness of breath, no cough, no wheezing  Gastrointestinal:  No pain, no nausea, no vomiting, no diarrhea  Musculoskeletal:  No muscle pain, no joint pain  Skin:  No rash, no pruritis  Neurologic:  No speech problems, no headache, no extremity numbness, no extremity tingling, no extremity weakness  Psychiatric:  No anxiety  Genitourinary:  No dysuria, no hematuria  Endocrine:  No unexpected weight gain, no unexpected weight loss  Extremities:  no edema, no pain    Past Medical History:   Diagnosis Date    Asthma     [facility] disorder [facility]     CKD (chronic kidney disease)     stage 3    COPD (chronic obstructive pulmonary disease) (HCC)     Empyema (HCC)     Hepatitis C     Patient denies     History of heroin use     Hypertension     Narcotic abuse ([facility] on file    [address] in the Last Year: Not on file   Transportation Needs:     Lack of Transportation (Medical): Not on file    Lack of Transportation (Non-Medical): Not on file   Physical Activity:     Days of Exercise per Week: Not on file    Minutes of Exercise per Session: Not on file   Stress:     Feeling of Stress : Not on file   Social Connections:     Frequency of Communication with Friends and Family: Not on file    Frequency of Social Gatherings with Friends and Family: Not on file    Attends Yazidism Services: Not on file    Active Member of [address] [place] [address] [employer] or Organizations: Not on file    Attends Club or Organization Meetings: Not on file    Marital Status: Not on file   Intimate Partner Violence:     Fear of Current or Ex-Partner: Not on file    Emotionally Abused: Not on file    Physically Abused: Not on file    Sexually Abused: Not on file   Housing Stability:     Unable to Pay for Housing in the Last Year: Not on file    Number of [place] in the Last Year: Not on file    Unstable Housing in the Last Year: Not on file     No current facility-administered medications for this encounter.      Current Outpatient Medications   Medication Sig Dispense Refill    montelukast (SINGULAIR) 10 MG tablet Take 1 tablet by mouth nightly 90 tablet 1    dexamethasone (DECADRON) 4 MG tablet Take 1 tablet by mouth daily (with breakfast) for 5 days 5 tablet 0    hydrOXYzine pamoate (VISTARIL) 25 MG capsule Take 1 capsule by mouth 3 times daily as needed for Itching 20 capsule 0    doxycycline hyclate (VIBRA-TABS) 100 MG tablet Take 1 tablet by mouth 2 times daily for 5 days 10 tablet 0    cefUROXime (CEFTIN) 250 MG tablet Take 1 tablet by mouth 2 times daily for 7 days 14 tablet 0    albuterol (PROVENTIL) (2.5 MG/3ML) 0.083% nebulizer solution Take 3 mLs by nebulization every 6 hours as needed for Wheezing 120 each 11    ipratropium-albuterol (DUONEB) 0.5-2.5 (3) MG/3ML SOLN nebulizer solution bronchospasm    Codeine Hives     Pt states no recent problems     Levaquin [Levofloxacin In D5w] Itching    Penicillins Hives and Itching    Dupixent [Dupilumab] Other (See Comments)     Right leg pain    Escitalopram Headaches    Ibuprofen Diarrhea, Nausea And Vomiting and Other (See Comments)       Nursing Notes Reviewed    Physical Exam:  Triage VS:    ED Triage Vitals [07/08/22 1904]   Enc Vitals Group      BP (!) 147/86      Heart Rate 86      Resp 20      Temp 98.5 °F (36.9 °C)      Temp Source Oral      SpO2 100 %      Weight 187 lb 6.3 oz (85 kg)      Height 5' 10\" (1.778 m)      Head Circumference       Peak Flow       Pain Score       Pain Loc       Pain Edu? Excl. in [address]? My pulse ox interpretation is - normal    General appearance:  No acute distress. Skin:  Warm. Dry. Eye:  Extraocular movements intact. Ears, nose, mouth and throat:  Oral mucosa moist   Neck:  Trachea midline. Extremity:  No swelling. Normal ROM     Heart:  Regular rate and rhythm, normal S1 & S2, no extra heart sounds. Perfusion:  intact  Respiratory:  Lungs clear to auscultation bilaterally. Respirations nonlabored. Abdominal:  Normal bowel sounds. Soft. Nontender. Non distended. Neurological:  Alert and oriented times 3.              Psychiatric:  Appropriate    I have reviewed and interpreted all of the currently available lab results from this visit (if applicable):  Results for orders placed or performed during the hospital encounter of 07/08/22   EKG 12 Lead   Result Value Ref Range    Ventricular Rate 67 BPM    Atrial Rate 67 BPM    P-R Interval 148 ms    QRS Duration 86 ms    Q-T Interval 384 ms    QTc Calculation (Bazett) 405 ms    P Axis 61 degrees    R Axis -19 degrees    T Axis -2 degrees    Diagnosis       Normal sinus rhythmPossible Left atrial enlargementSeptal infarct , age undeterminedNonspecific T wave abnormalityAbnormal ECGWhen compared with ECG of 29-OCT-2021 13:57,No significant change was foundConfirmed by Krishna, [address] [place] [address] ([id number]) on 7/9/2022 5:43:58 PM      Radiographs (if obtained):  Radiologist's Report Reviewed:  No results found. EKG (if obtained): (All EKG's are interpreted by myself in the absence of a cardiologist)      MDM:  Patient presented with chest pain. Given history and presentation cardiac workup initiated. Patient had labs, EKG, x-ray and troponin ordered. Patient was placed on monitor. Patient's pulse ox is normal.      HEART Score:   3    Patient's chest x-ray is read by radiology as negative for acute abnormality    The patient's initial troponin is within the normal range. Patient's EKG did not show any acute diagnostic ischemic changes. The patient was given medications, is starting to feel better. Patient does not have any acute hemodynamic instability. I completed a HEART PATHWAY to screen for Acute Coronary Syndrome (ACS) in this patient. The evidence indicates that the patient is very low risk for ACS and this is consistent with my clinical intuition. The risk of further workup or hospitalization is likely higher than the risk of the patient having an ACS. It is, therefore, in the patients best interest not to do additional emergent testing or be hospitalized    Clinical Impression:  1. Dyspnea, unspecified type    2. Dysphagia, unspecified type      Disposition referral (if applicable):   Beatriz Bowers MD  [address] [place] [address]  [phone]          Disposition medications (if applicable):  Discharge Medication List as of 7/8/2022  8:02 PM      START taking these medications    Details   !! montelukast (SINGULAIR) 10 MG tablet Take 1 tablet by mouth nightly, Disp-90 tablet, R-1Print      dexamethasone (DECADRON) 4 MG tablet Take 1 tablet by mouth daily (with breakfast) for 5 days, Disp-5 tablet, R-0Print      hydrOXYzine pamoate (VISTARIL) 25 MG capsule Take 1 capsule by mouth 3 times daily as needed for Itching, Disp-20 capsule, R-0Print       !! - Potential duplicate medications found. Please discuss with provider. Comment: Please note this report has been produced using speech recognition software and may contain errors related to that system including errors in grammar, punctuation, and spelling, as well as words and phrases that may be inappropriate. Efforts were made to edit the dictations.       Marcia Jordan MD  37/55/60 1041

## 2022-07-09 LAB
EKG ATRIAL RATE: 67 BPM
EKG DIAGNOSIS: NORMAL
EKG P AXIS: 61 DEGREES
EKG P-R INTERVAL: 148 MS
EKG Q-T INTERVAL: 384 MS
EKG QRS DURATION: 86 MS
EKG QTC CALCULATION (BAZETT): 405 MS
EKG R AXIS: -19 DEGREES
EKG T AXIS: -2 DEGREES
EKG VENTRICULAR RATE: 67 BPM

## 2022-07-09 PROCEDURE — 93010 ELECTROCARDIOGRAM REPORT: CPT | Performed by: INTERNAL MEDICINE

## 2022-07-09 NOTE — ED NOTES
Pt denying any SOB. States she is ready to be discharged. Discharge and education instructions reviewed. Patient verbalized understanding, teach-back successful. Patient denied questions at this time. No acute distress noted. Patient instructed to follow-up as noted - return to emergency department if symptoms worsen. Patient verbalized understanding. Discharged per EDMD with discharge instructions.           [facility]  07/08/22 2644

## 2022-07-18 NOTE — PROGRESS NOTES
[facility] time_____0805_______        Surgery time____1025________    Take the following medications with a sip of water: Follow your MD/Surgeons pre-procedure instructions regarding your medications     Do not eat or drink anything after 12:00 midnight prior to your surgery. This includes water chewing gum, mints and ice chips. You may brush your teeth and gargle the morning of your surgery, but do not swallow the water     Please see your family doctor/pediatrician for a history and physical and/or concerning medications. Bring any test results/reports from your physicians office. If you are under the care of a heart doctor or specialist doctor, please be aware that you may be asked to them for clearance    You may be asked to stop blood thinners such as Coumadin, Plavix, Fragmin, Lovenox, etc., or any anti-inflammatories such as:  Aspirin, Ibuprofen, Advil, Naproxen prior to your surgery. We also ask that you stop any OTC medications such as fish oil, vitamin E, glucosamine, garlic, Multivitamins, COQ 10, etc.    We ask that you do not smoke 24 hours prior to surgery  We ask that you do not  drink any alcoholic beverages 24 hours prior to surgery     You must make arrangements for a responsible adult to take you home after your surgery. For your safety you will not be allowed to leave alone or drive yourself home. Your surgery will be cancelled if you do not have a ride home. Also for your safety, it is strongly suggested that someone stay with you the first 24 hours after your surgery. A parent or legal guardian must accompany a child scheduled for surgery and plan to stay at the hospital until the child is discharged. Please do not bring other children with you. For your comfort, please wear simple loose fitting clothing to the hospital.  Please do not bring valuables.     Do not wear any make-up or nail polish on your fingers or toes      For your safety, please do not wear any jewelry or body piercing's on the day of surgery. All jewelry must be removed. If you have dentures, they will be removed before going to operating room. For your convenience, we will provide you with a container. If you wear contact lenses or glasses, they will be removed, please bring a case for them. If you have a living will and a durable power of  for healthcare, please bring in a copy. As part of our patient safety program to minimize surgical site infections, we ask you to do the following:    Please notify your surgeon if you develop any illness between         now and the  day of your surgery. This includes a cough, cold, fever, sore throat, nausea,         or vomiting, and diarrhea, etc.   Please notify your surgeon if you experience dizziness, shortness         of breath or blurred vision between now and the time of your surgery. Do not shave your operative site 96 hours prior to surgery. For face and neck surgery, men may use an electric razor 48 hours   prior to surgery. You may shower the night before surgery or the morning of   your surgery with an antibacterial soap. You will need to bring a photo ID and insurance card    [facility] has an onsite pharmacy, would you like to utilize our pharmacy     If you will be staying overnight and use a C-pap machine, please bring   your C-pap to hospital     Our goal is to provide you with excellent care, therefore, visitors will be limited to two(2) in the room at a time so that we may focus on providing this care for you. Please contact pre-admission testing if you have any further questions. [facility] phone number:  [phone] PAT fax number:  [phone]  Please note these are generalized instructions for all surgical cases, you may be provided with more specific instructions according to your surgery.     C-Difficile admission screening and protocol:       * Admitted with diarrhea? [] YES    [x]  NO     *Prior history of C-Diff. In last 3 months? [] YES    [x]  NO     *Antibiotic use in the past 6-8 weeks? [x]  NO    [x]  YES                 If yes, which ANTIBIOTIC AND REASON_mild pneumonia _____     *Prior hospitalization or nursing home in the last month? []  YES    [x]  NO        SAFETY FIRST. .call before you fall

## 2022-07-18 NOTE — PROGRESS NOTES
Srikanth Dover  :  1960    DOS:  22    Appt with Dr. Omar Davalos on 22  #[phone]    Update:22:H&P IN Epic UNDER MEDIA-PATIENT CLEARED FOR SURGERY

## 2022-07-26 ENCOUNTER — HOSPITAL ENCOUNTER (OUTPATIENT)
Age: 62
Setting detail: SURGERY ADMIT
Discharge: HOME | End: 2022-07-26
Attending: OTOLARYNGOLOGY | Admitting: OTOLARYNGOLOGY
Payer: MEDICAID

## 2022-07-26 VITALS
TEMPERATURE: 96.8 F | WEIGHT: 188 LBS | DIASTOLIC BLOOD PRESSURE: 97 MMHG | HEART RATE: 87 BPM | SYSTOLIC BLOOD PRESSURE: 154 MMHG | HEIGHT: 70 IN | RESPIRATION RATE: 18 BRPM | BODY MASS INDEX: 26.92 KG/M2 | OXYGEN SATURATION: 98 %

## 2022-07-26 LAB — SARS-COV-2, NAAT: DETECTED

## 2022-07-26 PROCEDURE — 87635 SARS-COV-2 COVID-19 AMP PRB: CPT

## 2022-07-26 RX ORDER — SODIUM CHLORIDE 0.9 % (FLUSH) 0.9 %
5-40 SYRINGE (ML) INJECTION PRN
Status: DISCONTINUED | OUTPATIENT
Start: 2022-07-26 | End: 2022-07-26 | Stop reason: HOSPADM

## 2022-07-26 RX ORDER — SODIUM CHLORIDE 9 MG/ML
INJECTION, SOLUTION INTRAVENOUS PRN
Status: DISCONTINUED | OUTPATIENT
Start: 2022-07-26 | End: 2022-07-26 | Stop reason: HOSPADM

## 2022-07-26 RX ORDER — SODIUM CHLORIDE 0.9 % (FLUSH) 0.9 %
5-40 SYRINGE (ML) INJECTION EVERY 12 HOURS SCHEDULED
Status: DISCONTINUED | OUTPATIENT
Start: 2022-07-26 | End: 2022-07-26 | Stop reason: HOSPADM

## 2022-07-26 ASSESSMENT — PAIN - FUNCTIONAL ASSESSMENT: PAIN_FUNCTIONAL_ASSESSMENT: NONE - DENIES PAIN

## 2022-07-28 ENCOUNTER — ANESTHESIA EVENT (OUTPATIENT)
Dept: OPERATING ROOM | Age: 62
End: 2022-07-28
Payer: MEDICAID

## 2022-07-28 ENCOUNTER — TELEPHONE (OUTPATIENT)
Dept: ENT CLINIC | Age: 62
End: 2022-07-28

## 2022-07-28 NOTE — PROGRESS NOTES
UPDATE:  per Ann Vazquez @ Dr. Junior Du office---OK to use H&P in epic under medica tab--per Kennedy Valdovinos, [address] on 22--Dr. Codey Varner will update DOS    DOS 22   [birth year]    Message left for Merry at [place] office to see if pt needs a new H.P from - in [facility]  since she had tested positive for COVID    Pt tested positive for Covid on  and her procedure was cancelled  She said she tested again on  negative only has a mild runny nose. She was told to contact office if any problems or questions arise between now and DOS. PAT interview done.

## 2022-07-28 NOTE — PROGRESS NOTES
C-diff Questionnaire:     * Admitted with diarrhea? [] YES    [x]  NO     *Prior history of C-Diff. In last 3 months? [] YES    [x]  NO     *Antibiotic use in the past 6-8 weeks? []  NO    [x]  YES      If yes, which: REASON__lung infection     *Prior hospitalization or nursing home in the last month? []  YES    [x]  NO     SAFETY FIRST. .call before you fall    [address] [facility] [address] time___930      Surgery time_1130      Do not eat or drink anything after 12:00 midnight prior to your surgery. This includes water chewing gum, mints and ice chips- the Day of Surgery. You may brush your teeth and gargle the morning of your surgery, but do not swallow the water     Please see your family doctor/pediatrician for a history and physical and/or questions concerning medications. Bring any test results/reports from your physicians office. If you are under the care of a heart doctor or specialist doctor, please be aware that you may be asked to them for clearance    You may be asked to stop blood thinners such as Coumadin, Plavix, Fragmin, Lovenox, etc., or any anti-inflammatories such as:  Aspirin, Ibuprofen, Advil, Naproxen prior to your surgery. We also ask that you stop any OTC medications such as fish oil, vitamin E, glucosamine, garlic, Multivitamins, COQ 10, etc.    We ask that you do not smoke 24 hours prior to surgery  We ask that you do not  drink any alcoholic beverages 24 hours prior to surgery     You must make arrangements for a responsible adult to take you home after your surgery. For your safety you will not be allowed to leave alone or drive yourself home. Your surgery will be cancelled if you do not have a ride home. Also for your safety, it is strongly suggested that someone stay with you the first 24 hours after your surgery.      A parent or legal guardian must accompany a child scheduled for surgery and plan to stay at the hospital until the child is discharged. Please do not bring other children with you. For your comfort, please wear simple loose fitting clothing to the hospital.  Please do not bring valuables. Do not wear any make-up or nail polish on your fingers or toes. For your safety, please do not wear any jewelry or body piercing's on the day of surgery. All jewelry must be removed. If you have dentures, they will be removed before going to operating room. For your convenience, we will provide you with a container. If you wear contact lenses or glasses, they will be removed, please bring a case for them. If you have a living will and a durable power of  for healthcare, please bring in a copy. As part of our patient safety program to minimize surgical site infections, we ask you to do the following:    Please notify your surgeon if you develop any illness between         now and the day of your surgery. This includes a cough, cold, fever, sore throat, nausea,         or vomiting, and diarrhea, etc.   Please notify your surgeon if you experience dizziness, shortness         of breath or blurred vision between now and the time of your surgery. Do not shave your operative site 96 hours prior to surgery. For face and neck surgery, men may use an electric razor 48 hours   prior to surgery. You may shower the night before surgery or the morning of   your surgery with an antibacterial soap. You will need to bring a photo ID and insurance card     If you use a C-pap or Bi-pap machine, please bring your machine with you to the hospital     Our goal is to provide you with excellent care, therefore, visitors will be limited to so that we may focus on providing this care for you. Please contact your surgeon office, if you have any further questions.                  [facility] phone number:  [phone] PAT fax number:  [phone]    Please note these are generalized instructions for all surgical cases, you may be provided with more specific instructions according to your surgery.

## 2022-08-05 ENCOUNTER — ANESTHESIA (OUTPATIENT)
Dept: OPERATING ROOM | Age: 62
End: 2022-08-05
Payer: MEDICAID

## 2022-08-05 ENCOUNTER — HOSPITAL ENCOUNTER (OUTPATIENT)
Age: 62
Setting detail: OBSERVATION
Discharge: HOME | End: 2022-08-06
Attending: OTOLARYNGOLOGY | Admitting: OTOLARYNGOLOGY
Payer: MEDICAID

## 2022-08-05 DIAGNOSIS — E04.1 THYROID NODULE: ICD-10-CM

## 2022-08-05 DIAGNOSIS — R22.1 NECK MASS: ICD-10-CM

## 2022-08-05 DIAGNOSIS — E89.0 S/P THYROIDECTOMY: Primary | ICD-10-CM

## 2022-08-05 PROBLEM — Z98.890 S/P THYROIDECTOMY: Status: ACTIVE | Noted: 2022-08-05

## 2022-08-05 PROBLEM — Z90.89 S/P THYROIDECTOMY: Status: ACTIVE | Noted: 2022-08-05

## 2022-08-05 PROCEDURE — 6360000002 HC RX W HCPCS: Performed by: ANESTHESIOLOGY

## 2022-08-05 PROCEDURE — 6360000002 HC RX W HCPCS: Performed by: OTOLARYNGOLOGY

## 2022-08-05 PROCEDURE — A4217 STERILE WATER/SALINE, 500 ML: HCPCS | Performed by: OTOLARYNGOLOGY

## 2022-08-05 PROCEDURE — 2500000003 HC RX 250 WO HCPCS: Performed by: OTOLARYNGOLOGY

## 2022-08-05 PROCEDURE — 6370000000 HC RX 637 (ALT 250 FOR IP): Performed by: OTOLARYNGOLOGY

## 2022-08-05 PROCEDURE — 2500000003 HC RX 250 WO HCPCS

## 2022-08-05 PROCEDURE — 2580000003 HC RX 258: Performed by: OTOLARYNGOLOGY

## 2022-08-05 PROCEDURE — 6360000002 HC RX W HCPCS

## 2022-08-05 PROCEDURE — 94640 AIRWAY INHALATION TREATMENT: CPT

## 2022-08-05 PROCEDURE — G0378 HOSPITAL OBSERVATION PER HR: HCPCS

## 2022-08-05 PROCEDURE — 2709999900 HC NON-CHARGEABLE SUPPLY: Performed by: OTOLARYNGOLOGY

## 2022-08-05 PROCEDURE — 7100000001 HC PACU RECOVERY - ADDTL 15 MIN: Performed by: OTOLARYNGOLOGY

## 2022-08-05 PROCEDURE — 3600000015 HC SURGERY LEVEL 5 ADDTL 15MIN: Performed by: OTOLARYNGOLOGY

## 2022-08-05 PROCEDURE — 88307 TISSUE EXAM BY PATHOLOGIST: CPT

## 2022-08-05 PROCEDURE — 60220 PARTIAL REMOVAL OF THYROID: CPT | Performed by: OTOLARYNGOLOGY

## 2022-08-05 PROCEDURE — 3600000005 HC SURGERY LEVEL 5 BASE: Performed by: OTOLARYNGOLOGY

## 2022-08-05 PROCEDURE — 2580000003 HC RX 258: Performed by: ANESTHESIOLOGY

## 2022-08-05 PROCEDURE — 3700000001 HC ADD 15 MINUTES (ANESTHESIA): Performed by: OTOLARYNGOLOGY

## 2022-08-05 PROCEDURE — 2720000010 HC SURG SUPPLY STERILE: Performed by: OTOLARYNGOLOGY

## 2022-08-05 PROCEDURE — 3700000000 HC ANESTHESIA ATTENDED CARE: Performed by: OTOLARYNGOLOGY

## 2022-08-05 PROCEDURE — 7100000000 HC PACU RECOVERY - FIRST 15 MIN: Performed by: OTOLARYNGOLOGY

## 2022-08-05 RX ORDER — MEPERIDINE HYDROCHLORIDE 25 MG/ML
12.5 INJECTION INTRAMUSCULAR; INTRAVENOUS; SUBCUTANEOUS
Status: DISCONTINUED | OUTPATIENT
Start: 2022-08-05 | End: 2022-08-05 | Stop reason: HOSPADM

## 2022-08-05 RX ORDER — SODIUM CHLORIDE 0.9 % (FLUSH) 0.9 %
5-40 SYRINGE (ML) INJECTION EVERY 12 HOURS SCHEDULED
Status: DISCONTINUED | OUTPATIENT
Start: 2022-08-05 | End: 2022-08-05 | Stop reason: HOSPADM

## 2022-08-05 RX ORDER — BACITRACIN ZINC AND POLYMYXIN B SULFATE 500; 1000 [USP'U]/G; [USP'U]/G
OINTMENT TOPICAL
Status: COMPLETED | OUTPATIENT
Start: 2022-08-05 | End: 2022-08-05

## 2022-08-05 RX ORDER — LIDOCAINE HYDROCHLORIDE 20 MG/ML
INJECTION, SOLUTION EPIDURAL; INFILTRATION; INTRACAUDAL; PERINEURAL PRN
Status: DISCONTINUED | OUTPATIENT
Start: 2022-08-05 | End: 2022-08-05 | Stop reason: SDUPTHER

## 2022-08-05 RX ORDER — MAGNESIUM HYDROXIDE 1200 MG/15ML
LIQUID ORAL CONTINUOUS PRN
Status: DISCONTINUED | OUTPATIENT
Start: 2022-08-05 | End: 2022-08-05 | Stop reason: HOSPADM

## 2022-08-05 RX ORDER — ONDANSETRON 2 MG/ML
4 INJECTION INTRAMUSCULAR; INTRAVENOUS
Status: DISCONTINUED | OUTPATIENT
Start: 2022-08-05 | End: 2022-08-05 | Stop reason: HOSPADM

## 2022-08-05 RX ORDER — DIAPER,BRIEF,INFANT-TODD,DISP
EACH MISCELLANEOUS 2 TIMES DAILY
Status: DISCONTINUED | OUTPATIENT
Start: 2022-08-05 | End: 2022-08-06 | Stop reason: HOSPADM

## 2022-08-05 RX ORDER — SODIUM CHLORIDE 0.9 % (FLUSH) 0.9 %
5-40 SYRINGE (ML) INJECTION PRN
Status: DISCONTINUED | OUTPATIENT
Start: 2022-08-05 | End: 2022-08-05 | Stop reason: HOSPADM

## 2022-08-05 RX ORDER — SODIUM CHLORIDE 0.9 % (FLUSH) 0.9 %
10 SYRINGE (ML) INJECTION EVERY 12 HOURS SCHEDULED
Status: DISCONTINUED | OUTPATIENT
Start: 2022-08-05 | End: 2022-08-06 | Stop reason: HOSPADM

## 2022-08-05 RX ORDER — FENTANYL CITRATE 50 UG/ML
50 INJECTION, SOLUTION INTRAMUSCULAR; INTRAVENOUS EVERY 5 MIN PRN
Status: DISCONTINUED | OUTPATIENT
Start: 2022-08-05 | End: 2022-08-05 | Stop reason: HOSPADM

## 2022-08-05 RX ORDER — PROPOFOL 10 MG/ML
INJECTION, EMULSION INTRAVENOUS PRN
Status: DISCONTINUED | OUTPATIENT
Start: 2022-08-05 | End: 2022-08-05 | Stop reason: SDUPTHER

## 2022-08-05 RX ORDER — SODIUM CHLORIDE 9 MG/ML
INJECTION, SOLUTION INTRAVENOUS PRN
Status: DISCONTINUED | OUTPATIENT
Start: 2022-08-05 | End: 2022-08-06 | Stop reason: HOSPADM

## 2022-08-05 RX ORDER — DEXAMETHASONE SODIUM PHOSPHATE 4 MG/ML
INJECTION, SOLUTION INTRA-ARTICULAR; INTRALESIONAL; INTRAMUSCULAR; INTRAVENOUS; SOFT TISSUE PRN
Status: DISCONTINUED | OUTPATIENT
Start: 2022-08-05 | End: 2022-08-05 | Stop reason: SDUPTHER

## 2022-08-05 RX ORDER — HALOPERIDOL 5 MG/ML
1 INJECTION INTRAMUSCULAR
Status: DISCONTINUED | OUTPATIENT
Start: 2022-08-05 | End: 2022-08-05 | Stop reason: HOSPADM

## 2022-08-05 RX ORDER — PROPOFOL 10 MG/ML
INJECTION, EMULSION INTRAVENOUS CONTINUOUS PRN
Status: DISCONTINUED | OUTPATIENT
Start: 2022-08-05 | End: 2022-08-05 | Stop reason: SDUPTHER

## 2022-08-05 RX ORDER — MIDAZOLAM HYDROCHLORIDE 1 MG/ML
INJECTION INTRAMUSCULAR; INTRAVENOUS PRN
Status: DISCONTINUED | OUTPATIENT
Start: 2022-08-05 | End: 2022-08-05 | Stop reason: SDUPTHER

## 2022-08-05 RX ORDER — POLYETHYLENE GLYCOL 3350 17 G/17G
17 POWDER, FOR SOLUTION ORAL DAILY PRN
Status: DISCONTINUED | OUTPATIENT
Start: 2022-08-05 | End: 2022-08-06 | Stop reason: HOSPADM

## 2022-08-05 RX ORDER — CLINDAMYCIN PHOSPHATE 900 MG/50ML
900 INJECTION INTRAVENOUS ONCE
Status: COMPLETED | OUTPATIENT
Start: 2022-08-05 | End: 2022-08-05

## 2022-08-05 RX ORDER — LORAZEPAM 2 MG/ML
0.5 INJECTION INTRAMUSCULAR
Status: DISCONTINUED | OUTPATIENT
Start: 2022-08-05 | End: 2022-08-05 | Stop reason: HOSPADM

## 2022-08-05 RX ORDER — METHADONE HYDROCHLORIDE 40 MG/1
90 TABLET ORAL DAILY
Status: DISCONTINUED | OUTPATIENT
Start: 2022-08-05 | End: 2022-08-06 | Stop reason: CLARIF

## 2022-08-05 RX ORDER — FENTANYL CITRATE 50 UG/ML
INJECTION, SOLUTION INTRAMUSCULAR; INTRAVENOUS PRN
Status: DISCONTINUED | OUTPATIENT
Start: 2022-08-05 | End: 2022-08-05 | Stop reason: SDUPTHER

## 2022-08-05 RX ORDER — SUCCINYLCHOLINE/SOD CL,ISO/PF 200MG/10ML
SYRINGE (ML) INTRAVENOUS PRN
Status: DISCONTINUED | OUTPATIENT
Start: 2022-08-05 | End: 2022-08-05 | Stop reason: SDUPTHER

## 2022-08-05 RX ORDER — ONDANSETRON 2 MG/ML
INJECTION INTRAMUSCULAR; INTRAVENOUS PRN
Status: DISCONTINUED | OUTPATIENT
Start: 2022-08-05 | End: 2022-08-05 | Stop reason: SDUPTHER

## 2022-08-05 RX ORDER — SODIUM CHLORIDE 9 MG/ML
INJECTION, SOLUTION INTRAVENOUS PRN
Status: DISCONTINUED | OUTPATIENT
Start: 2022-08-05 | End: 2022-08-05 | Stop reason: HOSPADM

## 2022-08-05 RX ORDER — OXYCODONE HYDROCHLORIDE 5 MG/1
5 TABLET ORAL
Status: DISCONTINUED | OUTPATIENT
Start: 2022-08-05 | End: 2022-08-05 | Stop reason: HOSPADM

## 2022-08-05 RX ORDER — MORPHINE SULFATE 4 MG/ML
4 INJECTION, SOLUTION INTRAMUSCULAR; INTRAVENOUS
Status: DISCONTINUED | OUTPATIENT
Start: 2022-08-05 | End: 2022-08-06 | Stop reason: HOSPADM

## 2022-08-05 RX ORDER — SODIUM CHLORIDE 9 MG/ML
INJECTION, SOLUTION INTRAVENOUS CONTINUOUS
Status: DISCONTINUED | OUTPATIENT
Start: 2022-08-05 | End: 2022-08-05 | Stop reason: HOSPADM

## 2022-08-05 RX ORDER — REMIFENTANIL HYDROCHLORIDE 1 MG/ML
INJECTION, POWDER, LYOPHILIZED, FOR SOLUTION INTRAVENOUS CONTINUOUS PRN
Status: DISCONTINUED | OUTPATIENT
Start: 2022-08-05 | End: 2022-08-05 | Stop reason: SDUPTHER

## 2022-08-05 RX ORDER — SODIUM CHLORIDE 0.9 % (FLUSH) 0.9 %
10 SYRINGE (ML) INJECTION PRN
Status: DISCONTINUED | OUTPATIENT
Start: 2022-08-05 | End: 2022-08-06 | Stop reason: HOSPADM

## 2022-08-05 RX ORDER — DIPHENHYDRAMINE HYDROCHLORIDE 50 MG/ML
12.5 INJECTION INTRAMUSCULAR; INTRAVENOUS
Status: DISCONTINUED | OUTPATIENT
Start: 2022-08-05 | End: 2022-08-05 | Stop reason: HOSPADM

## 2022-08-05 RX ORDER — ONDANSETRON 4 MG/1
4 TABLET, ORALLY DISINTEGRATING ORAL EVERY 8 HOURS PRN
Status: DISCONTINUED | OUTPATIENT
Start: 2022-08-05 | End: 2022-08-06 | Stop reason: HOSPADM

## 2022-08-05 RX ORDER — LIDOCAINE HYDROCHLORIDE AND EPINEPHRINE 10; 10 MG/ML; UG/ML
INJECTION, SOLUTION INFILTRATION; PERINEURAL
Status: COMPLETED | OUTPATIENT
Start: 2022-08-05 | End: 2022-08-05

## 2022-08-05 RX ORDER — ALBUTEROL SULFATE 90 UG/1
2 AEROSOL, METERED RESPIRATORY (INHALATION) EVERY 6 HOURS PRN
Status: DISCONTINUED | OUTPATIENT
Start: 2022-08-05 | End: 2022-08-06 | Stop reason: HOSPADM

## 2022-08-05 RX ORDER — ONDANSETRON 2 MG/ML
4 INJECTION INTRAMUSCULAR; INTRAVENOUS EVERY 6 HOURS PRN
Status: DISCONTINUED | OUTPATIENT
Start: 2022-08-05 | End: 2022-08-06 | Stop reason: HOSPADM

## 2022-08-05 RX ORDER — MORPHINE SULFATE 2 MG/ML
2 INJECTION, SOLUTION INTRAMUSCULAR; INTRAVENOUS
Status: DISCONTINUED | OUTPATIENT
Start: 2022-08-05 | End: 2022-08-06 | Stop reason: HOSPADM

## 2022-08-05 RX ORDER — DIPHENHYDRAMINE HCL 25 MG
25 TABLET ORAL EVERY 8 HOURS PRN
Status: DISCONTINUED | OUTPATIENT
Start: 2022-08-05 | End: 2022-08-06 | Stop reason: HOSPADM

## 2022-08-05 RX ORDER — AMLODIPINE BESYLATE 5 MG/1
5 TABLET ORAL DAILY
Status: DISCONTINUED | OUTPATIENT
Start: 2022-08-06 | End: 2022-08-06 | Stop reason: HOSPADM

## 2022-08-05 RX ADMIN — MOMETASONE FUROATE AND FORMOTEROL FUMARATE DIHYDRATE 2 PUFF: 200; 5 AEROSOL RESPIRATORY (INHALATION) at 19:45

## 2022-08-05 RX ADMIN — Medication 10 ML: at 20:29

## 2022-08-05 RX ADMIN — PROPOFOL 160 MG: 10 INJECTION, EMULSION INTRAVENOUS at 11:59

## 2022-08-05 RX ADMIN — HYDROMORPHONE HYDROCHLORIDE 0.5 MG: 1 INJECTION, SOLUTION INTRAMUSCULAR; INTRAVENOUS; SUBCUTANEOUS at 12:32

## 2022-08-05 RX ADMIN — LIDOCAINE HYDROCHLORIDE 60 MG: 20 INJECTION, SOLUTION EPIDURAL; INFILTRATION; INTRACAUDAL; PERINEURAL at 11:59

## 2022-08-05 RX ADMIN — FENTANYL CITRATE 50 MCG: 50 INJECTION INTRAMUSCULAR; INTRAVENOUS at 12:23

## 2022-08-05 RX ADMIN — FENTANYL CITRATE 50 MCG: 50 INJECTION, SOLUTION INTRAMUSCULAR; INTRAVENOUS at 14:31

## 2022-08-05 RX ADMIN — MORPHINE SULFATE 4 MG: 4 INJECTION, SOLUTION INTRAMUSCULAR; INTRAVENOUS at 17:43

## 2022-08-05 RX ADMIN — DIPHENHYDRAMINE HCL 25 MG: 25 TABLET ORAL at 17:43

## 2022-08-05 RX ADMIN — ONDANSETRON 4 MG: 2 INJECTION INTRAMUSCULAR; INTRAVENOUS at 12:07

## 2022-08-05 RX ADMIN — FENTANYL CITRATE 50 MCG: 50 INJECTION, SOLUTION INTRAMUSCULAR; INTRAVENOUS at 14:24

## 2022-08-05 RX ADMIN — MORPHINE SULFATE 4 MG: 4 INJECTION, SOLUTION INTRAMUSCULAR; INTRAVENOUS at 15:31

## 2022-08-05 RX ADMIN — HYDROMORPHONE HYDROCHLORIDE 0.5 MG: 1 INJECTION, SOLUTION INTRAMUSCULAR; INTRAVENOUS; SUBCUTANEOUS at 13:37

## 2022-08-05 RX ADMIN — PROPOFOL 150 MCG/KG/MIN: 10 INJECTION, EMULSION INTRAVENOUS at 12:05

## 2022-08-05 RX ADMIN — MORPHINE SULFATE 4 MG: 4 INJECTION, SOLUTION INTRAMUSCULAR; INTRAVENOUS at 22:35

## 2022-08-05 RX ADMIN — FENTANYL CITRATE 50 MCG: 50 INJECTION INTRAMUSCULAR; INTRAVENOUS at 11:58

## 2022-08-05 RX ADMIN — SODIUM CHLORIDE: 9 INJECTION, SOLUTION INTRAVENOUS at 12:47

## 2022-08-05 RX ADMIN — SODIUM CHLORIDE: 9 INJECTION, SOLUTION INTRAVENOUS at 09:39

## 2022-08-05 RX ADMIN — MORPHINE SULFATE 4 MG: 4 INJECTION, SOLUTION INTRAMUSCULAR; INTRAVENOUS at 20:25

## 2022-08-05 RX ADMIN — HYDROMORPHONE HYDROCHLORIDE 0.5 MG: 1 INJECTION, SOLUTION INTRAMUSCULAR; INTRAVENOUS; SUBCUTANEOUS at 13:51

## 2022-08-05 RX ADMIN — MIDAZOLAM 2 MG: 1 INJECTION INTRAMUSCULAR; INTRAVENOUS at 11:53

## 2022-08-05 RX ADMIN — HYDROMORPHONE HYDROCHLORIDE 0.5 MG: 1 INJECTION, SOLUTION INTRAMUSCULAR; INTRAVENOUS; SUBCUTANEOUS at 12:49

## 2022-08-05 RX ADMIN — Medication 140 MG: at 12:00

## 2022-08-05 RX ADMIN — CLINDAMYCIN PHOSPHATE 900 MG: 900 INJECTION, SOLUTION INTRAVENOUS at 12:03

## 2022-08-05 RX ADMIN — REMIFENTANIL HYDROCHLORIDE 0.12 MCG/KG/MIN: 1 INJECTION, POWDER, LYOPHILIZED, FOR SOLUTION INTRAVENOUS at 12:05

## 2022-08-05 RX ADMIN — DEXAMETHASONE SODIUM PHOSPHATE 8 MG: 4 INJECTION, SOLUTION INTRAMUSCULAR; INTRAVENOUS at 12:07

## 2022-08-05 ASSESSMENT — PAIN SCALES - GENERAL
PAINLEVEL_OUTOF10: 10
PAINLEVEL_OUTOF10: 8
PAINLEVEL_OUTOF10: 7
PAINLEVEL_OUTOF10: 10

## 2022-08-05 ASSESSMENT — PAIN DESCRIPTION - FREQUENCY
FREQUENCY: CONTINUOUS

## 2022-08-05 ASSESSMENT — PAIN DESCRIPTION - LOCATION
LOCATION: NECK

## 2022-08-05 ASSESSMENT — PAIN DESCRIPTION - DESCRIPTORS
DESCRIPTORS: SHARP
DESCRIPTORS: STABBING

## 2022-08-05 ASSESSMENT — PAIN DESCRIPTION - PAIN TYPE
TYPE: SURGICAL PAIN

## 2022-08-05 ASSESSMENT — PAIN DESCRIPTION - ORIENTATION
ORIENTATION: ANTERIOR
ORIENTATION: LOWER
ORIENTATION: ANTERIOR

## 2022-08-05 ASSESSMENT — PAIN DESCRIPTION - ONSET
ONSET: ON-GOING

## 2022-08-05 ASSESSMENT — LIFESTYLE VARIABLES: SMOKING_STATUS: 0

## 2022-08-05 NOTE — H&P
I have reviewed this patient's history and physical.  There have been no significant changes. She understands the risk of a left hemithyroidectomy including infection, damage to recurrent laryngeal nerve, bleeding and need for additional surgery. Patient had a positive COVID test 10 days ago with minimal to no symptoms. Currently policy is with minimal to no symptoms can proceed with surgery. Discussed previous policy was 14 days because there was an increased risk of anesthesia to do earlier. Patient understands current and new policy and would like to proceed.

## 2022-08-05 NOTE — PROGRESS NOTES
Pt's  at bedside, will go home during procedure, please call cell with doctor talk and room number if not back. Cell number on face sheet.

## 2022-08-05 NOTE — OP NOTE
[address] [facility] SURGERY  OPERATIVE REPORT    Patient Name: Yue Martinez  YOB: 1960  Medical Record Number:  [id number]  Billing Number:  [id number]  Date of Procedure: [unfilled]  Time: 6952    Pre Operative Diagnoses: left thyroid nodule    Post Operative Diagnoses:  same             Procedure:  left hemithyroidectomy (20281)       Surgeon: Arely Mena MD    OR Staff/ Assistant:  Circulator: Ann Rodríguez RN; Tunde Ayala RN  Surgical Assistant: Dunia Aleman  Relief Circulator: Celena Braun RN  Relief Scrub: Juliet Velasquez  Scrub Person First: Ammy Aguirre    Anesthesia:  General anesthesia. Findings:  1) approximately 4 cm left thyroid nodule. Identification and preservation of left recurrent laryngeal nerve. 1 parathyroid identified on the left side. Indications: This is a 63 y.o. female with a left 4 cm thyroid nodule. FNA did not show concern for malignancy, however we have previous imaging that did not show any thyroid nodules so it was felt as though it increased in size quickly. In addition the patient had compressive symptoms. She is here for hemithyroidectomy. Risks and benefits discussed with the patient including alternate treatment options, Informed consent was obtained, the patient elected to proceed with the planned procedure. DETAILS OF PROCEDURE(S):   The patient was brought to the operating room placed in supine position the operating table. She underwent uncomplicated general anesthesia with endotracheal intubation with a nerve monitoring tube. The head of bed was rotated 180 degrees. An incision in a horizontal neck crease was planned and 3 mL of 1% lidocaine with epinephrine was injected. She was then prepped and draped in a sterile fashion. The neck incision was then made. This was carried into the subplatysmal space. Subplatysmal flaps were then raised.   The midline raphae was identified and divided vertically. This revealed the thyroid isthmus. The strap muscles were then dissected off of the left thyroid. This revealed a dark 4 cm nodule. Attention was turned superiorly and the superior vessels were ligated. Inferiorly the inferior vessels were ligated. Dissection was carried around laterally until the nodule could be delivered from the neck. There was one obvious parathyroid that was dissected off the specimen and left in the neck. The other parathyroid on the left side was not obvious, but there was no evidence of it being attached to the nodule. The recurrent laryngeal nerve was then identified. The nodule was then dissected off the trachea. The isthmus was divided and the left thyroid nodule as well as most of the isthmus was sent for permanent specimen. The wound was irrigated and hemostasis was obtained. The recurrent laryngeal nerve was stimulated and noted to be working. A GAGE drain was placed and brought out through the left neck. This was secured with a 2-0 silk suture. The strap muscles were closed in 2 layers with 3-0 Vicryl sutures. The platysma layer was closed with simple interrupted 3-0 Vicryl sutures. The deep dermis was closed with simple erupted 4-0 Vicryl sutures. The skin was then closed with a running 5-0 Prolene. Bacitracin was applied. The head of bed was rotated 180 degrees. The patient was allowed to awaken, extubated and taken recovery in stable condition. I attest that I was present for and did the entire procedure myself. Estimated Blood Loss: 50 mL                 Specimens:   ID Type Source Tests Collected by Time Destination   A : A-LEFT hemithyroid Tissue Tissue SURGICAL PATHOLOGY Tracy Vergara MD 8/5/2022 1245               Drains: Neck GAGE drain     Complications: There were no complications.     Tracy Vergara MD

## 2022-08-05 NOTE — PROGRESS NOTES
Pt dozing on bedpan. Report called to Mel. Pt's  is in her room. Will take pt off bedpan when finished and transfer to room 4111.

## 2022-08-05 NOTE — PROGRESS NOTES
Pt awake. Linens changed. Pain in neck but pt refuses pain med in PACU and wants to go to her room to go to the bathroom. Transferred to room 4111.

## 2022-08-05 NOTE — PROGRESS NOTES
Pain 7/10 in neck. O2 off. Pt is anxious to go up to her room. Placed back on bedpan for urgency to void.

## 2022-08-05 NOTE — PROGRESS NOTES
Pt awake on arrival to PACU but groggy. Urge to void. Pt restless. Pt states \"My neck is killing me! \" Medicated by CRNA. Elevated BP. Placed on bedpan.

## 2022-08-05 NOTE — ANESTHESIA PRE PROCEDURE
Department of Anesthesiology  Preprocedure Note       Name:  Yue Martinez   Age:  63 y.o.  :  1960                                          MRN:  [id number]         Date:  2022      Surgeon: Criselda Jacobs):  Arely Mena MD    Procedure: Procedure(s):  LEFT HEMITHYROIDECTOMY    Medications prior to admission:   Prior to Admission medications    Medication Sig Start Date End Date Taking? Authorizing Provider   albuterol (PROVENTIL) (2.5 MG/3ML) 0.083% nebulizer solution Take 3 mLs by nebulization every 6 hours as needed for Wheezing 22   Martha Borja MD   Skin Protectants, Misc. (MINERIN CREME) CREA APPLY TO AFFECTED AREA(S) AS NEEDED 22   Historical Provider, MD   brompheniramine-pseudoephedrine-DM (BROMFED DM) 2-30-10 MG/5ML syrup Take 5 mLs by mouth 4 times daily as needed for Cough 22   Martha Borja MD   budesonide-formoterol [facility]) 160-4.5 MCG/ACT AERO Inhale 2 puffs into the lungs 2 times daily 10/22/21   Martha Borja MD   albuterol sulfate HFA (PROAIR HFA) 108 (90 Base) MCG/ACT inhaler Inhale 2 puffs into the lungs every 6 hours as needed for Wheezing 20   NATALIE Schaffer - CNP   acetaminophen (APAP EXTRA STRENGTH) 500 MG tablet Take 2 tablets by mouth every 6 hours as needed for Pain DO NOT TAKE WITH OTHER MEDICATIONS CONTAINING ACETAMINOPHEN. 19   Hamlet ZANDER Sears   METHADONE HCL PO Take 90 mg by mouth daily     Historical Provider, MD   diphenhydrAMINE (BENADRYL) 25 MG tablet Take 25 mg by mouth every 8 hours as needed for Itching or Allergies    Historical Provider, MD   amLODIPine (NORVASC) 10 MG tablet Take 1 tablet by mouth daily. Patient taking differently: Take 5 mg by mouth in the morning.  14   Sandy Correa MD       Current medications:    Current Facility-Administered Medications   Medication Dose Route Frequency Provider Last Rate Last Admin    0.9 % sodium chloride infusion   IntraVENous Continuous Marcia Clore Onelia Esparza  mL/hr at 08/05/22 1114 NoRateChange at 08/05/22 1114    sodium chloride flush 0.9 % injection 5-40 mL  5-40 mL IntraVENous 2 times per day Riki Mustafa MD        sodium chloride flush 0.9 % injection 5-40 mL  5-40 mL IntraVENous PRN Riki Mustafa MD        0.9 % sodium chloride infusion   IntraVENous PRN Riki Mustafa MD           Allergies:     Allergies   Allergen Reactions    Acetylcysteine Shortness Of Breath and Anaphylaxis     Increased bronchospasm    Codeine Hives     Pt states no recent problems     Levaquin [Levofloxacin In D5w] Itching    Penicillins Hives and Itching    Dupixent [Dupilumab] Other (See Comments)     Right leg pain    Escitalopram Headaches    Ibuprofen Diarrhea, Nausea And Vomiting and Other (See Comments)       Problem List:    Patient Active Problem List   Diagnosis Code    Precordial pain R07.2    COPD with asthma ([facility] J44.9    Unstable angina ([facility] I20.0    Essential hypertension I10    HCV (hepatitis C virus) B19.20    Hypoxia R09.02    Smoking addiction F17.200    Thrush B37.0    Mild episode of recurrent major depressive disorder ([facility] F33.0    Over weight E66.3    Severe persistent asthma without complication P63.42    Other allergic rhinitis J30.89    Numbness in left leg R20.0    Left sided sciatica M54.32    Severe persistent asthma J45.50    COPD (chronic obstructive pulmonary disease) (HCC) J44.9    Current chronic use of systemic steroids Z79.52       Past Medical History:        Diagnosis Date    Anxiety and depression     Arthritis     Asthma     Bipolar disorder ([facility]     CKD (chronic kidney disease)     stage 3    COPD (chronic obstructive pulmonary disease) (HCC)     Empyema (HCC)     Hepatitis C     Patient denies     History of heroin use     Hypertension     Narcotic abuse ([facility]     heroin, clean 1 year    UTI (lower urinary tract infection)        Past Surgical History: Procedure Laterality Date    COLONOSCOPY  10/23/2018    Colonoscopy with polypectomy (cold snare), polypectomy (cold biopsy)    COLONOSCOPY N/A 10/23/2018    COLONOSCOPY POLYPECTOMY SNARE/COLD BIOPSY performed by Marissa Hill MD at [address] Left 2016    INTRACAPSULAR CATARACT EXTRACTION Right 2020    PHACOEMULSIFICATION WITH INTRAOCULAR LENS IMPLANT performed by Brigid Marte MD at [address] [facility] EXTRACTION Left 2020    PHACOEMULSIFICATION WITH INTRAOCULAR LENS IMPLANT performed by Brigid Marte MD at [address] [place] [address] 47 Right     26 yrs for lung infection     OTHER SURGICAL HISTORY  12/15/2016    EDMOND BUNIONECTOMY WITH APPLICATION OF AMNIOX LEFT FOOT       Social History:    Social History     Tobacco Use    Smoking status: Former     Packs/day: 0.50     Years: 45.00     Pack years: 22.50     Types: Cigarettes     Quit date: 2016     Years since quittin.1    Smokeless tobacco: Never   Substance Use Topics    Alcohol use:  No                                Counseling given: Not Answered      Vital Signs (Current):   Vitals:    22 1606 22 0915 22 0918 22 0919   BP:    133/79   Pulse:   66    Resp:   15    Temp:   98.5 °F (36.9 °C)    TempSrc:   Temporal    SpO2:   100%    Weight: 188 lb (85.3 kg) 188 lb 2.6 oz (85.3 kg)     Height: 5' 10\" (1.778 m) 5' 10\" (1.778 m)                                                BP Readings from Last 3 Encounters:   22 133/79   22 (!) 154/97   22 (!) 140/85       NPO Status: Time of last liquid consumption:                         Time of last solid consumption:                         Date of last liquid consumption: 22                        Date of last solid food consumption: 22    BMI:   Wt Readings from Last 3 Encounters:   22 188 lb 2.6 oz (85.3 kg)   22 188 lb (85.3 kg)   22 187 lb 6.3 oz (85 kg)     Body mass index is 27 kg/m². CBC:   Lab Results   Component Value Date/Time    WBC 8.0 02/07/2022 04:53 AM    RBC 4.48 02/07/2022 04:53 AM    HGB 13.4 02/07/2022 04:53 AM    HCT 40.8 02/07/2022 04:53 AM    MCV 90.9 02/07/2022 04:53 AM    RDW 13.9 02/07/2022 04:53 AM     02/07/2022 04:53 AM       CMP:   Lab Results   Component Value Date/Time     02/07/2022 04:53 AM    K 3.9 02/07/2022 04:53 AM     02/07/2022 04:53 AM    CO2 23 02/07/2022 04:53 AM    BUN 15 02/07/2022 04:53 AM    CREATININE 0.8 06/21/2022 10:15 AM    GFRAA >60 06/21/2022 10:15 AM    GFRAA >60 12/10/2011 12:46 PM    AGRATIO 1.5 10/29/2021 02:15 PM    LABGLOM >60 06/21/2022 10:15 AM    GLUCOSE 97 02/07/2022 04:53 AM    PROT 7.6 10/29/2021 02:15 PM    PROT 6.9 12/10/2011 12:46 PM    CALCIUM 8.9 02/07/2022 04:53 AM    BILITOT 0.7 10/29/2021 02:15 PM    ALKPHOS 67 10/29/2021 02:15 PM    AST 16 10/29/2021 02:15 PM    ALT 19 10/29/2021 02:15 PM       POC Tests: No results for input(s): POCGLU, POCNA, POCK, POCCL, POCBUN, POCHEMO, POCHCT in the last 72 hours.     Coags:   Lab Results   Component Value Date/Time    PROTIME 11.4 03/15/2021 02:45 PM    INR 0.98 03/15/2021 02:45 PM    APTT 31.9 03/15/2021 02:45 PM       HCG (If Applicable):   Lab Results   Component Value Date    PREGTESTUR Negative 03/09/2021        ABGs:   Lab Results   Component Value Date/Time    PHART 7.384 08/15/2018 12:03 PM    PO2ART 63.0 08/15/2018 12:03 PM    OBX4EXN 49.7 08/15/2018 12:03 PM    XNR0XLL 29.7 08/15/2018 12:03 PM    BEART 4.0 08/15/2018 12:03 PM    U4QNJCVB 91.0 08/15/2018 12:03 PM        Type & Screen (If Applicable):  No results found for: LABABO, LABRH    Drug/Infectious Status (If Applicable):  No results found for: HIV, HEPCAB    COVID-19 Screening (If Applicable):   Lab Results   Component Value Date/Time    COVID19 DETECTED 07/26/2022 08:22 AM    COVID19 Not Detected 11/07/2021 06:29 PM           Anesthesia Evaluation  Patient

## 2022-08-05 NOTE — ANESTHESIA POSTPROCEDURE EVALUATION
Department of Anesthesiology  Postprocedure Note    Patient: Tawana Cavazos  MRN: [id number]  YOB: 1960  Date of evaluation: 8/5/2022      Procedure Summary     Date: 08/05/22 Room / Location: [facility]    Anesthesia Start: 6750 Anesthesia Stop: 1631    Procedure: LEFT HEMITHYROIDECTOMY (Left: Neck) Diagnosis:       Neck mass      Thyroid nodule      (NECK MASS, THYROID NODULE)    Surgeons: Ansley Aguilar MD Responsible Provider: Lorraine Ridley MD    Anesthesia Type: general ASA Status: 3          Anesthesia Type: No value filed.     Paulo Phase I: Paulo Score: 10    Paulo Phase II:        Anesthesia Post Evaluation    Patient location during evaluation: PACU  Patient participation: complete - patient participated  Level of consciousness: awake and alert  Pain score: 5  Nausea & Vomiting: no nausea  Complications: no  Cardiovascular status: hemodynamically stable  Respiratory status: acceptable  Hydration status: stable

## 2022-08-05 NOTE — PROGRESS NOTES
Pt awake. Pain 10/10 in neck and complain of pain right leg more than left leg 8/10. Medicated with Fentanyl for pain. Attached A-boots.

## 2022-08-06 VITALS
RESPIRATION RATE: 16 BRPM | TEMPERATURE: 98.4 F | WEIGHT: 186.95 LBS | DIASTOLIC BLOOD PRESSURE: 83 MMHG | OXYGEN SATURATION: 91 % | HEIGHT: 70 IN | SYSTOLIC BLOOD PRESSURE: 126 MMHG | BODY MASS INDEX: 26.76 KG/M2 | HEART RATE: 67 BPM

## 2022-08-06 PROCEDURE — G0378 HOSPITAL OBSERVATION PER HR: HCPCS

## 2022-08-06 PROCEDURE — 6370000000 HC RX 637 (ALT 250 FOR IP): Performed by: OTOLARYNGOLOGY

## 2022-08-06 PROCEDURE — 94640 AIRWAY INHALATION TREATMENT: CPT

## 2022-08-06 PROCEDURE — 94760 N-INVAS EAR/PLS OXIMETRY 1: CPT

## 2022-08-06 PROCEDURE — 6360000002 HC RX W HCPCS: Performed by: OTOLARYNGOLOGY

## 2022-08-06 RX ORDER — OXYCODONE AND ACETAMINOPHEN 10; 325 MG/1; MG/1
1 TABLET ORAL EVERY 8 HOURS PRN
Qty: 20 TABLET | Refills: 0 | Status: SHIPPED | OUTPATIENT
Start: 2022-08-06 | End: 2022-08-11

## 2022-08-06 RX ORDER — BACITRACIN ZINC AND POLYMYXIN B SULFATE 500; 1000 [USP'U]/G; [USP'U]/G
OINTMENT TOPICAL
Qty: 1 EACH | Refills: 0 | Status: SHIPPED | OUTPATIENT
Start: 2022-08-06 | End: 2022-08-13

## 2022-08-06 RX ORDER — METHADONE HYDROCHLORIDE 10 MG/1
90 TABLET ORAL DAILY
Status: DISCONTINUED | OUTPATIENT
Start: 2022-08-06 | End: 2022-08-06 | Stop reason: HOSPADM

## 2022-08-06 RX ADMIN — MORPHINE SULFATE 4 MG: 4 INJECTION, SOLUTION INTRAMUSCULAR; INTRAVENOUS at 00:46

## 2022-08-06 RX ADMIN — MOMETASONE FUROATE AND FORMOTEROL FUMARATE DIHYDRATE 2 PUFF: 200; 5 AEROSOL RESPIRATORY (INHALATION) at 07:57

## 2022-08-06 RX ADMIN — DIPHENHYDRAMINE HCL 25 MG: 25 TABLET ORAL at 03:15

## 2022-08-06 RX ADMIN — BACITRACIN ZINC: 500 OINTMENT TOPICAL at 09:57

## 2022-08-06 RX ADMIN — MORPHINE SULFATE 4 MG: 4 INJECTION, SOLUTION INTRAMUSCULAR; INTRAVENOUS at 03:15

## 2022-08-06 RX ADMIN — BACITRACIN ZINC: 500 OINTMENT TOPICAL at 00:04

## 2022-08-06 ASSESSMENT — PAIN SCALES - GENERAL
PAINLEVEL_OUTOF10: 8
PAINLEVEL_OUTOF10: 8

## 2022-08-06 NOTE — PROGRESS NOTES
Patient given verbal and written discharge instructions. Question answered. No concerns. Spouse here and will provide a ride home. Call light in reach.

## 2022-08-06 NOTE — DISCHARGE INSTRUCTIONS
Discharge Instructions      Home Care   Regular diet  Physical Activity   During your recovery:   Light activity (no lifting more than 15 pounds) for 1 week  Medications   Dr. India Esparza to incision twice a day for 1 week    Follow these general medication guidelines:   Take your medication as directed. Do not change the amount or schedule. Do not share your medication with anyone. Medications can be dangerous when mixed. Talk to your doctor if you are taking more than one medication, including over-the-counter products and supplements. If you had to stop medications before surgery, ask your doctor when you can start again. Follow-up  Thursday for suture removal  Call Your Doctor If Any of the Following Occur ([phone])  Contact your doctor if your recovery is not progressing as expected or you develop complications, such as:  Signs of infection, including fever and chills  Pain that you cannot control with the medications that you have been given  Redness, swelling, increasing pain  Trouble breathing    If you think you have an emergency, call for medical help right away.

## 2022-08-06 NOTE — DISCHARGE INSTR - DIET

## 2022-08-06 NOTE — CARE COORDINATION
INITIAL CASE MANAGEMENT ASSESSMENT    Reviewed chart, called patient to assess possible discharge needs. Explained Case Management role/services. Living Situation: Pt lives in a two family home, alone. States she has support from her daughter and child's father. ADLs: Daughter assists with ADLs as well as house cleaning and shopping     DME: Nebulizer     Active Services: Pt reported that she is active with [facility] and has her dgtr as an aide. Pt stated that her dgtr provides her with 4hrs a day of PC services, 7 days a week. Transportation: Pt does not drive much but her child's father is here currently to drive her home and help her     Medications: Fills at [facility] on [address] No barriers    PCP: Cecy Fink      HD/PD: n/a    PLAN/COMMENTS: Return home with family support. No needs. SW/CM provided contact information for patient or family to call with any questions. SW/CM will follow and assist as needed.      Electronically signed by Veena Schwartz RN MSN on 8/6/2022 at 9:14 AM

## 2022-08-06 NOTE — DISCHARGE SUMMARY
Discharge Summary    Aria Esparza  :  1960  MRN:  [id number]    ADMIT DATE:  2022  DISCHARGE DATE:  2022    PRIMARY CARE PHYSICIAN:  Jose Schwab MD    VISIT STATUS: observation    CODE STATUS:  Full Code    DISCHARGE DIAGNOSES:  Principal Problem:    S/P thyroidectomy  Active Problems:    Thyroid nodule  Resolved Problems:    * No resolved hospital problems. *      HOSPITAL COURSE:  Admitted on 22 after a left hemithyroidectomy. Did well overnight. GAGE drained removed 22 and she was discharged    RECOMMENDED NEXT STEPS:   Follow up Thursday for suture removal     DISCHARGE MEDICATIONS:         Medication List        START taking these medications      bacitracin-polymyxin b [id number] UNIT/GM ointment  Commonly known as: POLYSPORIN  Apply topically twice a day to incision for 1 week     oxyCODONE-acetaminophen  MG per tablet  Commonly known as: Percocet  Take 1 tablet by mouth every 8 hours as needed for Pain for up to 5 days. Intended supply: 30 days            CONTINUE taking these medications      * albuterol sulfate  (90 Base) MCG/ACT inhaler  Commonly known as: ProAir HFA  Inhale 2 puffs into the lungs every 6 hours as needed for Wheezing     * albuterol (2.5 MG/3ML) 0.083% nebulizer solution  Commonly known as: PROVENTIL  Take 3 mLs by nebulization every 6 hours as needed for Wheezing     brompheniramine-pseudoephedrine-DM 2-30-10 MG/5ML syrup  Commonly known as: Bromfed DM  Take 5 mLs by mouth 4 times daily as needed for Cough     budesonide-formoterol 160-4.5 MCG/ACT Aero  Commonly known as: Symbicort  Inhale 2 puffs into the lungs 2 times daily     diphenhydrAMINE 25 MG tablet  Commonly known as: BENADRYL     METHADONE HCL PO     Minerin Creme Crea           * This list has 2 medication(s) that are the same as other medications prescribed for you. Read the directions carefully, and ask your doctor or other care provider to review them with you. STOP taking these medications      acetaminophen 500 MG tablet  Commonly known as: APAP Extra Strength            ASK your doctor about these medications      amLODIPine 10 MG tablet  Commonly known as: NORVASC  Take 1 tablet by mouth daily. Where to Get Your Medications        These medications were sent to [address], [address] [place] [id number]      Hours: 24-hours Phone: [phone]   bacitracin-polymyxin b [id number] UNIT/GM ointment       You can get these medications from any pharmacy    Bring a paper prescription for each of these medications  oxyCODONE-acetaminophen  MG per tablet         DIET: regular diet    ACTIVITY: light activity for 1 week (no lifting more than 15 pounds)  _____________________________________________________________________    Discharge exam:  Strong voice  Incision intact; no evidence of cervical hematoma  30 mL output from GAGE, this was removed      DISPOSITION: Home    Discharge condition:  good    Follow up with Dr. Alex Torres next Thursday to remove sutures    INSTRUCTIONS TO MA/SW: Please call patient on day after discharge (must document patient  contacted within 2 business days of discharge).         SIGNED:  Tracy Vergara MD   8/6/2022, 7:59 AM

## 2022-08-08 ENCOUNTER — TELEPHONE (OUTPATIENT)
Dept: ENT CLINIC | Age: 62
End: 2022-08-08

## 2022-08-08 ENCOUNTER — CARE COORDINATION (OUTPATIENT)
Dept: CASE MANAGEMENT | Age: 62
End: 2022-08-08

## 2022-08-08 NOTE — CARE COORDINATION
[facility] Transitions Initial Follow Up Call    Call within 2 business days of discharge: Yes    Patient: Gary Guevara Patient : 1960   MRN: [id number]  Reason for Admission: COVID, s/p thyroidectomy  Discharge Date: 22 RARS: Readmission Risk Score: 6.4 ( )      Last Discharge [facility]       Date Complaint Diagnosis Description Type Department Provider    22  S/P thyroidectomy . .. Admission (Discharged) [facility] MD Estrada             Spoke with: Durga Lim: [facility]    Non-face-to-face services provided:  Obtained and reviewed discharge summary and/or continuity of care documents  Education of patient/family/caregiver/guardian to support self-management-incision, f/u  Assessment and support for treatment adherence and medication management-full medication reconciliation completed    Care Transitions 24 Hour Call    Do you have a copy of your discharge instructions?: Yes  Do you have all of your prescriptions and are they filled?: Yes  Have you been contacted by a [facility] Pharmacist?: No  Have you scheduled your follow up appointment?: Yes  How are you going to get to your appointment?: Car - family or friend to transport  Do you have support at home?: Alone  Do you feel like you have everything you need to keep you well at home?: Yes  Are you an active caregiver in your home?: No  Care Transitions Interventions         Transitions of Care Initial Call    Was this an external facility discharge? No Discharge Facility: NA    Challenges to be reviewed by the provider   Additional needs identified to be addressed with provider: No  none             Method of communication with provider : none    Advance Care Planning:   Does patient have an Advance Directive: not on file. LPN Care Coordinator contacted the patient by telephone to perform post hospital discharge assessment. Verified name and  with patient as identifiers.  Provided introduction to self, and explanation of the LPN CC role. LPN CC reviewed discharge instructions, medical action plan and red flags with patient who verbalized understanding. Patient given an opportunity to ask questions and does not have any further questions or concerns at this time. Were discharge instructions available to patient? Yes. Reviewed appropriate site of care based on symptoms and resources available to patient including: PCP  Specialist. The patient agrees to contact the PCP office for questions related to their healthcare. Medication reconciliation was performed with patient, who verbalizes understanding of administration of home medications. Advised obtaining a 90-day supply of all daily and as-needed medications. Was patient discharged with a pulse oximeter? no    Patient verified  and was pleasant and agreeable to transition calls. States she is doing well, just experiencing body soreness. States incisional pain is managed well with medications. LPN CC discussed with patient that body aches may be related to COVID sx & encouraged patient to discuss & f/u with PCP. Patient verbalized understanding. Denies fever/chills, N/V/D, SOB. Reports some cough that is productive. Incision CDI. Appetite good, eating soft foods. Denies problems with bowels or bladder. Has pulse ox & BP cuff, but has not checked either today. States she has some depression & anxiety r/t pending pathology. LPN CC comforted patient & acknowledged that this would be scary, but that sometimes routines can make us feel good & in control over parts of our lives. Patient agreed & states she will take her vitals at end of call. Full medication reconciliation completed. Post op , will call PCP for f/u, pulm . LPN CC provided contact information. No further follow-up call indicated based on severity of symptoms and risk factors.        Follow Up  Future Appointments   Date Time Provider Dale Vega 8/11/2022  2:30 PM Nico Bonilla MD [facility]   9/26/2022  1:20 PM [facility]       Lan Oshea LPN

## 2022-08-08 NOTE — TELEPHONE ENCOUNTER
Submitted PA for oxyCODONE-Acetaminophen 10-325MG tablets, Key: [id number]. Medication has been APPROVED. Please notify patient. Thank you.

## 2022-08-11 ENCOUNTER — OFFICE VISIT (OUTPATIENT)
Dept: ENT CLINIC | Age: 62
End: 2022-08-11
Payer: MEDICAID

## 2022-08-11 VITALS — BODY MASS INDEX: 27.06 KG/M2 | WEIGHT: 189 LBS | HEIGHT: 70 IN

## 2022-08-11 DIAGNOSIS — Z48.02 VISIT FOR SUTURE REMOVAL: ICD-10-CM

## 2022-08-11 DIAGNOSIS — E89.0 HISTORY OF PARTIAL THYROIDECTOMY: ICD-10-CM

## 2022-08-11 DIAGNOSIS — E04.1 THYROID NODULE: Primary | ICD-10-CM

## 2022-08-11 PROCEDURE — 99213 OFFICE O/P EST LOW 20 MIN: CPT | Performed by: STUDENT IN AN ORGANIZED HEALTH CARE EDUCATION/TRAINING PROGRAM

## 2022-08-15 NOTE — PROGRESS NOTES
Megan Dover (:  1960) is a 63 y.o. female, here for evaluation of the following chief complaint(s):  Post-Op Check (Hot cold and joints hurt )      ASSESSMENT/PLAN:  1. Thyroid nodule  2. Visit for suture removal  3. History of partial thyroidectomy    This is a very pleasant 63 y.o. female here today for evaluation of the the above-noted complaints. The patient is status post excision of her left hemithyroid for a 4.3 cm nodule. I discussed her final pathology with her, and let her know that no further follow-up is necessary as it was benign. Pathology was consistent with hyperplastic nodule. We discussed wound care instructions. I have asked her to use Vaseline on her incision. We discussed that the supra incisional edema will resolve with time. Follow-up in 3 months with Dr. Olivia Shepherd or sooner should there be any new concerns. Medical Decision Making: The following items were considered in medical decision making:  Independent review of images  Review / order clinical lab tests  Review / order radiology tests  Decision to obtain old records  Review and summation of old records as accessed through [facility] if applicable    SUBJECTIVE/OBJECTIVE:  HPI    This is a patient who underwent a left hemithyroidectomy for a large nodule by my partner. She is here today for follow-up and suture removal.      Patient states she is doing great since surgery. She is starting to get her energy back. Her voice is a little bit raspy but overall feels pretty normal.  No difficulty swallowing. REVIEW OF SYSTEMS  The following systems were reviewed and revealed the following in addition to any already discussed in the HPI:    PHYSICAL EXAM    GENERAL: No acute distress, alert and oriented, no hoarseness, strong voice  EYES: EOMI, Anti-icteric  HENT:       Central neck incision with Prolene sutures in place.   Sutures removed without difficulty. There is some supra incisional edema. Skin edges are well approximated. No evidence of hematoma or infection. This note was generated completely or in part utilizing Dragon dictation speech recognition software. Occasionally, words are mistranscribed and despite editing, the text may contain inaccuracies due to incorrect word recognition. If further clarification is needed please contact the office at [phone]. An electronic signature was used to authenticate this note.     --Neri Camargo MD

## 2022-08-16 ENCOUNTER — APPOINTMENT (OUTPATIENT)
Dept: GENERAL RADIOLOGY | Age: 62
End: 2022-08-16
Payer: MEDICAID

## 2022-08-16 ENCOUNTER — TELEPHONE (OUTPATIENT)
Dept: ENT CLINIC | Age: 62
End: 2022-08-16

## 2022-08-16 ENCOUNTER — HOSPITAL ENCOUNTER (EMERGENCY)
Age: 62
Discharge: HOME | End: 2022-08-16
Attending: EMERGENCY MEDICINE
Payer: MEDICAID

## 2022-08-16 VITALS
RESPIRATION RATE: 23 BRPM | HEART RATE: 66 BPM | OXYGEN SATURATION: 91 % | SYSTOLIC BLOOD PRESSURE: 139 MMHG | DIASTOLIC BLOOD PRESSURE: 89 MMHG | BODY MASS INDEX: 26.7 KG/M2 | WEIGHT: 186.51 LBS | HEIGHT: 70 IN | TEMPERATURE: 98.1 F

## 2022-08-16 DIAGNOSIS — R52 BODY ACHES: Primary | ICD-10-CM

## 2022-08-16 LAB
A/G RATIO: 1.7 (ref 1.1–2.2)
ALBUMIN SERPL-MCNC: 4 G/DL (ref 3.4–5)
ALP BLD-CCNC: 79 U/L (ref 40–129)
ALT SERPL-CCNC: 14 U/L (ref 10–40)
ANION GAP SERPL CALCULATED.3IONS-SCNC: 12 MMOL/L (ref 3–16)
AST SERPL-CCNC: 16 U/L (ref 15–37)
BASOPHILS ABSOLUTE: 0.1 K/UL (ref 0–0.2)
BASOPHILS RELATIVE PERCENT: 1.4 %
BILIRUB SERPL-MCNC: 0.4 MG/DL (ref 0–1)
BILIRUBIN URINE: NEGATIVE
BLOOD, URINE: NEGATIVE
BUN BLDV-MCNC: 6 MG/DL (ref 7–20)
CALCIUM SERPL-MCNC: 9.1 MG/DL (ref 8.3–10.6)
CHLORIDE BLD-SCNC: 101 MMOL/L (ref 99–110)
CLARITY: CLEAR
CO2: 27 MMOL/L (ref 21–32)
COLOR: YELLOW
CREAT SERPL-MCNC: 0.7 MG/DL (ref 0.6–1.1)
EOSINOPHILS ABSOLUTE: 0.2 K/UL (ref 0–0.6)
EOSINOPHILS RELATIVE PERCENT: 2.9 %
GFR AFRICAN AMERICAN: >60
GFR NON-AFRICAN AMERICAN: >60
GLUCOSE BLD-MCNC: 95 MG/DL (ref 70–99)
GLUCOSE URINE: NEGATIVE MG/DL
HCT VFR BLD CALC: 41.4 % (ref 36–48)
HEMOGLOBIN: 14 G/DL (ref 12–16)
KETONES, URINE: NEGATIVE MG/DL
LACTIC ACID: 1.7 MMOL/L (ref 0.4–2)
LEUKOCYTE ESTERASE, URINE: NEGATIVE
LYMPHOCYTES ABSOLUTE: 1.4 K/UL (ref 1–5.1)
LYMPHOCYTES RELATIVE PERCENT: 23.5 %
MCH RBC QN AUTO: 30.3 PG (ref 26–34)
MCHC RBC AUTO-ENTMCNC: 33.8 G/DL (ref 31–36)
MCV RBC AUTO: 89.8 FL (ref 80–100)
MICROSCOPIC EXAMINATION: NORMAL
MONOCYTES ABSOLUTE: 0.5 K/UL (ref 0–1.3)
MONOCYTES RELATIVE PERCENT: 8.5 %
NEUTROPHILS ABSOLUTE: 3.7 K/UL (ref 1.7–7.7)
NEUTROPHILS RELATIVE PERCENT: 63.7 %
NITRITE, URINE: NEGATIVE
PDW BLD-RTO: 14.1 % (ref 12.4–15.4)
PH UA: 6.5 (ref 5–8)
PLATELET # BLD: 186 K/UL (ref 135–450)
PMV BLD AUTO: 8.1 FL (ref 5–10.5)
POTASSIUM SERPL-SCNC: 4.2 MMOL/L (ref 3.5–5.1)
PROTEIN UA: NEGATIVE MG/DL
RBC # BLD: 4.61 M/UL (ref 4–5.2)
SARS-COV-2, NAAT: NOT DETECTED
SODIUM BLD-SCNC: 140 MMOL/L (ref 136–145)
SPECIFIC GRAVITY UA: <=1.005 (ref 1–1.03)
TOTAL CK: 133 U/L (ref 26–192)
TOTAL PROTEIN: 6.3 G/DL (ref 6.4–8.2)
URINE REFLEX TO CULTURE: NORMAL
URINE TYPE: NORMAL
UROBILINOGEN, URINE: 0.2 E.U./DL
WBC # BLD: 5.9 K/UL (ref 4–11)

## 2022-08-16 PROCEDURE — 80053 COMPREHEN METABOLIC PANEL: CPT

## 2022-08-16 PROCEDURE — 99284 EMERGENCY DEPT VISIT MOD MDM: CPT

## 2022-08-16 PROCEDURE — 87635 SARS-COV-2 COVID-19 AMP PRB: CPT

## 2022-08-16 PROCEDURE — 36415 COLL VENOUS BLD VENIPUNCTURE: CPT

## 2022-08-16 PROCEDURE — 71045 X-RAY EXAM CHEST 1 VIEW: CPT

## 2022-08-16 PROCEDURE — 83605 ASSAY OF LACTIC ACID: CPT

## 2022-08-16 PROCEDURE — 85025 COMPLETE CBC W/AUTO DIFF WBC: CPT

## 2022-08-16 PROCEDURE — 81003 URINALYSIS AUTO W/O SCOPE: CPT

## 2022-08-16 PROCEDURE — 6370000000 HC RX 637 (ALT 250 FOR IP): Performed by: EMERGENCY MEDICINE

## 2022-08-16 PROCEDURE — 82550 ASSAY OF CK (CPK): CPT

## 2022-08-16 RX ORDER — LIDOCAINE 4 G/G
1 PATCH TOPICAL DAILY
Qty: 10 PATCH | Refills: 0 | Status: SHIPPED | OUTPATIENT
Start: 2022-08-16 | End: 2022-08-26

## 2022-08-16 RX ORDER — METHOCARBAMOL 750 MG/1
750 TABLET, FILM COATED ORAL ONCE
Status: COMPLETED | OUTPATIENT
Start: 2022-08-16 | End: 2022-08-16

## 2022-08-16 RX ORDER — METHOCARBAMOL 500 MG/1
500 TABLET, FILM COATED ORAL 3 TIMES DAILY PRN
Qty: 10 TABLET | Refills: 0 | Status: ON HOLD | OUTPATIENT
Start: 2022-08-16

## 2022-08-16 RX ADMIN — METHOCARBAMOL TABLETS 750 MG: 750 TABLET, COATED ORAL at 18:00

## 2022-08-16 ASSESSMENT — PAIN DESCRIPTION - LOCATION
LOCATION: GENERALIZED
LOCATION: ARM;LEG
LOCATION: ARM;LEG

## 2022-08-16 ASSESSMENT — PAIN DESCRIPTION - ORIENTATION
ORIENTATION: LEFT;RIGHT
ORIENTATION: RIGHT;LEFT

## 2022-08-16 ASSESSMENT — PAIN SCALES - GENERAL
PAINLEVEL_OUTOF10: 10
PAINLEVEL_OUTOF10: 7

## 2022-08-16 ASSESSMENT — PAIN - FUNCTIONAL ASSESSMENT: PAIN_FUNCTIONAL_ASSESSMENT: 0-10

## 2022-08-16 NOTE — ED NOTES
AVS reviewed with patient. Verbalized understanding. AVS was printed and given to patient. Printed prescription given to patient.      Zaira Dotson RN  08/16/22 1092

## 2022-08-16 NOTE — ED PROVIDER NOTES
urinary tract infection)      Past Surgical History:   Procedure Laterality Date    COLONOSCOPY  10/23/2018    Colonoscopy with polypectomy (cold snare), polypectomy (cold biopsy)    COLONOSCOPY N/A 10/23/2018    COLONOSCOPY POLYPECTOMY SNARE/COLD BIOPSY performed by Adriano Mcelroy MD at [facility] Left 2016    INTRACAPSULAR CATARACT EXTRACTION Right 2020    PHACOEMULSIFICATION WITH INTRAOCULAR LENS IMPLANT performed by Juan Madrid MD at [facility] [address] EXTRACTION Left 2020    PHACOEMULSIFICATION WITH INTRAOCULAR LENS IMPLANT performed by Juan Madrid MD at [place] [address] Right     26 yrs for lung infection     OTHER SURGICAL HISTORY  12/15/2016    EDMOND BUNIONECTOMY WITH APPLICATION OF AMNIOX LEFT FOOT    THYROIDECTOMY Left 2022    LEFT HEMITHYROIDECTOMY performed by Dangelo Tejeda MD at [facility] History   Problem Relation Age of Onset    Cancer Mother     Kidney Disease Mother     Heart Disease Mother     Diabetes Father     Heart Disease Father     Diabetes Sister     Heart Disease Brother     High Blood Pressure Brother      Social History     Socioeconomic History    Marital status: Life Partner     Spouse name: Not on file    Number of children: Not on file    Years of education: Not on file    Highest education level: Not on file   Occupational History    Not on file   Tobacco Use    Smoking status: Former     Packs/day: 0.50     Years: 45.00     Pack years: 22.50     Types: Cigarettes     Quit date: 2016     Years since quittin.1    Smokeless tobacco: Never   Vaping Use    Vaping Use: Never used   Substance and Sexual Activity    Alcohol use: No    Drug use: Not Currently     Comment: past h/o heroin on methadone--last used 5 years ago    Sexual activity: Not Currently   Other Topics Concern    Not on file   Social History Narrative    Not on file     Social Determinants of Health Financial Resource Strain: Not on file   Food Insecurity: Not on file   Transportation Needs: Not on file   Physical Activity: Not on file   Stress: Not on file   Social Connections: Not on file   Intimate Partner Violence: Not on file   Housing Stability: Not on file     No current facility-administered medications for this encounter. Current Outpatient Medications   Medication Sig Dispense Refill    albuterol (PROVENTIL) (2.5 MG/3ML) 0.083% nebulizer solution Take 3 mLs by nebulization every 6 hours as needed for Wheezing 120 each 11    Skin Protectants, Misc. (Jany Perez) CREA APPLY TO AFFECTED AREA(S) AS NEEDED (Patient not taking: Reported on 8/8/2022)      brompheniramine-pseudoephedrine-DM (BROMFED DM) 2-30-10 MG/5ML syrup Take 5 mLs by mouth 4 times daily as needed for Cough (Patient not taking: Reported on 8/8/2022) 118 mL 0    budesonide-formoterol (SYMBICORT) 160-4.5 MCG/ACT AERO Inhale 2 puffs into the lungs 2 times daily 1 each 11    albuterol sulfate HFA (PROAIR HFA) 108 (90 Base) MCG/ACT inhaler Inhale 2 puffs into the lungs every 6 hours as needed for Wheezing 1 Inhaler 3    METHADONE HCL PO Take 90 mg by mouth daily       diphenhydrAMINE (BENADRYL) 25 MG tablet Take 25 mg by mouth every 8 hours as needed for Itching or Allergies      amLODIPine (NORVASC) 10 MG tablet Take 1 tablet by mouth daily. (Patient taking differently: Take 5 mg by mouth in the morning.) 30 tablet 3     Allergies   Allergen Reactions    Acetylcysteine Shortness Of Breath and Anaphylaxis     Increased bronchospasm    Codeine Hives     Pt states no recent problems     Levaquin [Levofloxacin In D5w] Itching    Penicillins Hives and Itching    Dupixent [Dupilumab] Other (See Comments)     Right leg pain    Escitalopram Headaches    Ibuprofen Diarrhea, Nausea And Vomiting and Other (See Comments)       PMH, Surgical Hx, FH, Social Hx reviewed by myself.      REVIEW OF SYSTEMS  10 systems reviewed, pertinent positives per HPI otherwise noted to be negative. PHYSICAL EXAM  /81   Pulse 68   Temp 98.1 °F (36.7 °C) (Oral)   Resp 19   Ht 5' 10\" (1.778 m)   Wt 186 lb 8.2 oz (84.6 kg)   LMP 09/13/2017   SpO2 97%   BMI 26.76 kg/m²    GENERAL APPEARANCE: Awake and alert. HENT: Normocephalic. Atraumatic. EOMI. No facial droop. HEART/CHEST: RRR. LUNGS: Respirations unlabored. Speaking comfortably in full sentences. ABDOMEN: Soft, non-distended abdomen. Non tender to palpation. No guarding. No rebound. EXTREMITIES: No gross deformities. Moving all extremities with 5/5 strength. Sensation intact all extremities. No significant lower extremity edema. Negative Homans' sign bilaterally. SKIN: Warm and dry. No acute rashes. NEUROLOGICAL: Alert and oriented. No gross facial drooping. Answering questions appropriately. Moving all extremities. PSYCHIATRIC: Pleasant. Normal mood and affect. LABS  Results for orders placed or performed during the hospital encounter of 08/16/22   Urinalysis with Reflex to Culture    Specimen: Urine, clean catch   Result Value Ref Range    Urine Type Cleancatch        I have reviewed all labs for this visit. RADIOLOGY  XR CHEST PORTABLE    Result Date: 8/16/2022  EXAMINATION: ONE XRAY VIEW OF THE CHEST 8/16/2022 5:54 pm COMPARISON: 07/08/2022 HISTORY: ORDERING SYSTEM PROVIDED HISTORY: chills TECHNOLOGIST PROVIDED HISTORY: Reason for exam:->chills Reason for Exam: chills, left arm pain, covid? FINDINGS: The lungs are without acute focal process. There is no effusion or pneumothorax. The cardiomediastinal silhouette is stable. The osseous structures are stable. No acute process. ED COURSE/MDM  Patient seen and evaluated. At presentation, patient was awake, alert, afebrile, hemodynamically stable, and satting well on room air. The surgical incision on the anterior neck appears to be healing well with no erythema, drainage, fluctuance.   She had 5 out of 5 strength to all extremities. Intact sensation to all extremities. No significant lower extremity edema. Differential diagnosis includes electrolyte abnormalities such as hypercalcemia, hypothyroidism following thyroidectomy, COVID-19, UTI, pneumonia, others. I inquired if her symptoms may be due to opioid withdrawal as she recently completed the prescription of percocet and patient does not think this is due to withdrawals as she had been through withdrawal symptoms before. I offered ibuprofen, Toradol, naproxen, and patient reports having h/o renal injury and was told not to take NSAIDs. She was agreeable with trying a muscle relaxer, or methocarbamol. Labs obtained. Discussed that TSH will not be resulted today and she is to follow-up with that result through her ENT. Patient called out to the nurse and said she had seen her results on mycharts and wished to be discharged home. Creatinine normal. Calcium wnl at 9. Lactic normal. Ck wnl and therefore not rhabdomyolysis. Covid negative. Cxr without focal consolidation. Patient agreeable with plan to discharge home with lidocaine and methocarbamol prescriptions. She was discharged home with strict return precautions. Is this patient to be included in the SEP-1 Core Measure due to severe sepsis or septic shock? No   Exclusion criteria - the patient is NOT to be included for SEP-1 Core Measure due to:  2+ SIRS criteria are not met     Pt was seen during the COVID 19 pandemic. Appropriate PPE worn by ME during patient encounters. Patient was cared for during a time with constrained hospital bed capacity with nationwide stress on resources and staffing. During the patient's ED course, the patient was given:  Medications   methocarbamol (ROBAXIN) tablet 750 mg (750 mg Oral Given 8/16/22 1800)        CLINICAL IMPRESSION  1. Body aches        Blood pressure 132/81, pulse 68, temperature 98.1 °F (36.7 °C), temperature source Oral, resp.  rate 19, height 5' 10\" (1.778 m), weight 186 lb 8.2 oz (84.6 kg), last menstrual period 09/13/2017, SpO2 97 %, not currently breastfeeding. DISPOSITION  Rik Nice was discharged home in stable condition. Patient was given scripts for the following medications. I counseled patient how to take these medications. New Prescriptions    No medications on file       Follow-up with:  Courtney Carias MD  [address] [facility] [address] [facility]  [phone]    Call in 1 day        DISCLAIMER: This chart was created using Dragon dictation software. Efforts were made by me to ensure accuracy, however some errors may be present due to limitations of this technology and occasionally words are not transcribed correctly.         Demetria Acosta MD  08/16/22 [id number]       Demetria Acosta MD  08/20/22 3595

## 2022-08-16 NOTE — DISCHARGE INSTRUCTIONS
Please follow up with your ENT physician tomorrow for further evaluation and treatment. Follow up with the result of the TSH. Take tylenol as needed for symptoms. You can try methocarbamol as needed for muscle aches. Do not drive or operate machinery while taking this medication as it may cause drowsiness. If persistent or worsening symptoms, or if you have any concerns, return to ED immediately.

## 2022-08-16 NOTE — TELEPHONE ENCOUNTER
Pt calling, had surgery with Dr Karen Alamo on 8/5/22; she calls today saying \"my bones hurt\" in her arms and legs and she \"can't get any sleep because it just hurts so bad\". She is asking if this is common. Please call to advise.

## 2022-08-17 NOTE — TELEPHONE ENCOUNTER
Please let the patient know that the pain she is experiencing is not typical after surgery. I reviewed her visit notes from the emergency department yesterday. I agree that she should try Tylenol and ibuprofen for pain. I advised that she follow-up with Dr. Lucille Bill when he gets back to discuss whether or not anything else needs to be done. The good news is is that all of her laboratory work looked normal.  She has no evidence of infection and her surgical site is healing well.

## 2022-08-18 NOTE — TELEPHONE ENCOUNTER
Called and spoke to pt and she was very upset that the ER was saying that she wanted pain pills and that is nto what she was looking for, she just wants to know why her bones are hurting so bad.  She also had a TSH done and would like us to call her with the results

## 2022-08-22 NOTE — TELEPHONE ENCOUNTER
Bone pain is not a known complication of a hemithyroidectomy. I am not exactly sure why she is having this. I would encourage her to follow-up with her primary care doctor if she is having this sort of pain.

## 2022-08-22 NOTE — TELEPHONE ENCOUNTER
Called pt and talked to her and she said that she is feeling a little better and that she would be back in 3 months to see us

## 2022-09-09 ENCOUNTER — HOSPITAL ENCOUNTER (EMERGENCY)
Age: 62
Discharge: HOME | End: 2022-09-10
Attending: EMERGENCY MEDICINE
Payer: MEDICAID

## 2022-09-09 ENCOUNTER — APPOINTMENT (OUTPATIENT)
Dept: GENERAL RADIOLOGY | Age: 62
End: 2022-09-09
Payer: MEDICAID

## 2022-09-09 VITALS
RESPIRATION RATE: 14 BRPM | DIASTOLIC BLOOD PRESSURE: 85 MMHG | SYSTOLIC BLOOD PRESSURE: 129 MMHG | WEIGHT: 186.07 LBS | OXYGEN SATURATION: 97 % | HEART RATE: 51 BPM | BODY MASS INDEX: 26.64 KG/M2 | HEIGHT: 70 IN | TEMPERATURE: 98.3 F

## 2022-09-09 DIAGNOSIS — M16.11 ARTHRITIS OF RIGHT HIP: ICD-10-CM

## 2022-09-09 DIAGNOSIS — R10.31 RIGHT GROIN PAIN: Primary | ICD-10-CM

## 2022-09-09 LAB
ANION GAP SERPL CALCULATED.3IONS-SCNC: 8 MMOL/L (ref 3–16)
BASOPHILS ABSOLUTE: 0 K/UL (ref 0–0.2)
BASOPHILS RELATIVE PERCENT: 0.9 %
BUN BLDV-MCNC: 8 MG/DL (ref 7–20)
CALCIUM SERPL-MCNC: 8.8 MG/DL (ref 8.3–10.6)
CHLORIDE BLD-SCNC: 101 MMOL/L (ref 99–110)
CO2: 30 MMOL/L (ref 21–32)
CREAT SERPL-MCNC: 0.9 MG/DL (ref 0.6–1.1)
D DIMER: 0.43 UG/ML FEU (ref 0–0.6)
EOSINOPHILS ABSOLUTE: 0.2 K/UL (ref 0–0.6)
EOSINOPHILS RELATIVE PERCENT: 4.9 %
GFR AFRICAN AMERICAN: >60
GFR NON-AFRICAN AMERICAN: >60
GLUCOSE BLD-MCNC: 82 MG/DL (ref 70–99)
HCT VFR BLD CALC: 42.6 % (ref 36–48)
HEMOGLOBIN: 14.3 G/DL (ref 12–16)
LYMPHOCYTES ABSOLUTE: 1.8 K/UL (ref 1–5.1)
LYMPHOCYTES RELATIVE PERCENT: 36.8 %
MCH RBC QN AUTO: 30.2 PG (ref 26–34)
MCHC RBC AUTO-ENTMCNC: 33.6 G/DL (ref 31–36)
MCV RBC AUTO: 90.1 FL (ref 80–100)
MONOCYTES ABSOLUTE: 0.5 K/UL (ref 0–1.3)
MONOCYTES RELATIVE PERCENT: 9.8 %
NEUTROPHILS ABSOLUTE: 2.3 K/UL (ref 1.7–7.7)
NEUTROPHILS RELATIVE PERCENT: 47.6 %
PDW BLD-RTO: 14.1 % (ref 12.4–15.4)
PLATELET # BLD: 192 K/UL (ref 135–450)
PMV BLD AUTO: 8.1 FL (ref 5–10.5)
POTASSIUM REFLEX MAGNESIUM: 4.1 MMOL/L (ref 3.5–5.1)
RBC # BLD: 4.73 M/UL (ref 4–5.2)
SODIUM BLD-SCNC: 139 MMOL/L (ref 136–145)
WBC # BLD: 4.9 K/UL (ref 4–11)

## 2022-09-09 PROCEDURE — 85379 FIBRIN DEGRADATION QUANT: CPT

## 2022-09-09 PROCEDURE — 99284 EMERGENCY DEPT VISIT MOD MDM: CPT

## 2022-09-09 PROCEDURE — 36415 COLL VENOUS BLD VENIPUNCTURE: CPT

## 2022-09-09 PROCEDURE — 85025 COMPLETE CBC W/AUTO DIFF WBC: CPT

## 2022-09-09 PROCEDURE — 80048 BASIC METABOLIC PNL TOTAL CA: CPT

## 2022-09-09 PROCEDURE — 73502 X-RAY EXAM HIP UNI 2-3 VIEWS: CPT

## 2022-09-09 RX ORDER — ACETAMINOPHEN 500 MG
1000 TABLET ORAL ONCE
Status: COMPLETED | OUTPATIENT
Start: 2022-09-10 | End: 2022-09-10

## 2022-09-09 ASSESSMENT — PAIN - FUNCTIONAL ASSESSMENT
PAIN_FUNCTIONAL_ASSESSMENT: 0-10
PAIN_FUNCTIONAL_ASSESSMENT: PREVENTS OR INTERFERES SOME ACTIVE ACTIVITIES AND ADLS

## 2022-09-09 ASSESSMENT — ENCOUNTER SYMPTOMS
SHORTNESS OF BREATH: 0
SORE THROAT: 0
RESPIRATORY NEGATIVE: 1
ABDOMINAL PAIN: 0
GASTROINTESTINAL NEGATIVE: 1

## 2022-09-09 ASSESSMENT — PAIN DESCRIPTION - PAIN TYPE: TYPE: ACUTE PAIN

## 2022-09-09 ASSESSMENT — PAIN DESCRIPTION - ORIENTATION: ORIENTATION: RIGHT

## 2022-09-09 ASSESSMENT — PAIN DESCRIPTION - DESCRIPTORS: DESCRIPTORS: THROBBING

## 2022-09-09 ASSESSMENT — PAIN DESCRIPTION - LOCATION: LOCATION: GROIN

## 2022-09-09 ASSESSMENT — PAIN DESCRIPTION - ONSET: ONSET: ON-GOING

## 2022-09-09 ASSESSMENT — PAIN DESCRIPTION - FREQUENCY: FREQUENCY: CONTINUOUS

## 2022-09-09 ASSESSMENT — PAIN SCALES - GENERAL: PAINLEVEL_OUTOF10: 10

## 2022-09-10 PROCEDURE — 6370000000 HC RX 637 (ALT 250 FOR IP): Performed by: EMERGENCY MEDICINE

## 2022-09-10 RX ADMIN — ACETAMINOPHEN 1000 MG: 500 TABLET ORAL at 00:03

## 2022-09-10 NOTE — ED PROVIDER NOTES
of her skin. For further review of systems see below. The history is provided by the patient. No  was used. NursingNotes were reviewed. REVIEW OF SYSTEMS    (2-9 systems for level 4, 10 or more for level 5)     Review of Systems   Constitutional: Negative. HENT:  Negative for congestion and sore throat. Respiratory: Negative. Negative for shortness of breath. Cardiovascular: Negative. Negative for chest pain. Gastrointestinal: Negative. Negative for abdominal pain. Genitourinary: Negative. Musculoskeletal:  Positive for arthralgias and myalgias. Right groin pain   Skin: Negative. Neurological: Negative. Negative for headaches. Except as noted above the remainder of the review of systems was reviewed and negative.        PAST MEDICAL HISTORY     Past Medical History:   Diagnosis Date    Anxiety and depression     Arthritis     Asthma     Bipolar disorder ([facility] [address]     CKD (chronic kidney disease)     stage 3    COPD (chronic obstructive pulmonary disease) ([facility] [address]     Empyema ([facility] [address]     Hepatitis C     Patient denies     History of heroin use     Hypertension     Narcotic abuse ([facility] [address]     heroin, clean 1 year    UTI (lower urinary tract infection)          SURGICALHISTORY       Past Surgical History:   Procedure Laterality Date    COLONOSCOPY  10/23/2018    Colonoscopy with polypectomy (cold snare), polypectomy (cold biopsy)    COLONOSCOPY N/A 10/23/2018    COLONOSCOPY POLYPECTOMY SNARE/COLD BIOPSY performed by Chris Berrios MD at [facility] Left 12/16/2016    INTRACAPSULAR CATARACT EXTRACTION Right 02/21/2020    PHACOEMULSIFICATION WITH INTRAOCULAR LENS IMPLANT performed by Aster Chaudhary MD at [address] Left 02/28/2020    PHACOEMULSIFICATION WITH INTRAOCULAR LENS IMPLANT performed by Aster Chaudhary MD at [place] [address] Right     26 yrs for lung infection     OTHER SURGICAL Right hip: Tenderness and bony tenderness present. No deformity, lacerations or crepitus. Normal range of motion. Normal strength. Right upper leg: Normal. No swelling. Left upper leg: Normal. No swelling. Right lower leg: No edema. Left lower leg: No edema. Comments: No lower extremity swelling but significant tenderness to palpation of the right hip and reproducible tenderness with external rotation of the hip as well as with flexion and extension at the hip no overlying erythema noted no palpable fluctuance   Lymphadenopathy:      Lower Body: No right inguinal adenopathy. No left inguinal adenopathy. Skin:     General: Skin is warm and dry. Capillary Refill: Capillary refill takes less than 2 seconds. Neurological:      General: No focal deficit present. Mental Status: She is alert and oriented to person, place, and time. Psychiatric:         Mood and Affect: Mood normal.         Behavior: Behavior normal.       RESULTS     EKG: All EKG's are interpreted by the Emergency Department Physician who either signs or Co-signsthis chart in the absence of a cardiologist.      RADIOLOGY:   Eli Ohm such as CT, Ultrasound and MRI are read by the radiologist.   Interpretation per the Radiologist below, if available at the time ofthis note:    XR HIP 2-3 VW W PELVIS RIGHT   Final Result   Moderate osteoarthritic changes in the right hip and mild osteoarthritic   changes on the left. Diffuse osteopenia with no acute bony abnormality. ED BEDSIDE ULTRASOUND:   Performed by ED Physician - none    LABS:  Labs Reviewed   BASIC METABOLIC PANEL W/ REFLEX TO MG FOR LOW K   CBC WITH AUTO DIFFERENTIAL   D-DIMER, QUANTITATIVE       All other labs were within normal range or not returned as of this dictation.     EMERGENCY DEPARTMENT COURSE and DIFFERENTIAL DIAGNOSIS/MDM:   Vitals:    Vitals:    09/09/22 2238   BP: 129/85   Pulse: 51   Resp: 14   Temp: 98.3 °F (36.8 °C)   TempSrc: Oral   SpO2: 97%   Weight: 186 lb 1.1 oz (84.4 kg)   Height: 5' 10\" (1.778 m)       Patient was given thefollowing medications:  Medications   acetaminophen (TYLENOL) tablet 1,000 mg (1,000 mg Oral Given 9/10/22 0003)       ED COURSE & MEDICAL DECISION MAKING    Pertinent Labs & Imaging studies reviewed. (See chart for details)   -  Patient seen and evaluated in the emergency department. -  Triage and nursing notes reviewed and incorporated. -  Old chart records reviewed and incorporated. -  Differential diagnosis includes: Hip strain, fracture, muscular strain, tendinitis, adenopathy, DVT, abscess, other    81-year-old female presents with right groin pain which just occurred a few weeks after she had a thyroid surgery. Reproducibly tender with both palpation and with motion. Likely musculoskeletal or tendon strain however given patient has isolated right lower extremity pain and subjective swelling that is radiating down her right leg will rule out patient for DVT status postsurgery. Will obtain D-dimer. We will also obtain x-ray of the right hip and basic laboratory work-up. Will reeval closely disposition accordingly. No signs of local abscess or infection. Vital signs stable afebrile not tachycardic saturating well on room air normotensive. -  Work-up included:  See above  -  ED treatment included: See above  -  Results discussed with patient. The patient is agreeable with plan of care and disposition. Is this patient to be included in the SEP-1 Core Measure due to severe sepsis or septic shock? No   Exclusion criteria - the patient is NOT to be included for SEP-1 Core Measure due to:  2+ SIRS criteria are not met     REASSESSMENT      The patient is at low risk for mortality based on demographic, history and clinical factors. Given the best available information and clinical assessment, I estimate the risk of hospitalization to be greater than risk of treatment at home.  I have explained to the patient that the risk could rapidly change, given precautions for return and instructions. Explained to patient that the risk for mortality is low based on demographic, history and clinical factors. I discussed with patient the results of evaluation in the ED, diagnosis, care, and prognosis. The plan is to discharge to home. Patient is in agreement with plan and questions have been answered. I also discussed with patient the reasons which may require a return visit and the importance of follow-up care. The patient is well-appearing, nontoxic, and improved at the time of discharge. Patient agrees to call to arrange follow-up care as directed. Patient understands to return immediately for worsening/change in symptoms. CRITICAL CARE TIME   Total Critical Care time was 18 minutes, excluding separately reportable procedures. There was a high probability of clinically significant/life threatening deterioration in the patient's condition which required my urgent intervention. This includes multiple reevaluations, vital sign monitoring, pulse oximetry monitoring, telemetry monitoring, clinical response to the IV medications, reviewing the nursing notes, consultation time, dictation/documentation time, and interpretation of the labwork. (This time excludes time spent performing procedures). CONSULTS:  None    PROCEDURES:  Unless otherwise noted below, none     Procedures    FINAL IMPRESSION      1. Right groin pain    2.  Arthritis of right hip          DISPOSITION/PLAN   DISPOSITION Decision To Discharge 09/10/2022 12:34:48 AM      PATIENT REFERREDTO:  Aishwarya Reynolds MD  [address]  [phone]    Schedule an appointment as soon as possible for a visit       DISCHARGEMEDICATIONS:  New Prescriptions    No medications on file          (Please note that portions of this note were completed with a voice recognition program.  Efforts were made to edit the dictations but occasionally words are mis-transcribed.)    Cassie Phelan MD (electronically signed)  Attending Emergency Physician         Cassie Phelan MD  09/10/22 6757

## 2022-09-10 NOTE — ED TRIAGE NOTES
Patient to the ED with c/o right sided groin pain. States she had the left thyroid removed in August and was having pain in her bones but now she is having the pain in the groin. States the pain in the bones have become less painful but still there. Patient denies any injury. Concerned for a blood clot or abscess. No signs of distress noted.

## 2022-09-10 NOTE — ED NOTES
Discharge instructions reviewed with pt and pt denied having any questions. Discharge paperwork signed and pt discharged.         Audrey Grover RN  09/10/22 9725

## 2022-09-20 ENCOUNTER — TELEPHONE (OUTPATIENT)
Dept: PULMONOLOGY | Age: 62
End: 2022-09-20

## 2022-09-20 DIAGNOSIS — R05.9 COUGH: Primary | ICD-10-CM

## 2022-09-20 RX ORDER — GUAIFENESIN AND DEXTROMETHORPHAN HYDROBROMIDE 1200; 60 MG/1; MG/1
1 TABLET, EXTENDED RELEASE ORAL 2 TIMES DAILY
Qty: 28 TABLET | Refills: 0 | Status: SHIPPED | OUTPATIENT
Start: 2022-09-20 | End: 2022-09-21

## 2022-09-20 NOTE — TELEPHONE ENCOUNTER
Patient went to [facility] to  script and insurance will not cover but they will cpver the cough syrup she normally gets . Can we send a script for Bromfed?

## 2022-09-20 NOTE — TELEPHONE ENCOUNTER
Complains of cough and SOB  Duration 3 days   Cough with sputum production? Not really   Color none   Fever? No   Any other Symptoms? Any current treatment tried? no  Using inhalers? Yes and neb  do they help? Somewhat   Pharmacy?  [facility]      Patient is requesting cough syrup , has Next OV 9/26

## 2022-09-21 RX ORDER — BROMPHENIRAMINE MALEATE, PSEUDOEPHEDRINE HYDROCHLORIDE, AND DEXTROMETHORPHAN HYDROBROMIDE 2; 30; 10 MG/5ML; MG/5ML; MG/5ML
5 SYRUP ORAL 4 TIMES DAILY PRN
Qty: 118 ML | Refills: 0 | Status: ON HOLD | OUTPATIENT
Start: 2022-09-21

## 2022-09-24 DIAGNOSIS — J44.9 COPD WITH ASTHMA (HCC): ICD-10-CM

## 2022-09-26 RX ORDER — PREDNISONE 10 MG/1
TABLET ORAL
Qty: 30 TABLET | Refills: 0 | OUTPATIENT
Start: 2022-09-26

## 2022-09-27 NOTE — TELEPHONE ENCOUNTER
How long have you been short of breath? Since saturday  Have you had a fever? Have you had a fever? no  Are you more SOB than normal? yes  Have you taken your inhalers? yes  Did they give relief? Short relief   Cough?  No      Requesting prednisone

## 2022-09-28 RX ORDER — PREDNISONE 10 MG/1
TABLET ORAL
Qty: 30 TABLET | Refills: 0 | Status: SHIPPED | OUTPATIENT
Start: 2022-09-28 | End: 2022-10-08

## 2022-10-17 ENCOUNTER — TELEPHONE (OUTPATIENT)
Dept: PULMONOLOGY | Age: 62
End: 2022-10-17

## 2022-10-17 DIAGNOSIS — J44.9 COPD WITH ASTHMA (HCC): Primary | ICD-10-CM

## 2022-10-17 RX ORDER — DEXAMETHASONE 4 MG/1
4 TABLET ORAL
Qty: 5 TABLET | Refills: 0 | Status: SHIPPED | OUTPATIENT
Start: 2022-10-17 | End: 2022-10-22

## 2022-10-17 NOTE — TELEPHONE ENCOUNTER
It is not clear why she asking for a steroids after complains of allergies. Either way it appears she wants a different steroid. Have her call the pharmacy she filled this steroid and find out 1. What it was and the dose. I suspect she would be why better with office visit the telephone medicine.   Can be offered apt Wednesday

## 2022-10-17 NOTE — TELEPHONE ENCOUNTER
Patient called the office back with the name of the medication. Dexamethasone (Decadron) tablet directions take one tablet PO daily for 5 (with breakfast) days, 4 mg tablets. She states the Prednisone is not working anymore since she has taken it so much and believes she has built up a tolerance to it. Please advise if you want me to schedule an appointment for her either VV or office visit.  If so which days, and what times are best?

## 2022-10-25 ENCOUNTER — OFFICE VISIT (OUTPATIENT)
Dept: PULMONOLOGY | Age: 62
End: 2022-10-25
Payer: MEDICAID

## 2022-10-25 VITALS
HEART RATE: 64 BPM | OXYGEN SATURATION: 98 % | TEMPERATURE: 97.4 F | BODY MASS INDEX: 26.69 KG/M2 | SYSTOLIC BLOOD PRESSURE: 112 MMHG | HEIGHT: 70 IN | RESPIRATION RATE: 16 BRPM | DIASTOLIC BLOOD PRESSURE: 70 MMHG | WEIGHT: 186.4 LBS

## 2022-10-25 DIAGNOSIS — J30.89 OTHER ALLERGIC RHINITIS: Primary | ICD-10-CM

## 2022-10-25 DIAGNOSIS — J44.9 COPD WITH ASTHMA (HCC): ICD-10-CM

## 2022-10-25 PROBLEM — J45.50 SEVERE PERSISTENT ASTHMA: Status: RESOLVED | Noted: 2021-06-10 | Resolved: 2022-10-25

## 2022-10-25 PROBLEM — Z79.52 CURRENT CHRONIC USE OF SYSTEMIC STEROIDS: Status: RESOLVED | Noted: 2021-07-13 | Resolved: 2022-10-25

## 2022-10-25 PROCEDURE — 99214 OFFICE O/P EST MOD 30 MIN: CPT | Performed by: INTERNAL MEDICINE

## 2022-10-25 PROCEDURE — 3074F SYST BP LT 130 MM HG: CPT | Performed by: INTERNAL MEDICINE

## 2022-10-25 PROCEDURE — 3078F DIAST BP <80 MM HG: CPT | Performed by: INTERNAL MEDICINE

## 2022-10-25 RX ORDER — ALBUTEROL SULFATE 2.5 MG/3ML
2.5 SOLUTION RESPIRATORY (INHALATION) EVERY 6 HOURS PRN
Qty: 120 EACH | Refills: 11 | Status: SHIPPED | OUTPATIENT
Start: 2022-10-25

## 2022-10-25 RX ORDER — ALBUTEROL SULFATE 90 UG/1
2 AEROSOL, METERED RESPIRATORY (INHALATION) EVERY 6 HOURS PRN
Qty: 1 EACH | Refills: 11 | Status: SHIPPED | OUTPATIENT
Start: 2022-10-25

## 2022-10-25 RX ORDER — BUDESONIDE AND FORMOTEROL FUMARATE DIHYDRATE 160; 4.5 UG/1; UG/1
2 AEROSOL RESPIRATORY (INHALATION) 2 TIMES DAILY
Qty: 1 EACH | Refills: 11 | Status: SHIPPED | OUTPATIENT
Start: 2022-10-25 | End: 2022-10-25 | Stop reason: SDUPTHER

## 2022-10-25 RX ORDER — LORATADINE AND PSEUDOEPHEDRINE SULFATE 10; 240 MG/1; MG/1
1 TABLET, EXTENDED RELEASE ORAL DAILY
Qty: 30 TABLET | Refills: 11 | Status: SHIPPED | OUTPATIENT
Start: 2022-10-25

## 2022-10-25 RX ORDER — ALBUTEROL SULFATE 90 UG/1
2 AEROSOL, METERED RESPIRATORY (INHALATION) EVERY 6 HOURS PRN
Qty: 1 EACH | Refills: 3 | Status: SHIPPED | OUTPATIENT
Start: 2022-10-25 | End: 2022-10-25 | Stop reason: SDUPTHER

## 2022-10-25 RX ORDER — BUDESONIDE AND FORMOTEROL FUMARATE DIHYDRATE 160; 4.5 UG/1; UG/1
2 AEROSOL RESPIRATORY (INHALATION) 2 TIMES DAILY
Qty: 1 EACH | Refills: 11 | Status: SHIPPED | OUTPATIENT
Start: 2022-10-25

## 2022-10-25 NOTE — ASSESSMENT & PLAN NOTE
complains of shortness of breath with alexandre environment.      Continue Symbicort     Off prednisone     Start Claritin D.

## 2022-10-25 NOTE — PROGRESS NOTES
REASON FOR CONSULTATION/CC: asthma  Chief Complaint   Patient presents with    COPD    Follow-up          PCP: Lucas Figueroa MD    HISTORY OF PRESENT ILLNESS: Romero Walker is a 63y.o. year old female with a history of asthma who presents :               Asthma history   Nucala, failed. Did not response  Dupexiant, side effects. side effects to Spiriva   Hx of chronic steroids then weaned off    Currently          COPD with Asthma and allergic rhinitis   Stopped singulair secondary to causing depression       Stopped secondary to tongue bleeding. She is using Symbicort    Off prednisone. allergic rhinitis  Off Astelin . Has sinus congestion and drainage. Objective:   PHYSICAL EXAM:  Blood pressure 112/70, pulse 64, temperature 97.4 °F (36.3 °C), temperature source Infrared, resp. rate 16, height 5' 10\" (1.778 m), weight 186 lb 6.4 oz (84.6 kg), last menstrual period 09/13/2017, SpO2 98 %, not currently breastfeeding.'    Gen: No distress. Eyes:    ENT:    Neck:    Resp: No accessory muscle use. No crackles. No wheezes. No rhonchi. CV: Regular rate. Regular rhythm. No murmur or rub. No edema. GI:    Skin:    Lymph:    M/S: No cyanosis. No clubbing. Neuro: Moves all four extremities. Psych: Oriented x 3. No anxiety. Awake. Alert. Intact judgement and insight. Data Reviewed:        Assessment:      Asthma  allergic rhinitis   Leg numbness      Plan:      Problem List Items Addressed This Visit       Other allergic rhinitis - Primary      Stopped Astelin    Add Claritin D.           Relevant Medications    loratadine-pseudoephedrine (CLARITIN-D 24 HOUR)  MG per extended release tablet    albuterol (PROVENTIL) (2.5 MG/3ML) 0.083% nebulizer solution    albuterol sulfate HFA (PROAIR HFA) 108 (90 Base) MCG/ACT inhaler    budesonide-formoterol (SYMBICORT) 160-4.5 MCG/ACT AERO    COPD with asthma (HCC)      complains of shortness of breath with alexandre environment. Continue Symbicort     Off prednisone     Start Claritin D. Relevant Medications    loratadine-pseudoephedrine (CLARITIN-D 24 HOUR)  MG per extended release tablet    albuterol (PROVENTIL) (2.5 MG/3ML) 0.083% nebulizer solution    albuterol sulfate HFA (PROAIR HFA) 108 (90 Base) MCG/ACT inhaler    budesonide-formoterol (SYMBICORT) 160-4.5 MCG/ACT AERO              This note was transcribed using [id number] [employer]. Please disregard any translational errors.     [phone]

## 2022-10-31 ENCOUNTER — TELEPHONE (OUTPATIENT)
Dept: PULMONOLOGY | Age: 62
End: 2022-10-31

## 2022-10-31 RX ORDER — DEXAMETHASONE 4 MG/1
4 TABLET ORAL
Qty: 5 TABLET | Refills: 0 | Status: ON HOLD | OUTPATIENT
Start: 2022-10-31 | End: 2022-11-09 | Stop reason: HOSPADM

## 2022-10-31 NOTE — TELEPHONE ENCOUNTER
Tawanda Pressley calls. \"Asthma attack,\" sob, coughing up clear phlegm, started as she was leaving work this morning about 2:00 am.  Using albuterol and nebulizer treatments with little help. Requesting Dexamethasone 4 mg one daily for 5 days (Decadron) that was given on 10/18. \" Really helped a lot. \"  [facility] Perez on file.   LOV 10/25/2022

## 2022-11-02 ENCOUNTER — APPOINTMENT (OUTPATIENT)
Dept: GENERAL RADIOLOGY | Age: 62
DRG: 133 | End: 2022-11-02
Payer: MEDICAID

## 2022-11-02 ENCOUNTER — HOSPITAL ENCOUNTER (INPATIENT)
Age: 62
LOS: 7 days | Discharge: HOME | DRG: 133 | End: 2022-11-09
Attending: EMERGENCY MEDICINE | Admitting: INTERNAL MEDICINE
Payer: MEDICAID

## 2022-11-02 DIAGNOSIS — J96.01 ACUTE RESPIRATORY FAILURE WITH HYPOXIA (HCC): ICD-10-CM

## 2022-11-02 DIAGNOSIS — J44.1 COPD EXACERBATION (HCC): Primary | ICD-10-CM

## 2022-11-02 LAB
A/G RATIO: 1.7 (ref 1.1–2.2)
ALBUMIN SERPL-MCNC: 4.4 G/DL (ref 3.4–5)
ALP BLD-CCNC: 86 U/L (ref 40–129)
ALT SERPL-CCNC: 18 U/L (ref 10–40)
ANION GAP SERPL CALCULATED.3IONS-SCNC: 18 MMOL/L (ref 3–16)
AST SERPL-CCNC: 23 U/L (ref 15–37)
BASE EXCESS VENOUS: 1.5 MMOL/L
BASOPHILS ABSOLUTE: 0 K/UL (ref 0–0.2)
BASOPHILS RELATIVE PERCENT: 0.3 %
BILIRUB SERPL-MCNC: 0.3 MG/DL (ref 0–1)
BUN BLDV-MCNC: 7 MG/DL (ref 7–20)
CALCIUM SERPL-MCNC: 9.3 MG/DL (ref 8.3–10.6)
CARBOXYHEMOGLOBIN: 3.2 %
CHLORIDE BLD-SCNC: 101 MMOL/L (ref 99–110)
CO2: 22 MMOL/L (ref 21–32)
CREAT SERPL-MCNC: 0.9 MG/DL (ref 0.6–1.2)
EKG ATRIAL RATE: 104 BPM
EKG DIAGNOSIS: NORMAL
EKG P AXIS: 60 DEGREES
EKG P-R INTERVAL: 140 MS
EKG Q-T INTERVAL: 342 MS
EKG QRS DURATION: 86 MS
EKG QTC CALCULATION (BAZETT): 449 MS
EKG R AXIS: -41 DEGREES
EKG T AXIS: 38 DEGREES
EKG VENTRICULAR RATE: 104 BPM
EOSINOPHILS ABSOLUTE: 0 K/UL (ref 0–0.6)
EOSINOPHILS RELATIVE PERCENT: 0.3 %
GFR SERPL CREATININE-BSD FRML MDRD: >60 ML/MIN/{1.73_M2}
GLUCOSE BLD-MCNC: 102 MG/DL (ref 70–99)
HCO3 VENOUS: 28 MMOL/L (ref 23–29)
HCT VFR BLD CALC: 44.9 % (ref 36–48)
HEMOGLOBIN: 14.8 G/DL (ref 12–16)
L. PNEUMOPHILA SEROGP 1 UR AG: NORMAL
LYMPHOCYTES ABSOLUTE: 0.5 K/UL (ref 1–5.1)
LYMPHOCYTES RELATIVE PERCENT: 7.9 %
MCH RBC QN AUTO: 29.9 PG (ref 26–34)
MCHC RBC AUTO-ENTMCNC: 33 G/DL (ref 31–36)
MCV RBC AUTO: 90.6 FL (ref 80–100)
METHEMOGLOBIN VENOUS: 0.4 %
MONOCYTES ABSOLUTE: 0.7 K/UL (ref 0–1.3)
MONOCYTES RELATIVE PERCENT: 11.4 %
NEUTROPHILS ABSOLUTE: 5 K/UL (ref 1.7–7.7)
NEUTROPHILS RELATIVE PERCENT: 80.1 %
O2 SAT, VEN: 85 %
O2 THERAPY: NORMAL
PCO2, VEN: 48.6 MMHG (ref 40–50)
PDW BLD-RTO: 14.1 % (ref 12.4–15.4)
PH VENOUS: 7.37 (ref 7.35–7.45)
PLATELET # BLD: 136 K/UL (ref 135–450)
PMV BLD AUTO: 8.3 FL (ref 5–10.5)
PO2, VEN: 50 MMHG
POTASSIUM REFLEX MAGNESIUM: 4.2 MMOL/L (ref 3.5–5.1)
PRO-BNP: 307 PG/ML (ref 0–124)
RAPID INFLUENZA  B AGN: NEGATIVE
RAPID INFLUENZA A AGN: NEGATIVE
RBC # BLD: 4.95 M/UL (ref 4–5.2)
SARS-COV-2, NAAT: NOT DETECTED
SODIUM BLD-SCNC: 141 MMOL/L (ref 136–145)
STREP PNEUMONIAE ANTIGEN, URINE: NORMAL
TCO2 CALC VENOUS: 29 MMOL/L
TOTAL PROTEIN: 7 G/DL (ref 6.4–8.2)
TROPONIN: <0.01 NG/ML
WBC # BLD: 6.2 K/UL (ref 4–11)

## 2022-11-02 PROCEDURE — 87070 CULTURE OTHR SPECIMN AEROBIC: CPT

## 2022-11-02 PROCEDURE — 94640 AIRWAY INHALATION TREATMENT: CPT

## 2022-11-02 PROCEDURE — 2580000003 HC RX 258: Performed by: INTERNAL MEDICINE

## 2022-11-02 PROCEDURE — 1200000000 HC SEMI PRIVATE

## 2022-11-02 PROCEDURE — 6370000000 HC RX 637 (ALT 250 FOR IP): Performed by: INTERNAL MEDICINE

## 2022-11-02 PROCEDURE — 83880 ASSAY OF NATRIURETIC PEPTIDE: CPT

## 2022-11-02 PROCEDURE — 6360000002 HC RX W HCPCS: Performed by: INTERNAL MEDICINE

## 2022-11-02 PROCEDURE — 82803 BLOOD GASES ANY COMBINATION: CPT

## 2022-11-02 PROCEDURE — 96365 THER/PROPH/DIAG IV INF INIT: CPT

## 2022-11-02 PROCEDURE — 93005 ELECTROCARDIOGRAM TRACING: CPT | Performed by: EMERGENCY MEDICINE

## 2022-11-02 PROCEDURE — 6360000002 HC RX W HCPCS: Performed by: EMERGENCY MEDICINE

## 2022-11-02 PROCEDURE — 99285 EMERGENCY DEPT VISIT HI MDM: CPT

## 2022-11-02 PROCEDURE — 87804 INFLUENZA ASSAY W/OPTIC: CPT

## 2022-11-02 PROCEDURE — 71045 X-RAY EXAM CHEST 1 VIEW: CPT

## 2022-11-02 PROCEDURE — 93010 ELECTROCARDIOGRAM REPORT: CPT | Performed by: INTERNAL MEDICINE

## 2022-11-02 PROCEDURE — 87635 SARS-COV-2 COVID-19 AMP PRB: CPT

## 2022-11-02 PROCEDURE — 94761 N-INVAS EAR/PLS OXIMETRY MLT: CPT

## 2022-11-02 PROCEDURE — 80053 COMPREHEN METABOLIC PANEL: CPT

## 2022-11-02 PROCEDURE — 6370000000 HC RX 637 (ALT 250 FOR IP)

## 2022-11-02 PROCEDURE — 2700000000 HC OXYGEN THERAPY PER DAY

## 2022-11-02 PROCEDURE — 84484 ASSAY OF TROPONIN QUANT: CPT

## 2022-11-02 PROCEDURE — 87205 SMEAR GRAM STAIN: CPT

## 2022-11-02 PROCEDURE — 87449 NOS EACH ORGANISM AG IA: CPT

## 2022-11-02 PROCEDURE — 85025 COMPLETE CBC W/AUTO DIFF WBC: CPT

## 2022-11-02 RX ORDER — AMLODIPINE BESYLATE 5 MG/1
10 TABLET ORAL DAILY
Status: DISCONTINUED | OUTPATIENT
Start: 2022-11-02 | End: 2022-11-02

## 2022-11-02 RX ORDER — ONDANSETRON 4 MG/1
4 TABLET, ORALLY DISINTEGRATING ORAL EVERY 8 HOURS PRN
Status: DISCONTINUED | OUTPATIENT
Start: 2022-11-02 | End: 2022-11-03

## 2022-11-02 RX ORDER — METHADONE HYDROCHLORIDE 10 MG/1
90 TABLET ORAL DAILY
Status: DISCONTINUED | OUTPATIENT
Start: 2022-11-02 | End: 2022-11-09 | Stop reason: HOSPADM

## 2022-11-02 RX ORDER — ALBUTEROL SULFATE 2.5 MG/3ML
2.5 SOLUTION RESPIRATORY (INHALATION) EVERY 4 HOURS PRN
Status: DISCONTINUED | OUTPATIENT
Start: 2022-11-02 | End: 2022-11-03

## 2022-11-02 RX ORDER — AZITHROMYCIN 500 MG/1
500 TABLET, FILM COATED ORAL DAILY
Status: COMPLETED | OUTPATIENT
Start: 2022-11-02 | End: 2022-11-04

## 2022-11-02 RX ORDER — METHYLPREDNISOLONE SODIUM SUCCINATE 40 MG/ML
40 INJECTION, POWDER, LYOPHILIZED, FOR SOLUTION INTRAMUSCULAR; INTRAVENOUS EVERY 6 HOURS
Status: COMPLETED | OUTPATIENT
Start: 2022-11-02 | End: 2022-11-04

## 2022-11-02 RX ORDER — SODIUM CHLORIDE 9 MG/ML
INJECTION, SOLUTION INTRAVENOUS PRN
Status: DISCONTINUED | OUTPATIENT
Start: 2022-11-02 | End: 2022-11-09 | Stop reason: HOSPADM

## 2022-11-02 RX ORDER — ALBUTEROL SULFATE 2.5 MG/3ML
2.5 SOLUTION RESPIRATORY (INHALATION) ONCE
Status: COMPLETED | OUTPATIENT
Start: 2022-11-02 | End: 2022-11-02

## 2022-11-02 RX ORDER — PREDNISONE 20 MG/1
40 TABLET ORAL DAILY
Status: DISCONTINUED | OUTPATIENT
Start: 2022-11-04 | End: 2022-11-09

## 2022-11-02 RX ORDER — ACETAMINOPHEN 650 MG/1
650 SUPPOSITORY RECTAL EVERY 6 HOURS PRN
Status: DISCONTINUED | OUTPATIENT
Start: 2022-11-02 | End: 2022-11-09 | Stop reason: HOSPADM

## 2022-11-02 RX ORDER — POLYETHYLENE GLYCOL 3350 17 G/17G
17 POWDER, FOR SOLUTION ORAL DAILY PRN
Status: DISCONTINUED | OUTPATIENT
Start: 2022-11-02 | End: 2022-11-09 | Stop reason: HOSPADM

## 2022-11-02 RX ORDER — ONDANSETRON 2 MG/ML
4 INJECTION INTRAMUSCULAR; INTRAVENOUS EVERY 6 HOURS PRN
Status: DISCONTINUED | OUTPATIENT
Start: 2022-11-02 | End: 2022-11-09 | Stop reason: HOSPADM

## 2022-11-02 RX ORDER — PSEUDOEPHEDRINE HCL 120 MG/1
120 TABLET, FILM COATED, EXTENDED RELEASE ORAL 2 TIMES DAILY
Status: DISCONTINUED | OUTPATIENT
Start: 2022-11-02 | End: 2022-11-09 | Stop reason: HOSPADM

## 2022-11-02 RX ORDER — CETIRIZINE HYDROCHLORIDE 10 MG/1
5 TABLET ORAL DAILY
Status: DISCONTINUED | OUTPATIENT
Start: 2022-11-02 | End: 2022-11-09 | Stop reason: HOSPADM

## 2022-11-02 RX ORDER — AMLODIPINE BESYLATE 5 MG/1
5 TABLET ORAL DAILY
Status: DISCONTINUED | OUTPATIENT
Start: 2022-11-02 | End: 2022-11-09 | Stop reason: HOSPADM

## 2022-11-02 RX ORDER — IPRATROPIUM BROMIDE AND ALBUTEROL SULFATE 2.5; .5 MG/3ML; MG/3ML
1 SOLUTION RESPIRATORY (INHALATION)
Status: DISCONTINUED | OUTPATIENT
Start: 2022-11-02 | End: 2022-11-03

## 2022-11-02 RX ORDER — SODIUM CHLORIDE 0.9 % (FLUSH) 0.9 %
5-40 SYRINGE (ML) INJECTION PRN
Status: DISCONTINUED | OUTPATIENT
Start: 2022-11-02 | End: 2022-11-09 | Stop reason: HOSPADM

## 2022-11-02 RX ORDER — IPRATROPIUM BROMIDE AND ALBUTEROL SULFATE 2.5; .5 MG/3ML; MG/3ML
SOLUTION RESPIRATORY (INHALATION)
Status: COMPLETED
Start: 2022-11-02 | End: 2022-11-02

## 2022-11-02 RX ORDER — DIPHENHYDRAMINE HCL 25 MG
25 TABLET ORAL EVERY 8 HOURS PRN
Status: DISCONTINUED | OUTPATIENT
Start: 2022-11-02 | End: 2022-11-09 | Stop reason: HOSPADM

## 2022-11-02 RX ORDER — SODIUM CHLORIDE 0.9 % (FLUSH) 0.9 %
5-40 SYRINGE (ML) INJECTION EVERY 12 HOURS SCHEDULED
Status: DISCONTINUED | OUTPATIENT
Start: 2022-11-02 | End: 2022-11-09 | Stop reason: HOSPADM

## 2022-11-02 RX ORDER — METHOCARBAMOL 500 MG/1
500 TABLET, FILM COATED ORAL 3 TIMES DAILY PRN
Status: DISCONTINUED | OUTPATIENT
Start: 2022-11-02 | End: 2022-11-09 | Stop reason: HOSPADM

## 2022-11-02 RX ORDER — IPRATROPIUM BROMIDE AND ALBUTEROL SULFATE 2.5; .5 MG/3ML; MG/3ML
3 SOLUTION RESPIRATORY (INHALATION) EVERY 4 HOURS PRN
COMMUNITY
Start: 2022-09-11

## 2022-11-02 RX ORDER — ACETAMINOPHEN 325 MG/1
650 TABLET ORAL EVERY 6 HOURS PRN
Status: DISCONTINUED | OUTPATIENT
Start: 2022-11-02 | End: 2022-11-09 | Stop reason: HOSPADM

## 2022-11-02 RX ORDER — MAGNESIUM SULFATE IN WATER 40 MG/ML
2000 INJECTION, SOLUTION INTRAVENOUS ONCE
Status: COMPLETED | OUTPATIENT
Start: 2022-11-02 | End: 2022-11-02

## 2022-11-02 RX ORDER — ENOXAPARIN SODIUM 100 MG/ML
40 INJECTION SUBCUTANEOUS DAILY
Status: DISCONTINUED | OUTPATIENT
Start: 2022-11-02 | End: 2022-11-04 | Stop reason: ALTCHOICE

## 2022-11-02 RX ADMIN — MAGNESIUM SULFATE HEPTAHYDRATE 2000 MG: 40 INJECTION, SOLUTION INTRAVENOUS at 05:40

## 2022-11-02 RX ADMIN — MOMETASONE FUROATE AND FORMOTEROL FUMARATE DIHYDRATE 2 PUFF: 200; 5 AEROSOL RESPIRATORY (INHALATION) at 12:30

## 2022-11-02 RX ADMIN — IPRATROPIUM BROMIDE AND ALBUTEROL SULFATE 1 AMPULE: .5; 3 SOLUTION RESPIRATORY (INHALATION) at 20:23

## 2022-11-02 RX ADMIN — SODIUM CHLORIDE, PRESERVATIVE FREE 10 ML: 5 INJECTION INTRAVENOUS at 09:42

## 2022-11-02 RX ADMIN — MOMETASONE FUROATE AND FORMOTEROL FUMARATE DIHYDRATE 2 PUFF: 200; 5 AEROSOL RESPIRATORY (INHALATION) at 20:23

## 2022-11-02 RX ADMIN — PSEUDOEPHEDRINE HYDROCHLORIDE 120 MG: 120 TABLET, FILM COATED, EXTENDED RELEASE ORAL at 21:07

## 2022-11-02 RX ADMIN — IPRATROPIUM BROMIDE AND ALBUTEROL SULFATE 1 AMPULE: .5; 3 SOLUTION RESPIRATORY (INHALATION) at 12:29

## 2022-11-02 RX ADMIN — ALBUTEROL SULFATE 2.5 MG: 2.5 SOLUTION RESPIRATORY (INHALATION) at 05:52

## 2022-11-02 RX ADMIN — METHYLPREDNISOLONE SODIUM SUCCINATE 40 MG: 40 INJECTION, POWDER, FOR SOLUTION INTRAMUSCULAR; INTRAVENOUS at 09:42

## 2022-11-02 RX ADMIN — IPRATROPIUM BROMIDE AND ALBUTEROL SULFATE 1 AMPULE: .5; 3 SOLUTION RESPIRATORY (INHALATION) at 16:32

## 2022-11-02 RX ADMIN — METHYLPREDNISOLONE SODIUM SUCCINATE 40 MG: 40 INJECTION, POWDER, FOR SOLUTION INTRAMUSCULAR; INTRAVENOUS at 15:39

## 2022-11-02 RX ADMIN — METHYLPREDNISOLONE SODIUM SUCCINATE 40 MG: 40 INJECTION, POWDER, FOR SOLUTION INTRAMUSCULAR; INTRAVENOUS at 21:07

## 2022-11-02 RX ADMIN — CETIRIZINE HYDROCHLORIDE 5 MG: 10 TABLET, FILM COATED ORAL at 12:21

## 2022-11-02 RX ADMIN — AMLODIPINE BESYLATE 5 MG: 5 TABLET ORAL at 12:21

## 2022-11-02 RX ADMIN — PSEUDOEPHEDRINE HYDROCHLORIDE 120 MG: 120 TABLET, FILM COATED, EXTENDED RELEASE ORAL at 12:21

## 2022-11-02 RX ADMIN — IPRATROPIUM BROMIDE AND ALBUTEROL SULFATE 1 AMPULE: .5; 3 SOLUTION RESPIRATORY (INHALATION) at 08:44

## 2022-11-02 RX ADMIN — ENOXAPARIN SODIUM 40 MG: 100 INJECTION SUBCUTANEOUS at 09:42

## 2022-11-02 RX ADMIN — AZITHROMYCIN 500 MG: 500 TABLET, FILM COATED ORAL at 09:42

## 2022-11-02 RX ADMIN — SODIUM CHLORIDE, PRESERVATIVE FREE 10 ML: 5 INJECTION INTRAVENOUS at 21:07

## 2022-11-02 RX ADMIN — METHADONE HYDROCHLORIDE 90 MG: 10 TABLET ORAL at 12:22

## 2022-11-02 ASSESSMENT — PAIN - FUNCTIONAL ASSESSMENT
PAIN_FUNCTIONAL_ASSESSMENT: NONE - DENIES PAIN
PAIN_FUNCTIONAL_ASSESSMENT: NONE - DENIES PAIN

## 2022-11-02 ASSESSMENT — ENCOUNTER SYMPTOMS
COLOR CHANGE: 0
VOMITING: 0
COUGH: 0
SHORTNESS OF BREATH: 1
CHEST TIGHTNESS: 1
WHEEZING: 1
TROUBLE SWALLOWING: 0
ABDOMINAL PAIN: 0
PHOTOPHOBIA: 0

## 2022-11-02 ASSESSMENT — LIFESTYLE VARIABLES
HOW OFTEN DO YOU HAVE A DRINK CONTAINING ALCOHOL: NEVER
HOW MANY STANDARD DRINKS CONTAINING ALCOHOL DO YOU HAVE ON A TYPICAL DAY: PATIENT DOES NOT DRINK

## 2022-11-02 NOTE — ED NOTES
Pharmacy Medication Reconciliation Note     List of medications patient is currently taking is complete. Source of information:   1. EMR  2. Patient  3. [facility]    Notes regarding home medications:   1. Confirmed methadone dose of 90 mg daily (she receives a week supply at a time)  2. She reports only taking 5 mg of amlodipine regularly. She only takes the full 10 mg if her blood pressure is running high.  Reports she \"may need the 10 mg while she is here\"     Denies taking any other OTC or herbal medications    Julio Rowe PharmD, BCPS  11/2/2022  3:29 PM

## 2022-11-02 NOTE — ED NOTES
Report received from Abigail/NORIS Melo  Pt laying in bed with eyes closed,denies pain, even respirations,on 4L NC, no distress noted.  VSS, NSR  Bed in lowest position, call light within reach, side rails up x2   Will continue to monitor      Abe Spears, NORIS  11/02/22 2440

## 2022-11-02 NOTE — ED TRIAGE NOTES
Pt arrived per ems for a c/c of shortness of breath. Pt woke up from sleep with sudden onset shortness of breath. Ems noted bilateral wheezing and pt was stats were in the 80s. Pt does have a hx of asthma, COPD, and HBP. Pt does not normally wear o2. Ems gave 2 nebulizer treatments. Pt currently on 4L O2 and sating at 94%. VS noted and stable except bp (135/90) and hr (117). Patient A&Ox4. Respirations easy and unlabored. Skin warm and dry and appropriate for ethnicity. No acute distress noted at this time. Patient placed on continuous telemetry and pulse ox upon arrival to room. Calm

## 2022-11-02 NOTE — PROGRESS NOTES
4 Eyes Skin Assessment     NAME:  Milagro Crow  YOB: 1960  MEDICAL RECORD NUMBER:  [id number]    The patient is being assess for  Admission    I agree that 2 RN's have performed a thorough Head to Toe Skin Assessment on the patient. ALL assessment sites listed below have been assessed. Areas assessed by both nurses:    Head, Face, Ears, Shoulders, Back, Chest, Arms, Elbows, Hands, Sacrum. Buttock, Coccyx, Ischium, and Legs. Feet and Heels        Does the Patient have a Wound?  No noted wound(s)       Oleg Prevention initiated:  No   Wound Care Orders initiated:  No    Pressure Injury (Stage 3,4, Unstageable, DTI, NWPT, and Complex wounds) if present place referral/consult order under [de-identified] No    New and Established Ostomies if present place consult order under : No      Nurse 1 eSignature: Electronically signed by Genie Conde RN on 11/2/22 at 5:38 PM EDT    **SHARE this note so that the co-signing nurse is able to place an eSignature**    Nurse 2 eSignature: Electronically signed by Farhat Perez RN on 11/2/22 at 5:49 PM EDT

## 2022-11-02 NOTE — ED PROVIDER NOTES
[address] [facility]      Pt Name: Zoltan Murguia  MRN: [id number]  [place] 1960  Date ofevaluation: 11/2/2022  Provider: Meagan Hightower MD    CHIEF COMPLAINT       Chief Complaint   Patient presents with    Shortness of Breath     Pt woke up with SOB; ems noted bilateral wheezing. Pt was in [de-identified] for ems. Ems gave 2 nebulizer tx with 6-8l o2. Pt sating at 98% currently. Hx of asthma, COPD, and HBP         HISTORY OF PRESENT ILLNESS   (Location/Symptom, Timing/Onset,Context/Setting, Quality, Duration, Modifying Factors, Severity)  Note limiting factors. Zoltan Murguia is a 63 y.o. female  who  has a past medical history of Anxiety and depression, Arthritis, Asthma, Bipolar disorder ([address] CKD (chronic kidney disease), COPD (chronic obstructive pulmonary disease) ([address] Empyema ([address] Hepatitis C, History of heroin use, Hypertension, Narcotic abuse ([address] and UTI (lower urinary tract infection). who presents to the emergency department for evaluation of shortness of breath. Patient reports a history of COPD. 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 Reports that her  also smokes. She reports progressively worsening shortness of breath that became acutely worse this morning and woke her up from sleep. Denies chest pains fevers or cough. Denies close sick contacts. EMS reports the patient was satting in the 80s on arrival.  She is improved with supplemental oxygen. Patient denies history of previous oxygen requirement. HPI    NursingNotes were reviewed. REVIEW OF SYSTEMS    (2-9 systems for level 4, 10 or more for level 5)     Review of Systems   Constitutional:  Negative for activity change, fatigue and fever. HENT:  Negative for congestion, mouth sores and trouble swallowing. Eyes:  Negative for photophobia and visual disturbance.    Respiratory: Positive for chest tightness, shortness of breath and wheezing. Negative for cough. Cardiovascular:  Negative for chest pain, palpitations and leg swelling. Gastrointestinal:  Negative for abdominal pain and vomiting. Genitourinary:  Negative for difficulty urinating and frequency. Musculoskeletal:  Negative for gait problem and neck pain. Skin:  Negative for color change and rash. Neurological:  Negative for dizziness, light-headedness and headaches. Psychiatric/Behavioral:  Negative for confusion. The patient is not nervous/anxious. Except as noted above the remainder of the review of systems was reviewed and negative.        PAST MEDICAL HISTORY     Past Medical History:   Diagnosis Date    Anxiety and depression     Arthritis     Asthma     B[facility]     CKD (chronic kidney disease)     stage 3    COPD (chronic obstructive pulmonary disease) ([facility]     Empyema ([facility]     Hepatitis C     Patient denies     History of heroin use     Hypertension     Narcotic abuse (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    UTI (lower urinary tract infection)          SURGICALHISTORY       Past Surgical History:   Procedure Laterality Date    COLONOSCOPY  10/23/2018    Colonoscopy with polypectomy (cold snare), polypectomy (cold biopsy)    COLONOSCOPY N/A 10/23/2018    COLONOSCOPY POLYPECTOMY SNARE/COLD BIOPSY performed by Kirk Edgar MD at [facility] Left 12/16/2016    INTRACAPSULAR CATARACT EXTRACTION Right 02/21/2020    PHACOEMULSIFICATION WITH INTRAOCULAR LENS IMPLANT performed by Erick Barahona MD at [address] [facility] Left 02/28/2020    PHACOEMULSIFICATION WITH INTRAOCULAR LENS IMPLANT performed by Erick Barahona MD at [place] [address] Right     26 yrs for lung infection     OTHER SURGICAL HISTORY  12/15/2016    EDMOND BUNIONECTOMY WITH APPLICATION OF AMNIOX LEFT FOOT    THYROIDECTOMY Left 8/5/2022    LEFT HEMITHYROIDECTOMY performed by Marquis Nayeli MD at [address] [facility]       Previous Medications    ALBUTEROL (PROVENTIL) (2.5 MG/3ML) 0.083% NEBULIZER SOLUTION    Take 3 mLs by nebulization every 6 hours as needed for Wheezing    ALBUTEROL SULFATE HFA (PROAIR HFA) 108 (90 BASE) MCG/ACT INHALER    Inhale 2 puffs into the lungs every 6 hours as needed for Wheezing    AMLODIPINE (NORVASC) 10 MG TABLET    Take 1 tablet by mouth daily. BROMPHENIRAMINE-PSEUDOEPHEDRINE-DM (BROMFED DM) 2-30-10 MG/5ML SYRUP    Take 5 mLs by mouth 4 times daily as needed for Cough    BUDESONIDE-FORMOTEROL (SYMBICORT) 160-4.5 MCG/ACT AERO    Inhale 2 puffs into the lungs 2 times daily    DEXAMETHASONE (DECADRON) 4 MG TABLET    Take 1 tablet by mouth daily (with breakfast) for 5 days    DIPHENHYDRAMINE (BENADRYL) 25 MG TABLET    Take 25 mg by mouth every 8 hours as needed for Itching or Allergies    LORATADINE-PSEUDOEPHEDRINE (CLARITIN-D 24 HOUR)  MG PER EXTENDED RELEASE TABLET    Take 1 tablet by mouth daily    METHADONE HCL PO    Take 90 mg by mouth daily     METHOCARBAMOL (ROBAXIN) 500 MG TABLET    Take 1 tablet by mouth 3 times daily as needed (pain)    SKIN PROTECTANTS, MISC.  (Mitzi Carole) CREA    APPLY TO AFFECTED AREA(S) AS NEEDED            Acetylcysteine, Codeine, Levaquin [levofloxacin in d5w], Penicillins, Dupixent [dupilumab], Escitalopram, and Ibuprofen    FAMILY HISTORY       Family History   Problem Relation Age of Onset    Cancer Mother     Kidney Disease Mother     Heart Disease Mother     Diabetes Father     Heart Disease Father     Diabetes Sister     Heart Disease Brother     High Blood Pressure Brother           SOCIAL HISTORY       Social History     Socioeconomic History    Marital status: Life Partner     Spouse name: None    Number of children: None    Years of education: None    Highest education level: None   Tobacco Use    Smoking status: Former     Packs/day: 0.50     Years: 45.00     Pack years: 22. 50     Types: Cigarettes     Quit date: 2016     Years since quittin.3    Smokeless tobacco: Never   Vaping Use    Vaping Use: Never used   Substance and Sexual Activity    Alcohol use: No    Drug use: Not Currently     Comment: past h/o heroin on methadone--last used 5 years ago    Sexual activity: Not Currently       SCREENINGS    Brave Coma Scale  Eye Opening: Spontaneous  Best Verbal Response: Oriented  Best Motor Response: Obeys commands  Brave Coma Scale Score: 15        PHYSICAL EXAM    (up to 7 for level 4, 8 or more for level 5)     ED Triage Vitals [22 0459]   BP Temp Temp Source Heart Rate Resp SpO2 Height Weight   (!) 135/90 98.4 °F (36.9 °C) Oral (!) 117 15 94 % -- --       Physical Exam  Vitals reviewed. Constitutional:       Appearance: She is well-developed. HENT:      Head: Normocephalic and atraumatic. Mouth/Throat:      Mouth: Mucous membranes are moist.   Eyes:      Extraocular Movements: Extraocular movements intact. Conjunctiva/sclera: Conjunctivae normal.      Pupils: Pupils are equal, round, and reactive to light. Neck:      Trachea: No tracheal deviation. Cardiovascular:      Rate and Rhythm: Regular rhythm. Tachycardia present. Heart sounds: Normal heart sounds. Pulmonary:      Effort: Tachypnea and accessory muscle usage present. Breath sounds: Wheezing present. Abdominal:      General: There is no distension. Palpations: Abdomen is soft. Tenderness: There is no abdominal tenderness. Musculoskeletal:         General: Normal range of motion. Cervical back: Normal range of motion. Skin:     General: Skin is warm and dry. Neurological:      Mental Status: She is alert. RESULTS     EKG: All EKG's are interpreted by the Emergency Department Physician who either signs or Co-signsthis chart in the absence of a cardiologist.    Sinus rhythm ventricular to 104 bpm.  AZ interval and QTc interval within normal limits. Patient has left axis deviation. There are no significant ST elevations or depressions EKG is nondiagnostic for ACS. Kristel Magallanes Compared EKG from 6/8/2022 I do not appreciate significant change. RADIOLOGY:   Non-plain filmimages such as CT, Ultrasound and MRI are read by the radiologist. Plain radiographic images are visualized and preliminarily interpreted by the emergency physician with the below findings:      Interpretation per the Radiologist below, if available at the time ofthis note:    XR CHEST PORTABLE    (Results Pending)         ED BEDSIDE ULTRASOUND:   Performed by ED Physician - none    LABS:  Labs Reviewed   COVID-19, RAPID   RAPID INFLUENZA A/B ANTIGENS   RAPID INFLUENZA A/B ANTIGENS   CBC WITH AUTO DIFFERENTIAL   COMPREHENSIVE METABOLIC PANEL W/ REFLEX TO MG FOR LOW K   BLOOD GAS, VENOUS   BRAIN NATRIURETIC PEPTIDE       All other labs were within normal range or not returned as of this dictation. EMERGENCY DEPARTMENT COURSE and DIFFERENTIAL DIAGNOSIS/MDM:   Vitals:    Vitals:    11/02/22 0459   BP: (!) 135/90   Pulse: (!) 117   Resp: 15   Temp: 98.4 °F (36.9 °C)   TempSrc: Oral   SpO2: 94%       Patient was given thefollowing medications:  Medications   albuterol (PROVENTIL) nebulizer solution 2.5 mg (has no administration in time range)   magnesium sulfate 2000 mg in 50 mL IVPB premix (has no administration in time range)       ED COURSE & MEDICAL DECISION MAKING    Pertinent Labs & Imaging studies reviewed. (See chart for details)   -  Patient seen and evaluated in the emergency department. -  Triage and nursing notes reviewed and incorporated. -  Old chart records reviewed and incorporated.   -  Differential diagnosis includes: Differential Diagnosis: Acute COPD exacerbation, Hypoxemic Respiratory Distress, Pneumonia, Sepsis, Congestive Heart Failure, Myocardial Infarction, Pulmonary Embolus, ARDS, Pneumothorax, other    -  Work-up included:  See above  -  ED treatment included: See above  - Results discussed with patient. Patient with an extensive history of COPD presents ED for evaluation of shortness of breath. Reports new job as environment has been exacerbating her COPD. Patient tachypneic tachycardic on arrival.  She is satting well on supplemental oxygen. Was originally satting in the [de-identified] per EMS. Laboratory work-up does not demonstrate any emergent abnormality. Patient is not hypercapnic. No leukocytosis. imaging studies show acute cardiopulmonary disease. Patient feels dyspneic on reevaluation. Patient taken off supplemental oxygen and remained tachycardic tachypneic with increased work of breathing and desaturated into the 80s. Received corticosteroids via EMS. Patient received 2 DuoNeb's and additional butyryl in the emergency department as well as magnesium. Due to patient's hypoxia and persistent increased work of breathing she will be admitted for further medical management. .  The patient is agreeable with plan of care and disposition. Is this patient to be included in the SEP-1 Core Measure due to severe sepsis or septic shock? No   Exclusion criteria - the patient is NOT to be included for SEP-1 Core Measure due to: Infection is not suspected      REASSESSMENT     ED Course as of 11/02/22 0759   Wed Nov 02, 2022   0714 XR CHEST PORTABLE [CS]      ED Course User Index  [CS] Lavinia Marr MD         CRITICAL CARE TIME   Total Critical Care time was 30 minutes, excluding separately reportable procedures. There was a high probability of clinically significant/life threatening deterioration in the patient's condition which required my urgent intervention. CONSULTS:  None    PROCEDURES:  Unless otherwise noted below, none     Procedures    FINAL IMPRESSION      1. COPD exacerbation ([address]    2.  Acute respiratory failure with hypoxia (HCC)          DISPOSITION/PLAN   DISPOSITION        PATIENT REFERREDTO:  No follow-up provider specified.     DISCHARGEMEDICATIONS:  New Prescriptions    No medications on file          (Please note that portions of this note were completed with a voice recognition program.  Efforts were made to edit the dictations but occasionally words are mis-transcribed.)    Clarence Gleason MD (electronically signed)  Attending Emergency Physician          Clarence Gleason MD  11/09/22 1025

## 2022-11-02 NOTE — PROGRESS NOTES
provided emotional support, presence and prayer with patient and her daughter, per patient request.

## 2022-11-02 NOTE — ED NOTES
ED SBAR report provider to 4W, RN. Patient to be transported to Room 4126 via stretcher by transport tech when clean . Patient transported with bedside cardiac monitor and with IV medications infusing. IV site clean, dry, and intact. MEWS score and pain assessed as 0/10 and documented. Updated patient on plan of care.      Lorie Barker RN  11/02/22 5797

## 2022-11-02 NOTE — ED NOTES
Perfect serve sent to Dr. Dorothy Miles regarding daily medications     Prachi Santizo RN  11/02/22 2735

## 2022-11-02 NOTE — H&P
Hospital Medicine History & Physical      PCP: Nikki Peña MD    Date of Admission: 11/2/2022    Date of Service: Pt seen/examined on 11/02/2022 and Admitted to Inpatient with expected LOS greater than two midnights due to medical therapy. Chief Complaint:  sob      History Of Present Illness:      63 y.o. female who presented to [facility] with 2 to 3-day history of worsening shortness of breath, severe enough to alter her daily living activity, no obvious alleviating or aggravating factors, in the context of chronic COPD and asthma, no obvious trigger, associated with cough with mild sputum production white color, no fever chills no chest pain no nausea vomiting or abdominal pain to note that patient stated that her shortness of breath was gradually worsening over few weeks but it significantly getting worse in the last few days.   In emergency department given breathing therapy still feeling weak still wheezing patient being admitted for further management    Past Medical History:          Diagnosis Date    Anxiety and depression     Arthritis     Asthma     Bipolar disorder ([address]     CKD (chronic kidney disease)     stage 3    COPD (chronic obstructive pulmonary disease) ([address]     Empyema ([address]     Hepatitis C     Patient denies     History of heroin use     Hypertension     Narcotic abuse ([address]     heroin, clean 1 year    UTI (lower urinary tract infection)        Past Surgical History:          Procedure Laterality Date    COLONOSCOPY  10/23/2018    Colonoscopy with polypectomy (cold snare), polypectomy (cold biopsy)    COLONOSCOPY N/A 10/23/2018    COLONOSCOPY POLYPECTOMY SNARE/COLD BIOPSY performed by Jaimie Lagos MD at [facility] Left 12/16/2016    INTRACAPSULAR CATARACT EXTRACTION Right 02/21/2020    PHACOEMULSIFICATION WITH INTRAOCULAR LENS IMPLANT performed by Velia Mahoney MD at [address] [place] [address] Left 02/28/2020 PHACOEMULSIFICATION WITH INTRAOCULAR LENS IMPLANT performed by Laura Carson MD at [facility] Right     28 yrs for lung infection     OTHER SURGICAL HISTORY  12/15/2016    EDMOND BUNIONECTOMY WITH APPLICATION OF AMNIOX LEFT FOOT    THYROIDECTOMY Left 8/5/2022    LEFT HEMITHYROIDECTOMY performed by Hillary Wei MD at [place] [address]       Medications Prior to Admission:      Prior to Admission medications    Medication Sig Start Date End Date Taking? Authorizing Provider   dexamethasone (DECADRON) 4 MG tablet Take 1 tablet by mouth daily (with breakfast) for 5 days 10/31/22 11/5/22  Viola Tamayo MD   loratadine-pseudoephedrine (CLARITIN-D 24 HOUR)  MG per extended release tablet Take 1 tablet by mouth daily 10/25/22   Viola Tamayo MD   albuterol (PROVENTIL) (2.5 MG/3ML) 0.083% nebulizer solution Take 3 mLs by nebulization every 6 hours as needed for Wheezing 10/25/22   Viola Tamayo MD   albuterol sulfate HFA (PROAIR HFA) 108 (90 Base) MCG/ACT inhaler Inhale 2 puffs into the lungs every 6 hours as needed for Wheezing 10/25/22   Viola Tamayo MD   budesonide-formoterol [facility]) 160-4.5 MCG/ACT AERO Inhale 2 puffs into the lungs 2 times daily 10/25/22   Viola Tamayo MD   brompheniramine-pseudoephedrine-DM (BROMFED DM) 2-30-10 MG/5ML syrup Take 5 mLs by mouth 4 times daily as needed for Cough 9/21/22   Viola Tamayo MD   methocarbamol (ROBAXIN) 500 MG tablet Take 1 tablet by mouth 3 times daily as needed (pain) 8/16/22   Prieto Jacobs MD   Skin Protectants, Misc.  (MINERIN CREME) CREA APPLY TO AFFECTED AREA(S) AS NEEDED  Patient not taking: Reported on 8/8/2022 5/9/22   Historical Provider, MD   acetaminophen (APAP EXTRA STRENGTH) 500 MG tablet Take 2 tablets by mouth every 6 hours as needed for Pain DO NOT TAKE WITH OTHER MEDICATIONS CONTAINING ACETAMINOPHEN. 5/29/19 8/6/22  [place] [employer], [place]   METHADONE HCL PO Take 90 mg by mouth daily Historical Provider, MD   diphenhydrAMINE (BENADRYL) 25 MG tablet Take 25 mg by mouth every 8 hours as needed for Itching or Allergies    Historical Provider, MD   amLODIPine (NORVASC) 10 MG tablet Take 1 tablet by mouth daily. Patient taking differently: Take 5 mg by mouth daily 12/14/14   Trisha Macdonald MD       Allergies:  Acetylcysteine, Codeine, Levaquin [levofloxacin in d5w], Penicillins, Dupixent [dupilumab], Escitalopram, and Ibuprofen    Social History:      The patient currently lives at home    TOBACCO:   reports that she quit smoking about 6 years ago. Her smoking use included cigarettes. She has a 22.50 pack-year smoking history. She has never used smokeless tobacco.  ETOH:   reports no history of alcohol use. E-cigarette/Vaping       Questions Responses    E-cigarette/Vaping Use Never User    Start Date     Passive Exposure     Quit Date     Counseling Given Yes    Comments               Family History:      Reviewed and negative in regards to presenting illness/complaint. Problem Relation Age of Onset    Cancer Mother     Kidney Disease Mother     Heart Disease Mother     Diabetes Father     Heart Disease Father     Diabetes Sister     Heart Disease Brother     High Blood Pressure Brother        REVIEW OF SYSTEMS COMPLETED:   Pertinent positives as noted in the HPI. All other systems reviewed and negative. PHYSICAL EXAM PERFORMED:    /88   Pulse 87 Comment: NSR  Temp 98.4 °F (36.9 °C) (Oral)   Resp 14   Wt 175 lb 14.8 oz (79.8 kg)   LMP 09/13/2017   SpO2 100%   BMI 25.24 kg/m²     General appearance:  No apparent distress  HEENT:  Normal cephalic  Neck: Supple  Respiratory: Expiratory wheezes  Cardiovascular:  Regular rate and rhythm with normal S1/S2 without murmurs, rubs or gallops. Abdomen: Soft, non-tender, non-distended   Musculoskeletal:  No clubbing, cyanosis or edema bilaterally. Full range of motion without deformity.   Skin: Skin color, texture, turgor normal.  No rashes or lesions. Neurologic:  No focal weakness   Psychiatric:  Alert and oriented  Capillary Refill: Brisk,3 seconds, normal  Peripheral Pulses: +2 palpable, equal bilaterally       Labs:     Recent Labs     11/02/22 0513   WBC 6.2   HGB 14.8   HCT 44.9        Recent Labs     11/02/22 0513      K 4.2      CO2 22   BUN 7   CREATININE 0.9   CALCIUM 9.3     Recent Labs     11/02/22 0513   AST 23   ALT 18   BILITOT 0.3   ALKPHOS 86     No results for input(s): INR in the last 72 hours. Recent Labs     11/02/22 0513   TROPONINI <0.01       Urinalysis:      Lab Results   Component Value Date/Time    NITRU Negative 08/16/2022 06:10 PM    WBCUA 3 12/12/2020 07:32 PM    BACTERIA Rare 05/29/2019 02:05 PM    RBCUA 2 12/12/2020 07:32 PM    BLOODU Negative 08/16/2022 06:10 PM    SPECGRAV <=1.005 08/16/2022 06:10 PM    GLUCOSEU Negative 08/16/2022 06:10 PM    GLUCOSEU NEGATIVE 12/10/2011 12:18 PM       Radiology:     CXR: I have reviewed the CXR with the following interpretation: No infiltrates  EKG:  I have reviewed the EKG with the following interpretation: no ST elevation     XR CHEST PORTABLE   Final Result   No acute finding or significant change. Consults:    None    ASSESSMENT:    Active Hospital Problems    Diagnosis Date Noted    Acute respiratory failure with hypoxia ([facility] [J96.01] 11/02/2022     Priority: Medium    COPD exacerbation ([facility] [J44.1] 02/04/2022    Essential hypertension [I10] 04/26/2016         PLAN:    Acute respiratory failure with hypoxia, patient per ED MD dropped to 80% on room air, keep saturation above 90% keep oxygen supplement. COPD exacerbation along with asthma exacerbation, IV steroids, azithromycin started, monitor closely, DuoNeb. Pulse oximetry, telemetry monitoring, plan of care discussed with patient in details, all questions answered.   Essential hypertension, p.o. medications  Chronic methadone therapy    DVT Prophylaxis: Lovenox  Diet: ADULT DIET; Regular  Code Status: Full Code    PT/OT Eval Status: Ordered    Dispo -inpatient 2 to 3 days       [facility] MD Laci    Thank you Kika Todd MD for the opportunity to be involved in this patient's care. If you have any questions or concerns please feel free to contact me at [phone].

## 2022-11-02 NOTE — PROGRESS NOTES
Pt admitted to room 4126. Pt reports 0/10 pain. Pt oriented to room and to call light. Pt slid over from stretcher to bed .  Pt on 4L o2 pt o2 saturation is 95%

## 2022-11-03 ENCOUNTER — APPOINTMENT (OUTPATIENT)
Dept: GENERAL RADIOLOGY | Age: 62
DRG: 133 | End: 2022-11-03
Payer: MEDICAID

## 2022-11-03 LAB
A/G RATIO: 1.4 (ref 1.1–2.2)
ALBUMIN SERPL-MCNC: 4.2 G/DL (ref 3.4–5)
ALP BLD-CCNC: 87 U/L (ref 40–129)
ALT SERPL-CCNC: 41 U/L (ref 10–40)
ANION GAP SERPL CALCULATED.3IONS-SCNC: 15 MMOL/L (ref 3–16)
AST SERPL-CCNC: 90 U/L (ref 15–37)
BASE EXCESS ARTERIAL: -0.3 MMOL/L (ref -3–3)
BASOPHILS ABSOLUTE: 0 K/UL (ref 0–0.2)
BASOPHILS RELATIVE PERCENT: 0 %
BILIRUB SERPL-MCNC: 0.3 MG/DL (ref 0–1)
BUN BLDV-MCNC: 15 MG/DL (ref 7–20)
CALCIUM SERPL-MCNC: 9.3 MG/DL (ref 8.3–10.6)
CARBOXYHEMOGLOBIN ARTERIAL: 1.1 % (ref 0–1.5)
CHLORIDE BLD-SCNC: 100 MMOL/L (ref 99–110)
CO2: 22 MMOL/L (ref 21–32)
CREAT SERPL-MCNC: 1.2 MG/DL (ref 0.6–1.2)
EOSINOPHILS ABSOLUTE: 0 K/UL (ref 0–0.6)
EOSINOPHILS RELATIVE PERCENT: 0 %
GFR SERPL CREATININE-BSD FRML MDRD: 52 ML/MIN/{1.73_M2}
GLUCOSE BLD-MCNC: 153 MG/DL (ref 70–99)
HCO3 ARTERIAL: 27 MMOL/L (ref 21–29)
HCT VFR BLD CALC: 46.9 % (ref 36–48)
HEMOGLOBIN, ART, EXTENDED: 15.4 G/DL (ref 12–16)
HEMOGLOBIN: 15.6 G/DL (ref 12–16)
LYMPHOCYTES ABSOLUTE: 0.5 K/UL (ref 1–5.1)
LYMPHOCYTES RELATIVE PERCENT: 7.1 %
MCH RBC QN AUTO: 30.5 PG (ref 26–34)
MCHC RBC AUTO-ENTMCNC: 33.3 G/DL (ref 31–36)
MCV RBC AUTO: 91.5 FL (ref 80–100)
METHEMOGLOBIN ARTERIAL: 0.2 %
MONOCYTES ABSOLUTE: 0.4 K/UL (ref 0–1.3)
MONOCYTES RELATIVE PERCENT: 5.7 %
NEUTROPHILS ABSOLUTE: 6.4 K/UL (ref 1.7–7.7)
NEUTROPHILS RELATIVE PERCENT: 87.2 %
O2 SAT, ARTERIAL: 96.1 %
O2 THERAPY: ABNORMAL
PCO2 ARTERIAL: 53.5 MMHG (ref 35–45)
PDW BLD-RTO: 14.1 % (ref 12.4–15.4)
PH ARTERIAL: 7.31 (ref 7.35–7.45)
PLATELET # BLD: 161 K/UL (ref 135–450)
PMV BLD AUTO: 8.9 FL (ref 5–10.5)
PO2 ARTERIAL: 83.9 MMHG (ref 75–108)
POTASSIUM REFLEX MAGNESIUM: 4.6 MMOL/L (ref 3.5–5.1)
RBC # BLD: 5.12 M/UL (ref 4–5.2)
SODIUM BLD-SCNC: 137 MMOL/L (ref 136–145)
TCO2 ARTERIAL: 28.6 MMOL/L
TOTAL PROTEIN: 7.1 G/DL (ref 6.4–8.2)
WBC # BLD: 7.4 K/UL (ref 4–11)

## 2022-11-03 PROCEDURE — 2580000003 HC RX 258: Performed by: INTERNAL MEDICINE

## 2022-11-03 PROCEDURE — 71045 X-RAY EXAM CHEST 1 VIEW: CPT

## 2022-11-03 PROCEDURE — 6370000000 HC RX 637 (ALT 250 FOR IP): Performed by: STUDENT IN AN ORGANIZED HEALTH CARE EDUCATION/TRAINING PROGRAM

## 2022-11-03 PROCEDURE — 82803 BLOOD GASES ANY COMBINATION: CPT

## 2022-11-03 PROCEDURE — 36600 WITHDRAWAL OF ARTERIAL BLOOD: CPT

## 2022-11-03 PROCEDURE — 85025 COMPLETE CBC W/AUTO DIFF WBC: CPT

## 2022-11-03 PROCEDURE — 6360000002 HC RX W HCPCS: Performed by: NURSE PRACTITIONER

## 2022-11-03 PROCEDURE — 99291 CRITICAL CARE FIRST HOUR: CPT | Performed by: INTERNAL MEDICINE

## 2022-11-03 PROCEDURE — 6370000000 HC RX 637 (ALT 250 FOR IP): Performed by: NURSE PRACTITIONER

## 2022-11-03 PROCEDURE — 6370000000 HC RX 637 (ALT 250 FOR IP): Performed by: INTERNAL MEDICINE

## 2022-11-03 PROCEDURE — 6360000002 HC RX W HCPCS: Performed by: STUDENT IN AN ORGANIZED HEALTH CARE EDUCATION/TRAINING PROGRAM

## 2022-11-03 PROCEDURE — 94660 CPAP INITIATION&MGMT: CPT

## 2022-11-03 PROCEDURE — 6360000002 HC RX W HCPCS: Performed by: INTERNAL MEDICINE

## 2022-11-03 PROCEDURE — 94761 N-INVAS EAR/PLS OXIMETRY MLT: CPT

## 2022-11-03 PROCEDURE — 94640 AIRWAY INHALATION TREATMENT: CPT

## 2022-11-03 PROCEDURE — 2000000000 HC ICU R&B

## 2022-11-03 PROCEDURE — 36415 COLL VENOUS BLD VENIPUNCTURE: CPT

## 2022-11-03 PROCEDURE — APPNB15 APP NON BILLABLE TIME 0-15 MINS: Performed by: NURSE PRACTITIONER

## 2022-11-03 PROCEDURE — 80053 COMPREHEN METABOLIC PANEL: CPT

## 2022-11-03 PROCEDURE — 2700000000 HC OXYGEN THERAPY PER DAY

## 2022-11-03 RX ORDER — DEXAMETHASONE SODIUM PHOSPHATE 10 MG/ML
8 INJECTION INTRAMUSCULAR; INTRAVENOUS ONCE
Status: COMPLETED | OUTPATIENT
Start: 2022-11-03 | End: 2022-11-03

## 2022-11-03 RX ORDER — ALBUTEROL SULFATE 2.5 MG/3ML
2.5 SOLUTION RESPIRATORY (INHALATION)
Status: DISCONTINUED | OUTPATIENT
Start: 2022-11-03 | End: 2022-11-09 | Stop reason: HOSPADM

## 2022-11-03 RX ORDER — IPRATROPIUM BROMIDE AND ALBUTEROL SULFATE 2.5; .5 MG/3ML; MG/3ML
1 SOLUTION RESPIRATORY (INHALATION) EVERY 4 HOURS
Status: DISCONTINUED | OUTPATIENT
Start: 2022-11-03 | End: 2022-11-03

## 2022-11-03 RX ORDER — LORAZEPAM 2 MG/ML
1 INJECTION INTRAMUSCULAR ONCE
Status: COMPLETED | OUTPATIENT
Start: 2022-11-03 | End: 2022-11-03

## 2022-11-03 RX ORDER — SODIUM CHLORIDE FOR INHALATION 0.9 %
3 VIAL, NEBULIZER (ML) INHALATION PRN
Status: DISCONTINUED | OUTPATIENT
Start: 2022-11-03 | End: 2022-11-09 | Stop reason: HOSPADM

## 2022-11-03 RX ORDER — IPRATROPIUM BROMIDE AND ALBUTEROL SULFATE 2.5; .5 MG/3ML; MG/3ML
1 SOLUTION RESPIRATORY (INHALATION) EVERY 4 HOURS
Status: DISCONTINUED | OUTPATIENT
Start: 2022-11-04 | End: 2022-11-09 | Stop reason: HOSPADM

## 2022-11-03 RX ORDER — LORAZEPAM 2 MG/ML
0.5 INJECTION INTRAMUSCULAR ONCE
Status: DISCONTINUED | OUTPATIENT
Start: 2022-11-03 | End: 2022-11-03

## 2022-11-03 RX ORDER — IPRATROPIUM BROMIDE AND ALBUTEROL SULFATE 2.5; .5 MG/3ML; MG/3ML
1 SOLUTION RESPIRATORY (INHALATION)
Status: DISPENSED | OUTPATIENT
Start: 2022-11-03 | End: 2022-11-03

## 2022-11-03 RX ORDER — DEXMEDETOMIDINE HYDROCHLORIDE 4 UG/ML
.1-1.5 INJECTION, SOLUTION INTRAVENOUS CONTINUOUS
Status: DISCONTINUED | OUTPATIENT
Start: 2022-11-03 | End: 2022-11-07

## 2022-11-03 RX ORDER — IPRATROPIUM BROMIDE AND ALBUTEROL SULFATE 2.5; .5 MG/3ML; MG/3ML
1 SOLUTION RESPIRATORY (INHALATION) ONCE
Status: COMPLETED | OUTPATIENT
Start: 2022-11-03 | End: 2022-11-03

## 2022-11-03 RX ORDER — METHYLPREDNISOLONE SODIUM SUCCINATE 125 MG/2ML
60 INJECTION, POWDER, LYOPHILIZED, FOR SOLUTION INTRAMUSCULAR; INTRAVENOUS ONCE
Status: COMPLETED | OUTPATIENT
Start: 2022-11-03 | End: 2022-11-03

## 2022-11-03 RX ORDER — MAGNESIUM SULFATE IN WATER 40 MG/ML
2000 INJECTION, SOLUTION INTRAVENOUS ONCE
Status: COMPLETED | OUTPATIENT
Start: 2022-11-03 | End: 2022-11-03

## 2022-11-03 RX ADMIN — MOMETASONE FUROATE AND FORMOTEROL FUMARATE DIHYDRATE 2 PUFF: 200; 5 AEROSOL RESPIRATORY (INHALATION) at 20:27

## 2022-11-03 RX ADMIN — LORAZEPAM 1 MG: 2 INJECTION INTRAMUSCULAR; INTRAVENOUS at 05:37

## 2022-11-03 RX ADMIN — METHOCARBAMOL 500 MG: 500 TABLET ORAL at 10:10

## 2022-11-03 RX ADMIN — DEXAMETHASONE SODIUM PHOSPHATE 8 MG: 10 INJECTION INTRAMUSCULAR; INTRAVENOUS at 05:56

## 2022-11-03 RX ADMIN — ENOXAPARIN SODIUM 40 MG: 100 INJECTION SUBCUTANEOUS at 10:11

## 2022-11-03 RX ADMIN — MOMETASONE FUROATE AND FORMOTEROL FUMARATE DIHYDRATE 2 PUFF: 200; 5 AEROSOL RESPIRATORY (INHALATION) at 08:37

## 2022-11-03 RX ADMIN — METHYLPREDNISOLONE SODIUM SUCCINATE 60 MG: 125 INJECTION, POWDER, FOR SOLUTION INTRAMUSCULAR; INTRAVENOUS at 10:10

## 2022-11-03 RX ADMIN — LORAZEPAM 1 MG: 2 INJECTION INTRAMUSCULAR; INTRAVENOUS at 06:06

## 2022-11-03 RX ADMIN — IPRATROPIUM BROMIDE AND ALBUTEROL SULFATE 1 AMPULE: .5; 3 SOLUTION RESPIRATORY (INHALATION) at 16:12

## 2022-11-03 RX ADMIN — SODIUM CHLORIDE, PRESERVATIVE FREE 10 ML: 5 INJECTION INTRAVENOUS at 21:23

## 2022-11-03 RX ADMIN — IPRATROPIUM BROMIDE AND ALBUTEROL SULFATE 1 AMPULE: .5; 3 SOLUTION RESPIRATORY (INHALATION) at 13:23

## 2022-11-03 RX ADMIN — METHADONE HYDROCHLORIDE 90 MG: 10 TABLET ORAL at 10:09

## 2022-11-03 RX ADMIN — IPRATROPIUM BROMIDE AND ALBUTEROL SULFATE 1 AMPULE: .5; 3 SOLUTION RESPIRATORY (INHALATION) at 11:25

## 2022-11-03 RX ADMIN — IPRATROPIUM BROMIDE AND ALBUTEROL SULFATE 1 AMPULE: .5; 3 SOLUTION RESPIRATORY (INHALATION) at 18:09

## 2022-11-03 RX ADMIN — IPRATROPIUM BROMIDE AND ALBUTEROL SULFATE 1 AMPULE: .5; 3 SOLUTION RESPIRATORY (INHALATION) at 05:45

## 2022-11-03 RX ADMIN — CETIRIZINE HYDROCHLORIDE 5 MG: 10 TABLET, FILM COATED ORAL at 10:10

## 2022-11-03 RX ADMIN — METHYLPREDNISOLONE SODIUM SUCCINATE 40 MG: 40 INJECTION, POWDER, FOR SOLUTION INTRAMUSCULAR; INTRAVENOUS at 13:00

## 2022-11-03 RX ADMIN — ALBUTEROL SULFATE 2.5 MG: 2.5 SOLUTION RESPIRATORY (INHALATION) at 05:23

## 2022-11-03 RX ADMIN — MAGNESIUM SULFATE HEPTAHYDRATE 2000 MG: 40 INJECTION, SOLUTION INTRAVENOUS at 10:12

## 2022-11-03 RX ADMIN — AZITHROMYCIN 500 MG: 500 TABLET, FILM COATED ORAL at 10:10

## 2022-11-03 RX ADMIN — AMLODIPINE BESYLATE 5 MG: 5 TABLET ORAL at 10:10

## 2022-11-03 RX ADMIN — METHYLPREDNISOLONE SODIUM SUCCINATE 40 MG: 40 INJECTION, POWDER, FOR SOLUTION INTRAMUSCULAR; INTRAVENOUS at 01:53

## 2022-11-03 RX ADMIN — IPRATROPIUM BROMIDE AND ALBUTEROL SULFATE 1 AMPULE: .5; 3 SOLUTION RESPIRATORY (INHALATION) at 08:37

## 2022-11-03 RX ADMIN — ALBUTEROL SULFATE 2.5 MG: 2.5 SOLUTION RESPIRATORY (INHALATION) at 00:12

## 2022-11-03 RX ADMIN — ISODIUM CHLORIDE 3 ML: 0.03 SOLUTION RESPIRATORY (INHALATION) at 06:25

## 2022-11-03 RX ADMIN — IPRATROPIUM BROMIDE AND ALBUTEROL SULFATE 1 AMPULE: .5; 3 SOLUTION RESPIRATORY (INHALATION) at 05:30

## 2022-11-03 RX ADMIN — MAGNESIUM SULFATE HEPTAHYDRATE 2000 MG: 40 INJECTION, SOLUTION INTRAVENOUS at 06:51

## 2022-11-03 RX ADMIN — SODIUM CHLORIDE, PRESERVATIVE FREE 10 ML: 5 INJECTION INTRAVENOUS at 10:11

## 2022-11-03 RX ADMIN — IPRATROPIUM BROMIDE AND ALBUTEROL SULFATE 1 AMPULE: .5; 3 SOLUTION RESPIRATORY (INHALATION) at 20:28

## 2022-11-03 RX ADMIN — METHYLPREDNISOLONE SODIUM SUCCINATE 40 MG: 40 INJECTION, POWDER, FOR SOLUTION INTRAMUSCULAR; INTRAVENOUS at 17:58

## 2022-11-03 RX ADMIN — RACEPINEPHRINE HYDROCHLORIDE 11.25 MG: 11.25 SOLUTION RESPIRATORY (INHALATION) at 06:25

## 2022-11-03 NOTE — PROGRESS NOTES
Occupational Therapy  Hold Note    Precious Sanders  11/3/2022    Orders received and chart review completed. Pt downgraded to ICU today. Will sign off at this time. Please re-order OT when pt is medically appropriate.     Will Xiong, OTR/L [id number]

## 2022-11-03 NOTE — CARE COORDINATION
Patient currently on bipap. Reviewed chart. Last admit 8/2022, patient was active with [facility].   Spoke with patient's waiver , Teres[employer] ([phone]) and confirmed services: aide 4 hours/day 7 days/week, home delivered meals and emergency response button. Will follow.   Electronically signed by MICHELLE Camacho, MADELAINE, Case Management 11/3/2022 at 2:44 PM  [phone]

## 2022-11-03 NOTE — SIGNIFICANT EVENT
Patient had acute onset of shortness of breath with inspiratory and expiratory wheezing, noted by nursing that she was doing fine all night and did receive her Solu-Medrol at midnight. She had been resting and did not note that she was exposed to any allergens or any other concern for aspiration that could have caused her to become short of breath. Respiratory therapy was already giving patient 1 dose of albuterol as well as 1 dose of DuoNeb before my arrival.  She received a second DuoNeb while I was there. I came to evaluate her and she did have end expiratory and inspiratory wheezing. Seems to be mostly secondary due to her COPD. I did also realize that she has some component of asthma as well. Decide to give patient later 1 dose of racemic epinephrine and respiratory therapy and I also decided to try a dose of magnesium later. Patient was already also given 1 dose of Decadron 8 mg IV prior to giving the racemic epi. Solu-Medrol was retimed for later. Stat chest x-ray is ordered and is still pending. I put a pulmonology consult for evaluation later t this morning and nursing knows to call pulmonology.

## 2022-11-03 NOTE — PROGRESS NOTES
Hospitalist Progress Note      PCP: Grace Reyes MD    Date of Admission: 11/2/2022        Subjective: Called and reported patient in severe respiratory distress, using accessory muscles, reported to bedside, patient is using upper accessory muscles, scattered wheezes anxious. Denies chest pain abdominal pain      Medications:  Reviewed    Infusion Medications    dexmedetomidine      sodium chloride       Scheduled Medications    magnesium sulfate  2,000 mg IntraVENous Once    ipratropium-albuterol  1 ampule Inhalation Q2H    [START ON 11/4/2022] ipratropium-albuterol  1 ampule Inhalation Q4H    sodium chloride flush  5-40 mL IntraVENous 2 times per day    enoxaparin  40 mg SubCUTAneous Daily    azithromycin  500 mg Oral Daily    methylPREDNISolone  40 mg IntraVENous Q6H    Followed by    Markel Olszewski ON 11/4/2022] predniSONE  40 mg Oral Daily    mometasone-formoterol  2 puff Inhalation BID    methadone  90 mg Oral Daily    cetirizine  5 mg Oral Daily    And    pseudoephedrine  120 mg Oral BID    amLODIPine  5 mg Oral Daily     PRN Meds: albuterol, sodium chloride nebulizer, sodium chloride flush, sodium chloride, [DISCONTINUED] ondansetron **OR** ondansetron, polyethylene glycol, acetaminophen **OR** acetaminophen, diphenhydrAMINE, methocarbamol    No intake or output data in the 24 hours ending 11/03/22 1026    Physical Exam Performed:    BP (!) 155/104   Pulse (!) 110   Temp 97.8 °F (36.6 °C) (Axillary)   Resp 30   Ht 5' 10\" (1.778 m)   Wt 190 lb 0.6 oz (86.2 kg)   LMP 09/13/2017   SpO2 100%   BMI 27.27 kg/m²     General appearance: Respiratory distress  Neck: Supple  Respiratory: Scattered wheezes  Cardiovascular: Regular rate and rhythm with normal S1/S2 without murmurs, rubs or gallops. Abdomen: Soft, non-tender, non-distended  Musculoskeletal: No clubbing, cyanosis   Skin: Skin color, texture, turgor normal.  No rashes or lesions.   Neurologic: No focal weakness  Psychiatric: Alert and oriented  Capillary Refill: Brisk, 3 seconds, normal   Peripheral Pulses: +2 palpable, equal bilaterally       Labs:   Recent Labs     11/02/22 0513 11/03/22 0638   WBC 6.2 7.4   HGB 14.8 15.6   HCT 44.9 46.9    161     Recent Labs     11/02/22 0513 11/03/22 0638    137   K 4.2 4.6    100   CO2 22 22   BUN 7 15   CREATININE 0.9 1.2   CALCIUM 9.3 9.3     Recent Labs     11/02/22 0513 11/03/22 0638   AST 23 90*   ALT 18 41*   BILITOT 0.3 0.3   ALKPHOS 86 87     No results for input(s): INR in the last 72 hours. Recent Labs     11/02/22 0513   TROPONINI <0.01       Urinalysis:      Lab Results   Component Value Date/Time    NITRU Negative 08/16/2022 06:10 PM    WBCUA 3 12/12/2020 07:32 PM    BACTERIA Rare 05/29/2019 02:05 PM    RBCUA 2 12/12/2020 07:32 PM    BLOODU Negative 08/16/2022 06:10 PM    SPECGRAV <=1.005 08/16/2022 06:10 PM    GLUCOSEU Negative 08/16/2022 06:10 PM    GLUCOSEU NEGATIVE 12/10/2011 12:18 PM       Radiology:  XR CHEST PORTABLE   Preliminary Result   1. Linear densities at the left lung base may represent overlapping vessels   versus minimal subsegmental atelectasis. 2. Otherwise, no evidence of acute cardiopulmonary disease. XR CHEST PORTABLE   Final Result   No acute finding or significant change. Assessment/Plan:    Active Hospital Problems    Diagnosis     Acute respiratory failure with hypoxia ([facility]) [J96.01]      Priority: Medium    COPD exacerbation ([facility] [address] [J44.1]     Essential hypertension [I10]      Acute respiratory failure with hypoxia, worsening currently with worsening respiratory distress with potential worsening and need for intubation, transferred to ICU, ABG stat ordered and noted, started on BiPAP, DuoNeb, IV steroids, IV magnesium. Patient still at risk for life-threatening complications. Discussed with critical care team.  COPD exacerbation along with asthma exacerbation, as above  Essential hypertension, p.o. medications  Chronic methadone therapy    Critical care time including responding to calls, ordering and following on critical labs, ordering critical IV medications and discussion with other subspecialty and not including any procedure time 40 minutes          Diet: ADULT DIET;  Regular  Code Status: Full Code        Trevor Crook MD

## 2022-11-03 NOTE — RT PROTOCOL NOTE
RT Inhaler-Nebulizer Bronchodilator Protocol Note    There is a bronchodilator order in the chart from a provider indicating to follow the RT Bronchodilator Protocol and there is an Initiate RT Inhaler-Nebulizer Bronchodilator Protocol order as well (see protocol at bottom of note). CXR Findings:  XR CHEST PORTABLE    Result Date: 11/2/2022  No acute finding or significant change. The findings from the last RT Protocol Assessment were as follows:   History Pulmonary Disease: Chronic pulmonary disease  Respiratory Pattern: Dyspnea on exertion or RR 21-25 bpm  Breath Sounds: Inspiratory and expiratory or bilateral wheezing and/or rhonchi  Cough: Strong, spontaneous, non-productive  Indication for Bronchodilator Therapy: Wheezing associated with pulm disorder  Bronchodilator Assessment Score: 10    Aerosolized bronchodilator medication orders have been revised according to the RT Inhaler-Nebulizer Bronchodilator Protocol below. Respiratory Therapist to perform RT Therapy Protocol Assessment initially then follow the protocol. Repeat RT Therapy Protocol Assessment PRN for score 0-3 or on second treatment, BID, and PRN for scores above 3. No Indications - adjust the frequency to every 6 hours PRN wheezing or bronchospasm, if no treatments needed after 48 hours then discontinue using Per Protocol order mode. If indication present, adjust the RT bronchodilator orders based on the Bronchodilator Assessment Score as indicated below. Use Inhaler orders unless patient has one or more of the following: on home nebulizer, not able to hold breath for 10 seconds, is not alert and oriented, cannot activate and use MDI correctly, or respiratory rate 25 breaths per minute or more, then use the equivalent nebulizer order(s) with same Frequency and PRN reasons based on the score. If a patient is on this medication at home then do not decrease Frequency below that used at home.     0-3 - enter or revise RT bronchodilator order(s) to equivalent RT Bronchodilator order with Frequency of every 4 hours PRN for wheezing or increased work of breathing using Per Protocol order mode. 4-6 - enter or revise RT Bronchodilator order(s) to two equivalent RT bronchodilator orders with one order with BID Frequency and one order with Frequency of every 4 hours PRN wheezing or increased work of breathing using Per Protocol order mode. 7-10 - enter or revise RT Bronchodilator order(s) to two equivalent RT bronchodilator orders with one order with TID Frequency and one order with Frequency of every 4 hours PRN wheezing or increased work of breathing using Per Protocol order mode. 11-13 - enter or revise RT Bronchodilator order(s) to one equivalent RT bronchodilator order with QID Frequency and an Albuterol order with Frequency of every 4 hours PRN wheezing or increased work of breathing using Per Protocol order mode. Greater than 13 - enter or revise RT Bronchodilator order(s) to one equivalent RT bronchodilator order with every 4 hours Frequency and an Albuterol order with Frequency of every 2 hours PRN wheezing or increased work of breathing using Per Protocol order mode. RT to enter RT Home Evaluation for COPD & MDI Assessment order using Per Protocol order mode.     Electronically signed by Sam Gallegos RCP on 11/3/2022 at 5:14 AM

## 2022-11-03 NOTE — PROGRESS NOTES
Pt received to ICU room 2108.  Aria Workman) called and updated. Denies further questions. Pt A/Ox4, denies pain. Using accessory muscles to breathe on 4L O2 saturating 100%. Placed on Bipap with lung sounds wheezing on expiration. Stridorous sounds heard at trachea. Hospitalist at  bedside. Nurse manager to update critical care/pulm on pt's arrival to unit. VSS otherwise. Pt resting in bed with magnesium infusing.

## 2022-11-03 NOTE — PROGRESS NOTES
*late entry due to patient care* Called to patient room by patient RN for wheezing,SOB. Upon entry patient was very SOB, restless, wheezing, patient was given an albuterol neb, Dr. Endy King ordered an duoneb treatment, which patient did like that treatment better, and asked for another treatment, which was given- (DuoNeb Per Dr. Endy King). This RT gave the treatment on an oxygen flowmeter. Patent RN and Dr. Endy King at bedside.

## 2022-11-03 NOTE — PROGRESS NOTES
4 Eyes Skin Assessment     NAME:  Olamide Darnell  YOB: 1960  MEDICAL RECORD NUMBER:  [id number]    The patient is being assess for  Transfer to New Unit    I agree that 2 RN's have performed a thorough Head to Toe Skin Assessment on the patient. ALL assessment sites listed below have been assessed. Areas assessed by both nurses:    Head, Face, Ears, Shoulders, Back, Chest, Arms, Elbows, Hands, Sacrum. Buttock, Coccyx, Ischium, and Legs. Feet and Heels        Does the Patient have a Wound?  No noted wound(s)       Oleg Prevention initiated:  Yes   Wound Care Orders initiated:  NA    Pressure Injury (Stage 3,4, Unstageable, DTI, NWPT, and Complex wounds) if present place referral/consult order under [de-identified] NA    New and Established Ostomies if present place consult order under : NA      Nurse 1 eSignature: Electronically signed by Noah Card RN on 11/3/22 at 9:31 AM EDT    **SHARE this note so that the co-signing nurse is able to place an eSignature**    Nurse 2 eSignature: {Esignature:[id number]}

## 2022-11-03 NOTE — CARE COORDINATION
[facility] Mica Pike  ph: [phone]    Current services: Aid services through [facility] (aid is son's girlfriend)  [employer] Emergency response button  Home delivered meals. To resume services on weekend or evening, please call [address], [address]  Administrative Assist, Case Management  [phone]  Electronically signed by Viry Jackson on 11/3/2022 at 10:16 AM

## 2022-11-03 NOTE — PROGRESS NOTES
Patient's breathing is still stridor-like, but chest x-ray showed no acute process. Accessory muscles being used with each breath. Vitals:    11/03/22 0800   BP: (!) 136/96   Pulse: (!) 102   Resp: 24   Temp: 97.5 °F (36.4 °C)   SpO2: 93%      O2 checked via ear clip due to painted nails that will not come off with our hospital supplied nail polish    Physician notified.     0166: Patient transferred to ICU, Report called to NORIS Ho

## 2022-11-03 NOTE — CONSULTS
Pulmonary Consult Note     Patient's name:  Mallika Acuña  Medical Record Number: [id number]  Patient's account/billing number: [de-identified]  Patient's YOB: 1960  Age: 63 y.o. Date of Admission: 11/2/2022  4:53 AM  Date of Consult: 11/3/2022      Primary Care Physician: Eryn Larry MD      Code Status: Full Code    Reason for consult: Acute respiratory failure with hypoxia     Assessment and Plan     Acute respiratory failure with hypoxia  COPD/Asthma with acute exacerbation  HTN        Plan:  Nebulized DuoNeb every 2 hours. BiPAP. Precedex. Monitor closely high risk for intubation. Systemic steroid. Azithromycin   GI/DVT prophylaxis       HISTORY OF PRESENT ILLNESS:   Mr./Ms. Mallika Acuña is a 63 y.o. -American lady with past medical history stated below significant for moderately severe COPD/asthma with multiple allergies to maintenance treatment patient with Dr. Jany Spring and side effects to multiple inhalers, was on prednisone at 1 point currently being weaned off presented with worsening shortness of breath wheezing and cough  Transferred to the ICU due to worsening respiratory status. Chest x-ray with minimal left lower lobe airspace disease likely atelectasis.           Past Medical History:        Diagnosis Date    Anxiety and depression     Arthritis     Asthma     Bipolar disorder ([facility] [address]     CKD (chronic kidney disease)     stage 3    COPD (chronic obstructive pulmonary disease) ([facility] [address]     Empyema ([facility] [address]     Hepatitis C     Patient denies     History of heroin use     Hypertension     Narcotic abuse ([facility] [address]     heroin, clean 1 year    UTI (lower urinary tract infection)        Past Surgical History:        Procedure Laterality Date    COLONOSCOPY  10/23/2018    Colonoscopy with polypectomy (cold snare), polypectomy (cold biopsy)    COLONOSCOPY N/A 10/23/2018    COLONOSCOPY POLYPECTOMY SNARE/COLD BIOPSY performed by Camila Goff MD at [facility] Left 12/16/2016    INTRACAPSULAR CATARACT EXTRACTION Right 02/21/2020    PHACOEMULSIFICATION WITH INTRAOCULAR LENS IMPLANT performed by Van Sahni MD at [address] Left 02/28/2020    PHACOEMULSIFICATION WITH INTRAOCULAR LENS IMPLANT performed by Van Sahni MD at [facility] Right     26 yrs for lung infection     OTHER SURGICAL HISTORY  12/15/2016    EDMOND BUNIONECTOMY WITH APPLICATION OF AMNIOX LEFT FOOT    THYROIDECTOMY Left 8/5/2022    LEFT HEMITHYROIDECTOMY performed by Tamika Dee MD at [address] [place] [id number]: Allergies   Allergen Reactions    Acetylcysteine Shortness Of Breath and Anaphylaxis     Increased bronchospasm    Codeine Hives     Pt states no recent problems     Levaquin [Levofloxacin In D5w] Itching    Penicillins Hives and Itching    Dupixent [Dupilumab] Other (See Comments)     Right leg pain    Escitalopram Headaches    Ibuprofen Diarrhea, Nausea And Vomiting and Other (See Comments)         Home Meds:   Prior to Admission medications    Medication Sig Start Date End Date Taking?  Authorizing Provider   ipratropium-albuterol (DUONEB) 0.5-2.5 (3) MG/3ML SOLN nebulizer solution Inhale 3 mLs into the lungs every 4 hours as needed for Wheezing 9/11/22   Historical Provider, MD   dexamethasone (DECADRON) 4 MG tablet Take 1 tablet by mouth daily (with breakfast) for 5 days 10/31/22 11/5/22  Renae Hardin MD   loratadine-pseudoephedrine (CLARITIN-D 24 HOUR)  MG per extended release tablet Take 1 tablet by mouth daily 10/25/22   Renae Hardin MD   albuterol (PROVENTIL) (2.5 MG/3ML) 0.083% nebulizer solution Take 3 mLs by nebulization every 6 hours as needed for Wheezing 10/25/22   Renae Hardin MD   albuterol sulfate HFA (PROAIR HFA) 108 (90 Base) MCG/ACT inhaler Inhale 2 puffs into the lungs every 6 hours as needed for Wheezing 10/25/22   Viola Tamayo MD   budesonide-formoterol [facility]) 160-4.5 MCG/ACT AERO Inhale 2 puffs into the lungs 2 times daily 10/25/22   Viola Tamayo MD   brompheniramine-pseudoephedrine-DM (BROMFED DM) 2-30-10 MG/5ML syrup Take 5 mLs by mouth 4 times daily as needed for Cough 9/21/22   Viola Tamayo MD   methocarbamol (ROBAXIN) 500 MG tablet Take 1 tablet by mouth 3 times daily as needed (pain) 8/16/22   Prieto Jacobs MD   Skin Protectants, Misc. (MINERIN CREME) CREA APPLY TO AFFECTED AREA(S) AS NEEDED  Patient not taking: No sig reported 5/9/22   Historical Provider, MD   acetaminophen (APAP EXTRA STRENGTH) 500 MG tablet Take 2 tablets by mouth every 6 hours as needed for Pain DO NOT TAKE WITH OTHER MEDICATIONS CONTAINING ACETAMINOPHEN. 5/29/19 8/6/22  Hamlet Corita Buerger, PA   methadone 10 MG/5ML solution Take 90 mg by mouth daily     Historical Provider, MD   diphenhydrAMINE (BENADRYL) 25 MG capsule Take 25 mg by mouth every 8 hours as needed for Itching or Allergies    Historical Provider, MD   amLODIPine (NORVASC) 10 MG tablet Take 1 tablet by mouth daily. Patient taking differently: Take 5 mg by mouth daily 12/14/14   Sherron Meigs, MD       Family History:       Problem Relation Age of Onset    Cancer Mother     Kidney Disease Mother     Heart Disease Mother     Diabetes Father     Heart Disease Father     Diabetes Sister     Heart Disease Brother     High Blood Pressure Brother          Social History:   TOBACCO:   reports that she quit smoking about 6 years ago. Her smoking use included cigarettes. She has a 22.50 pack-year smoking history. She has never used smokeless tobacco.  ETOH:   reports no history of alcohol use. DRUGS:  reports that she does not currently use drugs.           REVIEW OF SYSTEMS:    Unable to obtain on BiPAP      Physical Exam:    Vitals: BP (!) 150/100   Pulse (!) 101   Temp 97.8 °F (36.6 °C) (Axillary)   Resp 16   Ht 5' 10\" (1.778 m)   Wt 190 lb 0.6 oz (86.2 kg)   LMP 09/13/2017   SpO2 99%   BMI 27.27 kg/m²     Last Body weight:   Wt Readings from Last 3 Encounters:   11/03/22 190 lb 0.6 oz (86.2 kg)   10/25/22 186 lb 6.4 oz (84.6 kg)   09/09/22 186 lb 1.1 oz (84.4 kg)       Body Mass Index : Body mass index is 27.27 kg/m². Intake and Output summary: No intake or output data in the 24 hours ending 11/03/22 1154    Physical Examination:     PHYSICAL EXAM:    Gen: Moderate acute distress on BiPAP. Eyes: PERRL. Anicteric sclera. No conjunctival injection. ENT: No discharge. Posterior oropharynx clear. External appearance of ears and nose normal.  Neck: Trachea midline. No mass, no lymphadenopathy    Resp: Diffuse bilateral wheezing very limited breath sounds bilaterally  CV: Regular rate. Regular rhythm. No murmur or rub. No edema. GI: Soft, Non-tender. Non-distended. +BS  Skin: Warm, dry, w/o erythema. Lymph: No cervical or supraclavicular LAD. M/S: No cyanosis. No clubbing. Neuro:  CN 2-12 tested, no focal neurologic deficit. Moves all extremities  Psych: Awake and alert, Oriented x 3. Judgement and insight appropriate. Mood stable. Laboratory findings:-    CBC:   Recent Labs     11/03/22  0638   WBC 7.4   HGB 15.6        BMP:    Recent Labs     11/02/22  0513 11/03/22  0638    137   K 4.2 4.6    100   CO2 22 22   BUN 7 15   CREATININE 0.9 1.2   GLUCOSE 102* 153*     S. Calcium:  Recent Labs     11/03/22  0638   CALCIUM 9.3       Hepatic functions:   Recent Labs     11/03/22  0638   ALKPHOS 87   ALT 41*   AST 90*   PROT 7.1   BILITOT 0.3   LABALBU 4.2       Cardiac enzymes:  Recent Labs     11/02/22  0513   TROPONINI <0.01         Radiology Review:  Pertinent images / reports were reviewed as a part of this visit. CT Chest w/ contrast: No results found for this or any previous visit. CT Chest w/o contrast: No results found for this or any previous visit.       CTPA: No results found for this or any previous visit. CXR PA/LAT: Results for orders placed during the hospital encounter of 06/10/22    XR CHEST (2 VW)    Narrative  EXAMINATION:  TWO XRAY VIEWS OF THE CHEST    6/10/2022 6:11 am    COMPARISON:  May 22, 2022    HISTORY:  ORDERING SYSTEM PROVIDED HISTORY: shortness of breath  TECHNOLOGIST PROVIDED HISTORY:  Reason for exam:->shortness of breath  Reason for Exam: SOB, patient is very anxious, says her mouth keeps bleeding  Relevant Medical/Surgical History: Lung surgery, chest tube yrs ago due to  lung infection, former drug abuse    FINDINGS:  No lines or tubes. Stable cardiomediastinal silhouette. Emphysematous changes  are present. The lungs are clear without focal consolidation or pleural  effusion. No suspicious pulmonary nodules. No pulmonary edema. No  pneumothorax. No acute osseous abnormality. Impression  1. No acute cardiopulmonary disease. CXR portable: Results for orders placed during the hospital encounter of 11/02/22    XR CHEST PORTABLE (Preliminary)  This result has not been signed. Information might be incomplete. Narrative  EXAMINATION:  ONE X-RAY VIEW OF THE CHEST    11/3/2022 6:53 am    COMPARISON:  Prior studies most recent 11/02/2022. HISTORY:  ORDERING SYSTEM PROVIDED HISTORY: SOB  TECHNOLOGIST PROVIDED HISTORY:  Reason for exam:->SOB  Reason for Exam: sob    FINDINGS:  Electrocardiographic monitor leads and oxygen tubing overlie the patient's  chest.  Cardiopericardial silhouette is within normal limits for an AP film. There are mild atherosclerotic changes of the aorta. Pulmonary vasculature  is normal.  There is mild hyperinflation of the lungs. Linear densities at  the left lung base may represent overlapping vessels versus minimal  subsegmental atelectasis. No focal confluent pulmonary infiltrate is  identified. There is minimal chronic blunting of the right costophrenic  angle.   No evidence of significant pleural effusion or pneumothorax. Impression  1. Linear densities at the left lung base may represent overlapping vessels  versus minimal subsegmental atelectasis. 2. Otherwise, no evidence of acute cardiopulmonary disease.            Critical Care time 35 min           Fracisco Agustin MD, M.D.            11/3/2022, 11:54 AM

## 2022-11-03 NOTE — PROGRESS NOTES
Physical Therapy Attempt/Discharge    Orders received and chart review completed. Pt downgraded to ICU today. Will sign off at this time. Please re-order physical therapy when pt is medically appropriate.     Tana Reyez,   [facility]

## 2022-11-04 LAB
ALBUMIN SERPL-MCNC: 3.9 G/DL (ref 3.4–5)
ANION GAP SERPL CALCULATED.3IONS-SCNC: 15 MMOL/L (ref 3–16)
BASOPHILS ABSOLUTE: 0 K/UL (ref 0–0.2)
BASOPHILS RELATIVE PERCENT: 0.1 %
BUN BLDV-MCNC: 31 MG/DL (ref 7–20)
CALCIUM SERPL-MCNC: 8.8 MG/DL (ref 8.3–10.6)
CHLORIDE BLD-SCNC: 98 MMOL/L (ref 99–110)
CO2: 23 MMOL/L (ref 21–32)
CREAT SERPL-MCNC: 2 MG/DL (ref 0.6–1.2)
EOSINOPHILS ABSOLUTE: 0 K/UL (ref 0–0.6)
EOSINOPHILS RELATIVE PERCENT: 0.1 %
GFR SERPL CREATININE-BSD FRML MDRD: 28 ML/MIN/{1.73_M2}
GLUCOSE BLD-MCNC: 112 MG/DL (ref 70–99)
HCT VFR BLD CALC: 44.1 % (ref 36–48)
HEMOGLOBIN: 14.5 G/DL (ref 12–16)
LYMPHOCYTES ABSOLUTE: 0.3 K/UL (ref 1–5.1)
LYMPHOCYTES RELATIVE PERCENT: 3.9 %
MAGNESIUM: 3.6 MG/DL (ref 1.8–2.4)
MCH RBC QN AUTO: 30.1 PG (ref 26–34)
MCHC RBC AUTO-ENTMCNC: 32.9 G/DL (ref 31–36)
MCV RBC AUTO: 91.5 FL (ref 80–100)
MONOCYTES ABSOLUTE: 0.5 K/UL (ref 0–1.3)
MONOCYTES RELATIVE PERCENT: 6.5 %
NEUTROPHILS ABSOLUTE: 6.8 K/UL (ref 1.7–7.7)
NEUTROPHILS RELATIVE PERCENT: 89.4 %
PDW BLD-RTO: 14 % (ref 12.4–15.4)
PHOSPHORUS: 6.9 MG/DL (ref 2.5–4.9)
PLATELET # BLD: 147 K/UL (ref 135–450)
PMV BLD AUTO: 8.4 FL (ref 5–10.5)
POTASSIUM SERPL-SCNC: 4.9 MMOL/L (ref 3.5–5.1)
RBC # BLD: 4.82 M/UL (ref 4–5.2)
SODIUM BLD-SCNC: 136 MMOL/L (ref 136–145)
WBC # BLD: 7.6 K/UL (ref 4–11)

## 2022-11-04 PROCEDURE — 6370000000 HC RX 637 (ALT 250 FOR IP): Performed by: INTERNAL MEDICINE

## 2022-11-04 PROCEDURE — 2000000000 HC ICU R&B

## 2022-11-04 PROCEDURE — 80069 RENAL FUNCTION PANEL: CPT

## 2022-11-04 PROCEDURE — 36415 COLL VENOUS BLD VENIPUNCTURE: CPT

## 2022-11-04 PROCEDURE — 6370000000 HC RX 637 (ALT 250 FOR IP): Performed by: STUDENT IN AN ORGANIZED HEALTH CARE EDUCATION/TRAINING PROGRAM

## 2022-11-04 PROCEDURE — 83735 ASSAY OF MAGNESIUM: CPT

## 2022-11-04 PROCEDURE — 94640 AIRWAY INHALATION TREATMENT: CPT

## 2022-11-04 PROCEDURE — 2700000000 HC OXYGEN THERAPY PER DAY

## 2022-11-04 PROCEDURE — 6360000002 HC RX W HCPCS: Performed by: INTERNAL MEDICINE

## 2022-11-04 PROCEDURE — 94761 N-INVAS EAR/PLS OXIMETRY MLT: CPT

## 2022-11-04 PROCEDURE — 94660 CPAP INITIATION&MGMT: CPT

## 2022-11-04 PROCEDURE — 2580000003 HC RX 258: Performed by: INTERNAL MEDICINE

## 2022-11-04 PROCEDURE — 6370000000 HC RX 637 (ALT 250 FOR IP): Performed by: NURSE PRACTITIONER

## 2022-11-04 PROCEDURE — 99291 CRITICAL CARE FIRST HOUR: CPT | Performed by: INTERNAL MEDICINE

## 2022-11-04 PROCEDURE — 85025 COMPLETE CBC W/AUTO DIFF WBC: CPT

## 2022-11-04 RX ORDER — LORAZEPAM 0.5 MG/1
0.5 TABLET ORAL EVERY 6 HOURS PRN
Status: DISCONTINUED | OUTPATIENT
Start: 2022-11-04 | End: 2022-11-09 | Stop reason: HOSPADM

## 2022-11-04 RX ORDER — SODIUM CHLORIDE 9 MG/ML
INJECTION, SOLUTION INTRAVENOUS CONTINUOUS
Status: DISCONTINUED | OUTPATIENT
Start: 2022-11-04 | End: 2022-11-08

## 2022-11-04 RX ORDER — HEPARIN SODIUM 5000 [USP'U]/ML
5000 INJECTION, SOLUTION INTRAVENOUS; SUBCUTANEOUS EVERY 8 HOURS SCHEDULED
Status: DISCONTINUED | OUTPATIENT
Start: 2022-11-05 | End: 2022-11-09 | Stop reason: HOSPADM

## 2022-11-04 RX ADMIN — AMLODIPINE BESYLATE 5 MG: 5 TABLET ORAL at 08:28

## 2022-11-04 RX ADMIN — IPRATROPIUM BROMIDE AND ALBUTEROL SULFATE 1 AMPULE: .5; 3 SOLUTION RESPIRATORY (INHALATION) at 00:02

## 2022-11-04 RX ADMIN — ENOXAPARIN SODIUM 40 MG: 100 INJECTION SUBCUTANEOUS at 08:27

## 2022-11-04 RX ADMIN — IPRATROPIUM BROMIDE AND ALBUTEROL SULFATE 1 AMPULE: .5; 3 SOLUTION RESPIRATORY (INHALATION) at 11:27

## 2022-11-04 RX ADMIN — SODIUM CHLORIDE, PRESERVATIVE FREE 10 ML: 5 INJECTION INTRAVENOUS at 19:49

## 2022-11-04 RX ADMIN — SODIUM CHLORIDE: 9 INJECTION, SOLUTION INTRAVENOUS at 10:03

## 2022-11-04 RX ADMIN — SODIUM CHLORIDE: 9 INJECTION, SOLUTION INTRAVENOUS at 23:37

## 2022-11-04 RX ADMIN — LORAZEPAM 0.5 MG: 0.5 TABLET ORAL at 15:26

## 2022-11-04 RX ADMIN — PSEUDOEPHEDRINE HYDROCHLORIDE 120 MG: 120 TABLET, FILM COATED, EXTENDED RELEASE ORAL at 08:33

## 2022-11-04 RX ADMIN — IPRATROPIUM BROMIDE AND ALBUTEROL SULFATE 1 AMPULE: .5; 3 SOLUTION RESPIRATORY (INHALATION) at 15:52

## 2022-11-04 RX ADMIN — MOMETASONE FUROATE AND FORMOTEROL FUMARATE DIHYDRATE 2 PUFF: 200; 5 AEROSOL RESPIRATORY (INHALATION) at 07:56

## 2022-11-04 RX ADMIN — LORAZEPAM 0.5 MG: 0.5 TABLET ORAL at 21:23

## 2022-11-04 RX ADMIN — LORAZEPAM 0.5 MG: 0.5 TABLET ORAL at 08:28

## 2022-11-04 RX ADMIN — IPRATROPIUM BROMIDE AND ALBUTEROL SULFATE 1 AMPULE: .5; 3 SOLUTION RESPIRATORY (INHALATION) at 07:39

## 2022-11-04 RX ADMIN — CETIRIZINE HYDROCHLORIDE 5 MG: 10 TABLET, FILM COATED ORAL at 08:28

## 2022-11-04 RX ADMIN — IPRATROPIUM BROMIDE AND ALBUTEROL SULFATE 1 AMPULE: .5; 3 SOLUTION RESPIRATORY (INHALATION) at 23:42

## 2022-11-04 RX ADMIN — SODIUM CHLORIDE, PRESERVATIVE FREE 10 ML: 5 INJECTION INTRAVENOUS at 08:31

## 2022-11-04 RX ADMIN — METHYLPREDNISOLONE SODIUM SUCCINATE 40 MG: 40 INJECTION, POWDER, FOR SOLUTION INTRAMUSCULAR; INTRAVENOUS at 05:10

## 2022-11-04 RX ADMIN — IPRATROPIUM BROMIDE AND ALBUTEROL SULFATE 1 AMPULE: .5; 3 SOLUTION RESPIRATORY (INHALATION) at 19:15

## 2022-11-04 RX ADMIN — AZITHROMYCIN 500 MG: 500 TABLET, FILM COATED ORAL at 08:27

## 2022-11-04 RX ADMIN — PREDNISONE 40 MG: 20 TABLET ORAL at 11:43

## 2022-11-04 RX ADMIN — METHADONE HYDROCHLORIDE 90 MG: 10 TABLET ORAL at 08:28

## 2022-11-04 RX ADMIN — LORAZEPAM 0.5 MG: 0.5 TABLET ORAL at 02:15

## 2022-11-04 RX ADMIN — PSEUDOEPHEDRINE HYDROCHLORIDE 120 MG: 120 TABLET, FILM COATED, EXTENDED RELEASE ORAL at 21:23

## 2022-11-04 RX ADMIN — METHYLPREDNISOLONE SODIUM SUCCINATE 40 MG: 40 INJECTION, POWDER, FOR SOLUTION INTRAMUSCULAR; INTRAVENOUS at 00:33

## 2022-11-04 RX ADMIN — IPRATROPIUM BROMIDE AND ALBUTEROL SULFATE 1 AMPULE: .5; 3 SOLUTION RESPIRATORY (INHALATION) at 03:40

## 2022-11-04 NOTE — PROGRESS NOTES
Pulmonary critical Care progress Note     Patient's name:  Wen Myers  Medical Record Number: [id number]  Patient's account/billing number: [de-identified]  Patient's YOB: 1960  Age: 63 y.o. Date of Admission: 11/2/2022  4:53 AM  Date of Consult: 11/4/2022      Primary Care Physician: Bettina Hope MD      Code Status: Full Code    Reason for consult: Acute respiratory failure with hypoxia     Assessment and Plan     Acute respiratory failure with hypoxia  COPD/Asthma with acute exacerbation  HTN  MERNA        Plan:  Bronchodilators   BiPAP. Intermittent, will give brakes for food. Precedex. IV fluid  Systemic steroid. Azithromycin   GI/DVT prophylaxis         Subjective:  Remains on Bipap  C/o sob  Less distressed      HISTORY OF PRESENT ILLNESS:   Mr./Ms. Wen Myers is a 63 y.o. -American lady with past medical history stated below significant for moderately severe COPD/asthma with multiple allergies to maintenance treatment patient with Dr. Dev Spring and side effects to multiple inhalers, was on prednisone at 1 point currently being weaned off presented with worsening shortness of breath wheezing and cough  Transferred to the ICU due to worsening respiratory status. Chest x-ray with minimal left lower lobe airspace disease likely atelectasis. REVIEW OF SYSTEMS:    Unable to obtain on BiPAP      Physical Exam:    Vitals: BP (!) 135/90   Pulse 89   Temp 97.6 °F (36.4 °C) (Axillary)   Resp 12   Ht 5' 10\" (1.778 m)   Wt 189 lb 9.5 oz (86 kg)   LMP 09/13/2017   SpO2 98%   BMI 27.20 kg/m²     Last Body weight:   Wt Readings from Last 3 Encounters:   11/04/22 189 lb 9.5 oz (86 kg)   10/25/22 186 lb 6.4 oz (84.6 kg)   09/09/22 186 lb 1.1 oz (84.4 kg)       Body Mass Index : Body mass index is 27.2 kg/m².       Intake and Output summary:   Intake/Output Summary (Last 24 hours) at 11/4/2022 1253  Last data filed at 11/4/2022 0200  Gross per 24 hour   Intake 81.3 ml   Output 80 ml   Net 1.3 ml       Physical Examination:     PHYSICAL EXAM:    Gen: mild acute distress on BiPAP. Eyes: PERRL. Anicteric sclera. No conjunctival injection. ENT: No discharge. Posterior oropharynx clear. External appearance of ears and nose normal.  Neck: Trachea midline. No mass, no lymphadenopathy    Resp: Diffuse bilateral wheezing very limited breath sounds bilaterally  CV: Regular rate. Regular rhythm. No murmur or rub. No edema. GI: Soft, Non-tender. Non-distended. +BS  Skin: Warm, dry, w/o erythema. Lymph: No cervical or supraclavicular LAD. M/S: No cyanosis. No clubbing. Neuro:  CN 2-12 tested, no focal neurologic deficit. Moves all extremities  Psych: Awake and alert, Oriented x 3. Judgement and insight appropriate. Mood stable. Laboratory findings:-    CBC:   Recent Labs     11/04/22 0525   WBC 7.6   HGB 14.5        BMP:    Recent Labs     11/02/22 0513 11/03/22 0638 11/04/22  0525    137 136   K 4.2 4.6 4.9    100 98*   CO2 22 22 23   BUN 7 15 31*   CREATININE 0.9 1.2 2.0*   GLUCOSE 102* 153* 112*     S. Calcium:  Recent Labs     11/04/22  0525   CALCIUM 8.8       Hepatic functions:   Recent Labs     11/03/22 0638 11/04/22  0525   ALKPHOS 87  --    ALT 41*  --    AST 90*  --    PROT 7.1  --    BILITOT 0.3  --    LABALBU 4.2 3.9       Cardiac enzymes:  Recent Labs     11/02/22 0513   TROPONINI <0.01         Radiology Review:  Pertinent images / reports were reviewed as a part of this visit. CT Chest w/ contrast: No results found for this or any previous visit. CT Chest w/o contrast: No results found for this or any previous visit. CTPA: No results found for this or any previous visit.       CXR PA/LAT: Results for orders placed during the hospital encounter of 06/10/22    XR CHEST (2 VW)    Narrative  EXAMINATION:  TWO XRAY VIEWS OF THE CHEST    6/10/2022 6:11 am    COMPARISON:  May 22, 2022    HISTORY:  ORDERING SYSTEM PROVIDED HISTORY: shortness of breath  TECHNOLOGIST PROVIDED HISTORY:  Reason for exam:->shortness of breath  Reason for Exam: SOB, patient is very anxious, says her mouth keeps bleeding  Relevant Medical/Surgical History: Lung surgery, chest tube yrs ago due to  lung infection, former drug abuse    FINDINGS:  No lines or tubes. Stable cardiomediastinal silhouette. Emphysematous changes  are present. The lungs are clear without focal consolidation or pleural  effusion. No suspicious pulmonary nodules. No pulmonary edema. No  pneumothorax. No acute osseous abnormality. Impression  1. No acute cardiopulmonary disease. CXR portable: Results for orders placed during the hospital encounter of 11/02/22    XR CHEST PORTABLE (Preliminary)  This result has not been signed. Information might be incomplete. Narrative  EXAMINATION:  ONE X-RAY VIEW OF THE CHEST    11/3/2022 6:53 am    COMPARISON:  Prior studies most recent 11/02/2022. HISTORY:  ORDERING SYSTEM PROVIDED HISTORY: SOB  TECHNOLOGIST PROVIDED HISTORY:  Reason for exam:->SOB  Reason for Exam: sob    FINDINGS:  Electrocardiographic monitor leads and oxygen tubing overlie the patient's  chest.  Cardiopericardial silhouette is within normal limits for an AP film. There are mild atherosclerotic changes of the aorta. Pulmonary vasculature  is normal.  There is mild hyperinflation of the lungs. Linear densities at  the left lung base may represent overlapping vessels versus minimal  subsegmental atelectasis. No focal confluent pulmonary infiltrate is  identified. There is minimal chronic blunting of the right costophrenic  angle. No evidence of significant pleural effusion or pneumothorax. Impression  1. Linear densities at the left lung base may represent overlapping vessels  versus minimal subsegmental atelectasis.   2. Otherwise, no evidence of acute cardiopulmonary disease.            Critical Care time 35 min           Fracisco Agustin MD, M.D.            11/4/2022, 12:53 PM

## 2022-11-04 NOTE — PROGRESS NOTES
Pt attempted off bipap, placed on 3lpm NC. Work of breathing increased significantly, Spo2 dropped to 86%, pt placed back on bipap after approximately 10 minutes.

## 2022-11-04 NOTE — PROGRESS NOTES
Hospitalist Progress Note      PCP: Theo Melo MD    Date of Admission: 11/2/2022        Subjective: Still on BiPAP, feels a little bit better, less wheezes. No chest pain no dizziness or lightheadedness. Medications:  Reviewed    Infusion Medications    dexmedetomidine      sodium chloride       Scheduled Medications    ipratropium-albuterol  1 ampule Inhalation Q4H    sodium chloride flush  5-40 mL IntraVENous 2 times per day    enoxaparin  40 mg SubCUTAneous Daily    azithromycin  500 mg Oral Daily    predniSONE  40 mg Oral Daily    mometasone-formoterol  2 puff Inhalation BID    methadone  90 mg Oral Daily    cetirizine  5 mg Oral Daily    And    pseudoephedrine  120 mg Oral BID    amLODIPine  5 mg Oral Daily     PRN Meds: LORazepam, albuterol, sodium chloride nebulizer, sodium chloride flush, sodium chloride, [DISCONTINUED] ondansetron **OR** ondansetron, polyethylene glycol, acetaminophen **OR** acetaminophen, diphenhydrAMINE, methocarbamol      Intake/Output Summary (Last 24 hours) at 11/4/2022 0634  Last data filed at 11/4/2022 0200  Gross per 24 hour   Intake 81.3 ml   Output 80 ml   Net 1.3 ml       Physical Exam Performed:    /76   Pulse 88   Temp 98.1 °F (36.7 °C) (Axillary)   Resp (!) 9   Ht 5' 10\" (1.778 m)   Wt 189 lb 9.5 oz (86 kg)   LMP 09/13/2017   SpO2 100%   BMI 27.20 kg/m²     General appearance: No apparent distress  Neck: Supple  Respiratory: Expiratory wheezes  Cardiovascular: Regular rate and rhythm with normal S1/S2 without murmurs, rubs or gallops. Abdomen: Soft, non-tender, non-distended   Musculoskeletal: No clubbing, cyanosis   Skin: Skin color, texture, turgor normal.  No rashes or lesions.   Neurologic:  No focal weakness   Psychiatric: Alert and oriented  Capillary Refill: Brisk, 3 seconds, normal   Peripheral Pulses: +2 palpable, equal bilaterally       Labs:   Recent Labs     11/02/22  0513 11/03/22  0638 11/04/22  0525   WBC 6.2 7.4 7.6   HGB 14.8 15.6 14.5   HCT 44.9 46.9 44.1    161 147     Recent Labs     11/02/22  0513 11/03/22  0638    137   K 4.2 4.6    100   CO2 22 22   BUN 7 15   CREATININE 0.9 1.2   CALCIUM 9.3 9.3     Recent Labs     11/02/22  0513 11/03/22  0638   AST 23 90*   ALT 18 41*   BILITOT 0.3 0.3   ALKPHOS 86 87     No results for input(s): INR in the last 72 hours. Recent Labs     11/02/22  0513   TROPONINI <0.01       Urinalysis:      Lab Results   Component Value Date/Time    NITRU Negative 08/16/2022 06:10 PM    WBCUA 3 12/12/2020 07:32 PM    BACTERIA Rare 05/29/2019 02:05 PM    RBCUA 2 12/12/2020 07:32 PM    BLOODU Negative 08/16/2022 06:10 PM    SPECGRAV <=1.005 08/16/2022 06:10 PM    GLUCOSEU Negative 08/16/2022 06:10 PM    GLUCOSEU NEGATIVE 12/10/2011 12:18 PM       Radiology:  XR CHEST PORTABLE   Preliminary Result   1. Linear densities at the left lung base may represent overlapping vessels   versus minimal subsegmental atelectasis. 2. Otherwise, no evidence of acute cardiopulmonary disease. XR CHEST PORTABLE   Final Result   No acute finding or significant change. Assessment/Plan:    Active Hospital Problems    Diagnosis     Acute respiratory failure with hypoxia ([facility] [address] [J96.01]      Priority: Medium    COPD exacerbation ([facility] [address] [J44.1]     Essential hypertension [I10]      Acute respiratory failure with hypoxia, worsening currently with worsening respiratory distress with potential worsening and need for intubation, transferred to ICU yesterday. Discussed with critical care team.   COPD exacerbation along with asthma exacerbation, as above  Essential hypertension, p.o. medications  Chronic methadone therapy  MERNA, IV fluid repeat CMP in a.m. Diet: ADULT DIET;  Regular  Code Status: Full Code      Candance Mess, MD

## 2022-11-05 LAB
ALBUMIN SERPL-MCNC: 4 G/DL (ref 3.4–5)
ANION GAP SERPL CALCULATED.3IONS-SCNC: 13 MMOL/L (ref 3–16)
BASOPHILS ABSOLUTE: 0 K/UL (ref 0–0.2)
BASOPHILS RELATIVE PERCENT: 0.1 %
BUN BLDV-MCNC: 36 MG/DL (ref 7–20)
CALCIUM SERPL-MCNC: 8.5 MG/DL (ref 8.3–10.6)
CHLORIDE BLD-SCNC: 99 MMOL/L (ref 99–110)
CO2: 22 MMOL/L (ref 21–32)
CREAT SERPL-MCNC: 1.6 MG/DL (ref 0.6–1.2)
EOSINOPHILS ABSOLUTE: 0 K/UL (ref 0–0.6)
EOSINOPHILS RELATIVE PERCENT: 0 %
GFR SERPL CREATININE-BSD FRML MDRD: 37 ML/MIN/{1.73_M2}
GLUCOSE BLD-MCNC: 105 MG/DL (ref 70–99)
HCT VFR BLD CALC: 42.8 % (ref 36–48)
HEMOGLOBIN: 14 G/DL (ref 12–16)
LYMPHOCYTES ABSOLUTE: 0.5 K/UL (ref 1–5.1)
LYMPHOCYTES RELATIVE PERCENT: 6.5 %
MAGNESIUM: 3.1 MG/DL (ref 1.8–2.4)
MCH RBC QN AUTO: 30.1 PG (ref 26–34)
MCHC RBC AUTO-ENTMCNC: 32.8 G/DL (ref 31–36)
MCV RBC AUTO: 91.9 FL (ref 80–100)
MONOCYTES ABSOLUTE: 0.7 K/UL (ref 0–1.3)
MONOCYTES RELATIVE PERCENT: 9.3 %
NEUTROPHILS ABSOLUTE: 6.8 K/UL (ref 1.7–7.7)
NEUTROPHILS RELATIVE PERCENT: 84.1 %
PDW BLD-RTO: 14.1 % (ref 12.4–15.4)
PHOSPHORUS: 4.4 MG/DL (ref 2.5–4.9)
PLATELET # BLD: 149 K/UL (ref 135–450)
PMV BLD AUTO: 8.2 FL (ref 5–10.5)
POTASSIUM SERPL-SCNC: 4.8 MMOL/L (ref 3.5–5.1)
RBC # BLD: 4.66 M/UL (ref 4–5.2)
SODIUM BLD-SCNC: 134 MMOL/L (ref 136–145)
WBC # BLD: 8 K/UL (ref 4–11)

## 2022-11-05 PROCEDURE — 6370000000 HC RX 637 (ALT 250 FOR IP): Performed by: STUDENT IN AN ORGANIZED HEALTH CARE EDUCATION/TRAINING PROGRAM

## 2022-11-05 PROCEDURE — 6360000002 HC RX W HCPCS: Performed by: INTERNAL MEDICINE

## 2022-11-05 PROCEDURE — 85025 COMPLETE CBC W/AUTO DIFF WBC: CPT

## 2022-11-05 PROCEDURE — 99291 CRITICAL CARE FIRST HOUR: CPT | Performed by: INTERNAL MEDICINE

## 2022-11-05 PROCEDURE — 80069 RENAL FUNCTION PANEL: CPT

## 2022-11-05 PROCEDURE — 6370000000 HC RX 637 (ALT 250 FOR IP): Performed by: NURSE PRACTITIONER

## 2022-11-05 PROCEDURE — 6370000000 HC RX 637 (ALT 250 FOR IP): Performed by: INTERNAL MEDICINE

## 2022-11-05 PROCEDURE — 2700000000 HC OXYGEN THERAPY PER DAY

## 2022-11-05 PROCEDURE — 94660 CPAP INITIATION&MGMT: CPT

## 2022-11-05 PROCEDURE — 36415 COLL VENOUS BLD VENIPUNCTURE: CPT

## 2022-11-05 PROCEDURE — 94640 AIRWAY INHALATION TREATMENT: CPT

## 2022-11-05 PROCEDURE — 94760 N-INVAS EAR/PLS OXIMETRY 1: CPT

## 2022-11-05 PROCEDURE — 2000000000 HC ICU R&B

## 2022-11-05 PROCEDURE — 83735 ASSAY OF MAGNESIUM: CPT

## 2022-11-05 PROCEDURE — 2580000003 HC RX 258: Performed by: INTERNAL MEDICINE

## 2022-11-05 PROCEDURE — APPNB15 APP NON BILLABLE TIME 0-15 MINS: Performed by: NURSE PRACTITIONER

## 2022-11-05 RX ADMIN — LORAZEPAM 0.5 MG: 0.5 TABLET ORAL at 09:24

## 2022-11-05 RX ADMIN — SODIUM CHLORIDE, PRESERVATIVE FREE 10 ML: 5 INJECTION INTRAVENOUS at 09:14

## 2022-11-05 RX ADMIN — PSEUDOEPHEDRINE HYDROCHLORIDE 120 MG: 120 TABLET, FILM COATED, EXTENDED RELEASE ORAL at 09:09

## 2022-11-05 RX ADMIN — AMLODIPINE BESYLATE 5 MG: 5 TABLET ORAL at 09:10

## 2022-11-05 RX ADMIN — HEPARIN SODIUM 5000 UNITS: 5000 INJECTION INTRAVENOUS; SUBCUTANEOUS at 14:17

## 2022-11-05 RX ADMIN — IPRATROPIUM BROMIDE AND ALBUTEROL SULFATE 1 AMPULE: .5; 3 SOLUTION RESPIRATORY (INHALATION) at 19:30

## 2022-11-05 RX ADMIN — PREDNISONE 40 MG: 20 TABLET ORAL at 09:09

## 2022-11-05 RX ADMIN — IPRATROPIUM BROMIDE AND ALBUTEROL SULFATE 1 AMPULE: .5; 3 SOLUTION RESPIRATORY (INHALATION) at 08:54

## 2022-11-05 RX ADMIN — HEPARIN SODIUM 5000 UNITS: 5000 INJECTION INTRAVENOUS; SUBCUTANEOUS at 06:19

## 2022-11-05 RX ADMIN — LORAZEPAM 0.5 MG: 0.5 TABLET ORAL at 23:53

## 2022-11-05 RX ADMIN — CETIRIZINE HYDROCHLORIDE 5 MG: 10 TABLET, FILM COATED ORAL at 09:10

## 2022-11-05 RX ADMIN — IPRATROPIUM BROMIDE AND ALBUTEROL SULFATE 1 AMPULE: .5; 3 SOLUTION RESPIRATORY (INHALATION) at 03:37

## 2022-11-05 RX ADMIN — SODIUM CHLORIDE, PRESERVATIVE FREE 10 ML: 5 INJECTION INTRAVENOUS at 19:28

## 2022-11-05 RX ADMIN — HEPARIN SODIUM 5000 UNITS: 5000 INJECTION INTRAVENOUS; SUBCUTANEOUS at 22:26

## 2022-11-05 RX ADMIN — IPRATROPIUM BROMIDE AND ALBUTEROL SULFATE 1 AMPULE: .5; 3 SOLUTION RESPIRATORY (INHALATION) at 12:28

## 2022-11-05 RX ADMIN — PSEUDOEPHEDRINE HYDROCHLORIDE 120 MG: 120 TABLET, FILM COATED, EXTENDED RELEASE ORAL at 19:28

## 2022-11-05 RX ADMIN — IPRATROPIUM BROMIDE AND ALBUTEROL SULFATE 1 AMPULE: .5; 3 SOLUTION RESPIRATORY (INHALATION) at 15:41

## 2022-11-05 RX ADMIN — SODIUM CHLORIDE: 9 INJECTION, SOLUTION INTRAVENOUS at 13:23

## 2022-11-05 RX ADMIN — METHADONE HYDROCHLORIDE 90 MG: 10 TABLET ORAL at 09:09

## 2022-11-05 RX ADMIN — LORAZEPAM 0.5 MG: 0.5 TABLET ORAL at 17:15

## 2022-11-05 ASSESSMENT — PAIN SCALES - GENERAL: PAINLEVEL_OUTOF10: 0

## 2022-11-05 NOTE — PROGRESS NOTES
Pulmonary critical Care progress Note     Patient's name:  Burt Bedoya  Medical Record Number: [id number]  Patient's account/billing number: [de-identified]  Patient's YOB: 1960  Age: 63 y.o. Date of Admission: 11/2/2022  4:53 AM  Date of Consult: 11/5/2022      Primary Care Physician: Johnnie Shah MD      Code Status: Full Code    Reason for consult: Acute respiratory failure with hypoxia     Assessment and Plan     Acute respiratory failure with hypoxia  COPD/Asthma with acute exacerbation  HTN  MERNA        Plan:  Bronchodilators   BiPAP. Intermittent as needed for increase wob  IV fluid  Systemic steroid. Done with azithromycin   GI/DVT prophylaxis         Subjective:  Remains on Bipap  C/o sob  Less distressed      HISTORY OF PRESENT ILLNESS:   Mr./Ms. Burt Bedoya is a 63 y.o. -American lady with past medical history stated below significant for moderately severe COPD/asthma with multiple allergies to maintenance treatment patient with Dr. Bridgett Osmanx Dupixent and side effects to multiple inhalers, was on prednisone at 1 point currently being weaned off presented with worsening shortness of breath wheezing and cough  Transferred to the ICU due to worsening respiratory status. Chest x-ray with minimal left lower lobe airspace disease likely atelectasis. REVIEW OF SYSTEMS:    Unable to obtain on BiPAP      Physical Exam:    Vitals: BP (!) 140/110   Pulse 78   Temp 97.6 °F (36.4 °C) (Axillary)   Resp (!) 9   Ht 5' 10\" (1.778 m)   Wt 191 lb 12.8 oz (87 kg)   LMP 09/13/2017   SpO2 95%   BMI 27.52 kg/m²     Last Body weight:   Wt Readings from Last 3 Encounters:   11/05/22 191 lb 12.8 oz (87 kg)   10/25/22 186 lb 6.4 oz (84.6 kg)   09/09/22 186 lb 1.1 oz (84.4 kg)       Body Mass Index : Body mass index is 27.52 kg/m².       Intake and Output summary:   Intake/Output Summary (Last 24 hours) at 11/5/2022 [address] [place] [address] filed at 11/5/2022 0537  Gross per 24 hour   Intake 1990.28 ml   Output 675 ml   Net 1315.28 ml         Physical Examination:     PHYSICAL EXAM:    Gen: mild acute distress on BiPAP. Eyes: PERRL. Anicteric sclera. No conjunctival injection. ENT: No discharge. Posterior oropharynx clear. External appearance of ears and nose normal.  Neck: Trachea midline. No mass, no lymphadenopathy    Resp: Diffuse bilateral wheezing very limited breath sounds bilaterally  CV: Regular rate. Regular rhythm. No murmur or rub. No edema. GI: Soft, Non-tender. Non-distended. +BS  Skin: Warm, dry, w/o erythema. Lymph: No cervical or supraclavicular LAD. M/S: No cyanosis. No clubbing. Neuro:  CN 2-12 tested, no focal neurologic deficit. Moves all extremities  Psych: Awake and alert, Oriented x 3. Judgement and insight appropriate. Mood stable. Laboratory findings:-    CBC:   Recent Labs     11/05/22 0400   WBC 8.0   HGB 14.0          BMP:    Recent Labs     11/03/22 0638 11/03/22 0638 11/04/22 0525 11/05/22  0400     --  136 134*   K 4.6   < > 4.9 4.8     --  98* 99   CO2 22  --  23 22   BUN 15  --  31* 36*   CREATININE 1.2  --  2.0* 1.6*   GLUCOSE 153*  --  112* 105*    < > = values in this interval not displayed. S. Calcium:  Recent Labs     11/05/22  0400   CALCIUM 8.5         Hepatic functions:   Recent Labs     11/03/22 0638 11/04/22 0525 11/05/22 0400   ALKPHOS 87  --   --    ALT 41*  --   --    AST 90*  --   --    PROT 7.1  --   --    BILITOT 0.3  --   --    LABALBU 4.2   < > 4.0    < > = values in this interval not displayed. Cardiac enzymes:  No results for input(s): CKTOTAL, CKMB, CKMBINDEX, TROPONINI in the last 72 hours. Radiology Review:  Pertinent images / reports were reviewed as a part of this visit. CTPA: No results found for this or any previous visit.       CXR PA/LAT: Results for orders placed during the hospital encounter of 06/10/22    XR CHEST (2 VW)    Narrative  EXAMINATION:  TWO XRAY VIEWS OF THE CHEST    6/10/2022 6:11 am    COMPARISON:  May 22, 2022    HISTORY:  ORDERING SYSTEM PROVIDED HISTORY: shortness of breath  TECHNOLOGIST PROVIDED HISTORY:  Reason for exam:->shortness of breath  Reason for Exam: SOB, patient is very anxious, says her mouth keeps bleeding  Relevant Medical/Surgical History: Lung surgery, chest tube yrs ago due to  lung infection, former drug abuse    FINDINGS:  No lines or tubes. Stable cardiomediastinal silhouette. Emphysematous changes  are present. The lungs are clear without focal consolidation or pleural  effusion. No suspicious pulmonary nodules. No pulmonary edema. No  pneumothorax. No acute osseous abnormality. Impression  1. No acute cardiopulmonary disease. CXR portable: Results for orders placed during the hospital encounter of 11/02/22    XR CHEST PORTABLE (Preliminary)  This result has not been signed. Information might be incomplete. Narrative  EXAMINATION:  ONE X-RAY VIEW OF THE CHEST    11/3/2022 6:53 am    COMPARISON:  Prior studies most recent 11/02/2022. HISTORY:  ORDERING SYSTEM PROVIDED HISTORY: SOB  TECHNOLOGIST PROVIDED HISTORY:  Reason for exam:->SOB  Reason for Exam: sob    FINDINGS:  Electrocardiographic monitor leads and oxygen tubing overlie the patient's  chest.  Cardiopericardial silhouette is within normal limits for an AP film. There are mild atherosclerotic changes of the aorta. Pulmonary vasculature  is normal.  There is mild hyperinflation of the lungs. Linear densities at  the left lung base may represent overlapping vessels versus minimal  subsegmental atelectasis. No focal confluent pulmonary infiltrate is  identified. There is minimal chronic blunting of the right costophrenic  angle. No evidence of significant pleural effusion or pneumothorax. Impression  1.  Linear densities at the left lung base may represent overlapping vessels  versus minimal subsegmental atelectasis. 2. Otherwise, no evidence of acute cardiopulmonary disease.            Critical Care time 31 min           Cruz Hollingsworth MD, M.D.            11/5/2022, 11:50 AM

## 2022-11-05 NOTE — PROGRESS NOTES
Hospitalist Progress Note      PCP: Theo Melo MD    Date of Admission: 11/2/2022      Subjective: Still feeling tightness in her breathing, no chest pain no fever chills nausea vomiting or diarrhea. Medications:  Reviewed    Infusion Medications    sodium chloride 75 mL/hr at 11/05/22 0537    dexmedetomidine      sodium chloride       Scheduled Medications    heparin (porcine)  5,000 Units SubCUTAneous 3 times per day    ipratropium-albuterol  1 ampule Inhalation Q4H    sodium chloride flush  5-40 mL IntraVENous 2 times per day    predniSONE  40 mg Oral Daily    mometasone-formoterol  2 puff Inhalation BID    methadone  90 mg Oral Daily    cetirizine  5 mg Oral Daily    And    pseudoephedrine  120 mg Oral BID    amLODIPine  5 mg Oral Daily     PRN Meds: LORazepam, albuterol, sodium chloride nebulizer, sodium chloride flush, sodium chloride, [DISCONTINUED] ondansetron **OR** ondansetron, polyethylene glycol, acetaminophen **OR** acetaminophen, diphenhydrAMINE, methocarbamol      Intake/Output Summary (Last 24 hours) at 11/5/2022 0900  Last data filed at 11/5/2022 0537  Gross per 24 hour   Intake 2140.28 ml   Output 675 ml   Net 1465.28 ml       Physical Exam Performed:    /69   Pulse 76   Temp 97.6 °F (36.4 °C) (Axillary)   Resp (!) 9   Ht 5' 10\" (1.778 m)   Wt 191 lb 12.8 oz (87 kg)   LMP 09/13/2017   SpO2 98%   BMI 27.52 kg/m²     General appearance: No apparent distress  Neck: Supple  Respiratory: Expiratory wheezes  Cardiovascular: Regular rate and rhythm with normal S1/S2 without murmurs, rubs or gallops. Abdomen: Soft, non-tender  Musculoskeletal: No clubbing, cyanosis  Skin: Skin color, texture, turgor normal.  No rashes or lesions.   Neurologic:  No focal weakness  Psychiatric: Alert and oriented  Capillary Refill: Brisk, 3 seconds, normal   Peripheral Pulses: +2 palpable, equal bilaterally       Labs:   Recent Labs     11/03/22  0638 11/04/22  0525 11/05/22  0400   WBC 7.4 7.6 8.0   HGB 15.6 14.5 14.0   HCT 46.9 44.1 42.8    147 149     Recent Labs     11/03/22  0638 11/04/22  0525 11/05/22  0400    136 134*   K 4.6 4.9 4.8    98* 99   CO2 22 23 22   BUN 15 31* 36*   CREATININE 1.2 2.0* 1.6*   CALCIUM 9.3 8.8 8.5   PHOS  --  6.9* 4.4     Recent Labs     11/03/22  0638   AST 90*   ALT 41*   BILITOT 0.3   ALKPHOS 87     No results for input(s): INR in the last 72 hours. No results for input(s): Maksim Ebbs in the last 72 hours. Urinalysis:      Lab Results   Component Value Date/Time    NITRU Negative 08/16/2022 06:10 PM    WBCUA 3 12/12/2020 07:32 PM    BACTERIA Rare 05/29/2019 02:05 PM    RBCUA 2 12/12/2020 07:32 PM    BLOODU Negative 08/16/2022 06:10 PM    SPECGRAV <=1.005 08/16/2022 06:10 PM    GLUCOSEU Negative 08/16/2022 06:10 PM    GLUCOSEU NEGATIVE 12/10/2011 12:18 PM       Radiology:  XR CHEST PORTABLE   Final Result   1. Linear densities at the left lung base may represent overlapping vessels   versus minimal subsegmental atelectasis. 2. Otherwise, no evidence of acute cardiopulmonary disease. XR CHEST PORTABLE   Final Result   No acute finding or significant change. Assessment/Plan:    Active Hospital Problems    Diagnosis     Acute respiratory failure with hypoxia ([facility] [address] [J96.01]      Priority: Medium    COPD exacerbation ([facility] [address] [J44.1]     Essential hypertension [I10]      Acute respiratory failure with hypoxia, transferred to ICU, critical care team consulted, discussed with pulmonology, intermittent BiPAP, improving overall. COPD exacerbation along with asthma exacerbation, as above  Essential hypertension, p.o. medications  Chronic methadone therapy  MERNA, IV fluid, creatinine improved    Diet: ADULT DIET;  Regular  Code Status: Full Code      Snow Downing MD

## 2022-11-05 NOTE — PROGRESS NOTES
PT placed on 12/5 30% rate of 16. Upon room entry pt minute volume was below 3.0.  On current settings pt MV is 6.1 and VT is 360       11/04/22 2350   NIV Type   Skin Assessment Clean, dry, & intact   Skin Protection for O2 Device Yes  (applied new skin barrier)   Orientation Middle   Location Nose   Intervention(s) Skin Barrier   NIV Started/Stopped On   Equipment Type V60   Mode Bilevel   Mask Type Full face mask   Mask Size Medium   Settings/Measurements   IPAP 12 cmH20   CPAP/EPAP 5 cmH2O   Vt (Measured) 340 mL   Rate Ordered 16   Resp 16   FiO2  30 %   Minute Volume (L/min) 6.5 Liters   Mask Leak (lpm) 0 lpm   Comfort Level Good   Using Accessory Muscles No   SpO2 95   Patient's Home Machine No   Breath Sounds   Right Upper Lobe Expiratory Wheezes   Right Middle Lobe Expiratory Wheezes   Right Lower Lobe Expiratory Wheezes   Left Upper Lobe Expiratory Wheezes   Left Lower Lobe Expiratory Wheezes   Alarm Settings   Alarms On Y   Low Pressure (cmH2O) 4 cmH2O   High Pressure (cmH2O) 30 cmH2O   Apnea (secs) 20 secs   RR Low (bpm) 10   RR High (bpm) 40 br/min

## 2022-11-05 NOTE — PLAN OF CARE
Problem: Discharge Planning  Goal: Discharge to home or other facility with appropriate resources  Outcome: Progressing  Flowsheets (Taken 11/4/2022 2000)  Discharge to home or other facility with appropriate resources: Identify barriers to discharge with patient and caregiver

## 2022-11-05 NOTE — PROGRESS NOTES
4 Eyes Skin Assessment     NAME:  Harpreet Denton  YOB: 1960  MEDICAL RECORD NUMBER:  [id number]    The patient is being assess for  Shift Handoff    I agree that 2 RN's have performed a thorough Head to Toe Skin Assessment on the patient. ALL assessment sites listed below have been assessed. Areas assessed by both nurses:    Head, Face, Ears and Shoulders, Back, Chest (all areas assessed)        Does the Patient have a Wound?  No noted wound(s)       Oleg Prevention initiated:  Yes   Wound Care Orders initiated:  No    Pressure Injury (Stage 3,4, Unstageable, DTI, NWPT, and Complex wounds) if present place referral/consult order under [de-identified] No    New and Established Ostomies if present place consult order under : NA      Nurse 1 eSignature: Electronically signed by Amol Greene RN on 11/5/22 at 6:58 AM EDT    **SHARE this note so that the co-signing nurse is able to place an eSignature**    Nurse 2 eSignature: Electronically signed by Antelmo Lund RN on 11/5/22 at 2:46 PM EDT

## 2022-11-05 NOTE — PROGRESS NOTES
4 Eyes Skin Assessment     NAME:  Olamide Darnell  YOB: 1960  MEDICAL RECORD NUMBER:  [id number]    The patient is being assess for  Shift Handoff    I agree that 2 RN's have performed a thorough Head to Toe Skin Assessment on the patient. ALL assessment sites listed below have been assessed. Areas assessed by both nurses:    Head, Face, Ears, Shoulders, Back, Chest, Arms, Elbows, Hands, Sacrum. Buttock, Coccyx, Ischium, and Legs. Feet and Heels        Does the Patient have a Wound?  No noted wound(s)       Oleg Prevention initiated:  Yes   Wound Care Orders initiated:  No    Pressure Injury (Stage 3,4, Unstageable, DTI, NWPT, and Complex wounds) if present place referral/consult order under [de-identified] No    New and Established Ostomies if present place consult order under : No      Nurse 1 eSignature: Electronically signed by Kobe Posey RN on 11/5/22 at 7:11 PM EDT    **SHARE this note so that the co-signing nurse is able to place an eSignature**    Nurse 2 eSignature: Electronically signed by Tavia Craven RN on 11/5/22 at 7:17 PM EDT

## 2022-11-06 ENCOUNTER — APPOINTMENT (OUTPATIENT)
Dept: GENERAL RADIOLOGY | Age: 62
DRG: 133 | End: 2022-11-06
Payer: MEDICAID

## 2022-11-06 LAB
ALBUMIN SERPL-MCNC: 3.7 G/DL (ref 3.4–5)
ANION GAP SERPL CALCULATED.3IONS-SCNC: 10 MMOL/L (ref 3–16)
ANION GAP SERPL CALCULATED.3IONS-SCNC: 7 MMOL/L (ref 3–16)
BASOPHILS ABSOLUTE: 0 K/UL (ref 0–0.2)
BASOPHILS RELATIVE PERCENT: 0.2 %
BUN BLDV-MCNC: 17 MG/DL (ref 7–20)
BUN BLDV-MCNC: 23 MG/DL (ref 7–20)
CALCIUM SERPL-MCNC: 8.5 MG/DL (ref 8.3–10.6)
CALCIUM SERPL-MCNC: 8.7 MG/DL (ref 8.3–10.6)
CHLORIDE BLD-SCNC: 104 MMOL/L (ref 99–110)
CHLORIDE BLD-SCNC: 106 MMOL/L (ref 99–110)
CO2: 25 MMOL/L (ref 21–32)
CO2: 29 MMOL/L (ref 21–32)
CREAT SERPL-MCNC: 1 MG/DL (ref 0.6–1.2)
CREAT SERPL-MCNC: 1.1 MG/DL (ref 0.6–1.2)
EOSINOPHILS ABSOLUTE: 0 K/UL (ref 0–0.6)
EOSINOPHILS RELATIVE PERCENT: 0.2 %
GFR SERPL CREATININE-BSD FRML MDRD: 58 ML/MIN/{1.73_M2}
GFR SERPL CREATININE-BSD FRML MDRD: >60 ML/MIN/{1.73_M2}
GLUCOSE BLD-MCNC: 108 MG/DL (ref 70–99)
GLUCOSE BLD-MCNC: 94 MG/DL (ref 70–99)
HCT VFR BLD CALC: 40.5 % (ref 36–48)
HEMOGLOBIN: 13.4 G/DL (ref 12–16)
LYMPHOCYTES ABSOLUTE: 0.9 K/UL (ref 1–5.1)
LYMPHOCYTES RELATIVE PERCENT: 14.8 %
MAGNESIUM: 2.6 MG/DL (ref 1.8–2.4)
MCH RBC QN AUTO: 29.7 PG (ref 26–34)
MCHC RBC AUTO-ENTMCNC: 33 G/DL (ref 31–36)
MCV RBC AUTO: 90.1 FL (ref 80–100)
MONOCYTES ABSOLUTE: 0.6 K/UL (ref 0–1.3)
MONOCYTES RELATIVE PERCENT: 10.3 %
NEUTROPHILS ABSOLUTE: 4.3 K/UL (ref 1.7–7.7)
NEUTROPHILS RELATIVE PERCENT: 74.5 %
PDW BLD-RTO: 14.5 % (ref 12.4–15.4)
PHOSPHORUS: 3 MG/DL (ref 2.5–4.9)
PLATELET # BLD: 133 K/UL (ref 135–450)
PMV BLD AUTO: 8.2 FL (ref 5–10.5)
POTASSIUM SERPL-SCNC: 5.2 MMOL/L (ref 3.5–5.1)
POTASSIUM SERPL-SCNC: 5.5 MMOL/L (ref 3.5–5.1)
RBC # BLD: 4.49 M/UL (ref 4–5.2)
SODIUM BLD-SCNC: 139 MMOL/L (ref 136–145)
SODIUM BLD-SCNC: 142 MMOL/L (ref 136–145)
WBC # BLD: 5.8 K/UL (ref 4–11)

## 2022-11-06 PROCEDURE — 71045 X-RAY EXAM CHEST 1 VIEW: CPT

## 2022-11-06 PROCEDURE — 2700000000 HC OXYGEN THERAPY PER DAY

## 2022-11-06 PROCEDURE — 80069 RENAL FUNCTION PANEL: CPT

## 2022-11-06 PROCEDURE — 99291 CRITICAL CARE FIRST HOUR: CPT | Performed by: INTERNAL MEDICINE

## 2022-11-06 PROCEDURE — 83735 ASSAY OF MAGNESIUM: CPT

## 2022-11-06 PROCEDURE — 2000000000 HC ICU R&B

## 2022-11-06 PROCEDURE — 6370000000 HC RX 637 (ALT 250 FOR IP): Performed by: NURSE PRACTITIONER

## 2022-11-06 PROCEDURE — 6370000000 HC RX 637 (ALT 250 FOR IP): Performed by: INTERNAL MEDICINE

## 2022-11-06 PROCEDURE — 94660 CPAP INITIATION&MGMT: CPT

## 2022-11-06 PROCEDURE — 94760 N-INVAS EAR/PLS OXIMETRY 1: CPT

## 2022-11-06 PROCEDURE — 85025 COMPLETE CBC W/AUTO DIFF WBC: CPT

## 2022-11-06 PROCEDURE — 6360000002 HC RX W HCPCS: Performed by: INTERNAL MEDICINE

## 2022-11-06 PROCEDURE — 6370000000 HC RX 637 (ALT 250 FOR IP): Performed by: STUDENT IN AN ORGANIZED HEALTH CARE EDUCATION/TRAINING PROGRAM

## 2022-11-06 PROCEDURE — 36415 COLL VENOUS BLD VENIPUNCTURE: CPT

## 2022-11-06 PROCEDURE — 94640 AIRWAY INHALATION TREATMENT: CPT

## 2022-11-06 PROCEDURE — 2580000003 HC RX 258: Performed by: INTERNAL MEDICINE

## 2022-11-06 RX ADMIN — IPRATROPIUM BROMIDE AND ALBUTEROL SULFATE 1 AMPULE: .5; 3 SOLUTION RESPIRATORY (INHALATION) at 23:39

## 2022-11-06 RX ADMIN — LORAZEPAM 0.5 MG: 0.5 TABLET ORAL at 06:48

## 2022-11-06 RX ADMIN — PSEUDOEPHEDRINE HYDROCHLORIDE 120 MG: 120 TABLET, FILM COATED, EXTENDED RELEASE ORAL at 09:34

## 2022-11-06 RX ADMIN — PSEUDOEPHEDRINE HYDROCHLORIDE 120 MG: 120 TABLET, FILM COATED, EXTENDED RELEASE ORAL at 20:33

## 2022-11-06 RX ADMIN — SODIUM CHLORIDE: 9 INJECTION, SOLUTION INTRAVENOUS at 14:57

## 2022-11-06 RX ADMIN — LORAZEPAM 0.5 MG: 0.5 TABLET ORAL at 14:44

## 2022-11-06 RX ADMIN — LORAZEPAM 0.5 MG: 0.5 TABLET ORAL at 22:50

## 2022-11-06 RX ADMIN — IPRATROPIUM BROMIDE AND ALBUTEROL SULFATE 1 AMPULE: .5; 3 SOLUTION RESPIRATORY (INHALATION) at 12:31

## 2022-11-06 RX ADMIN — IPRATROPIUM BROMIDE AND ALBUTEROL SULFATE 1 AMPULE: .5; 3 SOLUTION RESPIRATORY (INHALATION) at 15:48

## 2022-11-06 RX ADMIN — SODIUM CHLORIDE, PRESERVATIVE FREE 10 ML: 5 INJECTION INTRAVENOUS at 09:35

## 2022-11-06 RX ADMIN — IPRATROPIUM BROMIDE AND ALBUTEROL SULFATE 1 AMPULE: .5; 3 SOLUTION RESPIRATORY (INHALATION) at 02:51

## 2022-11-06 RX ADMIN — IPRATROPIUM BROMIDE AND ALBUTEROL SULFATE 1 AMPULE: .5; 3 SOLUTION RESPIRATORY (INHALATION) at 00:07

## 2022-11-06 RX ADMIN — IPRATROPIUM BROMIDE AND ALBUTEROL SULFATE 1 AMPULE: .5; 3 SOLUTION RESPIRATORY (INHALATION) at 20:35

## 2022-11-06 RX ADMIN — PREDNISONE 40 MG: 20 TABLET ORAL at 09:34

## 2022-11-06 RX ADMIN — IPRATROPIUM BROMIDE AND ALBUTEROL SULFATE 1 AMPULE: .5; 3 SOLUTION RESPIRATORY (INHALATION) at 08:51

## 2022-11-06 RX ADMIN — SODIUM CHLORIDE: 9 INJECTION, SOLUTION INTRAVENOUS at 01:04

## 2022-11-06 RX ADMIN — CETIRIZINE HYDROCHLORIDE 5 MG: 10 TABLET, FILM COATED ORAL at 09:34

## 2022-11-06 RX ADMIN — METHADONE HYDROCHLORIDE 90 MG: 10 TABLET ORAL at 09:34

## 2022-11-06 RX ADMIN — HEPARIN SODIUM 5000 UNITS: 5000 INJECTION INTRAVENOUS; SUBCUTANEOUS at 20:33

## 2022-11-06 RX ADMIN — MOMETASONE FUROATE AND FORMOTEROL FUMARATE DIHYDRATE 2 PUFF: 200; 5 AEROSOL RESPIRATORY (INHALATION) at 20:42

## 2022-11-06 RX ADMIN — SODIUM CHLORIDE, PRESERVATIVE FREE 10 ML: 5 INJECTION INTRAVENOUS at 20:33

## 2022-11-06 RX ADMIN — HEPARIN SODIUM 5000 UNITS: 5000 INJECTION INTRAVENOUS; SUBCUTANEOUS at 14:15

## 2022-11-06 RX ADMIN — AMLODIPINE BESYLATE 5 MG: 5 TABLET ORAL at 09:34

## 2022-11-06 RX ADMIN — HEPARIN SODIUM 5000 UNITS: 5000 INJECTION INTRAVENOUS; SUBCUTANEOUS at 06:09

## 2022-11-06 ASSESSMENT — PAIN SCALES - GENERAL
PAINLEVEL_OUTOF10: 0

## 2022-11-06 NOTE — PROGRESS NOTES
Hospitalist Progress Note      PCP: Sachin Shaikh MD    Date of Admission: 11/2/2022      Subjective: Feels a little bit better still having episodes of shortness of breath still having some cough no fever chills chest pain no abdominal pain nausea vomiting no dizziness or palpitation      Medications:  Reviewed    Infusion Medications    sodium chloride 75 mL/hr at 11/06/22 0612    dexmedetomidine      sodium chloride       Scheduled Medications    heparin (porcine)  5,000 Units SubCUTAneous 3 times per day    ipratropium-albuterol  1 ampule Inhalation Q4H    sodium chloride flush  5-40 mL IntraVENous 2 times per day    predniSONE  40 mg Oral Daily    mometasone-formoterol  2 puff Inhalation BID    methadone  90 mg Oral Daily    cetirizine  5 mg Oral Daily    And    pseudoephedrine  120 mg Oral BID    amLODIPine  5 mg Oral Daily     PRN Meds: LORazepam, albuterol, sodium chloride nebulizer, sodium chloride flush, sodium chloride, [DISCONTINUED] ondansetron **OR** ondansetron, polyethylene glycol, acetaminophen **OR** acetaminophen, diphenhydrAMINE, methocarbamol      Intake/Output Summary (Last 24 hours) at 11/6/2022 0841  Last data filed at 11/6/2022 0645  Gross per 24 hour   Intake 2613.58 ml   Output 1175 ml   Net 1438.58 ml       Physical Exam Performed:    BP (!) 135/99   Pulse 74   Temp 97.7 °F (36.5 °C) (Axillary)   Resp 13   Ht 5' 10\" (1.778 m)   Wt 193 lb 12.6 oz (87.9 kg)   LMP 09/13/2017   SpO2 97%   BMI 27.81 kg/m²     General appearance: No apparent distress  Neck: Supple  Respiratory: Scattered expiratory wheezes  Cardiovascular: Regular rate and rhythm with normal S1/S2 without murmurs, rubs or gallops. Abdomen: Soft, non-tender, non-distended   Musculoskeletal: No clubbing, cyanosis   Skin: Skin color, texture, turgor normal.  No rashes or lesions.   Neurologic: No focal weakness  Psychiatric: Alert and oriented  Capillary Refill: Brisk, 3 seconds, normal   Peripheral Pulses: +2 palpable, equal bilaterally       Labs:   Recent Labs     11/04/22  0525 11/05/22  0400 11/06/22  0434   WBC 7.6 8.0 5.8   HGB 14.5 14.0 13.4   HCT 44.1 42.8 40.5    149 133*     Recent Labs     11/04/22  0525 11/05/22  0400 11/06/22  0434    134* 142   K 4.9 4.8 5.2*   CL 98* 99 106   CO2 23 22 29   BUN 31* 36* 23*   CREATININE 2.0* 1.6* 1.1   CALCIUM 8.8 8.5 8.5   PHOS 6.9* 4.4 3.0     No results for input(s): AST, ALT, BILIDIR, BILITOT, ALKPHOS in the last 72 hours. No results for input(s): INR in the last 72 hours. No results for input(s): [place] in the last 72 hours. Urinalysis:      Lab Results   Component Value Date/Time    NITRU Negative 08/16/2022 06:10 PM    WBCUA 3 12/12/2020 07:32 PM    BACTERIA Rare 05/29/2019 02:05 PM    RBCUA 2 12/12/2020 07:32 PM    BLOODU Negative 08/16/2022 06:10 PM    SPECGRAV <=1.005 08/16/2022 06:10 PM    GLUCOSEU Negative 08/16/2022 06:10 PM    GLUCOSEU NEGATIVE 12/10/2011 12:18 PM       Radiology:  XR CHEST PORTABLE   Final Result   1. Linear densities at the left lung base may represent overlapping vessels   versus minimal subsegmental atelectasis. 2. Otherwise, no evidence of acute cardiopulmonary disease. XR CHEST PORTABLE   Final Result   No acute finding or significant change.                  Assessment/Plan:    Active Hospital Problems    Diagnosis     Acute respiratory failure with hypoxia ([facility] [address] [J96.01]      Priority: Medium    COPD exacerbation ([facility] [address] [J44.1]     Essential hypertension [I10]      Acute respiratory failure with hypoxia, transferred to ICU, critical care team consulted, discussed with pulmonology, intermittent BiPAP, slowly improving  COPD exacerbation along with asthma exacerbation, as above  Essential hypertension, p.o. medications  Chronic methadone therapy  MERNA, IV fluid, creatinine improved down to 1.1 this morning from 1.6 yesterday  Minimal hyperkalemia potassium at 5.2 continue to monitor  Thtombocytopenia, mild monitor repeat CBC in    Diet: ADULT DIET;  Regular  Code Status: Full Code      Angela Gamino MD

## 2022-11-06 NOTE — PLAN OF CARE
Problem: Discharge Planning  Goal: Discharge to home or other facility with appropriate resources  Outcome: Progressing  Flowsheets (Taken 11/5/2022 2000)  Discharge to home or other facility with appropriate resources: Identify barriers to discharge with patient and caregiver     Problem: Safety - Adult  Goal: Free from fall injury  Outcome: Progressing     Problem: Musculoskeletal - Adult  Goal: Return mobility to safest level of function  Outcome: Progressing  Flowsheets (Taken 11/5/2022 2000)  Return Mobility to Safest Level of Function:   Assess patient stability and activity tolerance for standing, transferring and ambulating with or without assistive devices   Assist with transfers and ambulation using safe patient handling equipment as needed  Goal: Maintain proper alignment of affected body part  Outcome: Progressing  Flowsheets (Taken 11/5/2022 2000)  Maintain proper alignment of affected body part: Support and protect limb and body alignment per provider's orders  Goal: Return ADL status to a safe level of function  Outcome: Progressing  Flowsheets (Taken 11/5/2022 2000)  Return ADL Status to a Safe Level of Function: Administer medication as ordered

## 2022-11-06 NOTE — PROGRESS NOTES
4 Eyes Skin Assessment     NAME:  Olamide Darnell  YOB: 1960  MEDICAL RECORD NUMBER:  [id number]    The patient is being assess for  Shift Handoff    I agree that 2 RN's have performed a thorough Head to Toe Skin Assessment on the patient. ALL assessment sites listed below have been assessed. Areas assessed by both nurses:    Head, Face, Ears, Shoulders, Back, Chest, Arms, Elbows, Hands, Sacrum. Buttock, Coccyx, Ischium, and Legs. Feet and Heels        Does the Patient have a Wound?  No noted wound(s)       Oleg Prevention initiated:  Yes   Wound Care Orders initiated:  No    Pressure Injury (Stage 3,4, Unstageable, DTI, NWPT, and Complex wounds) if present place referral/consult order under [de-identified] No    New and Established Ostomies if present place consult order under : No      Nurse 1 eSignature: Electronically signed by Kobe Posey RN on 11/6/22 at 6:32 PM EST    **SHARE this note so that the co-signing nurse is able to place an eSignature**    Nurse 2 eSignature: Electronically signed by Lissa Hamilton RN on 11/6/22 at 8:05 PM EST

## 2022-11-06 NOTE — PROGRESS NOTES
4 Eyes Skin Assessment     NAME:  Claudia Gustafson  YOB: 1960  MEDICAL RECORD NUMBER:  [id number]    The patient is being assess for  Shift Handoff    I agree that 2 RN's have performed a thorough Head to Toe Skin Assessment on the patient. ALL assessment sites listed below have been assessed. Areas assessed by both nurses:    Head, Face, Ears, Shoulders, Back, Chest, Arms, Elbows, Hands, Sacrum. Buttock, Coccyx, Ischium, and Legs. Feet and Heels        Does the Patient have a Wound?  No noted wound(s)       Oleg Prevention initiated:  Yes   Wound Care Orders initiated:  No    Pressure Injury (Stage 3,4, Unstageable, DTI, NWPT, and Complex wounds) if present place referral/consult order under [de-identified] No    New and Established Ostomies if present place consult order under : No      Nurse 1 eSignature: Electronically signed by Jose Leonard RN on 11/6/22 at 7:16 AM EST    **SHARE this note so that the co-signing nurse is able to place an eSignature**    Nurse 2 eSignature: Electronically signed by Massimo Pederson RN on 11/6/22 at 7:35 AM EST

## 2022-11-06 NOTE — PROGRESS NOTES
Pulmonary critical Care progress Note     Patient's name:  Precious Sanders  Medical Record Number: [id number]  Patient's account/billing number: [de-identified]  Patient's YOB: 1960  Age: 63 y.o. Date of Admission: 11/2/2022  4:53 AM  Date of Consult: 11/6/2022      Primary Care Physician: Jeanne Corral MD      Code Status: Full Code    Reason for consult: Acute respiratory failure with hypoxia     Assessment and Plan     Acute respiratory failure with hypoxia  COPD/Asthma with acute exacerbation  HTN  MERNA with hyperkalemia         Plan:  Bronchodilators   BiPAP. Intermittent as needed for increase wob  IV fluid, monitor K, check BMP @  2 pm  Systemic steroid. Done with azithromycin   Check CXR   GI/DVT prophylaxis         Subjective:  Remains on Bipap  C/o sob  Less distressed      HISTORY OF PRESENT ILLNESS:   Mr./Ms. Precious Sanders is a 63 y.o. -American lady with past medical history stated below significant for moderately severe COPD/asthma with multiple allergies to maintenance treatment patient with Dr. [place]oma [place] and side effects to multiple inhalers, was on prednisone at 1 point currently being weaned off presented with worsening shortness of breath wheezing and cough  Transferred to the ICU due to worsening respiratory status. Chest x-ray with minimal left lower lobe airspace disease likely atelectasis. REVIEW OF SYSTEMS:    Unable to obtain on BiPAP      Physical Exam:    Vitals: BP (!) 147/94   Pulse 74   Temp 97.7 °F (36.5 °C) (Axillary)   Resp 13   Ht 5' 10\" (1.778 m)   Wt 193 lb 12.6 oz (87.9 kg)   LMP 09/13/2017   SpO2 97%   BMI 27.81 kg/m²     Last Body weight:   Wt Readings from Last 3 Encounters:   11/06/22 193 lb 12.6 oz (87.9 kg)   10/25/22 186 lb 6.4 oz (84.6 kg)   09/09/22 186 lb 1.1 oz (84.4 kg)       Body Mass Index : Body mass index is 27.81 kg/m².       Intake and Output summary:   Intake/Output Summary (Last 24 hours) at 11/6/2022 1000  Last data filed at 11/6/2022 0645  Gross per 24 hour   Intake 2373.58 ml   Output 1175 ml   Net 1198.58 ml       Physical Examination:     PHYSICAL EXAM:    Gen: mild acute distress on BiPAP. Eyes: PERRL. Anicteric sclera. No conjunctival injection. ENT: No discharge. Posterior oropharynx clear. External appearance of ears and nose normal.  Neck: Trachea midline. No mass, no lymphadenopathy    Resp: Diffuse bilateral wheezing very limited breath sounds bilaterally  CV: Regular rate. Regular rhythm. No murmur or rub. No edema. GI: Soft, Non-tender. Non-distended. +BS  Skin: Warm, dry, w/o erythema. Lymph: No cervical or supraclavicular LAD. M/S: No cyanosis. No clubbing. Neuro:  CN 2-12 tested, no focal neurologic deficit. Moves all extremities  Psych: Awake and alert, Oriented x 3. Judgement and insight appropriate. Mood stable. Laboratory findings:-    CBC:   Recent Labs     11/06/22 0434   WBC 5.8   HGB 13.4   *     BMP:    Recent Labs     11/04/22  0525 11/05/22  0400 11/06/22  0434    134* 142   K 4.9 4.8 5.2*   CL 98* 99 106   CO2 23 22 29   BUN 31* 36* 23*   CREATININE 2.0* 1.6* 1.1   GLUCOSE 112* 105* 94     S. Calcium:  Recent Labs     11/06/22 0434   CALCIUM 8.5       Hepatic functions:   Recent Labs     11/06/22 0434   LABALBU 3.7       Cardiac enzymes:  No results for input(s): CKTOTAL, CKMB, CKMBINDEX, TROPONINI in the last 72 hours. Radiology Review:  Pertinent images / reports were reviewed as a part of this visit. CTPA: No results found for this or any previous visit.       CXR PA/LAT: Results for orders placed during the hospital encounter of 06/10/22    XR CHEST (2 VW)    Narrative  EXAMINATION:  TWO XRAY VIEWS OF THE CHEST    6/10/2022 6:11 am    COMPARISON:  May 22, 2022    HISTORY:  ORDERING SYSTEM PROVIDED HISTORY: shortness of breath  TECHNOLOGIST PROVIDED HISTORY:  Reason for exam:->shortness of breath  Reason for Exam: SOB, patient is very anxious, says her mouth keeps bleeding  Relevant Medical/Surgical History: Lung surgery, chest tube yrs ago due to  lung infection, former drug abuse    FINDINGS:  No lines or tubes. Stable cardiomediastinal silhouette. Emphysematous changes  are present. The lungs are clear without focal consolidation or pleural  effusion. No suspicious pulmonary nodules. No pulmonary edema. No  pneumothorax. No acute osseous abnormality. Impression  1. No acute cardiopulmonary disease. CXR portable: Results for orders placed during the hospital encounter of 11/02/22    XR CHEST PORTABLE (Preliminary)  This result has not been signed. Information might be incomplete. Narrative  EXAMINATION:  ONE X-RAY VIEW OF THE CHEST    11/3/2022 6:53 am    COMPARISON:  Prior studies most recent 11/02/2022. HISTORY:  ORDERING SYSTEM PROVIDED HISTORY: SOB  TECHNOLOGIST PROVIDED HISTORY:  Reason for exam:->SOB  Reason for Exam: sob    FINDINGS:  Electrocardiographic monitor leads and oxygen tubing overlie the patient's  chest.  Cardiopericardial silhouette is within normal limits for an AP film. There are mild atherosclerotic changes of the aorta. Pulmonary vasculature  is normal.  There is mild hyperinflation of the lungs. Linear densities at  the left lung base may represent overlapping vessels versus minimal  subsegmental atelectasis. No focal confluent pulmonary infiltrate is  identified. There is minimal chronic blunting of the right costophrenic  angle. No evidence of significant pleural effusion or pneumothorax. Impression  1. Linear densities at the left lung base may represent overlapping vessels  versus minimal subsegmental atelectasis. 2. Otherwise, no evidence of acute cardiopulmonary disease.            Critical Care time 31 min           Destini Aguero MD, M.D.            11/6/2022, 10:00 AM

## 2022-11-07 LAB
ALBUMIN SERPL-MCNC: 3.5 G/DL (ref 3.4–5)
ANION GAP SERPL CALCULATED.3IONS-SCNC: 10 MMOL/L (ref 3–16)
BASOPHILS ABSOLUTE: 0 K/UL (ref 0–0.2)
BASOPHILS RELATIVE PERCENT: 0.2 %
BUN BLDV-MCNC: 16 MG/DL (ref 7–20)
CALCIUM SERPL-MCNC: 8.6 MG/DL (ref 8.3–10.6)
CHLORIDE BLD-SCNC: 105 MMOL/L (ref 99–110)
CO2: 27 MMOL/L (ref 21–32)
CREAT SERPL-MCNC: 1 MG/DL (ref 0.6–1.2)
EOSINOPHILS ABSOLUTE: 0.1 K/UL (ref 0–0.6)
EOSINOPHILS RELATIVE PERCENT: 1.3 %
GFR SERPL CREATININE-BSD FRML MDRD: >60 ML/MIN/{1.73_M2}
GLUCOSE BLD-MCNC: 87 MG/DL (ref 70–99)
HCT VFR BLD CALC: 41 % (ref 36–48)
HEMOGLOBIN: 13.8 G/DL (ref 12–16)
LYMPHOCYTES ABSOLUTE: 1 K/UL (ref 1–5.1)
LYMPHOCYTES RELATIVE PERCENT: 21.7 %
MAGNESIUM: 2.1 MG/DL (ref 1.8–2.4)
MCH RBC QN AUTO: 30.1 PG (ref 26–34)
MCHC RBC AUTO-ENTMCNC: 33.6 G/DL (ref 31–36)
MCV RBC AUTO: 89.8 FL (ref 80–100)
MONOCYTES ABSOLUTE: 0.6 K/UL (ref 0–1.3)
MONOCYTES RELATIVE PERCENT: 11.5 %
NEUTROPHILS ABSOLUTE: 3.1 K/UL (ref 1.7–7.7)
NEUTROPHILS RELATIVE PERCENT: 65.3 %
PDW BLD-RTO: 14.1 % (ref 12.4–15.4)
PHOSPHORUS: 2.4 MG/DL (ref 2.5–4.9)
PLATELET # BLD: 122 K/UL (ref 135–450)
PMV BLD AUTO: 8 FL (ref 5–10.5)
POTASSIUM SERPL-SCNC: 4.7 MMOL/L (ref 3.5–5.1)
RBC # BLD: 4.56 M/UL (ref 4–5.2)
SODIUM BLD-SCNC: 142 MMOL/L (ref 136–145)
WBC # BLD: 4.8 K/UL (ref 4–11)

## 2022-11-07 PROCEDURE — 2060000000 HC ICU INTERMEDIATE R&B

## 2022-11-07 PROCEDURE — 94640 AIRWAY INHALATION TREATMENT: CPT

## 2022-11-07 PROCEDURE — 36415 COLL VENOUS BLD VENIPUNCTURE: CPT

## 2022-11-07 PROCEDURE — 6370000000 HC RX 637 (ALT 250 FOR IP): Performed by: INTERNAL MEDICINE

## 2022-11-07 PROCEDURE — 6360000002 HC RX W HCPCS: Performed by: INTERNAL MEDICINE

## 2022-11-07 PROCEDURE — 2580000003 HC RX 258: Performed by: INTERNAL MEDICINE

## 2022-11-07 PROCEDURE — 80069 RENAL FUNCTION PANEL: CPT

## 2022-11-07 PROCEDURE — 6370000000 HC RX 637 (ALT 250 FOR IP): Performed by: STUDENT IN AN ORGANIZED HEALTH CARE EDUCATION/TRAINING PROGRAM

## 2022-11-07 PROCEDURE — 94660 CPAP INITIATION&MGMT: CPT

## 2022-11-07 PROCEDURE — 6370000000 HC RX 637 (ALT 250 FOR IP): Performed by: NURSE PRACTITIONER

## 2022-11-07 PROCEDURE — 99232 SBSQ HOSP IP/OBS MODERATE 35: CPT | Performed by: INTERNAL MEDICINE

## 2022-11-07 PROCEDURE — 2700000000 HC OXYGEN THERAPY PER DAY

## 2022-11-07 PROCEDURE — 2500000003 HC RX 250 WO HCPCS: Performed by: NURSE PRACTITIONER

## 2022-11-07 PROCEDURE — 83735 ASSAY OF MAGNESIUM: CPT

## 2022-11-07 PROCEDURE — 2580000003 HC RX 258: Performed by: NURSE PRACTITIONER

## 2022-11-07 PROCEDURE — 85025 COMPLETE CBC W/AUTO DIFF WBC: CPT

## 2022-11-07 PROCEDURE — 94760 N-INVAS EAR/PLS OXIMETRY 1: CPT

## 2022-11-07 RX ADMIN — SODIUM PHOSPHATE, MONOBASIC, MONOHYDRATE AND SODIUM PHOSPHATE, DIBASIC, ANHYDROUS 10 MMOL: 142; 276 INJECTION, SOLUTION INTRAVENOUS at 10:57

## 2022-11-07 RX ADMIN — IPRATROPIUM BROMIDE AND ALBUTEROL SULFATE 1 AMPULE: .5; 3 SOLUTION RESPIRATORY (INHALATION) at 08:41

## 2022-11-07 RX ADMIN — PREDNISONE 40 MG: 20 TABLET ORAL at 08:09

## 2022-11-07 RX ADMIN — AMLODIPINE BESYLATE 5 MG: 5 TABLET ORAL at 08:09

## 2022-11-07 RX ADMIN — IPRATROPIUM BROMIDE AND ALBUTEROL SULFATE 1 AMPULE: .5; 3 SOLUTION RESPIRATORY (INHALATION) at 03:36

## 2022-11-07 RX ADMIN — MOMETASONE FUROATE AND FORMOTEROL FUMARATE DIHYDRATE 2 PUFF: 200; 5 AEROSOL RESPIRATORY (INHALATION) at 20:27

## 2022-11-07 RX ADMIN — SODIUM CHLORIDE, PRESERVATIVE FREE 10 ML: 5 INJECTION INTRAVENOUS at 08:10

## 2022-11-07 RX ADMIN — CETIRIZINE HYDROCHLORIDE 5 MG: 10 TABLET, FILM COATED ORAL at 08:09

## 2022-11-07 RX ADMIN — SODIUM CHLORIDE: 9 INJECTION, SOLUTION INTRAVENOUS at 21:16

## 2022-11-07 RX ADMIN — IPRATROPIUM BROMIDE AND ALBUTEROL SULFATE 1 AMPULE: .5; 3 SOLUTION RESPIRATORY (INHALATION) at 20:27

## 2022-11-07 RX ADMIN — HEPARIN SODIUM 5000 UNITS: 5000 INJECTION INTRAVENOUS; SUBCUTANEOUS at 13:39

## 2022-11-07 RX ADMIN — PSEUDOEPHEDRINE HYDROCHLORIDE 120 MG: 120 TABLET, FILM COATED, EXTENDED RELEASE ORAL at 21:12

## 2022-11-07 RX ADMIN — METHADONE HYDROCHLORIDE 90 MG: 10 TABLET ORAL at 08:09

## 2022-11-07 RX ADMIN — IPRATROPIUM BROMIDE AND ALBUTEROL SULFATE 1 AMPULE: .5; 3 SOLUTION RESPIRATORY (INHALATION) at 16:05

## 2022-11-07 RX ADMIN — SODIUM CHLORIDE: 9 INJECTION, SOLUTION INTRAVENOUS at 04:14

## 2022-11-07 RX ADMIN — IPRATROPIUM BROMIDE AND ALBUTEROL SULFATE 1 AMPULE: .5; 3 SOLUTION RESPIRATORY (INHALATION) at 12:28

## 2022-11-07 RX ADMIN — MOMETASONE FUROATE AND FORMOTEROL FUMARATE DIHYDRATE 2 PUFF: 200; 5 AEROSOL RESPIRATORY (INHALATION) at 08:42

## 2022-11-07 RX ADMIN — LORAZEPAM 0.5 MG: 0.5 TABLET ORAL at 10:59

## 2022-11-07 RX ADMIN — PSEUDOEPHEDRINE HYDROCHLORIDE 120 MG: 120 TABLET, FILM COATED, EXTENDED RELEASE ORAL at 08:09

## 2022-11-07 RX ADMIN — HEPARIN SODIUM 5000 UNITS: 5000 INJECTION INTRAVENOUS; SUBCUTANEOUS at 21:14

## 2022-11-07 RX ADMIN — IPRATROPIUM BROMIDE AND ALBUTEROL SULFATE 1 AMPULE: .5; 3 SOLUTION RESPIRATORY (INHALATION) at 23:58

## 2022-11-07 ASSESSMENT — PAIN SCALES - GENERAL
PAINLEVEL_OUTOF10: 0
PAINLEVEL_OUTOF10: 0

## 2022-11-07 NOTE — PROGRESS NOTES
Hospitalist Progress Note      PCP: Charlene Tadeo MD    Date of Admission: 11/2/2022        Hospital Course: 63-year-old female admitted to the hospital with respiratory failure, COPD and asthma exacerbation, get worse, transferred to ICU was on and off on BiPAP, slowly improving transfer to PCU, pulmonology following    Subjective: Less shortness of breath still having cough no fever or chills      Medications:  Reviewed    Infusion Medications    sodium chloride 75 mL/hr at 11/07/22 0414    dexmedetomidine      sodium chloride       Scheduled Medications    heparin (porcine)  5,000 Units SubCUTAneous 3 times per day    ipratropium-albuterol  1 ampule Inhalation Q4H    sodium chloride flush  5-40 mL IntraVENous 2 times per day    predniSONE  40 mg Oral Daily    mometasone-formoterol  2 puff Inhalation BID    methadone  90 mg Oral Daily    cetirizine  5 mg Oral Daily    And    pseudoephedrine  120 mg Oral BID    amLODIPine  5 mg Oral Daily     PRN Meds: LORazepam, albuterol, sodium chloride nebulizer, sodium chloride flush, sodium chloride, [DISCONTINUED] ondansetron **OR** ondansetron, polyethylene glycol, acetaminophen **OR** acetaminophen, diphenhydrAMINE, methocarbamol      Intake/Output Summary (Last 24 hours) at 11/7/2022 0857  Last data filed at 11/6/2022 2200  Gross per 24 hour   Intake 1328.74 ml   Output 500 ml   Net 828.74 ml       Physical Exam Performed:    BP (!) 154/98   Pulse 79   Temp 97 °F (36.1 °C) (Axillary)   Resp 20   Ht 5' 10\" (1.778 m)   Wt 193 lb 12.6 oz (87.9 kg)   LMP 09/13/2017   SpO2 93%   BMI 27.81 kg/m²     General appearance: No apparent distress  Neck: Supple  Respiratory: Expiratory wheezes improved  Cardiovascular: Regular rate and rhythm with normal S1/S2 without murmurs, rubs or gallops. Abdomen: Soft, non-tender  Musculoskeletal: No clubbing, cyanosis  Skin: Skin color, texture, turgor normal.  No rashes or lesions.   Neurologic: No focal weakness  Psychiatric: Alert and oriented  Capillary Refill: Brisk, 3 seconds, normal   Peripheral Pulses: +2 palpable, equal bilaterally       Labs:   Recent Labs     11/05/22  0400 11/06/22  0434 11/07/22  0421   WBC 8.0 5.8 4.8   HGB 14.0 13.4 13.8   HCT 42.8 40.5 41.0    133* 122*     Recent Labs     11/05/22  0400 11/06/22  0434 11/06/22  1418 11/07/22  0421   * 142 139 142   K 4.8 5.2* 5.5* 4.7   CL 99 106 104 105   CO2 22 29 25 27   BUN 36* 23* 17 16   CREATININE 1.6* 1.1 1.0 1.0   CALCIUM 8.5 8.5 8.7 8.6   PHOS 4.4 3.0  --  2.4*     No results for input(s): AST, ALT, BILIDIR, BILITOT, ALKPHOS in the last 72 hours. No results for input(s): INR in the last 72 hours. No results for input(s): [place] in the last 72 hours. Urinalysis:      Lab Results   Component Value Date/Time    NITRU Negative 08/16/2022 06:10 PM    WBCUA 3 12/12/2020 07:32 PM    BACTERIA Rare 05/29/2019 02:05 PM    RBCUA 2 12/12/2020 07:32 PM    BLOODU Negative 08/16/2022 06:10 PM    SPECGRAV <=1.005 08/16/2022 06:10 PM    GLUCOSEU Negative 08/16/2022 06:10 PM    GLUCOSEU NEGATIVE 12/10/2011 12:18 PM       Radiology:  XR CHEST PORTABLE   Final Result   Unchanged appearance of the chest without acute airspace disease identified. XR CHEST PORTABLE   Final Result   1. Linear densities at the left lung base may represent overlapping vessels   versus minimal subsegmental atelectasis. 2. Otherwise, no evidence of acute cardiopulmonary disease. XR CHEST PORTABLE   Final Result   No acute finding or significant change.                  Assessment/Plan:    Active Hospital Problems    Diagnosis     Acute respiratory failure with hypoxia ([facility] [address] [J96.01]      Priority: Medium    COPD exacerbation ([facility] [address] [J44.1]     Essential hypertension [I10]      Acute respiratory failure with hypoxia, transferred to ICU, critical care team consulted, discussed with pulmonology, intermittent BiPAP, slowly improving, currently on 6 liters of oxygen  COPD exacerbation along with asthma exacerbation, as above  Essential hypertension, p.o. medications  Chronic methadone therapy  MERNA, IV fluid, creatinine improved down to 1.0 this morning. Minimal hyperkalemia potassium at 5.2 yesterday decreased to 4.7 today  Thtombocytopenia, mild monitor  Hypophosphatemia being replaced      Diet: ADULT DIET;  Regular  Code Status: Full Code    Maggy Moreno MD

## 2022-11-07 NOTE — PROGRESS NOTES
4 Eyes Skin Assessment     NAME:  Romero aWlker  YOB: 1960  MEDICAL RECORD NUMBER:  [id number]    The patient is being assess for  Shift Handoff    I agree that 2 RN's have performed a thorough Head to Toe Skin Assessment on the patient. ALL assessment sites listed below have been assessed. Areas assessed by both nurses:    Head, Face, Ears, Shoulders, Back, Chest, Arms, Elbows, Hands, Sacrum. Buttock, Coccyx, Ischium, and Legs. Feet and Heels        Does the Patient have a Wound?  No noted wound(s)       Oleg Prevention initiated:  Yes   Wound Care Orders initiated:  NA    Pressure Injury (Stage 3,4, Unstageable, DTI, NWPT, and Complex wounds) if present place referral/consult order under [de-identified] NA    New and Established Ostomies if present place consult order under : NA      Nurse 1 eSignature: Electronically signed by Gee Bhatt RN on 11/7/22 at 7:11 AM EST    **SHARE this note so that the co-signing nurse is able to place an eSignature**    Nurse 2 eSignature: {Esignature:[id number]}

## 2022-11-07 NOTE — CARE COORDINATION
[facility]  - [place] [phone]    Lidia Brennan, Sr.  Administrative Assist, Case Management  [phone]  Electronically signed by Lidia Brennan on 11/7/2022 at 9:50 AM

## 2022-11-07 NOTE — PROGRESS NOTES
Pulmonary Progress Note    Date of Admission: 11/2/2022   LOS: 5 days     Chief Complaint   Patient presents with    Shortness of Breath     Pt woke up with SOB; ems noted bilateral wheezing. Pt was in [de-identified] for ems. Ems gave 2 nebulizer tx with 6-8l o2. Pt sating at 98% currently. Hx of asthma, COPD, and HBP       Assessment/Plan:       Acute hypoxemic respiratory failure with SPO2 less than 90% on room air  -Wean supplemental oxygen to goal saturation of >90%  -Is not on oxygen as an outpatient    Acute exacerbation of COPD/asthma  -Scheduled bronchodilators, no longer needs BiPAP  -Steroid taper    24 Hour Events/Subjective  No acute events overnight remains on supplemental oxygen. Dyspnea much improved sitting in chair at bedside    ROS:   No nausea  No Vomiting  No chest pain      Intake/Output Summary (Last 24 hours) at 11/7/2022 1547  Last data filed at 11/6/2022 2200  Gross per 24 hour   Intake 888.74 ml   Output 500 ml   Net 388.74 ml         PHYSICAL EXAM:   Blood pressure (!) 143/100, pulse 75, temperature 97 °F (36.1 °C), temperature source Axillary, resp. rate (!) 9, height 5' 10\" (1.778 m), weight 193 lb 12.6 oz (87.9 kg), last menstrual period 09/13/2017, SpO2 94 %, not currently breastfeeding.'  Gen:  No acute distress. Eyes: PERRL. Anicteric sclera. No conjunctival injection. ENT: No discharge. Posterior oropharynx clear. External appearance of ears and nose normal.  Neck: Trachea midline. No mass   Resp:  No crackles. No wheezes. No rhonchi. No dullness on percussion. CV: Regular rate. Regular rhythm. No murmur or rub. No edema. GI: Soft, Non-tender. Non-distended. +BS  Skin: Warm, dry, w/o erythema. Lymph: No cervical or supraclavicular LAD. M/S: No cyanosis. No clubbing. Neuro:  no focal neurologic deficit. Moves all extremities  Psych: Awake and alert, Oriented x 3. Judgement and insight appropriate. Mood stable.       Medications:    Scheduled Meds:   heparin (porcine)  5,000 Units SubCUTAneous 3 times per day    ipratropium-albuterol  1 ampule Inhalation Q4H    sodium chloride flush  5-40 mL IntraVENous 2 times per day    predniSONE  40 mg Oral Daily    mometasone-formoterol  2 puff Inhalation BID    methadone  90 mg Oral Daily    cetirizine  5 mg Oral Daily    And    pseudoephedrine  120 mg Oral BID    amLODIPine  5 mg Oral Daily       Continuous Infusions:   sodium chloride 75 mL/hr at 11/07/22 0414    sodium chloride         PRN Meds:  LORazepam, albuterol, sodium chloride nebulizer, sodium chloride flush, sodium chloride, [DISCONTINUED] ondansetron **OR** ondansetron, polyethylene glycol, acetaminophen **OR** acetaminophen, diphenhydrAMINE, methocarbamol    Labs reviewed:  CBC:   Recent Labs     11/05/22  0400 11/06/22  0434 11/07/22  0421   WBC 8.0 5.8 4.8   HGB 14.0 13.4 13.8   HCT 42.8 40.5 41.0   MCV 91.9 90.1 89.8    133* 122*     BMP:   Recent Labs     11/05/22  0400 11/06/22  0434 11/06/22  1418 11/07/22  0421   * 142 139 142   K 4.8 5.2* 5.5* 4.7   CL 99 106 104 105   CO2 22 29 25 27   PHOS 4.4 3.0  --  2.4*   BUN 36* 23* 17 16   CREATININE 1.6* 1.1 1.0 1.0     LIVER PROFILE: No results for input(s): AST, ALT, LIPASE, BILIDIR, BILITOT, ALKPHOS in the last 72 hours. Invalid input(s): AMYLASE,  ALB  PT/INR: No results for input(s): PROTIME, INR in the last 72 hours. Films:  Radiology Review:  Pertinent images / reports were reviewed as a part of this visit. This note was transcribed using [id number]. Please disregard any translational errors.     Thank you for this consult,    Harrison Parsons MD  [facility] Pulmonary, Critical Care, and Sleep Medicine

## 2022-11-08 LAB
ALBUMIN SERPL-MCNC: 3.3 G/DL (ref 3.4–5)
ANION GAP SERPL CALCULATED.3IONS-SCNC: 7 MMOL/L (ref 3–16)
BASOPHILS ABSOLUTE: 0 K/UL (ref 0–0.2)
BASOPHILS RELATIVE PERCENT: 0.1 %
BUN BLDV-MCNC: 10 MG/DL (ref 7–20)
CALCIUM SERPL-MCNC: 8.6 MG/DL (ref 8.3–10.6)
CHLORIDE BLD-SCNC: 101 MMOL/L (ref 99–110)
CO2: 31 MMOL/L (ref 21–32)
CREAT SERPL-MCNC: 0.8 MG/DL (ref 0.6–1.2)
EOSINOPHILS ABSOLUTE: 0.1 K/UL (ref 0–0.6)
EOSINOPHILS RELATIVE PERCENT: 1.7 %
GFR SERPL CREATININE-BSD FRML MDRD: >60 ML/MIN/{1.73_M2}
GLUCOSE BLD-MCNC: 70 MG/DL (ref 70–99)
HCT VFR BLD CALC: 38.8 % (ref 36–48)
HEMOGLOBIN: 13.1 G/DL (ref 12–16)
LYMPHOCYTES ABSOLUTE: 1.6 K/UL (ref 1–5.1)
LYMPHOCYTES RELATIVE PERCENT: 29.9 %
MAGNESIUM: 1.8 MG/DL (ref 1.8–2.4)
MCH RBC QN AUTO: 30 PG (ref 26–34)
MCHC RBC AUTO-ENTMCNC: 33.8 G/DL (ref 31–36)
MCV RBC AUTO: 88.7 FL (ref 80–100)
MONOCYTES ABSOLUTE: 0.5 K/UL (ref 0–1.3)
MONOCYTES RELATIVE PERCENT: 10 %
NEUTROPHILS ABSOLUTE: 3.2 K/UL (ref 1.7–7.7)
NEUTROPHILS RELATIVE PERCENT: 58.3 %
PDW BLD-RTO: 13.9 % (ref 12.4–15.4)
PHOSPHORUS: 2.3 MG/DL (ref 2.5–4.9)
PLATELET # BLD: 135 K/UL (ref 135–450)
PMV BLD AUTO: 8.3 FL (ref 5–10.5)
POTASSIUM SERPL-SCNC: 3.6 MMOL/L (ref 3.5–5.1)
RBC # BLD: 4.38 M/UL (ref 4–5.2)
SODIUM BLD-SCNC: 139 MMOL/L (ref 136–145)
WBC # BLD: 5.5 K/UL (ref 4–11)

## 2022-11-08 PROCEDURE — 99232 SBSQ HOSP IP/OBS MODERATE 35: CPT | Performed by: INTERNAL MEDICINE

## 2022-11-08 PROCEDURE — 6370000000 HC RX 637 (ALT 250 FOR IP): Performed by: INTERNAL MEDICINE

## 2022-11-08 PROCEDURE — 6360000002 HC RX W HCPCS: Performed by: INTERNAL MEDICINE

## 2022-11-08 PROCEDURE — 83735 ASSAY OF MAGNESIUM: CPT

## 2022-11-08 PROCEDURE — 2700000000 HC OXYGEN THERAPY PER DAY

## 2022-11-08 PROCEDURE — 2580000003 HC RX 258: Performed by: INTERNAL MEDICINE

## 2022-11-08 PROCEDURE — 6370000000 HC RX 637 (ALT 250 FOR IP): Performed by: STUDENT IN AN ORGANIZED HEALTH CARE EDUCATION/TRAINING PROGRAM

## 2022-11-08 PROCEDURE — 80069 RENAL FUNCTION PANEL: CPT

## 2022-11-08 PROCEDURE — 94761 N-INVAS EAR/PLS OXIMETRY MLT: CPT

## 2022-11-08 PROCEDURE — 2060000000 HC ICU INTERMEDIATE R&B

## 2022-11-08 PROCEDURE — 6370000000 HC RX 637 (ALT 250 FOR IP): Performed by: NURSE PRACTITIONER

## 2022-11-08 PROCEDURE — 36415 COLL VENOUS BLD VENIPUNCTURE: CPT

## 2022-11-08 PROCEDURE — 85025 COMPLETE CBC W/AUTO DIFF WBC: CPT

## 2022-11-08 PROCEDURE — 94640 AIRWAY INHALATION TREATMENT: CPT

## 2022-11-08 RX ADMIN — IPRATROPIUM BROMIDE AND ALBUTEROL SULFATE 1 AMPULE: .5; 3 SOLUTION RESPIRATORY (INHALATION) at 07:58

## 2022-11-08 RX ADMIN — IPRATROPIUM BROMIDE AND ALBUTEROL SULFATE 1 AMPULE: .5; 3 SOLUTION RESPIRATORY (INHALATION) at 20:25

## 2022-11-08 RX ADMIN — MOMETASONE FUROATE AND FORMOTEROL FUMARATE DIHYDRATE 2 PUFF: 200; 5 AEROSOL RESPIRATORY (INHALATION) at 20:27

## 2022-11-08 RX ADMIN — IPRATROPIUM BROMIDE AND ALBUTEROL SULFATE 1 AMPULE: .5; 3 SOLUTION RESPIRATORY (INHALATION) at 16:24

## 2022-11-08 RX ADMIN — HEPARIN SODIUM 5000 UNITS: 5000 INJECTION INTRAVENOUS; SUBCUTANEOUS at 21:25

## 2022-11-08 RX ADMIN — IPRATROPIUM BROMIDE AND ALBUTEROL SULFATE 1 AMPULE: .5; 3 SOLUTION RESPIRATORY (INHALATION) at 12:03

## 2022-11-08 RX ADMIN — PREDNISONE 40 MG: 20 TABLET ORAL at 08:00

## 2022-11-08 RX ADMIN — AMLODIPINE BESYLATE 5 MG: 5 TABLET ORAL at 08:00

## 2022-11-08 RX ADMIN — HEPARIN SODIUM 5000 UNITS: 5000 INJECTION INTRAVENOUS; SUBCUTANEOUS at 13:28

## 2022-11-08 RX ADMIN — SODIUM CHLORIDE, PRESERVATIVE FREE 10 ML: 5 INJECTION INTRAVENOUS at 21:26

## 2022-11-08 RX ADMIN — HEPARIN SODIUM 5000 UNITS: 5000 INJECTION INTRAVENOUS; SUBCUTANEOUS at 06:10

## 2022-11-08 RX ADMIN — IPRATROPIUM BROMIDE AND ALBUTEROL SULFATE 1 AMPULE: .5; 3 SOLUTION RESPIRATORY (INHALATION) at 04:08

## 2022-11-08 RX ADMIN — MOMETASONE FUROATE AND FORMOTEROL FUMARATE DIHYDRATE 2 PUFF: 200; 5 AEROSOL RESPIRATORY (INHALATION) at 08:08

## 2022-11-08 RX ADMIN — METHADONE HYDROCHLORIDE 90 MG: 10 TABLET ORAL at 08:00

## 2022-11-08 RX ADMIN — PSEUDOEPHEDRINE HYDROCHLORIDE 120 MG: 120 TABLET, FILM COATED, EXTENDED RELEASE ORAL at 08:23

## 2022-11-08 RX ADMIN — LORAZEPAM 0.5 MG: 0.5 TABLET ORAL at 00:00

## 2022-11-08 RX ADMIN — PSEUDOEPHEDRINE HYDROCHLORIDE 120 MG: 120 TABLET, FILM COATED, EXTENDED RELEASE ORAL at 21:25

## 2022-11-08 RX ADMIN — CETIRIZINE HYDROCHLORIDE 5 MG: 10 TABLET, FILM COATED ORAL at 08:00

## 2022-11-08 RX ADMIN — LORAZEPAM 0.5 MG: 0.5 TABLET ORAL at 21:31

## 2022-11-08 NOTE — PROGRESS NOTES
Pulmonary critical Care progress Note     Patient's name:  Shawn Hernandez  Medical Record Number: [id number]  Patient's account/billing number: [de-identified]  Patient's YOB: 1960  Age: 63 y.o. Date of Admission: 11/2/2022  4:53 AM  Date of Consult: 11/8/2022      Primary Care Physician: Kika Todd MD      Code Status: Full Code    Reason for consult: Acute respiratory failure with hypoxia     Assessment and Plan     Acute respiratory failure with hypoxia  COPD/Asthma with acute exacerbation  HTN  MERNA with hyperkalemia         Plan:  Bronchodilators, dulera and scheduled Duoneb  Systemic steroid. X 5 days  Singulair  Wean Fio2 as tolerated keep sat > 90%  GI/DVT prophylaxis         Subjective:  Sob/wheezing improving       HISTORY OF PRESENT ILLNESS:   Mr./Ms. Shawn Hernandez is a 63 y.o. -American lady with past medical history stated below significant for moderately severe COPD/asthma with multiple allergies to maintenance treatment patient with Dr. Parth Archibald Dupixent and side effects to multiple inhalers, was on prednisone at 1 point currently being weaned off presented with worsening shortness of breath wheezing and cough  Transferred to the ICU due to worsening respiratory status. Chest x-ray with minimal left lower lobe airspace disease likely atelectasis. REVIEW OF SYSTEMS:    Sob and wheezing better       Physical Exam:    Vitals: /85   Pulse 74   Temp 98.3 °F (36.8 °C) (Oral)   Resp 10   Ht 5' 10\" (1.778 m)   Wt 192 lb 0.3 oz (87.1 kg)   LMP 09/13/2017   SpO2 97%   BMI 27.55 kg/m²     Last Body weight:   Wt Readings from Last 3 Encounters:   11/08/22 192 lb 0.3 oz (87.1 kg)   10/25/22 186 lb 6.4 oz (84.6 kg)   09/09/22 186 lb 1.1 oz (84.4 kg)       Body Mass Index : Body mass index is 27.55 kg/m².       Intake and Output summary:   Intake/Output Summary (Last 24 hours) at 11/8/2022 1154  Last data filed at 11/8/2022 0634  Gross per 24 hour   Intake 2985.56 ml   Output 0 ml   Net 2985.56 ml         Physical Examination:     PHYSICAL EXAM:    Gen: no acute distress  Eyes: PERRL. Anicteric sclera. No conjunctival injection. ENT: No discharge. Posterior oropharynx clear. External appearance of ears and nose normal.  Neck: Trachea midline. No mass, no lymphadenopathy    Resp: better air entry and less wheezing   CV: Regular rate. Regular rhythm. No murmur or rub. No edema. GI: Soft, Non-tender. Non-distended. +BS  Skin: Warm, dry, w/o erythema. Lymph: No cervical or supraclavicular LAD. M/S: No cyanosis. No clubbing. Neuro:  CN 2-12 tested, no focal neurologic deficit. Moves all extremities  Psych: Awake and alert, Oriented x 3. Judgement and insight appropriate. Mood stable. Laboratory findings:-    CBC:   Recent Labs     11/08/22  0632   WBC 5.5   HGB 13.1          BMP:    Recent Labs     11/06/22  1418 11/07/22  0421 11/08/22  0632    142 139   K 5.5* 4.7 3.6    105 101   CO2 25 27 31   BUN 17 16 10   CREATININE 1.0 1.0 0.8   GLUCOSE 108* 87 70       S. Calcium:  Recent Labs     11/08/22  0632   CALCIUM 8.6         Hepatic functions:   Recent Labs     11/08/22  0632   LABALBU 3.3*         Cardiac enzymes:  No results for input(s): CKTOTAL, CKMB, CKMBINDEX, TROPONINI in the last 72 hours. Radiology Review:  Pertinent images / reports were reviewed as a part of this visit. CTPA: No results found for this or any previous visit.       CXR PA/LAT: Results for orders placed during the hospital encounter of 06/10/22    XR CHEST (2 VW)    Narrative  EXAMINATION:  TWO XRAY VIEWS OF THE CHEST    6/10/2022 6:11 am    COMPARISON:  May 22, 2022    HISTORY:  ORDERING SYSTEM PROVIDED HISTORY: shortness of breath  TECHNOLOGIST PROVIDED HISTORY:  Reason for exam:->shortness of breath  Reason for Exam: SOB, patient is very anxious, says her mouth keeps bleeding  Relevant Medical/Surgical History: Lung surgery, chest tube yrs ago due to  lung infection, former drug abuse    FINDINGS:  No lines or tubes. Stable cardiomediastinal silhouette. Emphysematous changes  are present. The lungs are clear without focal consolidation or pleural  effusion. No suspicious pulmonary nodules. No pulmonary edema. No  pneumothorax. No acute osseous abnormality. Impression  1. No acute cardiopulmonary disease. CXR portable: Results for orders placed during the hospital encounter of 11/02/22    XR CHEST PORTABLE (Preliminary)  This result has not been signed. Information might be incomplete. Narrative  EXAMINATION:  ONE X-RAY VIEW OF THE CHEST    11/3/2022 6:53 am    COMPARISON:  Prior studies most recent 11/02/2022. HISTORY:  ORDERING SYSTEM PROVIDED HISTORY: SOB  TECHNOLOGIST PROVIDED HISTORY:  Reason for exam:->SOB  Reason for Exam: sob    FINDINGS:  Electrocardiographic monitor leads and oxygen tubing overlie the patient's  chest.  Cardiopericardial silhouette is within normal limits for an AP film. There are mild atherosclerotic changes of the aorta. Pulmonary vasculature  is normal.  There is mild hyperinflation of the lungs. Linear densities at  the left lung base may represent overlapping vessels versus minimal  subsegmental atelectasis. No focal confluent pulmonary infiltrate is  identified. There is minimal chronic blunting of the right costophrenic  angle. No evidence of significant pleural effusion or pneumothorax. Impression  1. Linear densities at the left lung base may represent overlapping vessels  versus minimal subsegmental atelectasis. 2. Otherwise, no evidence of acute cardiopulmonary disease.         Thea Allison MD, M.D.            11/8/2022, 11:54 AM

## 2022-11-08 NOTE — PROGRESS NOTES
Hospitalist Progress Note      PCP: Janine Lara MD    Date of Admission: 11/2/2022      Hospital Course: Patient admitted with shortness of breath, managed for acute respiratory failure with hypoxia in setting of COPD exacerbation and asthma exacerbation. Subjective: Patient seen and examined. Complains of generalized weakness. Encourage patient to mobilize and use incentive spirometer. Discussed with bedside RN. Medications:  Reviewed    Infusion Medications    sodium chloride 75 mL/hr at 11/08/22 0634    sodium chloride       Scheduled Medications    heparin (porcine)  5,000 Units SubCUTAneous 3 times per day    ipratropium-albuterol  1 ampule Inhalation Q4H    sodium chloride flush  5-40 mL IntraVENous 2 times per day    predniSONE  40 mg Oral Daily    mometasone-formoterol  2 puff Inhalation BID    methadone  90 mg Oral Daily    cetirizine  5 mg Oral Daily    And    pseudoephedrine  120 mg Oral BID    amLODIPine  5 mg Oral Daily     PRN Meds: LORazepam, albuterol, sodium chloride nebulizer, sodium chloride flush, sodium chloride, [DISCONTINUED] ondansetron **OR** ondansetron, polyethylene glycol, acetaminophen **OR** acetaminophen, diphenhydrAMINE, methocarbamol      Intake/Output Summary (Last 24 hours) at 11/8/2022 1124  Last data filed at 11/8/2022 9997  Gross per 24 hour   Intake 2985.56 ml   Output 0 ml   Net 2985.56 ml       Physical Exam Performed:    /85   Pulse 83   Temp 97.8 °F (36.6 °C) (Oral)   Resp 15   Ht 5' 10\" (1.778 m)   Wt 192 lb 0.3 oz (87.1 kg)   LMP 09/13/2017   SpO2 97%   BMI 27.55 kg/m²     General appearance: No apparent distress, appears stated age and cooperative. HEENT: Pupils equal, round, and reactive to light. Conjunctivae/corneas clear. Respiratory:  Normal respiratory effort. Reduced at the bases  Cardiovascular: Regular rate and rhythm with normal S1/S2   Abdomen: Soft, non-tender, non-distended   Musculoskeletal: No edema bilaterally. Neurologic:  grossly non-focal.  Psychiatric: Alert and oriented, thought content appropriate, normal insight      Labs:   Recent Labs     11/06/22  0434 11/07/22  0421 11/08/22  0632   WBC 5.8 4.8 5.5   HGB 13.4 13.8 13.1   HCT 40.5 41.0 38.8   * 122* 135     Recent Labs     11/06/22  0434 11/06/22  1418 11/07/22  0421 11/08/22  0632    139 142 139   K 5.2* 5.5* 4.7 3.6    104 105 101   CO2 29 25 27 31   BUN 23* 17 16 10   CREATININE 1.1 1.0 1.0 0.8   CALCIUM 8.5 8.7 8.6 8.6   PHOS 3.0  --  2.4* 2.3*     No results for input(s): AST, ALT, BILIDIR, BILITOT, ALKPHOS in the last 72 hours. No results for input(s): INR in the last 72 hours. No results for input(s): Estle Carrow in the last 72 hours. Urinalysis:      Lab Results   Component Value Date/Time    NITRU Negative 08/16/2022 06:10 PM    WBCUA 3 12/12/2020 07:32 PM    BACTERIA Rare 05/29/2019 02:05 PM    RBCUA 2 12/12/2020 07:32 PM    BLOODU Negative 08/16/2022 06:10 PM    SPECGRAV <=1.005 08/16/2022 06:10 PM    GLUCOSEU Negative 08/16/2022 06:10 PM    GLUCOSEU NEGATIVE 12/10/2011 12:18 PM       Radiology:  XR CHEST PORTABLE   Final Result   Unchanged appearance of the chest without acute airspace disease identified. XR CHEST PORTABLE   Final Result   1. Linear densities at the left lung base may represent overlapping vessels   versus minimal subsegmental atelectasis. 2. Otherwise, no evidence of acute cardiopulmonary disease. XR CHEST PORTABLE   Final Result   No acute finding or significant change.                  Assessment/Plan:    Active Hospital Problems    Diagnosis     Acute respiratory failure with hypoxia ([facility]) [J96.01]      Priority: Medium    COPD exacerbation ([facility]) [J44.1]     Essential hypertension [I10]      Acute exacerbation of COPD/acute asthma exacerbation  Acute respiratory failure with hypoxia in setting of above: Required BiPAP Support this admission  Continue supportive care  Continue steroids for total of 5 days  Nebs as needed, incentive spirometry, wean oxygen as tolerated  Acute kidney injury on admission resolved discontinue IV fluids  Continue home medications  Monitor blood pressure  Monitor I's and O's  Thrombocytopenia resolved    DVT Prophylaxis:   Diet: ADULT DIET;  Regular  Code Status: Full Code  PT/OT  Out of bed to chair for meals  Home O2 evaluation in the morning    Dispo -possible discharge home tomorrow pending continued improvement        Klever Stephens MD

## 2022-11-08 NOTE — PLAN OF CARE
Problem: Discharge Planning  Goal: Discharge to home or other facility with appropriate resources  Outcome: Progressing  Flowsheets (Taken 11/7/2022 1130 by Sea Mathur RN)  Discharge to home or other facility with appropriate resources: Identify barriers to discharge with patient and caregiver     Problem: Safety - Adult  Goal: Free from fall injury  Outcome: Progressing  Flowsheets (Taken 11/8/2022 0316)  Free From Fall Injury: Instruct family/caregiver on patient safety     Problem: Musculoskeletal - Adult  Goal: Return mobility to safest level of function  Outcome: Progressing  Flowsheets (Taken 11/8/2022 0316)  Return Mobility to Safest Level of Function:   Assess patient stability and activity tolerance for standing, transferring and ambulating with or without assistive devices   Assist with transfers and ambulation using safe patient handling equipment as needed   Ensure adequate protection for wounds/incisions during mobilization

## 2022-11-09 VITALS
HEIGHT: 70 IN | WEIGHT: 189.82 LBS | BODY MASS INDEX: 27.17 KG/M2 | SYSTOLIC BLOOD PRESSURE: 126 MMHG | DIASTOLIC BLOOD PRESSURE: 83 MMHG | TEMPERATURE: 98.2 F | RESPIRATION RATE: 10 BRPM | OXYGEN SATURATION: 96 % | HEART RATE: 71 BPM

## 2022-11-09 LAB
ALBUMIN SERPL-MCNC: 3.5 G/DL (ref 3.4–5)
ANION GAP SERPL CALCULATED.3IONS-SCNC: 10 MMOL/L (ref 3–16)
BASOPHILS ABSOLUTE: 0 K/UL (ref 0–0.2)
BASOPHILS RELATIVE PERCENT: 0.2 %
BUN BLDV-MCNC: 10 MG/DL (ref 7–20)
CALCIUM SERPL-MCNC: 8.8 MG/DL (ref 8.3–10.6)
CHLORIDE BLD-SCNC: 98 MMOL/L (ref 99–110)
CO2: 32 MMOL/L (ref 21–32)
CREAT SERPL-MCNC: 0.8 MG/DL (ref 0.6–1.2)
EOSINOPHILS ABSOLUTE: 0.1 K/UL (ref 0–0.6)
EOSINOPHILS RELATIVE PERCENT: 1.7 %
GFR SERPL CREATININE-BSD FRML MDRD: >60 ML/MIN/{1.73_M2}
GLUCOSE BLD-MCNC: 75 MG/DL (ref 70–99)
HCT VFR BLD CALC: 39.4 % (ref 36–48)
HEMOGLOBIN: 13.3 G/DL (ref 12–16)
LYMPHOCYTES ABSOLUTE: 1.8 K/UL (ref 1–5.1)
LYMPHOCYTES RELATIVE PERCENT: 32.4 %
MAGNESIUM: 1.8 MG/DL (ref 1.8–2.4)
MCH RBC QN AUTO: 30 PG (ref 26–34)
MCHC RBC AUTO-ENTMCNC: 33.8 G/DL (ref 31–36)
MCV RBC AUTO: 88.6 FL (ref 80–100)
MONOCYTES ABSOLUTE: 0.5 K/UL (ref 0–1.3)
MONOCYTES RELATIVE PERCENT: 9.8 %
NEUTROPHILS ABSOLUTE: 3 K/UL (ref 1.7–7.7)
NEUTROPHILS RELATIVE PERCENT: 55.9 %
PDW BLD-RTO: 13.8 % (ref 12.4–15.4)
PHOSPHORUS: 2.9 MG/DL (ref 2.5–4.9)
PLATELET # BLD: 148 K/UL (ref 135–450)
PMV BLD AUTO: 8.4 FL (ref 5–10.5)
POTASSIUM SERPL-SCNC: 3.3 MMOL/L (ref 3.5–5.1)
RBC # BLD: 4.45 M/UL (ref 4–5.2)
SODIUM BLD-SCNC: 140 MMOL/L (ref 136–145)
WBC # BLD: 5.4 K/UL (ref 4–11)

## 2022-11-09 PROCEDURE — 6370000000 HC RX 637 (ALT 250 FOR IP): Performed by: NURSE PRACTITIONER

## 2022-11-09 PROCEDURE — 99232 SBSQ HOSP IP/OBS MODERATE 35: CPT | Performed by: INTERNAL MEDICINE

## 2022-11-09 PROCEDURE — 36415 COLL VENOUS BLD VENIPUNCTURE: CPT

## 2022-11-09 PROCEDURE — 94680 O2 UPTK RST&XERS DIR SIMPLE: CPT

## 2022-11-09 PROCEDURE — 94761 N-INVAS EAR/PLS OXIMETRY MLT: CPT

## 2022-11-09 PROCEDURE — 2580000003 HC RX 258: Performed by: INTERNAL MEDICINE

## 2022-11-09 PROCEDURE — 85025 COMPLETE CBC W/AUTO DIFF WBC: CPT

## 2022-11-09 PROCEDURE — 94640 AIRWAY INHALATION TREATMENT: CPT

## 2022-11-09 PROCEDURE — 94660 CPAP INITIATION&MGMT: CPT

## 2022-11-09 PROCEDURE — 97165 OT EVAL LOW COMPLEX 30 MIN: CPT

## 2022-11-09 PROCEDURE — 6370000000 HC RX 637 (ALT 250 FOR IP): Performed by: INTERNAL MEDICINE

## 2022-11-09 PROCEDURE — 97161 PT EVAL LOW COMPLEX 20 MIN: CPT | Performed by: PHYSICAL THERAPIST

## 2022-11-09 PROCEDURE — 80069 RENAL FUNCTION PANEL: CPT

## 2022-11-09 PROCEDURE — 83735 ASSAY OF MAGNESIUM: CPT

## 2022-11-09 PROCEDURE — 6360000002 HC RX W HCPCS: Performed by: INTERNAL MEDICINE

## 2022-11-09 RX ORDER — PREDNISONE 10 MG/1
10 TABLET ORAL DAILY
Status: DISCONTINUED | OUTPATIENT
Start: 2022-11-19 | End: 2022-11-09 | Stop reason: HOSPADM

## 2022-11-09 RX ORDER — PREDNISONE 20 MG/1
40 TABLET ORAL DAILY
Status: DISCONTINUED | OUTPATIENT
Start: 2022-11-10 | End: 2022-11-09 | Stop reason: HOSPADM

## 2022-11-09 RX ORDER — POTASSIUM CHLORIDE 20 MEQ/1
40 TABLET, EXTENDED RELEASE ORAL DAILY
Qty: 8 TABLET | Refills: 0 | Status: SHIPPED | OUTPATIENT
Start: 2022-11-09 | End: 2022-11-13

## 2022-11-09 RX ORDER — PREDNISONE 20 MG/1
20 TABLET ORAL DAILY
Status: DISCONTINUED | OUTPATIENT
Start: 2022-11-16 | End: 2022-11-09 | Stop reason: HOSPADM

## 2022-11-09 RX ADMIN — AMLODIPINE BESYLATE 5 MG: 5 TABLET ORAL at 10:10

## 2022-11-09 RX ADMIN — CETIRIZINE HYDROCHLORIDE 5 MG: 10 TABLET, FILM COATED ORAL at 10:11

## 2022-11-09 RX ADMIN — PSEUDOEPHEDRINE HYDROCHLORIDE 120 MG: 120 TABLET, FILM COATED, EXTENDED RELEASE ORAL at 10:10

## 2022-11-09 RX ADMIN — HEPARIN SODIUM 5000 UNITS: 5000 INJECTION INTRAVENOUS; SUBCUTANEOUS at 06:55

## 2022-11-09 RX ADMIN — SODIUM CHLORIDE, PRESERVATIVE FREE 10 ML: 5 INJECTION INTRAVENOUS at 10:04

## 2022-11-09 RX ADMIN — MOMETASONE FUROATE AND FORMOTEROL FUMARATE DIHYDRATE 2 PUFF: 200; 5 AEROSOL RESPIRATORY (INHALATION) at 08:45

## 2022-11-09 RX ADMIN — METHADONE HYDROCHLORIDE 90 MG: 10 TABLET ORAL at 10:10

## 2022-11-09 RX ADMIN — IPRATROPIUM BROMIDE AND ALBUTEROL SULFATE 1 AMPULE: .5; 3 SOLUTION RESPIRATORY (INHALATION) at 11:57

## 2022-11-09 RX ADMIN — IPRATROPIUM BROMIDE AND ALBUTEROL SULFATE 1 AMPULE: .5; 3 SOLUTION RESPIRATORY (INHALATION) at 03:50

## 2022-11-09 RX ADMIN — IPRATROPIUM BROMIDE AND ALBUTEROL SULFATE 1 AMPULE: .5; 3 SOLUTION RESPIRATORY (INHALATION) at 00:17

## 2022-11-09 RX ADMIN — IPRATROPIUM BROMIDE AND ALBUTEROL SULFATE 1 AMPULE: .5; 3 SOLUTION RESPIRATORY (INHALATION) at 08:45

## 2022-11-09 RX ADMIN — PREDNISONE 40 MG: 20 TABLET ORAL at 10:11

## 2022-11-09 NOTE — DISCHARGE SUMMARY
Hospital Medicine Discharge Summary    Patient ID: Nimesh Carter      Patient's PCP: Nettie Oshea MD    Admit Date: 11/2/2022     Discharge Date:   11/9/2022    Admitting Physician: Bernardino Fierro MD     Discharge Physician: Aminata Nobles MD     Discharge Diagnoses: Active Hospital Problems    Diagnosis Date Noted    Acute respiratory failure with hypoxia ([facility] [address] [J96.01] 11/02/2022     Priority: Medium    COPD exacerbation ([address] [J44.1] 02/04/2022    Essential hypertension [I10] 04/26/2016       The patient was seen and examined on day of discharge and this discharge summary is in conjunction with any daily progress note from day of discharge. Hospital Course: Patient who has asthma and COPD was admitted with acute respiratory failure with hypoxia in setting of COPD/asthma acute exacerbation. Required BiPAP support this admission. Started on steroids, antibiotics and nebulizations. Symptoms have improved and patient is currently on room air. Steroid taper per recommendation by pulmonary at discharge. Acute kidney injury on admission resolved with IV fluids. Thrombocytopenia resolved. At this time she is medically stable for discharge home, follow-up with her pulmonologist and PCP as scheduled. Exam:     /83   Pulse 71   Temp 98.2 °F (36.8 °C) (Oral)   Resp 10   Ht 5' 10\" (1.778 m)   Wt 189 lb 13.1 oz (86.1 kg)   LMP 09/13/2017   SpO2 93%   BMI 27.24 kg/m²     General appearance: No apparent distress, appears stated age and cooperative. HEENT: Pupils equal, round, and reactive to light. Conjunctivae/corneas clear. Respiratory:  Normal respiratory effort. Clear to auscultation, bilaterally without Rales/Wheezes/Rhonchi. Cardiovascular: Regular rate and rhythm with normal S1/S2  Abdomen: Soft, non-tender  Musculoskelatal: No clubbing, cyanosis or edema bilaterally. Full range of motion without deformity.   Psychiatric: Alert and oriented, thought content appropriate, normal insight      Consults:     IP CONSULT TO SPIRITUAL SERVICES  IP CONSULT TO PULMONOLOGY  IP CONSULT TO PULMONOLOGY    Significant Diagnostic Studies:       PCP/SNF to follow up:     Disposition:  home     Discharge Instructions/Follow-up:  PCP, pulmonologist    Code Status:  Full Code     Activity: activity as tolerated    Diet: cardiac diet    Labs:  For convenience and continuity at follow-up the following most recent labs are provided:      CBC:    Lab Results   Component Value Date/Time    WBC 5.4 11/09/2022 05:34 AM    HGB 13.3 11/09/2022 05:34 AM    HCT 39.4 11/09/2022 05:34 AM     11/09/2022 05:34 AM       Renal:    Lab Results   Component Value Date/Time     11/09/2022 05:34 AM    K 3.3 11/09/2022 05:34 AM    K 4.6 11/03/2022 06:38 AM    CL 98 11/09/2022 05:34 AM    CO2 32 11/09/2022 05:34 AM    BUN 10 11/09/2022 05:34 AM    CREATININE 0.8 11/09/2022 05:34 AM    CALCIUM 8.8 11/09/2022 05:34 AM    PHOS 2.9 11/09/2022 05:34 AM       Discharge Medications:     Discharge Medication List as of 11/9/2022 11:29 AM             Details   potassium chloride (KLOR-CON M) 20 MEQ extended release tablet Take 2 tablets by mouth daily for 4 days, Disp-8 tablet, R-0Normal                Details   ipratropium-albuterol (DUONEB) 0.5-2.5 (3) MG/3ML SOLN nebulizer solution Inhale 3 mLs into the lungs every 4 hours as needed for WheezingHistorical Med      loratadine-pseudoephedrine (CLARITIN-D 24 HOUR)  MG per extended release tablet Take 1 tablet by mouth daily, Disp-30 tablet, R-11Normal      albuterol (PROVENTIL) (2.5 MG/3ML) 0.083% nebulizer solution Take 3 mLs by nebulization every 6 hours as needed for Wheezing, Disp-120 each, R-11Normal      albuterol sulfate HFA (PROAIR HFA) 108 (90 Base) MCG/ACT inhaler Inhale 2 puffs into the lungs every 6 hours as needed for Wheezing, Disp-1 each, R-11Normal      budesonide-formoterol (SYMBICORT) 160-4.5 MCG/ACT AERO Inhale 2 puffs into the lungs 2 times daily, Disp-1 each, R-11Normal      brompheniramine-pseudoephedrine-DM (BROMFED DM) 2-30-10 MG/5ML syrup Take 5 mLs by mouth 4 times daily as needed for Cough, Disp-118 mL, R-0Normal      methocarbamol (ROBAXIN) 500 MG tablet Take 1 tablet by mouth 3 times daily as needed (pain), Disp-10 tablet, R-0Print      methadone 10 MG/5ML solution Take 90 mg by mouth daily Historical Med      diphenhydrAMINE (BENADRYL) 25 MG capsule Take 25 mg by mouth every 8 hours as needed for Itching or AllergiesHistorical Med      amLODIPine (NORVASC) 10 MG tablet Take 1 tablet by mouth daily. , Disp-30 tablet, R-3             Time Spent on discharge is more than  40 mins  in the examination, evaluation, counseling and review of medications and discharge plan. Signed:    Julieth Sharma MD   11/9/2022      Thank you Stew Carter MD for the opportunity to be involved in this patient's care. If you have any questions or concerns please feel free to contact me at [phone].

## 2022-11-09 NOTE — PROGRESS NOTES
Pulmonary critical Care progress Note     Patient's name:  Stefanie Montiel  Medical Record Number: [id number]  Patient's account/billing number: [de-identified]  Patient's YOB: 1960  Age: 63 y.o. Date of Admission: 11/2/2022  4:53 AM  Date of Consult: 11/9/2022      Primary Care Physician: Jessee Briggs MD      Code Status: Full Code    Reason for consult: Acute respiratory failure with hypoxia     Assessment and Plan     Acute respiratory failure with hypoxia  COPD/Asthma with acute exacerbation  HTN          Plan:  Bronchodilators, dulera and scheduled Duoneb  Systemic steroid. X 5 days  Singulair  Home O2 evaluation   Ok to d/c from pulmonary stand point     Subjective:  Sob/wheezing improving       HISTORY OF PRESENT ILLNESS:   Mr./Ms. Stefanie Montiel is a 63 y.o. -American lady with past medical history stated below significant for moderately severe COPD/asthma with multiple allergies to maintenance treatment patient with Dr. Arnulfo Nettlesixent and side effects to multiple inhalers, was on prednisone at 1 point currently being weaned off presented with worsening shortness of breath wheezing and cough  Transferred to the ICU due to worsening respiratory status. Chest x-ray with minimal left lower lobe airspace disease likely atelectasis. REVIEW OF SYSTEMS:    Sob and wheezing better       Physical Exam:    Vitals: /83   Pulse 71   Temp 98.2 °F (36.8 °C) (Oral)   Resp 10   Ht 5' 10\" (1.778 m)   Wt 189 lb 13.1 oz (86.1 kg)   LMP 09/13/2017   SpO2 93%   BMI 27.24 kg/m²     Last Body weight:   Wt Readings from Last 3 Encounters:   11/09/22 189 lb 13.1 oz (86.1 kg)   10/25/22 186 lb 6.4 oz (84.6 kg)   09/09/22 186 lb 1.1 oz (84.4 kg)       Body Mass Index : Body mass index is 27.24 kg/m².       Intake and Output summary:   Intake/Output Summary (Last 24 hours) at 11/9/2022 0950  Last data filed at 11/8/2022 1355  Gross per 24 hour   Intake 240 ml   Output --   Net 240 ml       Physical Examination:     PHYSICAL EXAM:    Gen: no acute distress  Eyes: PERRL. Anicteric sclera. No conjunctival injection. ENT: No discharge. Posterior oropharynx clear. External appearance of ears and nose normal.  Neck: Trachea midline. No mass, no lymphadenopathy    Resp: better air entry and less wheezing   CV: Regular rate. Regular rhythm. No murmur or rub. No edema. GI: Soft, Non-tender. Non-distended. +BS  Skin: Warm, dry, w/o erythema. Lymph: No cervical or supraclavicular LAD. M/S: No cyanosis. No clubbing. Neuro:  CN 2-12 tested, no focal neurologic deficit. Moves all extremities  Psych: Awake and alert, Oriented x 3. Judgement and insight appropriate. Mood stable. Laboratory findings:-    CBC:   Recent Labs     11/09/22  0534   WBC 5.4   HGB 13.3        BMP:    Recent Labs     11/07/22  0421 11/08/22  0632 11/09/22  0534    139 140   K 4.7 3.6 3.3*    101 98*   CO2 27 31 32   BUN 16 10 10   CREATININE 1.0 0.8 0.8   GLUCOSE 87 70 75     S. Calcium:  Recent Labs     11/09/22  0534   CALCIUM 8.8       Hepatic functions:   Recent Labs     11/09/22  0534   LABALBU 3.5       Cardiac enzymes:  No results for input(s): CKTOTAL, CKMB, CKMBINDEX, TROPONINI in the last 72 hours. Radiology Review:  Pertinent images / reports were reviewed as a part of this visit. CTPA: No results found for this or any previous visit.       CXR PA/LAT: Results for orders placed during the hospital encounter of 06/10/22    XR CHEST (2 VW)    Narrative  EXAMINATION:  TWO XRAY VIEWS OF THE CHEST    6/10/2022 6:11 am    COMPARISON:  May 22, 2022    HISTORY:  ORDERING SYSTEM PROVIDED HISTORY: shortness of breath  TECHNOLOGIST PROVIDED HISTORY:  Reason for exam:->shortness of breath  Reason for Exam: SOB, patient is very anxious, says her mouth keeps bleeding  Relevant Medical/Surgical History: Lung surgery, chest tube yrs ago due to  lung infection, former drug abuse    FINDINGS:  No lines or tubes. Stable cardiomediastinal silhouette. Emphysematous changes  are present. The lungs are clear without focal consolidation or pleural  effusion. No suspicious pulmonary nodules. No pulmonary edema. No  pneumothorax. No acute osseous abnormality. Impression  1. No acute cardiopulmonary disease. CXR portable: Results for orders placed during the hospital encounter of 11/02/22    XR CHEST PORTABLE (Preliminary)  This result has not been signed. Information might be incomplete. Narrative  EXAMINATION:  ONE X-RAY VIEW OF THE CHEST    11/3/2022 6:53 am    COMPARISON:  Prior studies most recent 11/02/2022. HISTORY:  ORDERING SYSTEM PROVIDED HISTORY: SOB  TECHNOLOGIST PROVIDED HISTORY:  Reason for exam:->SOB  Reason for Exam: sob    FINDINGS:  Electrocardiographic monitor leads and oxygen tubing overlie the patient's  chest.  Cardiopericardial silhouette is within normal limits for an AP film. There are mild atherosclerotic changes of the aorta. Pulmonary vasculature  is normal.  There is mild hyperinflation of the lungs. Linear densities at  the left lung base may represent overlapping vessels versus minimal  subsegmental atelectasis. No focal confluent pulmonary infiltrate is  identified. There is minimal chronic blunting of the right costophrenic  angle. No evidence of significant pleural effusion or pneumothorax. Impression  1. Linear densities at the left lung base may represent overlapping vessels  versus minimal subsegmental atelectasis. 2. Otherwise, no evidence of acute cardiopulmonary disease.         Joanna Dietrich MD, M.D.            11/9/2022, 9:50 AM

## 2022-11-09 NOTE — PROGRESS NOTES
Occupational Therapy  Facility/Department: [facility] 4Q PROGRESSIVE CARE  Occupational Therapy Initial Assessment and Discharge Summary    Name: Oh Danielle  : 1960  MRN: [id number]  Date of Service: 2022    Discharge Recommendations:  Defer OT at this time, Home with assist PRN          Patient Diagnosis(es): The primary encounter diagnosis was COPD exacerbation ([address] A diagnosis of Acute respiratory failure with hypoxia (HCC) was also pertinent to this visit. Past Medical History:  has a past medical history of Anxiety and depression, Arthritis, Asthma, Bipolar disorder ([address] CKD (chronic kidney disease), COPD (chronic obstructive pulmonary disease) ([address] Empyema ([facility] Hepatitis C, History of heroin use, Hypertension, Narcotic abuse ([facility] and UTI (lower urinary tract infection). Past Surgical History:  has a past surgical history that includes other surgical history (12/15/2016); Foot surgery (Left, 2016); Colonoscopy (10/23/2018); Colonoscopy (N/A, 10/23/2018); Lung surgery (Right); Intracapsular cataract extraction (Right, 2020); Intracapsular cataract extraction (Left, 2020); and Thyroidectomy (Left, 2022). Assessment   Assessment: Pt presents with COPD exacerbation. She lives alone but has a HHA 4 hours/day, 7 days a week and receives meals on wheels and has a life alert button. Pt demonstrated ability to transfer and complete self care without device or assist.  Pt at baseline and safe for d/c home with same assist.  Defer OT.   Prognosis: Good  Decision Making: Low Complexity  History: see aboveq  Exam: mobility, self care  Assistance / Modification: none  REQUIRES OT FOLLOW-UP: No  Activity Tolerance  Activity Tolerance: Patient Tolerated treatment well  Activity Tolerance Comments: Pt up in room independently for unknown length of time, O2 sat checked at 90% on RA        Plan   Occupational Therapy Plan  Times Per Week: D/C OT     Restrictions Subjective   General  Chart Reviewed: Yes, Orders, Progress Notes, History and Physical  Additional Pertinent Hx: Per Dr. Mitesh Mckinley, \" Patient admitted with shortness of breath, managed for acute respiratory failure with hypoxia in setting of COPD exacerbation and asthma exacerbation. \"  Referring Practitioner: Dr. Mitesh Mckinley  Diagnosis: COPD exac  Subjective  Subjective: Pt seen in room, agreed to OT/PT, pt up independently upon arrival in room. No complaints     Social/Functional History  Social/Functional History  Lives With: Alone  Type of Home: Apartment  Home Layout: One level (two family home)  Home Equipment: Alert Button  Has the patient had two or more falls in the past year or any fall with injury in the past year?: No  Receives Help From: Family, Personal care attendant (dtr is aide, 4 hours/day 7days/week)  ADL Assistance: Independent  Homemaking Assistance: Needs assistance  Ambulation Assistance: Independent  Transfer Assistance: Independent       Objective   Heart Rate: 71  Heart Rate Source: Monitor  BP: 126/83  BP Location: Right upper arm  BP Method: Automatic  Patient Position: Semi fowlers  MAP (Calculated): 97.33  Resp: 10  SpO2: 93 %  O2 Device: None (Room air)             Safety Devices  Type of Devices: Nurse notified;Call light within reach; Left in chair     Toilet Transfers  Toilet - Technique: Ambulating  Equipment Used: Grab bars  Toilet Transfer: Independent  Toilet Transfers Comments: no device, no LOB     ADL  Additional Comments: Pt observed completing toileting and self care without assist.  No deficits noted     Activity Tolerance  Activity Tolerance: Patient tolerated evaluation without incident  Bed mobility  Bed Mobility Comments: pt up in bathroom upon entering  Transfers  Sit to stand: Independent  Stand to sit:  Independent  Vision - Basic Assessment  Prior Vision: No visual deficits  Vision  Vision: Within Functional Limits  Hearing  Hearing: Within functional limits  Cognition  Overall Cognitive Status: WFL  Orientation  Overall Orientation Status: Within Functional Limits                  Education Given To: Patient  Education Provided: Role of Therapy;Plan of Care  Education Method: Demonstration;Verbal  Barriers to Learning: None  Education Outcome: Verbalized understanding;Demonstrated understanding                             OutComes Score   Shay Dejan Dover scored a 24/24 on the AM-[facility] ADL Inpatient form. At this time, no further OT is recommended upon discharge due to independence and assist available. Recommend patient returns to prior setting with prior services. AM-PAC Score        AM-[facility] Inpatient Daily Activity Raw Score: 24 (11/09/22 0939)  AM-PAC Inpatient ADL T-Scale Score : 57.54 (11/09/22 0939)  ADL Inpatient CMS 0-100% Score: 0 (11/09/22 0939)  ADL Inpatient CMS G-Code Modifier : [address] (11/09/22 9378)         Goals  Short Term Goals  Time Frame for Short Term Goals: NA, no OT goals identified  Patient Goals   Patient goals :  To return home today       Therapy Time   Individual Concurrent Group Co-treatment   Time In 0900         Time Out 0915         Minutes 15         Timed Code Treatment Minutes: Ana LAIRD Monge 106, OT

## 2022-11-09 NOTE — PROGRESS NOTES
Physical Therapy  Facility/Department: [facility] PROGRESSIVE CARE  Physical Therapy Initial Assessment/Discharge note    Name: Elizabeth Kumar  : 1960  MRN: [id number]  Date of Service: 2022    Discharge Recommendations:  Home with assist PRN   PT Equipment Recommendations  Equipment Needed: No    Elizabeth Kumar scored a 23/24 on the AM-PAC short mobility form. At this time, no further PT is recommended upon discharge. Recommend patient returns to prior setting with prior services. Patient Diagnosis(es): The primary encounter diagnosis was COPD exacerbation ([facility] [address] A diagnosis of Acute respiratory failure with hypoxia (HCC) was also pertinent to this visit. Past Medical History:  has a past medical history of Anxiety and depression, Arthritis, Asthma, Bipolar disorder ([facility] [address] CKD (chronic kidney disease), COPD (chronic obstructive pulmonary disease) ([facility] [address] Empyema ([facility] [address] Hepatitis C, History of heroin use, Hypertension, Narcotic abuse ([facility] [address] and UTI (lower urinary tract infection). Past Surgical History:  has a past surgical history that includes other surgical history (12/15/2016); Foot surgery (Left, 2016); Colonoscopy (10/23/2018); Colonoscopy (N/A, 10/23/2018); Lung surgery (Right); Intracapsular cataract extraction (Right, 2020); Intracapsular cataract extraction (Left, 2020); and Thyroidectomy (Left, 2022). Assessment   Assessment: pt is a 64 yo female who was adm to hosp with SOB with acute respiratory failure with hypoxia in setting of COPD exacerbation and asthma exacerbation.    Pt at baseline has her daughter as a caregiver through [employer] for household tasks; pt is Ind without an AD for mobility tasks; pt will be safe to return home without any further therapy  Therapy Prognosis: Good  Decision Making: Low Complexity  No Skilled PT: Safe to return home  Requires PT Follow-Up: No  Activity Tolerance  Activity Tolerance: Patient tolerated evaluation without incident     Plan   Physcial Therapy Plan  Additional Comments: no further therapy indicated  Safety Devices  Type of Devices: Nurse notified, Call light within reach, Left in chair     Restrictions        Subjective   General  Chart Reviewed: Yes  Additional Pertinent Hx: pt is a 64 yo female who was adm to hosp with SOB with acute respiratory failure with hypoxia in setting of COPD exacerbation and asthma exacerbation.   Response To Previous Treatment: Not applicable  Family / Caregiver Present: No  Referring Practitioner: Dr Blaine Soni: Within Functional Limits  Subjective  Subjective: pt pleasant and agreeable to working with therapy         Social/Functional History  Social/Functional History  Lives With: Alone  Type of Home: Apartment  Home Layout: One level (two family home)  Home Equipment: Alert Button  Has the patient had two or more falls in the past year or any fall with injury in the past year?: No  Receives Help From: Family, Personal care attendant (dtr is aide, 4 hours/day 7days/week)  ADL Assistance: Independent  Homemaking Assistance: Needs assistance  Ambulation Assistance: Independent  Transfer Assistance: Independent  Vision/Hearing  Vision  Vision: Within Functional Limits  Hearing  Hearing: Within functional limits    Cognition   Orientation  Overall Orientation Status: Within Functional Limits  Cognition  Overall Cognitive Status: WFL     Objective   Heart Rate: 71  Heart Rate Source: Monitor  BP: 126/83  BP Location: Right upper arm  BP Method: Automatic  Patient Position: Semi fowlers  MAP (Calculated): 97.33  Resp: 10  SpO2: 93 %  O2 Device: None (Room air)              AROM RLE (degrees)  RLE AROM: WFL  AROM LLE (degrees)  LLE AROM : WFL  Strength RLE  Strength RLE: WFL  Strength LLE  Strength LLE: WFL           Bed mobility  Bed Mobility Comments: pt up in bathroom upon entering  Transfers  Sit to Stand: Independent  Stand to Sit: Independent  Ambulation  Surface: Level kwesi  Device: No Device  Assistance: Independent  Distance: 25'  Comments: spO2 90% on RA     Balance  Sitting - Static: Good  Sitting - Dynamic: Good  Standing - Static: Good  Standing - Dynamic: Good           OutComes Score                                                  AM-PAC Score  AM-PAC Inpatient Mobility Raw Score : 23 (11/09/22 0938)  AM-PAC Inpatient T-Scale Score : 56.93 (11/09/22 6559)  Mobility Inpatient CMS 0-100% Score: 11.2 (11/09/22 0938)  Mobility Inpatient CMS G-Code Modifier : CI (11/09/22 6170)          Tinneti Score       Goals          Education  Patient Education  Education Given To: Patient  Education Provided: Role of Therapy  Education Method: Verbal  Education Outcome: Verbalized understanding      Therapy Time   Individual Concurrent Group Co-treatment   Time In 0900         Time Out 0915         Minutes 15                 AMA BAKER PT   Electronically signed by AMA BAKER PT on 11/9/2022 at 9:39 AM

## 2022-11-09 NOTE — CARE COORDINATION
Case Management Assessment  Initial Evaluation    Date/Time of Evaluation: 11/9/2022 11:16 AM  Assessment Completed by: Luciana Riley RN    If patient is discharged prior to next notation, then this note serves as note for discharge by case management. Patient Name: Zoltan Murguia                   YOB: 1960  Diagnosis: COPD exacerbation ([address] [J44.1]  Acute respiratory failure with hypoxia ([facility] [J96.01]                   Date / Time: 11/2/2022  4:53 AM    Patient Admission Status: Inpatient   Readmission Risk (Low < 19, Mod (19-27), High > 27): Readmission Risk Score: 9    Current PCP: Nikki Peña MD  PCP verified by ? Yes    Chart Reviewed: Yes      History Provided by: Patient  Patient Orientation: Alert and Oriented, Person, Place, Situation, Self    Patient Cognition: Alert    Hospitalization in the last 30 days (Readmission):  No    If yes, Readmission Assessment in  Navigator will be completed. Advance Directives:      Code Status: Full Code   Patient's Primary Decision Maker is: Legal Next of Kin    Primary Decision MakerTSt. Elizabeth Hospitaljesse Munguia  Michael - [phone]    Discharge Planning:    Patient lives with: Spouse/Significant Other Type of Home: Apartment  Primary Care Giver: Self  Patient Support Systems include: Spouse/Significant Other, Children   Current services prior to admission: Home Care, Emergency Call  System            Current DME:  nebulizer, cane            Type of Home Care services:  None    ADLS  Prior functional level: Assistance with the following:, Bathing, Dressing, Housework, Mobility  Current functional level: Assistance with the following:, Bathing, Dressing, Housework, Mobility    PT AM-PAC: 23 /24  OT AM-PAC: 24 /24    Family can provide assistance at DC: Yes  Would you like Case Management to discuss the discharge plan with any other family members/significant others, and if so, who?  No  Plans to Return to Present Housing: Yes  Other Identified Issues/Barriers to RETURNING to current housing: N/A  Potential Assistance needed at discharge: N/A            Potential DME:  None  Patient expects to discharge to: [address] [place] [address] for transportation at discharge: Family    Financial    Payor: MEDICAID [place] / Plan: [address] [place] [address] DEPT OF JOB / Product Type: *No Product type* /     Does insurance require precert for SNF: Yes    Potential assistance Purchasing Medications: No  Meds-to-Beds request: Yes      [place] [address] [address] [facility] [place] [place] [address] [address] [address]  Phone: [phone] Fax: [phone]      Notes:    Factors facilitating achievement of predicted outcomes: Family support, Caregiver support, and Has needed Durable Medical Equipment at home    Barriers to discharge: None    Additional Case Management Notes: Patient to return home with family. Family can transport. Patient has Aide services through "[employer].  Patient also received MOW and has a life alert. The Plan for Transition of Care is related to the following treatment goals of COPD exacerbation ([address] [J44.1]  Acute respiratory failure with hypoxia ([address] [J96.01]    The Patient and/or Patient Representative Agree with the Discharge Plan?  Yes    Speedy Ruvalcaba RN  Case Management Department  Ph: [phone]

## 2022-11-09 NOTE — PLAN OF CARE
Problem: Discharge Planning  Goal: Discharge to home or other facility with appropriate resources  Outcome: Progressing  Flowsheets (Taken 11/9/2022 0114)  Discharge to home or other facility with appropriate resources:   Identify barriers to discharge with patient and caregiver   Identify discharge learning needs (meds, wound care, etc)   Arrange for needed discharge resources and transportation as appropriate     Problem: Safety - Adult  Goal: Free from fall injury  Outcome: Progressing  Flowsheets (Taken 11/9/2022 0114)  Free From Fall Injury: Instruct family/caregiver on patient safety     Problem: Musculoskeletal - Adult  Goal: Return mobility to safest level of function  Outcome: Progressing  Flowsheets (Taken 11/9/2022 0114)  Return Mobility to Safest Level of Function:   Assess patient stability and activity tolerance for standing, transferring and ambulating with or without assistive devices   Assist with transfers and ambulation using safe patient handling equipment as needed   Ensure adequate protection for wounds/incisions during mobilization   Obtain physical therapy/occupational therapy consults as needed     Problem: Musculoskeletal - Adult  Goal: Maintain proper alignment of affected body part  Outcome: Progressing  Flowsheets (Taken 11/9/2022 0114)  Maintain proper alignment of affected body part: Support and protect limb and body alignment per provider's orders     Problem: Musculoskeletal - Adult  Goal: Return ADL status to a safe level of function  Outcome: Progressing  Flowsheets (Taken 11/9/2022 0114)  Return ADL Status to a Safe Level of Function:   Administer medication as ordered   Assess activities of daily living deficits and provide assistive devices as needed   Obtain physical therapy/occupational therapy consults as needed

## 2022-11-09 NOTE — PROGRESS NOTES
Pt discharge home. Pt discharge instruction were reviewed. Pt verbalized understanding. Pt IV removed no complication. Pt belongings taken by the pt.

## 2022-11-09 NOTE — PROGRESS NOTES
[facility]    Respiratory Therapy   Home Oxygen Evaluation        Name: hO Danielle  Medical Record Number: [id number]  Age: 63 y.o. Gender:  female   : 1960  Today's date: 2022  Room: [id number]      Assessment        /83   Pulse 71   Temp 98.2 °F (36.8 °C) (Oral)   Resp 10   Ht 5' 10\" (1.778 m)   Wt 189 lb 13.1 oz (86.1 kg)   LMP 2017   SpO2 93%   BMI 27.24 kg/m²     Patient Active Problem List   Diagnosis    Precordial pain    COPD with asthma (HCC)    Unstable angina (HCC)    Essential hypertension    HCV (hepatitis C virus)    Hypoxia    Smoking addiction    Thrush    Mild episode of recurrent major depressive disorder ([facility]    Over weight    Severe persistent asthma without complication    Other allergic rhinitis    Numbness in left leg    Left sided sciatica    COPD exacerbation (HCC)    S/P thyroidectomy    Thyroid nodule    Acute respiratory failure with hypoxia (HCC)       Social History:  Social History     Tobacco Use    Smoking status: Former     Packs/day: 0.50     Years: 45.00     Pack years: 22.50     Types: Cigarettes     Quit date: 2016     Years since quittin.4    Smokeless tobacco: Never   Vaping Use    Vaping Use: Never used   Substance Use Topics    Alcohol use: No    Drug use: Not Currently     Comment: past h/o heroin on methadone--last used 5 years ago       PT DOES NOT QUALIFY FOR HOME O2 AT THIS TIME    Patient Room Air saturation at rest 94  %  Patient Room Air saturation upon ambulation 93 %  Patient ambulated 10 minutes.   (Minimum of 3 minutes unless Sp02 < 88% prior to 3 minute karli       Patient/caregiver was educated on Home Oxygen process:  Yes      Level of patient/caregiver understanding able to:   [x] Verbalize understanding   [] Demonstrate understanding       [] Teach back       [] Needs reinforcement        []  No available caregiver               []  Other:     Response to education:  Very Good Time Spent with Home O2 Set Up:  20  minutes     Adonay Perdomo RCP on 11/9/2022 at 9:07 AM

## 2022-11-09 NOTE — CARE COORDINATION
CASE MANAGEMENT DISCHARGE SUMMARY:    DISCHARGE DATE: 11/9/22    DISCHARGED TO HOME     TRANSPORTATION: family             TIME: when ready     PREFERRED PHARMACY: [facility]               #[phone]  Electronically signed by Speedy Ruvalcaba RN on 11/9/2022 at 11:18 AM

## 2023-05-12 ENCOUNTER — HOSPITAL ENCOUNTER (OUTPATIENT)
Dept: GENERAL RADIOLOGY | Age: 63
Discharge: HOME | End: 2023-05-12
Payer: MEDICAID

## 2023-05-12 ENCOUNTER — HOSPITAL ENCOUNTER (OUTPATIENT)
Age: 63
Discharge: HOME | End: 2023-05-12
Payer: MEDICAID

## 2023-05-12 ENCOUNTER — HOSPITAL ENCOUNTER (OUTPATIENT)
Dept: WOMENS IMAGING | Age: 63
Discharge: HOME | End: 2023-05-12
Payer: MEDICAID

## 2023-05-12 DIAGNOSIS — I20.2 REFRACTORY ANGINA PECTORIS (HCC): ICD-10-CM

## 2023-05-12 DIAGNOSIS — Z12.31 BREAST CANCER SCREENING BY MAMMOGRAM: ICD-10-CM

## 2023-05-12 PROCEDURE — 77063 BREAST TOMOSYNTHESIS BI: CPT

## 2023-05-12 PROCEDURE — 71046 X-RAY EXAM CHEST 2 VIEWS: CPT

## 2023-06-05 ENCOUNTER — HOSPITAL ENCOUNTER (OUTPATIENT)
Dept: GENERAL RADIOLOGY | Age: 63
Discharge: HOME | End: 2023-06-05
Payer: MEDICAID

## 2023-06-05 ENCOUNTER — HOSPITAL ENCOUNTER (OUTPATIENT)
Age: 63
Discharge: HOME | End: 2023-06-05
Payer: MEDICAID

## 2023-06-05 DIAGNOSIS — M79.604 BILATERAL LEG PAIN: ICD-10-CM

## 2023-06-05 DIAGNOSIS — M79.605 BILATERAL LEG PAIN: ICD-10-CM

## 2023-06-05 DIAGNOSIS — M25.511 RIGHT SHOULDER PAIN, UNSPECIFIED CHRONICITY: ICD-10-CM

## 2023-06-05 PROCEDURE — 73030 X-RAY EXAM OF SHOULDER: CPT

## 2023-06-05 PROCEDURE — 73522 X-RAY EXAM HIPS BI 3-4 VIEWS: CPT

## 2023-06-09 ENCOUNTER — APPOINTMENT (OUTPATIENT)
Dept: CT IMAGING | Age: 63
End: 2023-06-09
Payer: MEDICAID

## 2023-06-09 ENCOUNTER — APPOINTMENT (OUTPATIENT)
Dept: GENERAL RADIOLOGY | Age: 63
End: 2023-06-09
Payer: MEDICAID

## 2023-06-09 ENCOUNTER — HOSPITAL ENCOUNTER (EMERGENCY)
Age: 63
Discharge: HOME | End: 2023-06-09
Payer: MEDICAID

## 2023-06-09 VITALS
TEMPERATURE: 98.3 F | OXYGEN SATURATION: 94 % | HEART RATE: 63 BPM | RESPIRATION RATE: 18 BRPM | SYSTOLIC BLOOD PRESSURE: 149 MMHG | DIASTOLIC BLOOD PRESSURE: 80 MMHG

## 2023-06-09 DIAGNOSIS — M25.511 CHRONIC PAIN OF BOTH SHOULDERS: ICD-10-CM

## 2023-06-09 DIAGNOSIS — M16.11 ARTHRITIS OF RIGHT HIP: ICD-10-CM

## 2023-06-09 DIAGNOSIS — M25.551 RIGHT HIP PAIN: Primary | ICD-10-CM

## 2023-06-09 DIAGNOSIS — G89.29 CHRONIC PAIN OF BOTH SHOULDERS: ICD-10-CM

## 2023-06-09 DIAGNOSIS — M25.512 CHRONIC PAIN OF BOTH SHOULDERS: ICD-10-CM

## 2023-06-09 DIAGNOSIS — J44.1 COPD EXACERBATION (HCC): ICD-10-CM

## 2023-06-09 LAB
ANION GAP SERPL CALCULATED.3IONS-SCNC: 14 MMOL/L (ref 3–16)
BACTERIA URNS QL MICRO: NORMAL /HPF
BASOPHILS # BLD: 0.1 K/UL (ref 0–0.2)
BASOPHILS NFR BLD: 1.1 %
BILIRUB UR QL STRIP.AUTO: NEGATIVE
BUN SERPL-MCNC: 8 MG/DL (ref 7–20)
CALCIUM SERPL-MCNC: 9.1 MG/DL (ref 8.3–10.6)
CHLORIDE SERPL-SCNC: 99 MMOL/L (ref 99–110)
CLARITY UR: CLEAR
CO2 SERPL-SCNC: 23 MMOL/L (ref 21–32)
COLOR UR: YELLOW
CREAT SERPL-MCNC: 0.9 MG/DL (ref 0.6–1.2)
CRP SERPL-MCNC: 3.5 MG/L (ref 0–5.1)
DEPRECATED RDW RBC AUTO: 14.6 % (ref 12.4–15.4)
EKG ATRIAL RATE: 65 BPM
EKG DIAGNOSIS: NORMAL
EKG P AXIS: 57 DEGREES
EKG P-R INTERVAL: 140 MS
EKG Q-T INTERVAL: 420 MS
EKG QRS DURATION: 86 MS
EKG QTC CALCULATION (BAZETT): 436 MS
EKG R AXIS: -22 DEGREES
EKG T AXIS: -13 DEGREES
EKG VENTRICULAR RATE: 65 BPM
EOSINOPHIL # BLD: 0.1 K/UL (ref 0–0.6)
EOSINOPHIL NFR BLD: 2 %
EPI CELLS #/AREA URNS AUTO: 3 /HPF (ref 0–5)
GFR SERPLBLD CREATININE-BSD FMLA CKD-EPI: >60 ML/MIN/{1.73_M2}
GLUCOSE SERPL-MCNC: 86 MG/DL (ref 70–99)
GLUCOSE UR STRIP.AUTO-MCNC: NEGATIVE MG/DL
HCT VFR BLD AUTO: 45.2 % (ref 36–48)
HGB BLD-MCNC: 15.1 G/DL (ref 12–16)
HGB UR QL STRIP.AUTO: NEGATIVE
HYALINE CASTS #/AREA URNS AUTO: 0 /LPF (ref 0–8)
KETONES UR STRIP.AUTO-MCNC: NEGATIVE MG/DL
LEUKOCYTE ESTERASE UR QL STRIP.AUTO: ABNORMAL
LYMPHOCYTES # BLD: 1.1 K/UL (ref 1–5.1)
LYMPHOCYTES NFR BLD: 19 %
MCH RBC QN AUTO: 29.9 PG (ref 26–34)
MCHC RBC AUTO-ENTMCNC: 33.3 G/DL (ref 31–36)
MCV RBC AUTO: 89.8 FL (ref 80–100)
MONOCYTES # BLD: 0.4 K/UL (ref 0–1.3)
MONOCYTES NFR BLD: 6.7 %
NEUTROPHILS # BLD: 4.1 K/UL (ref 1.7–7.7)
NEUTROPHILS NFR BLD: 71.2 %
NITRITE UR QL STRIP.AUTO: NEGATIVE
NT-PROBNP SERPL-MCNC: 258 PG/ML (ref 0–124)
PH UR STRIP.AUTO: 6 [PH] (ref 5–8)
PLATELET # BLD AUTO: 163 K/UL (ref 135–450)
PMV BLD AUTO: 8.7 FL (ref 5–10.5)
POTASSIUM SERPL-SCNC: 4.3 MMOL/L (ref 3.5–5.1)
PROT UR STRIP.AUTO-MCNC: NEGATIVE MG/DL
RBC # BLD AUTO: 5.04 M/UL (ref 4–5.2)
RBC CLUMPS #/AREA URNS AUTO: 1 /HPF (ref 0–4)
SODIUM SERPL-SCNC: 136 MMOL/L (ref 136–145)
SP GR UR STRIP.AUTO: 1.01 (ref 1–1.03)
TROPONIN, HIGH SENSITIVITY: 11 NG/L (ref 0–14)
TROPONIN, HIGH SENSITIVITY: 8 NG/L (ref 0–14)
UA COMPLETE W REFLEX CULTURE PNL UR: ABNORMAL
UA DIPSTICK W REFLEX MICRO PNL UR: YES
URN SPEC COLLECT METH UR: ABNORMAL
UROBILINOGEN UR STRIP-ACNC: 0.2 E.U./DL
WBC # BLD AUTO: 5.8 K/UL (ref 4–11)
WBC #/AREA URNS AUTO: 4 /HPF (ref 0–5)

## 2023-06-09 PROCEDURE — 85025 COMPLETE CBC W/AUTO DIFF WBC: CPT

## 2023-06-09 PROCEDURE — 93005 ELECTROCARDIOGRAM TRACING: CPT | Performed by: PHYSICIAN ASSISTANT

## 2023-06-09 PROCEDURE — 71250 CT THORAX DX C-: CPT

## 2023-06-09 PROCEDURE — 80048 BASIC METABOLIC PNL TOTAL CA: CPT

## 2023-06-09 PROCEDURE — 93010 ELECTROCARDIOGRAM REPORT: CPT | Performed by: INTERNAL MEDICINE

## 2023-06-09 PROCEDURE — 84484 ASSAY OF TROPONIN QUANT: CPT

## 2023-06-09 PROCEDURE — 83880 ASSAY OF NATRIURETIC PEPTIDE: CPT

## 2023-06-09 PROCEDURE — 6370000000 HC RX 637 (ALT 250 FOR IP): Performed by: PHYSICIAN ASSISTANT

## 2023-06-09 PROCEDURE — 81001 URINALYSIS AUTO W/SCOPE: CPT

## 2023-06-09 PROCEDURE — 86140 C-REACTIVE PROTEIN: CPT

## 2023-06-09 PROCEDURE — 36415 COLL VENOUS BLD VENIPUNCTURE: CPT

## 2023-06-09 PROCEDURE — 94760 N-INVAS EAR/PLS OXIMETRY 1: CPT

## 2023-06-09 PROCEDURE — 94640 AIRWAY INHALATION TREATMENT: CPT

## 2023-06-09 RX ORDER — PREDNISONE 5 MG/1
TABLET ORAL
Qty: 18 TABLET | Refills: 0 | Status: SHIPPED | OUTPATIENT
Start: 2023-06-09 | End: 2023-06-19

## 2023-06-09 RX ORDER — IPRATROPIUM BROMIDE AND ALBUTEROL SULFATE 2.5; .5 MG/3ML; MG/3ML
1 SOLUTION RESPIRATORY (INHALATION) ONCE
Status: COMPLETED | OUTPATIENT
Start: 2023-06-09 | End: 2023-06-09

## 2023-06-09 RX ORDER — PREDNISONE 20 MG/1
20 TABLET ORAL ONCE
Status: COMPLETED | OUTPATIENT
Start: 2023-06-09 | End: 2023-06-09

## 2023-06-09 RX ADMIN — IPRATROPIUM BROMIDE AND ALBUTEROL SULFATE 1 DOSE: 2.5; .5 SOLUTION RESPIRATORY (INHALATION) at 14:49

## 2023-06-09 RX ADMIN — PREDNISONE 20 MG: 20 TABLET ORAL at 14:46

## 2023-06-09 ASSESSMENT — PAIN - FUNCTIONAL ASSESSMENT: PAIN_FUNCTIONAL_ASSESSMENT: NONE - DENIES PAIN

## 2023-06-09 NOTE — ED NOTES
Discharge and education instructions reviewed. Patient verbalized understanding, teach-back successful. Patient denied questions at this time. No acute distress noted. Patient instructed to follow-up as noted - return to emergency department if symptoms worsen. Patient verbalized understanding. Discharged per EDMD with discharged instructions.        Kvng Bernal RN  06/09/23 5942

## 2023-06-09 NOTE — ED PROVIDER NOTES
[address] [facility]  [phone]    Schedule an appointment as soon as possible for a visit on 6/14/2023      Kobe Steele MD  [address] [facility] [address] [facility]  [phone]    Schedule an appointment as soon as possible for a visit   As needed    [facility] Emergency Department  [address] [address]  [phone]  Go to   If symptoms worsen      DISCHARGE MEDICATIONS:  Discharge Medication List as of 6/9/2023  7:04 PM        START taking these medications    Details   predniSONE (DELTASONE) 5 MG tablet 3 tabs po qam for 3 days then 2 tabs qam for 3 days the 1 tab qam for 3 days, Disp-18 tablet, R-0Normal             DISCONTINUED MEDICATIONS:  Discharge Medication List as of 6/9/2023  7:04 PM        STOP taking these medications       acetaminophen (APAP EXTRA STRENGTH) 500 MG tablet Comments:   Reason for Stopping:                      (Please note that portions of this note were completed with a voice recognition program.  Efforts were made to edit the dictations but occasionally words are mis-transcribed. )    Sruthi Estrada PA-C (electronically signed)        Sruthi Estrada PA-C  06/10/23 9973

## 2023-06-09 NOTE — DISCHARGE INSTRUCTIONS
You did ask the kidney function. Your BUN was 8, creatinine 0.9 and GFR was >60. This shows normal kidney function. White blood cell count was not elevated to suggest infection. No sign of heart attack or heart failure. Urine showed no sign of urinary infection. Your CT scan of the chest showed no right pulmonary nodule. Emphysema noted. I do believe he had a mild COPD exacerbation and recommend use your inhalers and breathing treatment as scheduled and prescribed. I do believe you have mild to moderate arthritis of the right hip. I did place a referral to see orthopedist Dr. Christian Monteil. At this point no clear evidence multiple myeloma. There are specific testing for this condition under direction of your healthcare provider.

## 2023-06-21 ENCOUNTER — OFFICE VISIT (OUTPATIENT)
Dept: PULMONOLOGY | Age: 63
End: 2023-06-21
Payer: MEDICAID

## 2023-06-21 VITALS
SYSTOLIC BLOOD PRESSURE: 127 MMHG | HEART RATE: 64 BPM | BODY MASS INDEX: 24.2 KG/M2 | HEIGHT: 70 IN | OXYGEN SATURATION: 91 % | DIASTOLIC BLOOD PRESSURE: 84 MMHG | TEMPERATURE: 97.1 F | WEIGHT: 169 LBS | RESPIRATION RATE: 18 BRPM

## 2023-06-21 DIAGNOSIS — J30.89 OTHER ALLERGIC RHINITIS: ICD-10-CM

## 2023-06-21 DIAGNOSIS — J44.9 COPD WITH ASTHMA (HCC): ICD-10-CM

## 2023-06-21 PROBLEM — J44.1 COPD EXACERBATION (HCC): Status: RESOLVED | Noted: 2022-02-04 | Resolved: 2023-06-21

## 2023-06-21 PROBLEM — J96.01 ACUTE RESPIRATORY FAILURE WITH HYPOXIA (HCC): Status: RESOLVED | Noted: 2022-11-02 | Resolved: 2023-06-21

## 2023-06-21 PROCEDURE — 99214 OFFICE O/P EST MOD 30 MIN: CPT | Performed by: INTERNAL MEDICINE

## 2023-06-21 PROCEDURE — 3079F DIAST BP 80-89 MM HG: CPT | Performed by: INTERNAL MEDICINE

## 2023-06-21 PROCEDURE — 3074F SYST BP LT 130 MM HG: CPT | Performed by: INTERNAL MEDICINE

## 2023-06-21 ASSESSMENT — ASTHMA QUESTIONNAIRES
QUESTION_3 LAST FOUR WEEKS HOW OFTEN DID YOUR ASTHMA SYMPTOMS (WHEEZING, COUGHING, SHORTNESS OF BREATH, CHEST TIGHTNESS OR PAIN) WAKE YOU UP AT NIGHT OR EARLIER THAN USUAL IN THE MORNING: 2
QUESTION_5 LAST FOUR WEEKS HOW WOULD YOU RATE YOUR ASTHMA CONTROL: 4
QUESTION_1 LAST FOUR WEEKS HOW MUCH OF THE TIME DID YOUR ASTHMA KEEP YOU FROM GETTING AS MUCH DONE AT WORK, SCHOOL OR AT HOME: 3
QUESTION_2 LAST FOUR WEEKS HOW OFTEN HAVE YOU HAD SHORTNESS OF BREATH: 3
QUESTION_4 LAST FOUR WEEKS HOW OFTEN HAVE YOU USED YOUR RESCUE INHALER OR NEBULIZER MEDICATION (SUCH AS ALBUTEROL): 4
ACT_TOTALSCORE: 16

## 2023-06-21 ASSESSMENT — COPD QUESTIONNAIRES
QUESTION6_LEAVINGHOUSE: 2
CAT_TOTALSCORE: 17
QUESTION2_CHESTPHLEGM: 2
QUESTION8_ENERGYLEVEL: 2
QUESTION5_HOMEACTIVITIES: 2
QUESTION4_WALKINCLINE: 3
QUESTION3_CHESTTIGHTNESS: 2
QUESTION1_COUGHFREQUENCY: 1
QUESTION7_SLEEPQUALITY: 3

## 2023-06-21 NOTE — PROGRESS NOTES
REASON FOR CONSULTATION/CC: asthma  Chief Complaint   Patient presents with    Follow-up    COPD    Asthma          PCP: Pam Mak MD    HISTORY OF PRESENT ILLNESS: Estevan Gramajo is a 63y.o. year old female with a history of asthma who presents :               Asthma history   Nucala, failed. Did not response  Dupexiant, side effects. side effects to Spiriva   Hx of chronic steroids then weaned off         Currently       COPD with Asthma and allergic rhinitis   Symbicort and albuterol prn. Breathing and white tongue better after thyroid surgery. She was in the ER for shortness of breath and on prednisone . Now off.         allergic rhinitis  Off Astelin . Mild symptoms. Claritin. Has sinus congestion and drainage. Objective:   PHYSICAL EXAM:  Blood pressure 127/84, pulse 64, temperature 97.1 °F (36.2 °C), resp. rate 18, height 5' 10\" (1.778 m), weight 169 lb (76.7 kg), last menstrual period 09/13/2017, SpO2 91 %, not currently breastfeeding.'    Gen: No distress. Eyes:    ENT:    Neck:    Resp: No accessory muscle use. No crackles. No wheezes. No rhonchi. CV: Regular rate. Regular rhythm. No murmur or rub. No edema. GI:    Skin:    Lymph:    M/S: No cyanosis. No clubbing. Neuro: Moves all four extremities. Psych: Oriented x 3. No anxiety. Awake. Alert. Intact judgement and insight. Data Reviewed:        Assessment:      Asthma  allergic rhinitis   Leg numbness      Plan:      Problem List Items Addressed This Visit       Other allergic rhinitis      Claritin. Controlled. COPD with asthma ([address]      Controlled with Symbicort and albuterol prn. This note was transcribed using [id number] [address]. Please disregard any translational errors.     Michelle Pierre [facility], Sleep and [facility] [phone]

## 2023-07-03 RX ORDER — IPRATROPIUM BROMIDE AND ALBUTEROL SULFATE 2.5; .5 MG/3ML; MG/3ML
SOLUTION RESPIRATORY (INHALATION)
Qty: 360 ML | Refills: 5 | Status: SHIPPED | OUTPATIENT
Start: 2023-07-03

## 2023-08-11 ENCOUNTER — TELEPHONE (OUTPATIENT)
Dept: ORTHOPEDIC SURGERY | Age: 63
End: 2023-08-11

## 2023-08-11 NOTE — TELEPHONE ENCOUNTER
S/W Mirtha Other  Reached out regarding referral received from DR. Rupali Escobar and request for consultation with Dr. Jess Rhoades or Olga Evans PA-C. I asked Mirtha Other call the office to schedule an appointment if they wish to pursue this consultation.

## 2023-09-05 ENCOUNTER — HOSPITAL ENCOUNTER (EMERGENCY)
Age: 63
Discharge: HOME | End: 2023-09-05
Attending: EMERGENCY MEDICINE
Payer: MEDICAID

## 2023-09-05 ENCOUNTER — APPOINTMENT (OUTPATIENT)
Dept: GENERAL RADIOLOGY | Age: 63
End: 2023-09-05
Payer: MEDICAID

## 2023-09-05 VITALS
RESPIRATION RATE: 20 BRPM | HEART RATE: 58 BPM | BODY MASS INDEX: 24.62 KG/M2 | SYSTOLIC BLOOD PRESSURE: 131 MMHG | OXYGEN SATURATION: 98 % | WEIGHT: 171.96 LBS | HEIGHT: 70 IN | DIASTOLIC BLOOD PRESSURE: 82 MMHG | TEMPERATURE: 98.2 F

## 2023-09-05 DIAGNOSIS — J44.1 COPD EXACERBATION (HCC): Primary | ICD-10-CM

## 2023-09-05 DIAGNOSIS — R06.02 SHORTNESS OF BREATH: ICD-10-CM

## 2023-09-05 LAB
ALBUMIN SERPL-MCNC: 4.4 G/DL (ref 3.4–5)
ALBUMIN/GLOB SERPL: 2.1 {RATIO} (ref 1.1–2.2)
ALP SERPL-CCNC: 113 U/L (ref 40–129)
ALT SERPL-CCNC: 10 U/L (ref 10–40)
ANION GAP SERPL CALCULATED.3IONS-SCNC: 11 MMOL/L (ref 3–16)
AST SERPL-CCNC: 18 U/L (ref 15–37)
BASE EXCESS BLDV CALC-SCNC: 3.8 MMOL/L (ref -3–3)
BASOPHILS # BLD: 0 K/UL (ref 0–0.2)
BASOPHILS NFR BLD: 0.6 %
BILIRUB SERPL-MCNC: 0.4 MG/DL (ref 0–1)
BILIRUB UR QL STRIP.AUTO: NEGATIVE
BUN SERPL-MCNC: 8 MG/DL (ref 7–20)
CALCIUM SERPL-MCNC: 9.4 MG/DL (ref 8.3–10.6)
CHLORIDE SERPL-SCNC: 103 MMOL/L (ref 99–110)
CLARITY UR: CLEAR
CO2 BLDV-SCNC: 27 MMOL/L
CO2 SERPL-SCNC: 25 MMOL/L (ref 21–32)
COLOR UR: YELLOW
CREAT SERPL-MCNC: 0.8 MG/DL (ref 0.6–1.2)
DEPRECATED RDW RBC AUTO: 13.7 % (ref 12.4–15.4)
EKG ATRIAL RATE: 66 BPM
EKG DIAGNOSIS: NORMAL
EKG P AXIS: 68 DEGREES
EKG P-R INTERVAL: 158 MS
EKG Q-T INTERVAL: 410 MS
EKG QRS DURATION: 92 MS
EKG QTC CALCULATION (BAZETT): 429 MS
EKG R AXIS: -22 DEGREES
EKG T AXIS: -49 DEGREES
EKG VENTRICULAR RATE: 66 BPM
EOSINOPHIL # BLD: 0.1 K/UL (ref 0–0.6)
EOSINOPHIL NFR BLD: 2.4 %
GFR SERPLBLD CREATININE-BSD FMLA CKD-EPI: >60 ML/MIN/{1.73_M2}
GLUCOSE SERPL-MCNC: 113 MG/DL (ref 70–99)
GLUCOSE UR STRIP.AUTO-MCNC: NEGATIVE MG/DL
HCO3 BLDV-SCNC: 27.6 MMOL/L (ref 23–29)
HCT VFR BLD AUTO: 42.3 % (ref 36–48)
HGB BLD-MCNC: 14.2 G/DL (ref 12–16)
HGB UR QL STRIP.AUTO: NEGATIVE
KETONES UR STRIP.AUTO-MCNC: NEGATIVE MG/DL
LEUKOCYTE ESTERASE UR QL STRIP.AUTO: NEGATIVE
LYMPHOCYTES # BLD: 1.1 K/UL (ref 1–5.1)
LYMPHOCYTES NFR BLD: 21.7 %
MCH RBC QN AUTO: 30.5 PG (ref 26–34)
MCHC RBC AUTO-ENTMCNC: 33.7 G/DL (ref 31–36)
MCV RBC AUTO: 90.3 FL (ref 80–100)
MONOCYTES # BLD: 0.4 K/UL (ref 0–1.3)
MONOCYTES NFR BLD: 8.7 %
NEUTROPHILS # BLD: 3.4 K/UL (ref 1.7–7.7)
NEUTROPHILS NFR BLD: 66.6 %
NITRITE UR QL STRIP.AUTO: NEGATIVE
NT-PROBNP SERPL-MCNC: 487 PG/ML (ref 0–124)
O2 THERAPY: ABNORMAL
PCO2 BLDV: 38.5 MMHG (ref 40–50)
PH BLDV: 7.46 [PH] (ref 7.35–7.45)
PH UR STRIP.AUTO: 7 [PH] (ref 5–8)
PLATELET # BLD AUTO: 139 K/UL (ref 135–450)
PMV BLD AUTO: 8.1 FL (ref 5–10.5)
PO2 BLDV: 58.8 MMHG (ref 25–40)
POTASSIUM SERPL-SCNC: 4 MMOL/L (ref 3.5–5.1)
PROT SERPL-MCNC: 6.5 G/DL (ref 6.4–8.2)
PROT UR STRIP.AUTO-MCNC: NEGATIVE MG/DL
RBC # BLD AUTO: 4.68 M/UL (ref 4–5.2)
SAO2 % BLDV: 92 %
SARS-COV-2 RDRP RESP QL NAA+PROBE: NOT DETECTED
SODIUM SERPL-SCNC: 139 MMOL/L (ref 136–145)
SP GR UR STRIP.AUTO: <=1.005 (ref 1–1.03)
TROPONIN, HIGH SENSITIVITY: <6 NG/L (ref 0–14)
UA COMPLETE W REFLEX CULTURE PNL UR: NORMAL
UA DIPSTICK W REFLEX MICRO PNL UR: NORMAL
URN SPEC COLLECT METH UR: NORMAL
UROBILINOGEN UR STRIP-ACNC: 0.2 E.U./DL
WBC # BLD AUTO: 5.1 K/UL (ref 4–11)

## 2023-09-05 PROCEDURE — 84484 ASSAY OF TROPONIN QUANT: CPT

## 2023-09-05 PROCEDURE — 93005 ELECTROCARDIOGRAM TRACING: CPT | Performed by: EMERGENCY MEDICINE

## 2023-09-05 PROCEDURE — 36415 COLL VENOUS BLD VENIPUNCTURE: CPT

## 2023-09-05 PROCEDURE — 99285 EMERGENCY DEPT VISIT HI MDM: CPT

## 2023-09-05 PROCEDURE — 87635 SARS-COV-2 COVID-19 AMP PRB: CPT

## 2023-09-05 PROCEDURE — 93010 ELECTROCARDIOGRAM REPORT: CPT | Performed by: INTERNAL MEDICINE

## 2023-09-05 PROCEDURE — 6370000000 HC RX 637 (ALT 250 FOR IP): Performed by: EMERGENCY MEDICINE

## 2023-09-05 PROCEDURE — 80053 COMPREHEN METABOLIC PANEL: CPT

## 2023-09-05 PROCEDURE — 71046 X-RAY EXAM CHEST 2 VIEWS: CPT

## 2023-09-05 PROCEDURE — 85025 COMPLETE CBC W/AUTO DIFF WBC: CPT

## 2023-09-05 PROCEDURE — 81003 URINALYSIS AUTO W/O SCOPE: CPT

## 2023-09-05 PROCEDURE — 82803 BLOOD GASES ANY COMBINATION: CPT

## 2023-09-05 PROCEDURE — 83880 ASSAY OF NATRIURETIC PEPTIDE: CPT

## 2023-09-05 RX ORDER — PREDNISONE 20 MG/1
40 TABLET ORAL ONCE
Status: COMPLETED | OUTPATIENT
Start: 2023-09-05 | End: 2023-09-05

## 2023-09-05 RX ORDER — PREDNISONE 10 MG/1
40 TABLET ORAL DAILY
Qty: 20 TABLET | Refills: 0 | Status: SHIPPED | OUTPATIENT
Start: 2023-09-05 | End: 2023-09-10

## 2023-09-05 RX ADMIN — PREDNISONE 40 MG: 20 TABLET ORAL at 07:41

## 2023-09-05 ASSESSMENT — ENCOUNTER SYMPTOMS
CHEST TIGHTNESS: 1
SHORTNESS OF BREATH: 1
WHEEZING: 1
SORE THROAT: 0
NAUSEA: 0
CONSTIPATION: 0
ABDOMINAL PAIN: 0
VOMITING: 0
DIARRHEA: 0
COUGH: 1

## 2023-09-05 ASSESSMENT — PAIN - FUNCTIONAL ASSESSMENT: PAIN_FUNCTIONAL_ASSESSMENT: NONE - DENIES PAIN

## 2023-09-05 NOTE — DISCHARGE INSTRUCTIONS
Call today or tomorrow to follow up with Idalia Barnard MD  in 4-5 days. Take your medication as indicated, if you are given an antibiotic then make sure you get the prescription filled and take the antibiotics until finished. Drink plenty of water while taking the antibiotics. Avoid drinking alcohol while taking antibiotics. [facility] has some antibiotics for free; [facility] has a 4 dollar prescription plan for some antibiotics. Take your prednisone every day. Take Pepcid (famotide - over the counter) every day while you are taking the steroids. Please return to the Emergency Department if you develop any symptoms that concern you, including the following: increasing pain, shortness of breath, excessive cough or change in the amount of sputum that you cough up or a change in the color of your sputum, using your inhaler more frequent or if your inhaler only lasts up to 2 hours, vomiting, fevers, numbness, weakness, loss of bladder/bowel control, loss of consciousness or any other concerns.

## 2023-09-05 NOTE — ED PROVIDER NOTES
with shortness of breath. She has normal lung sounds on arrival.  Did just treat herself with a breathing treatment prior to arrival in the ED. Tolerating room air. Patient also complaining of feeling generally ill with lightheadedness and body aches with a cough. Possibilities of viral syndrome. Did recommend obtaining a COVID swab as well as routine labs, EKG, chest x-ray. Prednisone for history of COPD. Patient agreeable with care plan. DDX- COPD exacerbation, asthma, pneumothorax, anaphylaxis, anxiety, PE , pericardial effusion, CHF, ACS/MI, atelectasis, lower airway obstruction, aspiration    Social Determinants (Poverty, Education, uninsured) -none  Prior notes-ED visit note on 6/9/2023 for right hip pain, COPD and chronic pain in both shoulders, admission notes from 11-22 for COPD exacerbation  Name and route all medications-oral prednisone  Charting interpretations all by myself-EKG and chest x-ray, lab work  Diagnosis descriptions-COPD exacerbation  Consults-none  Disposition- Considered -    Is this patient to be included in the SEP-1 Core Measure due to severe sepsis or septic shock? No   Exclusion criteria - the patient is NOT to be included for SEP-1 Core Measure due to:  2+ SIRS criteria are not met    ED Course as of 09/05/23 0901   Tue Sep 05, 2023   0818 pH on VBG 7.463 with PCO2 of 38.5 and a bicarb of 27.6. Base excess 3.8. Urinalysis negative for infection. [DS]   0895 COVID not detected. WBC 5.1, hemoglobin 14.2. Platelets at 011. No electrolyte disturbances on metabolic panel. Creatinine 0.8 with BUN 8. Anion gap of 11 and CO2 of 25. COVID not detected. Troponin less than 6. Chest x-ray reviewed by myself no acute abnormality. Confirmed by radiology. Patient updated on results. Patient signs symptoms suggestive of COPD exacerbation. [DS]   8259 Patient updated on results. Resting comfortably. Recommended prednisone burst for COPD exacerbation. Patient agreeable.   Also

## 2023-09-18 ENCOUNTER — TELEPHONE (OUTPATIENT)
Dept: PULMONOLOGY | Age: 63
End: 2023-09-18

## 2023-09-18 DIAGNOSIS — J44.9 COPD WITH ASTHMA (HCC): Primary | ICD-10-CM

## 2023-09-18 RX ORDER — PREDNISONE 10 MG/1
TABLET ORAL
Qty: 30 TABLET | Refills: 0 | Status: ON HOLD | OUTPATIENT
Start: 2023-09-18 | End: 2023-09-28

## 2023-09-18 NOTE — TELEPHONE ENCOUNTER
Complains of cough and SOB  Duration Once she stopped the prednisone from the hospital she couldn't breathe again  Cough with sputum production? A little  Color white  Fever? no  Any other Symptoms? Nose running  Any current treatment tried? Nyquil last night to get get some sleep  Using inhalers? yes do they help? some  Pharmacy? [address] [facility] says that her  has a cold. She wants something for her cough, some prednisone, and something to make her nose stop running. Please review. Thank you.

## 2023-09-22 ENCOUNTER — HOSPITAL ENCOUNTER (EMERGENCY)
Age: 63
Discharge: HOME | DRG: 139 | End: 2023-09-22
Attending: EMERGENCY MEDICINE
Payer: MEDICAID

## 2023-09-22 ENCOUNTER — APPOINTMENT (OUTPATIENT)
Dept: GENERAL RADIOLOGY | Age: 63
DRG: 139 | End: 2023-09-22
Payer: MEDICAID

## 2023-09-22 VITALS
WEIGHT: 170.5 LBS | DIASTOLIC BLOOD PRESSURE: 76 MMHG | HEART RATE: 71 BPM | RESPIRATION RATE: 16 BRPM | BODY MASS INDEX: 24.41 KG/M2 | HEIGHT: 70 IN | OXYGEN SATURATION: 100 % | TEMPERATURE: 98.2 F | SYSTOLIC BLOOD PRESSURE: 123 MMHG

## 2023-09-22 DIAGNOSIS — J44.1 COPD EXACERBATION (HCC): Primary | ICD-10-CM

## 2023-09-22 LAB
FLUAV RNA UPPER RESP QL NAA+PROBE: NEGATIVE
FLUBV AG NPH QL: NEGATIVE
SARS-COV-2 RDRP RESP QL NAA+PROBE: NOT DETECTED

## 2023-09-22 PROCEDURE — 99284 EMERGENCY DEPT VISIT MOD MDM: CPT

## 2023-09-22 PROCEDURE — 6360000002 HC RX W HCPCS: Performed by: EMERGENCY MEDICINE

## 2023-09-22 PROCEDURE — 6370000000 HC RX 637 (ALT 250 FOR IP): Performed by: EMERGENCY MEDICINE

## 2023-09-22 PROCEDURE — 71046 X-RAY EXAM CHEST 2 VIEWS: CPT

## 2023-09-22 PROCEDURE — 87635 SARS-COV-2 COVID-19 AMP PRB: CPT

## 2023-09-22 PROCEDURE — 96374 THER/PROPH/DIAG INJ IV PUSH: CPT

## 2023-09-22 PROCEDURE — 87804 INFLUENZA ASSAY W/OPTIC: CPT

## 2023-09-22 RX ORDER — ALBUTEROL SULFATE 2.5 MG/3ML
2.5 SOLUTION RESPIRATORY (INHALATION) ONCE
Status: COMPLETED | OUTPATIENT
Start: 2023-09-22 | End: 2023-09-22

## 2023-09-22 RX ORDER — DOXYCYCLINE HYCLATE 100 MG
100 TABLET ORAL 2 TIMES DAILY
Qty: 14 TABLET | Refills: 0 | Status: ON HOLD | OUTPATIENT
Start: 2023-09-22 | End: 2023-09-27 | Stop reason: HOSPADM

## 2023-09-22 RX ORDER — METHYLPREDNISOLONE SODIUM SUCCINATE 125 MG/2ML
125 INJECTION, POWDER, LYOPHILIZED, FOR SOLUTION INTRAMUSCULAR; INTRAVENOUS ONCE
Status: COMPLETED | OUTPATIENT
Start: 2023-09-22 | End: 2023-09-22

## 2023-09-22 RX ORDER — METHYLPREDNISOLONE SODIUM SUCCINATE 125 MG/2ML
125 INJECTION, POWDER, LYOPHILIZED, FOR SOLUTION INTRAMUSCULAR; INTRAVENOUS ONCE
Status: DISCONTINUED | OUTPATIENT
Start: 2023-09-22 | End: 2023-09-22

## 2023-09-22 RX ORDER — GUAIFENESIN AND DEXTROMETHORPHAN HYDROBROMIDE 600; 30 MG/1; MG/1
1 TABLET, EXTENDED RELEASE ORAL 2 TIMES DAILY
Qty: 14 TABLET | Refills: 0 | Status: SHIPPED | OUTPATIENT
Start: 2023-09-22

## 2023-09-22 RX ORDER — IPRATROPIUM BROMIDE AND ALBUTEROL SULFATE 2.5; .5 MG/3ML; MG/3ML
1 SOLUTION RESPIRATORY (INHALATION) ONCE
Status: COMPLETED | OUTPATIENT
Start: 2023-09-22 | End: 2023-09-22

## 2023-09-22 RX ORDER — BENZONATATE 200 MG/1
200 CAPSULE ORAL 3 TIMES DAILY PRN
Qty: 30 CAPSULE | Refills: 0 | Status: SHIPPED | OUTPATIENT
Start: 2023-09-22 | End: 2023-09-29

## 2023-09-22 RX ADMIN — ALBUTEROL SULFATE 2.5 MG: 2.5 SOLUTION RESPIRATORY (INHALATION) at 11:23

## 2023-09-22 RX ADMIN — IPRATROPIUM BROMIDE AND ALBUTEROL SULFATE 1 DOSE: 2.5; .5 SOLUTION RESPIRATORY (INHALATION) at 10:42

## 2023-09-22 RX ADMIN — METHYLPREDNISOLONE SODIUM SUCCINATE 125 MG: 125 INJECTION INTRAMUSCULAR; INTRAVENOUS at 10:41

## 2023-09-22 ASSESSMENT — PAIN SCALES - GENERAL: PAINLEVEL_OUTOF10: 0

## 2023-09-22 ASSESSMENT — PAIN - FUNCTIONAL ASSESSMENT: PAIN_FUNCTIONAL_ASSESSMENT: 0-10

## 2023-09-22 NOTE — ED NOTES
Discharge paperwork given to and reviewed with pt. Pt verbalized understanding and all questions answered. Pt encouraged to return if having worsening symptoms or new symptoms discussed in discharge paperwork. Pt to follow up with PCP  Rx x 3 given and medications reviewed with pt. IV removed with catheter intact. Pt in NAD, RR even and unlabored.  Pt off unit ambulatory      [place], [place]  09/22/23 3777

## 2023-09-22 NOTE — ED NOTES
Pt calm and resting quietly with equal rise and fall of chest. Pt is dypneic upon ambulation back to room. O2 remains above 90%. Pt in NAD, RR even and unlabored. Side rails up, bed locked and in lowest position. Pt updated on plan of care. No needs at this time. Pt instructed on use of call light if needed.        Cathy Rosa RN  09/22/23 4958

## 2023-09-23 ENCOUNTER — HOSPITAL ENCOUNTER (INPATIENT)
Age: 63
LOS: 4 days | Discharge: HOME | DRG: 139 | End: 2023-09-27
Attending: EMERGENCY MEDICINE | Admitting: INTERNAL MEDICINE
Payer: MEDICAID

## 2023-09-23 ENCOUNTER — APPOINTMENT (OUTPATIENT)
Dept: CT IMAGING | Age: 63
DRG: 139 | End: 2023-09-23
Payer: MEDICAID

## 2023-09-23 DIAGNOSIS — R09.02 HYPOXIA: ICD-10-CM

## 2023-09-23 DIAGNOSIS — J18.9 COMMUNITY ACQUIRED PNEUMONIA, UNSPECIFIED LATERALITY: ICD-10-CM

## 2023-09-23 DIAGNOSIS — Z71.89 GOALS OF CARE, COUNSELING/DISCUSSION: ICD-10-CM

## 2023-09-23 DIAGNOSIS — J44.1 ACUTE EXACERBATION OF CHRONIC OBSTRUCTIVE PULMONARY DISEASE (HCC): Primary | ICD-10-CM

## 2023-09-23 DIAGNOSIS — R79.89 ELEVATED TROPONIN: ICD-10-CM

## 2023-09-23 PROBLEM — J16.0 CAP (COMMUNITY ACQUIRED PNEUMONIA) DUE TO CHLAMYDIA SPECIES: Status: ACTIVE | Noted: 2023-09-23

## 2023-09-23 LAB
ALBUMIN SERPL-MCNC: 4.5 G/DL (ref 3.4–5)
ALBUMIN/GLOB SERPL: 2 {RATIO} (ref 1.1–2.2)
ALP SERPL-CCNC: 105 U/L (ref 40–129)
ALT SERPL-CCNC: 24 U/L (ref 10–40)
ANION GAP SERPL CALCULATED.3IONS-SCNC: 13 MMOL/L (ref 3–16)
AST SERPL-CCNC: 28 U/L (ref 15–37)
BASE EXCESS BLDV CALC-SCNC: 0.6 MMOL/L (ref -3–3)
BASOPHILS # BLD: 0 K/UL (ref 0–0.2)
BASOPHILS NFR BLD: 0.3 %
BILIRUB SERPL-MCNC: 0.6 MG/DL (ref 0–1)
BUN SERPL-MCNC: 13 MG/DL (ref 7–20)
CALCIUM SERPL-MCNC: 9.6 MG/DL (ref 8.3–10.6)
CHLORIDE SERPL-SCNC: 101 MMOL/L (ref 99–110)
CO2 BLDV-SCNC: 26 MMOL/L
CO2 SERPL-SCNC: 27 MMOL/L (ref 21–32)
CREAT SERPL-MCNC: 0.9 MG/DL (ref 0.6–1.2)
DEPRECATED RDW RBC AUTO: 14.1 % (ref 12.4–15.4)
EOSINOPHIL # BLD: 0 K/UL (ref 0–0.6)
EOSINOPHIL NFR BLD: 0.1 %
FLUAV RNA UPPER RESP QL NAA+PROBE: NEGATIVE
FLUBV AG NPH QL: NEGATIVE
GFR SERPLBLD CREATININE-BSD FMLA CKD-EPI: >60 ML/MIN/{1.73_M2}
GLUCOSE SERPL-MCNC: 121 MG/DL (ref 70–99)
HCO3 BLDV-SCNC: 26.1 MMOL/L (ref 23–29)
HCT VFR BLD AUTO: 42 % (ref 36–48)
HGB BLD-MCNC: 14.2 G/DL (ref 12–16)
LYMPHOCYTES # BLD: 0.6 K/UL (ref 1–5.1)
LYMPHOCYTES NFR BLD: 7.5 %
MCH RBC QN AUTO: 30.6 PG (ref 26–34)
MCHC RBC AUTO-ENTMCNC: 33.8 G/DL (ref 31–36)
MCV RBC AUTO: 90.3 FL (ref 80–100)
MONOCYTES # BLD: 0.5 K/UL (ref 0–1.3)
MONOCYTES NFR BLD: 7.1 %
NEUTROPHILS # BLD: 6.4 K/UL (ref 1.7–7.7)
NEUTROPHILS NFR BLD: 85 %
O2 THERAPY: ABNORMAL
PCO2 BLDV: 46.7 MMHG (ref 40–50)
PH BLDV: 7.36 [PH] (ref 7.35–7.45)
PLATELET # BLD AUTO: 173 K/UL (ref 135–450)
PMV BLD AUTO: 7.9 FL (ref 5–10.5)
PO2 BLDV: 48.4 MMHG (ref 25–40)
POTASSIUM SERPL-SCNC: 3.9 MMOL/L (ref 3.5–5.1)
PROCALCITONIN SERPL IA-MCNC: 0.04 NG/ML (ref 0–0.15)
PROT SERPL-MCNC: 6.7 G/DL (ref 6.4–8.2)
RBC # BLD AUTO: 4.65 M/UL (ref 4–5.2)
SAO2 % BLDV: 81 %
SARS-COV-2 RDRP RESP QL NAA+PROBE: NOT DETECTED
SODIUM SERPL-SCNC: 141 MMOL/L (ref 136–145)
TROPONIN, HIGH SENSITIVITY: 15 NG/L (ref 0–14)
TROPONIN, HIGH SENSITIVITY: 15 NG/L (ref 0–14)
WBC # BLD AUTO: 7.5 K/UL (ref 4–11)

## 2023-09-23 PROCEDURE — 36415 COLL VENOUS BLD VENIPUNCTURE: CPT

## 2023-09-23 PROCEDURE — 93005 ELECTROCARDIOGRAM TRACING: CPT | Performed by: EMERGENCY MEDICINE

## 2023-09-23 PROCEDURE — 87804 INFLUENZA ASSAY W/OPTIC: CPT

## 2023-09-23 PROCEDURE — 87635 SARS-COV-2 COVID-19 AMP PRB: CPT

## 2023-09-23 PROCEDURE — 93005 ELECTROCARDIOGRAM TRACING: CPT

## 2023-09-23 PROCEDURE — 6370000000 HC RX 637 (ALT 250 FOR IP): Performed by: SURGERY

## 2023-09-23 PROCEDURE — 94761 N-INVAS EAR/PLS OXIMETRY MLT: CPT

## 2023-09-23 PROCEDURE — 2580000003 HC RX 258: Performed by: SURGERY

## 2023-09-23 PROCEDURE — 6370000000 HC RX 637 (ALT 250 FOR IP)

## 2023-09-23 PROCEDURE — 94640 AIRWAY INHALATION TREATMENT: CPT

## 2023-09-23 PROCEDURE — 6370000000 HC RX 637 (ALT 250 FOR IP): Performed by: NURSE PRACTITIONER

## 2023-09-23 PROCEDURE — 71260 CT THORAX DX C+: CPT

## 2023-09-23 PROCEDURE — 6360000004 HC RX CONTRAST MEDICATION

## 2023-09-23 PROCEDURE — 80053 COMPREHEN METABOLIC PANEL: CPT

## 2023-09-23 PROCEDURE — 96367 TX/PROPH/DG ADDL SEQ IV INF: CPT

## 2023-09-23 PROCEDURE — 99285 EMERGENCY DEPT VISIT HI MDM: CPT

## 2023-09-23 PROCEDURE — 85025 COMPLETE CBC W/AUTO DIFF WBC: CPT

## 2023-09-23 PROCEDURE — 82803 BLOOD GASES ANY COMBINATION: CPT

## 2023-09-23 PROCEDURE — 96365 THER/PROPH/DIAG IV INF INIT: CPT

## 2023-09-23 PROCEDURE — 84484 ASSAY OF TROPONIN QUANT: CPT

## 2023-09-23 PROCEDURE — 2580000003 HC RX 258

## 2023-09-23 PROCEDURE — 6360000002 HC RX W HCPCS

## 2023-09-23 PROCEDURE — 84145 PROCALCITONIN (PCT): CPT

## 2023-09-23 PROCEDURE — 6370000000 HC RX 637 (ALT 250 FOR IP): Performed by: EMERGENCY MEDICINE

## 2023-09-23 PROCEDURE — 1200000000 HC SEMI PRIVATE

## 2023-09-23 PROCEDURE — 2700000000 HC OXYGEN THERAPY PER DAY

## 2023-09-23 RX ORDER — POLYETHYLENE GLYCOL 3350 17 G/17G
17 POWDER, FOR SOLUTION ORAL DAILY PRN
Status: DISCONTINUED | OUTPATIENT
Start: 2023-09-23 | End: 2023-09-27 | Stop reason: HOSPADM

## 2023-09-23 RX ORDER — PREDNISONE 20 MG/1
40 TABLET ORAL DAILY
Status: COMPLETED | OUTPATIENT
Start: 2023-09-24 | End: 2023-09-27

## 2023-09-23 RX ORDER — IPRATROPIUM BROMIDE AND ALBUTEROL SULFATE 2.5; .5 MG/3ML; MG/3ML
1 SOLUTION RESPIRATORY (INHALATION)
Status: DISCONTINUED | OUTPATIENT
Start: 2023-09-24 | End: 2023-09-27 | Stop reason: HOSPADM

## 2023-09-23 RX ORDER — AZITHROMYCIN 500 MG/1
500 TABLET, FILM COATED ORAL EVERY 24 HOURS
Status: COMPLETED | OUTPATIENT
Start: 2023-09-24 | End: 2023-09-26

## 2023-09-23 RX ORDER — IPRATROPIUM BROMIDE AND ALBUTEROL SULFATE 2.5; .5 MG/3ML; MG/3ML
1 SOLUTION RESPIRATORY (INHALATION) ONCE
Status: COMPLETED | OUTPATIENT
Start: 2023-09-23 | End: 2023-09-23

## 2023-09-23 RX ORDER — ALBUTEROL SULFATE 2.5 MG/3ML
5 SOLUTION RESPIRATORY (INHALATION) ONCE
Status: DISCONTINUED | OUTPATIENT
Start: 2023-09-23 | End: 2023-09-23

## 2023-09-23 RX ORDER — ACETAMINOPHEN 650 MG/1
650 SUPPOSITORY RECTAL EVERY 6 HOURS PRN
Status: DISCONTINUED | OUTPATIENT
Start: 2023-09-23 | End: 2023-09-27 | Stop reason: HOSPADM

## 2023-09-23 RX ORDER — ONDANSETRON 4 MG/1
4 TABLET, ORALLY DISINTEGRATING ORAL EVERY 8 HOURS PRN
Status: DISCONTINUED | OUTPATIENT
Start: 2023-09-23 | End: 2023-09-27 | Stop reason: HOSPADM

## 2023-09-23 RX ORDER — SODIUM CHLORIDE 9 MG/ML
INJECTION, SOLUTION INTRAVENOUS PRN
Status: DISCONTINUED | OUTPATIENT
Start: 2023-09-23 | End: 2023-09-27 | Stop reason: HOSPADM

## 2023-09-23 RX ORDER — PREDNISONE 20 MG/1
60 TABLET ORAL ONCE
Status: COMPLETED | OUTPATIENT
Start: 2023-09-23 | End: 2023-09-23

## 2023-09-23 RX ORDER — PREDNISONE 20 MG/1
40 TABLET ORAL DAILY
Status: DISCONTINUED | OUTPATIENT
Start: 2023-09-24 | End: 2023-09-23

## 2023-09-23 RX ORDER — GUAIFENESIN 600 MG/1
600 TABLET, EXTENDED RELEASE ORAL 2 TIMES DAILY
Status: DISCONTINUED | OUTPATIENT
Start: 2023-09-23 | End: 2023-09-27 | Stop reason: HOSPADM

## 2023-09-23 RX ORDER — ACETAMINOPHEN 325 MG/1
650 TABLET ORAL EVERY 6 HOURS PRN
Status: DISCONTINUED | OUTPATIENT
Start: 2023-09-23 | End: 2023-09-27 | Stop reason: HOSPADM

## 2023-09-23 RX ORDER — ENOXAPARIN SODIUM 100 MG/ML
40 INJECTION SUBCUTANEOUS DAILY
Status: DISCONTINUED | OUTPATIENT
Start: 2023-09-24 | End: 2023-09-27 | Stop reason: HOSPADM

## 2023-09-23 RX ORDER — DIPHENHYDRAMINE HCL 25 MG
25 TABLET ORAL ONCE
Status: COMPLETED | OUTPATIENT
Start: 2023-09-23 | End: 2023-09-23

## 2023-09-23 RX ORDER — IPRATROPIUM BROMIDE AND ALBUTEROL SULFATE 2.5; .5 MG/3ML; MG/3ML
1 SOLUTION RESPIRATORY (INHALATION)
Status: COMPLETED | OUTPATIENT
Start: 2023-09-23 | End: 2023-09-25

## 2023-09-23 RX ORDER — SODIUM CHLORIDE 0.9 % (FLUSH) 0.9 %
5-40 SYRINGE (ML) INJECTION PRN
Status: DISCONTINUED | OUTPATIENT
Start: 2023-09-23 | End: 2023-09-27 | Stop reason: HOSPADM

## 2023-09-23 RX ORDER — BENZONATATE 100 MG/1
100 CAPSULE ORAL 3 TIMES DAILY PRN
Status: DISCONTINUED | OUTPATIENT
Start: 2023-09-23 | End: 2023-09-27 | Stop reason: HOSPADM

## 2023-09-23 RX ORDER — MAGNESIUM SULFATE IN WATER 40 MG/ML
2000 INJECTION, SOLUTION INTRAVENOUS ONCE
Status: COMPLETED | OUTPATIENT
Start: 2023-09-23 | End: 2023-09-23

## 2023-09-23 RX ORDER — SODIUM CHLORIDE 0.9 % (FLUSH) 0.9 %
5-40 SYRINGE (ML) INJECTION EVERY 12 HOURS SCHEDULED
Status: DISCONTINUED | OUTPATIENT
Start: 2023-09-23 | End: 2023-09-27 | Stop reason: HOSPADM

## 2023-09-23 RX ORDER — GUAIFENESIN/DEXTROMETHORPHAN 100-10MG/5
5 SYRUP ORAL EVERY 4 HOURS PRN
Status: DISCONTINUED | OUTPATIENT
Start: 2023-09-23 | End: 2023-09-27 | Stop reason: HOSPADM

## 2023-09-23 RX ORDER — ASPIRIN 81 MG/1
324 TABLET, CHEWABLE ORAL ONCE
Status: COMPLETED | OUTPATIENT
Start: 2023-09-23 | End: 2023-09-23

## 2023-09-23 RX ORDER — ONDANSETRON 2 MG/ML
4 INJECTION INTRAMUSCULAR; INTRAVENOUS EVERY 6 HOURS PRN
Status: DISCONTINUED | OUTPATIENT
Start: 2023-09-23 | End: 2023-09-27 | Stop reason: HOSPADM

## 2023-09-23 RX ADMIN — SODIUM CHLORIDE, PRESERVATIVE FREE 10 ML: 5 INJECTION INTRAVENOUS at 21:42

## 2023-09-23 RX ADMIN — IPRATROPIUM BROMIDE AND ALBUTEROL SULFATE 1 DOSE: 2.5; .5 SOLUTION RESPIRATORY (INHALATION) at 23:10

## 2023-09-23 RX ADMIN — AZITHROMYCIN MONOHYDRATE 500 MG: 500 INJECTION, POWDER, LYOPHILIZED, FOR SOLUTION INTRAVENOUS at 18:35

## 2023-09-23 RX ADMIN — IOMEPROL INJECTION 100 ML: 714 INJECTION, SOLUTION INTRAVASCULAR at 16:35

## 2023-09-23 RX ADMIN — IPRATROPIUM BROMIDE AND ALBUTEROL SULFATE 1 DOSE: 2.5; .5 SOLUTION RESPIRATORY (INHALATION) at 18:36

## 2023-09-23 RX ADMIN — ASPIRIN 324 MG: 81 TABLET, CHEWABLE ORAL at 16:36

## 2023-09-23 RX ADMIN — DIPHENHYDRAMINE HCL 25 MG: 25 TABLET ORAL at 22:32

## 2023-09-23 RX ADMIN — MAGNESIUM SULFATE HEPTAHYDRATE 2000 MG: 40 INJECTION, SOLUTION INTRAVENOUS at 15:16

## 2023-09-23 RX ADMIN — IPRATROPIUM BROMIDE AND ALBUTEROL SULFATE 1 DOSE: .5; 2.5 SOLUTION RESPIRATORY (INHALATION) at 15:02

## 2023-09-23 RX ADMIN — PREDNISONE 60 MG: 20 TABLET ORAL at 15:24

## 2023-09-23 RX ADMIN — GUAIFENESIN 600 MG: 600 TABLET ORAL at 21:41

## 2023-09-23 RX ADMIN — CEFTRIAXONE 1000 MG: 1 INJECTION, POWDER, FOR SOLUTION INTRAMUSCULAR; INTRAVENOUS at 18:00

## 2023-09-23 NOTE — ED NOTES
Pt arrives ambulatory to ED with c/o SOB, she states she was here yesterday for the same. Pt woke up around 0430 this morning and has taken approx 8 duoneb breathing treatments without relief. Pt also took 30mg of prednisone this morning. Hx of COPD and asthma. Bilateral wheezing. 96% on RA.      Abby Nuñez RN  09/23/23 1943

## 2023-09-23 NOTE — ED PROVIDER NOTES
[address] [facility],[address] ENCOUNTER        Pt Name: Chip Rosado  MRN: [id number]  [address] [facility] 1960  Date of evaluation: 9/23/2023  Provider: NATALIE Villatoro - CNP  PCP: Kush Reilly MD  Note Started: 3:12 PM EDT 9/23/23       I have seen and evaluated this patient with my supervising physician Chel Duron, [address]       Chief Complaint   Patient presents with    Shortness of Breath     Pt states she was here yesterday for the same. Pt woke up around 0430 this morning and has taken approx 8 duoneb breathing treatments without relief. Pt also took 30mg of prednisone this morning. HISTORY OF PRESENT ILLNESS: 1 or more Elements     History From: pt            Chief Complaint:migdalia Rosado is a 63 y.o. female who presents to ed with sob and wheezing. Started a few days ago  Thought it was copd exacerbation. Was seen in ed on 9/5 and started on prednisone and nebs. No oxygen at home  Follows with dr Philip Early    Seen here yesterday with further exacerbation. Felt fine at time of dc but when home to celebrate daughter birthday and \"pushed it\". Woke up 0430 with wheezing. Has been doing home breathing tx Q 2 hours with no relief.     + sick contact  has \"cold\" and is on abx.     2 doses covid vaccine. The patient  reports that she quit smoking about 7 years ago. Her smoking use included cigarettes. She has a 22.50 pack-year smoking history. She has been exposed to tobacco smoke. She has never used smokeless tobacco. She reports that she does not currently use drugs. She reports that she does not drink alcohol. Nursing Notes were all reviewed and agreed with or any disagreements were addressed in the HPI. REVIEW OF SYSTEMS :      Review of Systems    Positives and Pertinent negatives as per HPI.      SURGICAL HISTORY     Past Surgical History:   Procedure Laterality Date    COLONOSCOPY
HISTORY: copd  TECHNOLOGIST PROVIDED HISTORY:  Reason for exam:->copd  Reason for Exam: sob, cough     FINDINGS:  Normal cardiomediastinal silhouette. No acute airspace infiltrate. No  pneumothorax or pleural effusion     IMPRESSION:  No acute cardiopulmonary findings           Prior records were reviewed including recent emergency department visit from yesterday. She presented at that time with coughing wheezing, sputum production, and exposure to her  who has URI symptoms. In addition, she was seen in the emergency department approximately 2 weeks ago with some similar symptoms and prescribed a 5-day course of prednisone. She followed up with her pulmonologist who prescribed a longer course of steroids. She is currently on a taper at 30 mg daily for 5 days. She did have testing yesterday including rapid COVID test and rapid influenza which were negative. Chest x-ray completed yesterday revealed no active disease. She received Solu-Medrol in the emergency department and was improved at the time of discharge. She was discharged home on Mucinex and doxycycline. Lab results were reviewed. Glucose 121. Creatinine 0.9. White blood cell count 7.5. Platelets 996.  pH 4.73. PCO2 is 46. High-sensitivity troponin is mildly elevated at 15. Previous high-sensitivity troponin from September 5, 2023 was less than 6. She did have an episode of respiratory distress this morning at 4:30 AM.  I cannot exclude the possibility of demand ischemia from hypoxia. Suspicion for acute coronary syndrome is low. Repeat EKG was obtained. No evolving changes. No ST elevation. No current chest pain. She was given aspirin 324 mg p.o.    CT chest PE protocol was obtained. Dense of pulmonary embolus. There is mucous plugging and infiltrate in the right lower lobe. She was given Rocephin 1 g IV and Zithromax 500 mg IV. The patient is requiring oxygen and was placed on 2 L per nasal cannula for hypoxia.

## 2023-09-24 LAB
ANION GAP SERPL CALCULATED.3IONS-SCNC: 7 MMOL/L (ref 3–16)
BASOPHILS # BLD: 0 K/UL (ref 0–0.2)
BASOPHILS NFR BLD: 0.2 %
BUN SERPL-MCNC: 8 MG/DL (ref 7–20)
CALCIUM SERPL-MCNC: 8.4 MG/DL (ref 8.3–10.6)
CHLORIDE SERPL-SCNC: 103 MMOL/L (ref 99–110)
CO2 SERPL-SCNC: 30 MMOL/L (ref 21–32)
CREAT SERPL-MCNC: 0.8 MG/DL (ref 0.6–1.2)
DEPRECATED RDW RBC AUTO: 14 % (ref 12.4–15.4)
EKG ATRIAL RATE: 67 BPM
EKG ATRIAL RATE: 68 BPM
EKG DIAGNOSIS: NORMAL
EKG DIAGNOSIS: NORMAL
EKG P AXIS: 63 DEGREES
EKG P AXIS: 78 DEGREES
EKG P-R INTERVAL: 146 MS
EKG P-R INTERVAL: 158 MS
EKG Q-T INTERVAL: 372 MS
EKG Q-T INTERVAL: 396 MS
EKG QRS DURATION: 88 MS
EKG QRS DURATION: 88 MS
EKG QTC CALCULATION (BAZETT): 393 MS
EKG QTC CALCULATION (BAZETT): 421 MS
EKG R AXIS: 0 DEGREES
EKG R AXIS: 8 DEGREES
EKG T AXIS: 18 DEGREES
EKG T AXIS: 25 DEGREES
EKG VENTRICULAR RATE: 67 BPM
EKG VENTRICULAR RATE: 68 BPM
EOSINOPHIL # BLD: 0 K/UL (ref 0–0.6)
EOSINOPHIL NFR BLD: 0.1 %
GFR SERPLBLD CREATININE-BSD FMLA CKD-EPI: >60 ML/MIN/{1.73_M2}
GLUCOSE SERPL-MCNC: 86 MG/DL (ref 70–99)
HCT VFR BLD AUTO: 40.5 % (ref 36–48)
HGB BLD-MCNC: 13.6 G/DL (ref 12–16)
LYMPHOCYTES # BLD: 1 K/UL (ref 1–5.1)
LYMPHOCYTES NFR BLD: 14.7 %
MCH RBC QN AUTO: 29.8 PG (ref 26–34)
MCHC RBC AUTO-ENTMCNC: 33.4 G/DL (ref 31–36)
MCV RBC AUTO: 89.1 FL (ref 80–100)
MONOCYTES # BLD: 0.7 K/UL (ref 0–1.3)
MONOCYTES NFR BLD: 10.1 %
NEUTROPHILS # BLD: 5.2 K/UL (ref 1.7–7.7)
NEUTROPHILS NFR BLD: 74.9 %
PLATELET # BLD AUTO: 167 K/UL (ref 135–450)
PMV BLD AUTO: 8.2 FL (ref 5–10.5)
POTASSIUM SERPL-SCNC: 4.2 MMOL/L (ref 3.5–5.1)
RBC # BLD AUTO: 4.55 M/UL (ref 4–5.2)
SODIUM SERPL-SCNC: 140 MMOL/L (ref 136–145)
WBC # BLD AUTO: 6.9 K/UL (ref 4–11)

## 2023-09-24 PROCEDURE — 94761 N-INVAS EAR/PLS OXIMETRY MLT: CPT

## 2023-09-24 PROCEDURE — 6370000000 HC RX 637 (ALT 250 FOR IP): Performed by: INTERNAL MEDICINE

## 2023-09-24 PROCEDURE — 93010 ELECTROCARDIOGRAM REPORT: CPT | Performed by: INTERNAL MEDICINE

## 2023-09-24 PROCEDURE — 6360000002 HC RX W HCPCS: Performed by: SURGERY

## 2023-09-24 PROCEDURE — 80048 BASIC METABOLIC PNL TOTAL CA: CPT

## 2023-09-24 PROCEDURE — 6370000000 HC RX 637 (ALT 250 FOR IP): Performed by: SURGERY

## 2023-09-24 PROCEDURE — 1200000000 HC SEMI PRIVATE

## 2023-09-24 PROCEDURE — 94640 AIRWAY INHALATION TREATMENT: CPT

## 2023-09-24 PROCEDURE — 6370000000 HC RX 637 (ALT 250 FOR IP)

## 2023-09-24 PROCEDURE — 2580000003 HC RX 258: Performed by: SURGERY

## 2023-09-24 PROCEDURE — 85025 COMPLETE CBC W/AUTO DIFF WBC: CPT

## 2023-09-24 PROCEDURE — 2700000000 HC OXYGEN THERAPY PER DAY

## 2023-09-24 PROCEDURE — 6370000000 HC RX 637 (ALT 250 FOR IP): Performed by: NURSE PRACTITIONER

## 2023-09-24 PROCEDURE — 36415 COLL VENOUS BLD VENIPUNCTURE: CPT

## 2023-09-24 RX ORDER — DIPHENHYDRAMINE HCL 25 MG
25 TABLET ORAL ONCE
Status: COMPLETED | OUTPATIENT
Start: 2023-09-24 | End: 2023-09-24

## 2023-09-24 RX ORDER — METHADONE HYDROCHLORIDE 10 MG/1
95 TABLET ORAL DAILY
Status: DISCONTINUED | OUTPATIENT
Start: 2023-09-24 | End: 2023-09-27 | Stop reason: HOSPADM

## 2023-09-24 RX ADMIN — PREDNISONE 40 MG: 20 TABLET ORAL at 07:41

## 2023-09-24 RX ADMIN — AZITHROMYCIN 500 MG: 500 TABLET, FILM COATED ORAL at 07:41

## 2023-09-24 RX ADMIN — ACETAMINOPHEN 650 MG: 325 TABLET ORAL at 19:02

## 2023-09-24 RX ADMIN — IPRATROPIUM BROMIDE AND ALBUTEROL SULFATE 1 DOSE: 2.5; .5 SOLUTION RESPIRATORY (INHALATION) at 16:12

## 2023-09-24 RX ADMIN — GUAIFENESIN 600 MG: 600 TABLET ORAL at 22:27

## 2023-09-24 RX ADMIN — METHADONE HYDROCHLORIDE 95 MG: 10 TABLET ORAL at 14:34

## 2023-09-24 RX ADMIN — IPRATROPIUM BROMIDE AND ALBUTEROL SULFATE 1 DOSE: 2.5; .5 SOLUTION RESPIRATORY (INHALATION) at 20:45

## 2023-09-24 RX ADMIN — GUAIFENESIN 600 MG: 600 TABLET ORAL at 07:41

## 2023-09-24 RX ADMIN — IPRATROPIUM BROMIDE AND ALBUTEROL SULFATE 1 DOSE: 2.5; .5 SOLUTION RESPIRATORY (INHALATION) at 11:59

## 2023-09-24 RX ADMIN — SODIUM CHLORIDE, PRESERVATIVE FREE 10 ML: 5 INJECTION INTRAVENOUS at 11:19

## 2023-09-24 RX ADMIN — MOMETASONE FUROATE AND FORMOTEROL FUMARATE DIHYDRATE 2 PUFF: 100; 5 AEROSOL RESPIRATORY (INHALATION) at 20:45

## 2023-09-24 RX ADMIN — ENOXAPARIN SODIUM 40 MG: 100 INJECTION SUBCUTANEOUS at 07:43

## 2023-09-24 RX ADMIN — SODIUM CHLORIDE, PRESERVATIVE FREE 10 ML: 5 INJECTION INTRAVENOUS at 22:28

## 2023-09-24 RX ADMIN — IPRATROPIUM BROMIDE AND ALBUTEROL SULFATE 1 DOSE: 2.5; .5 SOLUTION RESPIRATORY (INHALATION) at 06:49

## 2023-09-24 RX ADMIN — IPRATROPIUM BROMIDE AND ALBUTEROL SULFATE 1 DOSE: 2.5; .5 SOLUTION RESPIRATORY (INHALATION) at 08:10

## 2023-09-24 RX ADMIN — IPRATROPIUM BROMIDE AND ALBUTEROL SULFATE 1 DOSE: 2.5; .5 SOLUTION RESPIRATORY (INHALATION) at 17:51

## 2023-09-24 RX ADMIN — CEFTRIAXONE 1000 MG: 1 INJECTION, POWDER, FOR SOLUTION INTRAMUSCULAR; INTRAVENOUS at 18:57

## 2023-09-24 RX ADMIN — DIPHENHYDRAMINE HCL 25 MG: 25 TABLET ORAL at 22:27

## 2023-09-24 ASSESSMENT — PAIN DESCRIPTION - LOCATION
LOCATION: BACK
LOCATION: BACK

## 2023-09-24 ASSESSMENT — PAIN SCALES - GENERAL
PAINLEVEL_OUTOF10: 0
PAINLEVEL_OUTOF10: 5
PAINLEVEL_OUTOF10: 5

## 2023-09-24 ASSESSMENT — PAIN DESCRIPTION - ORIENTATION
ORIENTATION: LOWER
ORIENTATION: MID

## 2023-09-24 ASSESSMENT — PAIN DESCRIPTION - DESCRIPTORS
DESCRIPTORS: ACHING
DESCRIPTORS: ACHING

## 2023-09-24 NOTE — PLAN OF CARE
Problem: Discharge Planning  Goal: Discharge to home or other facility with appropriate resources  9/24/2023 1003 by Davion Minor RN  Outcome: Progressing       Problem: Safety - Adult  Goal: Free from fall injury  9/24/2023 1003 by Davion Minor RN  Outcome: Progressing       Problem: Respiratory - Adult  Goal: Achieves optimal ventilation and oxygenation  9/24/2023 1003 by Davion Minor RN  Outcome: Progressing  Flowsheets (Taken 9/24/2023 1003)  Achieves optimal ventilation and oxygenation: Assess for changes in respiratory status  Note: Patient receiving breathing treatments . Sitting in bed in highest position.  Attempted weaning down o2 patient desats when ambulating with 1 L o2 into 80s

## 2023-09-24 NOTE — H&P
hours.  BNP: No results for input(s): \"PROBNP\" in the last 72 hours. UA:  Lab Results   Component Value Date/Time    NITRU Negative 09/05/2023 08:00 AM    COLORU Yellow 09/05/2023 08:00 AM    PHUR 7.0 09/05/2023 08:00 AM    WBCUA 4 06/09/2023 03:01 PM    RBCUA 1 06/09/2023 03:01 PM    MUCUS 1+ 04/05/2015 01:40 AM    BACTERIA None Seen 06/09/2023 03:01 PM    CLARITYU Clear 09/05/2023 08:00 AM    SPECGRAV <=1.005 09/05/2023 08:00 AM    LEUKOCYTESUR Negative 09/05/2023 08:00 AM    UROBILINOGEN 0.2 09/05/2023 08:00 AM    BILIRUBINUR Negative 09/05/2023 08:00 AM    BILIRUBINUR NEGATIVE 12/10/2011 12:18 PM    BLOODU Negative 09/05/2023 08:00 AM    GLUCOSEU Negative 09/05/2023 08:00 AM    GLUCOSEU NEGATIVE 12/10/2011 12:18 PM    KETUA Negative 09/05/2023 08:00 AM    AMORPHOUS 1+ 04/05/2015 01:40 AM     Urine Cultures:   Lab Results   Component Value Date/Time    LABURIN  05/29/2019 02:05 PM     <50,000 CFU/ml mixed skin/urogenital jen. No further workup     Blood Cultures:   Lab Results   Component Value Date/Time    BC No growth after 5 days of incubation. 04/26/2016 04:50 PM     Lab Results   Component Value Date/Time    BLOODCULT2 No growth after 5 days of incubation. 04/26/2016 05:15 PM     Organism: No results found for: \"ORG\"    Imaging/Diagnostics Last 24 Hours   CT CHEST PULMONARY EMBOLISM W CONTRAST    Result Date: 9/23/2023  EXAMINATION: CTA OF THE CHEST 9/23/2023 3:58 pm TECHNIQUE: CTA of the chest was performed after the administration of intravenous contrast.  Multiplanar reformatted images are provided for review. MIP images are provided for review. Automated exposure control, iterative reconstruction, and/or weight based adjustment of the mA/kV was utilized to reduce the radiation dose to as low as reasonably achievable.  COMPARISON: 06/09/2023 HISTORY: ORDERING SYSTEM PROVIDED HISTORY: pe r/o TECHNOLOGIST PROVIDED HISTORY: Reason for exam:->pe r/o Decision Support Exception - unselect if not a suspected

## 2023-09-24 NOTE — FLOWSHEET NOTE
4 Eyes Skin Assessment     NAME:  Fernanda Lamar  YOB: 1960  MEDICAL RECORD NUMBER:  [id number]    The patient is being assessed for  Admission    I agree that at least one RN has performed a thorough Head to Toe Skin Assessment on the patient. ALL assessment sites listed below have been assessed. Areas assessed by both nurses:    Head, Face, Ears, Shoulders, Back, Chest, Arms, Elbows, Hands, Sacrum. Buttock, Coccyx, Ischium, Legs. Feet and Heels, and Under Medical Devices         Does the Patient have a Wound?  No noted wound(s)       Oleg Prevention initiated by RN: No  Wound Care Orders initiated by RN: No    Pressure Injury (Stage 3,4, Unstageable, DTI, NWPT, and Complex wounds) if present, place Wound referral order by RN under : No    New Ostomies, if present place, Ostomy referral order under : No     Nurse 1 eSignature: Electronically signed by Rikki Saul RN on 9/23/23 at 9:54 PM EDT    **SHARE this note so that the co-signing nurse can place an eSignature**    Nurse 2 eSignature: {Esignature:[id number]}

## 2023-09-25 PROBLEM — R93.89 ABNORMAL CT OF THE CHEST: Status: ACTIVE | Noted: 2023-09-25

## 2023-09-25 LAB
ORGANISM: ABNORMAL
PNEUMONIA PANEL MOLECULAR: ABNORMAL
REPORT: NORMAL
TSH SERPL DL<=0.005 MIU/L-ACNC: 3.11 UIU/ML (ref 0.27–4.2)

## 2023-09-25 PROCEDURE — 1200000000 HC SEMI PRIVATE

## 2023-09-25 PROCEDURE — 2580000003 HC RX 258: Performed by: INTERNAL MEDICINE

## 2023-09-25 PROCEDURE — 6370000000 HC RX 637 (ALT 250 FOR IP): Performed by: INTERNAL MEDICINE

## 2023-09-25 PROCEDURE — 84443 ASSAY THYROID STIM HORMONE: CPT

## 2023-09-25 PROCEDURE — 87633 RESP VIRUS 12-25 TARGETS: CPT

## 2023-09-25 PROCEDURE — 6360000002 HC RX W HCPCS: Performed by: SURGERY

## 2023-09-25 PROCEDURE — 94761 N-INVAS EAR/PLS OXIMETRY MLT: CPT

## 2023-09-25 PROCEDURE — 2580000003 HC RX 258: Performed by: SURGERY

## 2023-09-25 PROCEDURE — 6370000000 HC RX 637 (ALT 250 FOR IP): Performed by: SURGERY

## 2023-09-25 PROCEDURE — 87070 CULTURE OTHR SPECIMN AEROBIC: CPT

## 2023-09-25 PROCEDURE — 87641 MR-STAPH DNA AMP PROBE: CPT

## 2023-09-25 PROCEDURE — 99255 IP/OBS CONSLTJ NEW/EST HI 80: CPT | Performed by: INTERNAL MEDICINE

## 2023-09-25 PROCEDURE — 2700000000 HC OXYGEN THERAPY PER DAY

## 2023-09-25 PROCEDURE — 87449 NOS EACH ORGANISM AG IA: CPT

## 2023-09-25 PROCEDURE — 87205 SMEAR GRAM STAIN: CPT

## 2023-09-25 PROCEDURE — 94640 AIRWAY INHALATION TREATMENT: CPT

## 2023-09-25 PROCEDURE — 36415 COLL VENOUS BLD VENIPUNCTURE: CPT

## 2023-09-25 PROCEDURE — 6370000000 HC RX 637 (ALT 250 FOR IP)

## 2023-09-25 PROCEDURE — 6360000002 HC RX W HCPCS: Performed by: INTERNAL MEDICINE

## 2023-09-25 RX ORDER — HYDROXYZINE PAMOATE 25 MG/1
25 CAPSULE ORAL 2 TIMES DAILY PRN
Status: DISCONTINUED | OUTPATIENT
Start: 2023-09-25 | End: 2023-09-27 | Stop reason: HOSPADM

## 2023-09-25 RX ORDER — HYDROXYZINE 50 MG/1
50 TABLET, FILM COATED ORAL 2 TIMES DAILY PRN
Status: ON HOLD | COMMUNITY
End: 2023-09-25

## 2023-09-25 RX ORDER — SODIUM CHLORIDE FOR INHALATION 3 %
15 VIAL, NEBULIZER (ML) INHALATION
Status: DISCONTINUED | OUTPATIENT
Start: 2023-09-25 | End: 2023-09-27 | Stop reason: HOSPADM

## 2023-09-25 RX ORDER — SODIUM CHLORIDE FOR INHALATION 3 %
15 VIAL, NEBULIZER (ML) INHALATION
Status: DISCONTINUED | OUTPATIENT
Start: 2023-09-25 | End: 2023-09-25

## 2023-09-25 RX ORDER — HYDROXYZINE PAMOATE 50 MG/1
50 CAPSULE ORAL 3 TIMES DAILY PRN
COMMUNITY

## 2023-09-25 RX ORDER — AMLODIPINE BESYLATE 5 MG/1
5 TABLET ORAL DAILY
COMMUNITY

## 2023-09-25 RX ADMIN — METHADONE HYDROCHLORIDE 95 MG: 10 TABLET ORAL at 09:53

## 2023-09-25 RX ADMIN — GUAIFENESIN 600 MG: 600 TABLET ORAL at 20:40

## 2023-09-25 RX ADMIN — IPRATROPIUM BROMIDE AND ALBUTEROL SULFATE 1 DOSE: 2.5; .5 SOLUTION RESPIRATORY (INHALATION) at 12:09

## 2023-09-25 RX ADMIN — CEFTRIAXONE 1000 MG: 1 INJECTION, POWDER, FOR SOLUTION INTRAMUSCULAR; INTRAVENOUS at 18:18

## 2023-09-25 RX ADMIN — MOMETASONE FUROATE AND FORMOTEROL FUMARATE DIHYDRATE 2 PUFF: 100; 5 AEROSOL RESPIRATORY (INHALATION) at 20:12

## 2023-09-25 RX ADMIN — IPRATROPIUM BROMIDE AND ALBUTEROL SULFATE 1 DOSE: 2.5; .5 SOLUTION RESPIRATORY (INHALATION) at 20:11

## 2023-09-25 RX ADMIN — GUAIFENESIN 600 MG: 600 TABLET ORAL at 09:53

## 2023-09-25 RX ADMIN — SODIUM CHLORIDE, PRESERVATIVE FREE 10 ML: 5 INJECTION INTRAVENOUS at 09:54

## 2023-09-25 RX ADMIN — SODIUM CHLORIDE, PRESERVATIVE FREE 10 ML: 5 INJECTION INTRAVENOUS at 20:41

## 2023-09-25 RX ADMIN — AZITHROMYCIN 500 MG: 500 TABLET, FILM COATED ORAL at 09:53

## 2023-09-25 RX ADMIN — VANCOMYCIN HYDROCHLORIDE 1000 MG: 1 INJECTION, POWDER, LYOPHILIZED, FOR SOLUTION INTRAVENOUS at 12:25

## 2023-09-25 RX ADMIN — HYDROXYZINE PAMOATE 25 MG: 25 CAPSULE ORAL at 20:40

## 2023-09-25 RX ADMIN — ENOXAPARIN SODIUM 40 MG: 100 INJECTION SUBCUTANEOUS at 09:53

## 2023-09-25 RX ADMIN — PREDNISONE 40 MG: 20 TABLET ORAL at 09:53

## 2023-09-25 RX ADMIN — MOMETASONE FUROATE AND FORMOTEROL FUMARATE DIHYDRATE 2 PUFF: 100; 5 AEROSOL RESPIRATORY (INHALATION) at 08:21

## 2023-09-25 RX ADMIN — IPRATROPIUM BROMIDE AND ALBUTEROL SULFATE 1 DOSE: 2.5; .5 SOLUTION RESPIRATORY (INHALATION) at 08:17

## 2023-09-25 RX ADMIN — IPRATROPIUM BROMIDE AND ALBUTEROL SULFATE 1 DOSE: 2.5; .5 SOLUTION RESPIRATORY (INHALATION) at 16:40

## 2023-09-25 RX ADMIN — SODIUM CHLORIDE SOLN NEBU 3% 4 ML: 3 NEBU SOLN at 12:16

## 2023-09-25 RX ADMIN — IPRATROPIUM BROMIDE AND ALBUTEROL SULFATE 1 DOSE: 2.5; .5 SOLUTION RESPIRATORY (INHALATION) at 10:29

## 2023-09-25 RX ADMIN — SODIUM CHLORIDE SOLN NEBU 3% 15 ML: 3 NEBU SOLN at 16:39

## 2023-09-25 RX ADMIN — SODIUM CHLORIDE SOLN NEBU 3% 15 ML: 3 NEBU SOLN at 20:11

## 2023-09-25 NOTE — PLAN OF CARE
Problem: Safety - Adult  Goal: Free from fall injury  9/25/2023 1100 by Michaelle Rasheed RN  Outcome: Progressing  Flowsheets  Taken 9/25/2023 1100 by Michaelle Rasheed RN  Free From Fall Injury:   Instruct family/caregiver on patient safety   Based on caregiver fall risk screen, instruct family/caregiver to ask for assistance with transferring infant if caregiver noted to have fall risk factors    Problem: Respiratory - Adult  Goal: Achieves optimal ventilation and oxygenation  9/25/2023 1100 by Michaelle Rasheed RN  Outcome: Progressing  Flowsheets  Taken 9/25/2023 1100 by Michaelle Rasheed RN  Achieves optimal ventilation and oxygenation:   Assess for changes in respiratory status   Assess for changes in mentation and behavior   Position to facilitate oxygenation and minimize respiratory effort   Oxygen supplementation based on oxygen saturation or arterial blood gases   Initiate smoking cessation protocol as indicated   Encourage broncho-pulmonary hygiene including cough, deep breathe, incentive spirometry   Assess and instruct to report shortness of breath or any respiratory difficulty   Respiratory therapy support as indicated    Problem: Pain  Goal: Verbalizes/displays adequate comfort level or baseline comfort level  9/25/2023 1100 by Michaelle Rasheed RN  Outcome: Progressing  Flowsheets (Taken 9/25/2023 1100)  Verbalizes/displays adequate comfort level or baseline comfort level:   Encourage patient to monitor pain and request assistance   Assess pain using appropriate pain scale   Administer analgesics based on type and severity of pain and evaluate response   Implement non-pharmacological measures as appropriate and evaluate response   Consider cultural and social influences on pain and pain management   Notify Licensed Independent Practitioner if interventions unsuccessful or patient reports new pain      Problem: Chronic Conditions and Co-morbidities  Goal: Patient's chronic

## 2023-09-25 NOTE — ACP (ADVANCE CARE PLANNING)
Advance Care Planning     Advance Care Planning Activator (Inpatient)  Conversation Note      Date of ACP Conversation: 9/25/2023     Conversation Conducted with: Patient with Decision Making Capacity    ACP Activator: Mikayla Oshea RN      Health Care Decision Maker:     Current Designated Health Care Decision Maker:     Primary Decision Maker: Shilpi Perez - [phone]    Primary Decision Maker: Abel Rodriguez - [phone]    Care Preferences    Ventilation: \"If you were in your present state of health and suddenly became very ill and were unable to breathe on your own, what would your preference be about the use of a ventilator (breathing machine) if it were available to you? \"      Would the patient desire the use of ventilator (breathing machine)?: yes    \"If your health worsens and it becomes clear that your chance of recovery is unlikely, what would your preference be about the use of a ventilator (breathing machine) if it were available to you? \"     Would the patient desire the use of ventilator (breathing machine)?: Yes      Resuscitation  \"CPR works best to restart the heart when there is a sudden event, like a heart attack, in someone who is otherwise healthy. Unfortunately, CPR does not typically restart the heart for people who have serious health conditions or who are very sick. \"    \"In the event your heart stopped as a result of an underlying serious health condition, would you want attempts to be made to restart your heart (answer \"yes\" for attempt to resuscitate) or would you prefer a natural death (answer \"no\" for do not attempt to resuscitate)? \" yes       [] Yes   [x] No   Educated Patient / Cyndra Favor regarding differences between Advance Directives and portable DNR orders.     Length of ACP Conversation in minutes:5      Conversation Outcomes:  ACP discussion completed    Follow-up plan:    [] Schedule follow-up conversation to continue planning  [] Referred

## 2023-09-25 NOTE — CONSULTS
PATIENT IS SEEN AT THE REQUEST OF DR. Pang for COPD and pneumonia     HISTORY OF PRESENT ILLNESS:  This is a 63 y.o. female who presented to the ED on 9/22 with a CC of SOB. Per ED notes recent treatment for COPD exacerbation. Increase in SOB with cold like illness. Admitted for COPD. She says she feels terrible. She is coughing, SOB and very weak. Almost collapsing anytime she walks. Uses symbicort at home. Does not require oxygen.   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    UTI (lower urinary tract infection)        PAST SURGICAL HISTORY:  Past Surgical History:   Procedure Laterality Date    COLONOSCOPY  10/23/2018    Colonoscopy with polypectomy (cold snare), polypectomy (cold biopsy)    COLONOSCOPY N/A 10/23/2018    COLONOSCOPY POLYPECTOMY SNARE/COLD BIOPSY performed by Lisbeth Briceno MD at [facility] Left 12/16/2016    INTRACAPSULAR CATARACT EXTRACTION Right 02/21/2020    PHACOEMULSIFICATION WITH INTRAOCULAR LENS IMPLANT performed by Leticia Zamudio MD at [facility] Left 02/28/2020    PHACOEMULSIFICATION WITH INTRAOCULAR LENS IMPLANT performed by Leticia Zamudio MD at [facility] Right     26 yrs for lung infection     OTHER SURGICAL HISTORY  12/15/2016    EDMOND BUNIONECTOMY WITH APPLICATION OF AMNIOX LEFT FOOT    THYROIDECTOMY Left 8/5/2022    LEFT HEMITHYROIDECTOMY performed by Lore Babinski, MD at [address] [place] [address]:  family history includes

## 2023-09-25 NOTE — PLAN OF CARE
Problem: Discharge Planning  Goal: Discharge to home or other facility with appropriate resources  9/25/2023 0042 by Gideon Martinez RN  Outcome: Progressing  9/25/2023 0041 by Gideon Martinez RN  Outcome: Progressing     Problem: Safety - Adult  Goal: Free from fall injury  9/25/2023 0042 by Gideon Martinez RN  Outcome: Progressing  Flowsheets (Taken 9/25/2023 0042)  Free From Fall Injury:   Mikala Fuller family/caregiver on patient safety   Based on caregiver fall risk screen, instruct family/caregiver to ask for assistance with transferring infant if caregiver noted to have fall risk factors  9/25/2023 0041 by Gideon Martinez RN  Outcome: Progressing     Problem: Respiratory - Adult  Goal: Achieves optimal ventilation and oxygenation  9/25/2023 0042 by Gideon Martinez RN  Outcome: Progressing  Flowsheets (Taken 9/25/2023 0042)  Achieves optimal ventilation and oxygenation:   Assess for changes in mentation and behavior   Position to facilitate oxygenation and minimize respiratory effort   Oxygen supplementation based on oxygen saturation or arterial blood gases   Initiate smoking cessation protocol as indicated   Assess and instruct to report shortness of breath or any respiratory difficulty   Assess the need for suctioning and aspirate as needed   Encourage broncho-pulmonary hygiene including cough, deep breathe, incentive spirometry   Respiratory therapy support as indicated   Assess for changes in respiratory status  9/25/2023 0041 by Gideon Martinez RN  Outcome: Progressing     Problem: Pain  Goal: Verbalizes/displays adequate comfort level or baseline comfort level  9/25/2023 0042 by Gideon Martinez RN  Outcome: Progressing  9/25/2023 0041 by Gideon Martinez RN  Outcome: Progressing

## 2023-09-25 NOTE — CARE COORDINATION
Case Management Assessment  Initial Evaluation    Date/Time of Evaluation: 9/25/2023 12:43 PM  Assessment Completed by: Juan Salas RN    If patient is discharged prior to next notation, then this note serves as note for discharge by case management. Patient Name: Popeye Carbajal                   YOB: 1960  Diagnosis: Hypoxia [R09.02]  Elevated troponin [R77.8]  Acute exacerbation of chronic obstructive pulmonary disease ([address]) [J44.1]  Goals of care, counseling/discussion [Z71.89]  Community acquired pneumonia, unspecified laterality [J18.9]  CAP (community acquired pneumonia) due to Chlamydia species [J16.0]                   Date / Time: 9/23/2023  2:35 PM    Patient Admission Status: Inpatient   Readmission Risk (Low < 19, Mod (19-27), High > 27): Readmission Risk Score: 11.2    Current PCP: Bethany King MD  PCP verified by CM? Yes    Chart Reviewed: Yes      History Provided by: Patient  Patient Orientation: Alert and Oriented, Person, Place, Situation, Self    Patient Cognition: Alert    Hospitalization in the last 30 days (Readmission):  No    If yes, Readmission Assessment in CM Navigator will be completed. Advance Directives:      Code Status: Full Code   Patient's Primary Decision Maker is: Legal Next of Kin    Primary Decision Maker: Minh Foote Enrique Other - [phone]    Primary Decision Maker: Linda Nunez Child - [phone]    Discharge Planning:    Patient lives with: Spouse/Significant Other, Children Type of Home: Apartment  Primary Care Giver: Self  Patient Support Systems include: Spouse/Significant Other, Children   Current Financial resources: Medicaid  Current community resources: None  Current services prior to admission: None            Current DME:              Type of Home Care services:  [facility]    ADLS  Prior functional level:  Independent in ADLs/IADLs, Assistance with the following:, Cooking, Housework, Shopping  Current functional level:

## 2023-09-26 PROBLEM — J44.1 COPD EXACERBATION (HCC): Status: ACTIVE | Noted: 2023-09-26

## 2023-09-26 LAB
LEGIONELLA AG UR QL: NORMAL
MRSA DNA SPEC QL NAA+PROBE: NORMAL
S PNEUM AG UR QL: NORMAL

## 2023-09-26 PROCEDURE — 94761 N-INVAS EAR/PLS OXIMETRY MLT: CPT

## 2023-09-26 PROCEDURE — 6360000002 HC RX W HCPCS: Performed by: SURGERY

## 2023-09-26 PROCEDURE — 2580000003 HC RX 258: Performed by: SURGERY

## 2023-09-26 PROCEDURE — 1200000000 HC SEMI PRIVATE

## 2023-09-26 PROCEDURE — 6370000000 HC RX 637 (ALT 250 FOR IP): Performed by: INTERNAL MEDICINE

## 2023-09-26 PROCEDURE — 6370000000 HC RX 637 (ALT 250 FOR IP): Performed by: NURSE PRACTITIONER

## 2023-09-26 PROCEDURE — 2700000000 HC OXYGEN THERAPY PER DAY

## 2023-09-26 PROCEDURE — 6370000000 HC RX 637 (ALT 250 FOR IP): Performed by: SURGERY

## 2023-09-26 PROCEDURE — 99233 SBSQ HOSP IP/OBS HIGH 50: CPT | Performed by: INTERNAL MEDICINE

## 2023-09-26 PROCEDURE — 94640 AIRWAY INHALATION TREATMENT: CPT

## 2023-09-26 PROCEDURE — 2580000003 HC RX 258: Performed by: INTERNAL MEDICINE

## 2023-09-26 PROCEDURE — 94669 MECHANICAL CHEST WALL OSCILL: CPT

## 2023-09-26 RX ORDER — HYDROXYZINE PAMOATE 25 MG/1
50 CAPSULE ORAL ONCE
Status: COMPLETED | OUTPATIENT
Start: 2023-09-26 | End: 2023-09-26

## 2023-09-26 RX ORDER — ALBUTEROL SULFATE 2.5 MG/3ML
2.5 SOLUTION RESPIRATORY (INHALATION) EVERY 4 HOURS PRN
Status: DISCONTINUED | OUTPATIENT
Start: 2023-09-26 | End: 2023-09-27 | Stop reason: HOSPADM

## 2023-09-26 RX ADMIN — SODIUM CHLORIDE SOLN NEBU 3% 15 ML: 3 NEBU SOLN at 11:32

## 2023-09-26 RX ADMIN — SODIUM CHLORIDE, PRESERVATIVE FREE 10 ML: 5 INJECTION INTRAVENOUS at 21:14

## 2023-09-26 RX ADMIN — BENZONATATE 100 MG: 100 CAPSULE ORAL at 06:49

## 2023-09-26 RX ADMIN — MOMETASONE FUROATE AND FORMOTEROL FUMARATE DIHYDRATE 2 PUFF: 100; 5 AEROSOL RESPIRATORY (INHALATION) at 20:50

## 2023-09-26 RX ADMIN — IPRATROPIUM BROMIDE AND ALBUTEROL SULFATE 1 DOSE: 2.5; .5 SOLUTION RESPIRATORY (INHALATION) at 16:11

## 2023-09-26 RX ADMIN — METHADONE HYDROCHLORIDE 95 MG: 10 TABLET ORAL at 09:16

## 2023-09-26 RX ADMIN — IPRATROPIUM BROMIDE AND ALBUTEROL SULFATE 1 DOSE: 2.5; .5 SOLUTION RESPIRATORY (INHALATION) at 07:30

## 2023-09-26 RX ADMIN — SODIUM CHLORIDE, PRESERVATIVE FREE 10 ML: 5 INJECTION INTRAVENOUS at 09:18

## 2023-09-26 RX ADMIN — GUAIFENESIN 600 MG: 600 TABLET ORAL at 21:12

## 2023-09-26 RX ADMIN — AZITHROMYCIN 500 MG: 500 TABLET, FILM COATED ORAL at 09:16

## 2023-09-26 RX ADMIN — SODIUM CHLORIDE SOLN NEBU 3% 15 ML: 3 NEBU SOLN at 20:48

## 2023-09-26 RX ADMIN — IPRATROPIUM BROMIDE AND ALBUTEROL SULFATE 1 DOSE: 2.5; .5 SOLUTION RESPIRATORY (INHALATION) at 20:49

## 2023-09-26 RX ADMIN — SODIUM CHLORIDE SOLN NEBU 3% 15 ML: 3 NEBU SOLN at 07:30

## 2023-09-26 RX ADMIN — PREDNISONE 40 MG: 20 TABLET ORAL at 09:16

## 2023-09-26 RX ADMIN — SODIUM CHLORIDE SOLN NEBU 3% 15 ML: 3 NEBU SOLN at 16:11

## 2023-09-26 RX ADMIN — IPRATROPIUM BROMIDE AND ALBUTEROL SULFATE 1 DOSE: 2.5; .5 SOLUTION RESPIRATORY (INHALATION) at 11:32

## 2023-09-26 RX ADMIN — GUAIFENESIN 600 MG: 600 TABLET ORAL at 09:16

## 2023-09-26 RX ADMIN — IPRATROPIUM BROMIDE AND ALBUTEROL SULFATE 1 DOSE: 2.5; .5 SOLUTION RESPIRATORY (INHALATION) at 01:58

## 2023-09-26 RX ADMIN — MOMETASONE FUROATE AND FORMOTEROL FUMARATE DIHYDRATE 2 PUFF: 100; 5 AEROSOL RESPIRATORY (INHALATION) at 07:30

## 2023-09-26 RX ADMIN — ENOXAPARIN SODIUM 40 MG: 100 INJECTION SUBCUTANEOUS at 09:17

## 2023-09-26 RX ADMIN — CEFTRIAXONE 1000 MG: 1 INJECTION, POWDER, FOR SOLUTION INTRAMUSCULAR; INTRAVENOUS at 18:46

## 2023-09-26 RX ADMIN — HYDROXYZINE PAMOATE 50 MG: 25 CAPSULE ORAL at 21:12

## 2023-09-26 ASSESSMENT — PAIN SCALES - GENERAL
PAINLEVEL_OUTOF10: 4
PAINLEVEL_OUTOF10: 4

## 2023-09-26 ASSESSMENT — PAIN DESCRIPTION - LOCATION
LOCATION: CHEST;OTHER (COMMENT)
LOCATION: CHEST;OTHER (COMMENT)

## 2023-09-26 ASSESSMENT — PAIN DESCRIPTION - DESCRIPTORS
DESCRIPTORS: SORE
DESCRIPTORS: SORE

## 2023-09-26 ASSESSMENT — PAIN DESCRIPTION - PAIN TYPE: TYPE: ACUTE PAIN

## 2023-09-27 VITALS
OXYGEN SATURATION: 100 % | WEIGHT: 167.11 LBS | TEMPERATURE: 97.4 F | BODY MASS INDEX: 23.92 KG/M2 | SYSTOLIC BLOOD PRESSURE: 148 MMHG | HEIGHT: 70 IN | DIASTOLIC BLOOD PRESSURE: 93 MMHG | HEART RATE: 74 BPM | RESPIRATION RATE: 18 BRPM

## 2023-09-27 LAB
BACTERIA SPEC RESP CULT: NORMAL
GRAM STN SPEC: NORMAL

## 2023-09-27 PROCEDURE — 6360000002 HC RX W HCPCS: Performed by: SURGERY

## 2023-09-27 PROCEDURE — 94669 MECHANICAL CHEST WALL OSCILL: CPT

## 2023-09-27 PROCEDURE — 6370000000 HC RX 637 (ALT 250 FOR IP): Performed by: INTERNAL MEDICINE

## 2023-09-27 PROCEDURE — 94761 N-INVAS EAR/PLS OXIMETRY MLT: CPT

## 2023-09-27 PROCEDURE — 6370000000 HC RX 637 (ALT 250 FOR IP): Performed by: SURGERY

## 2023-09-27 PROCEDURE — 94640 AIRWAY INHALATION TREATMENT: CPT

## 2023-09-27 PROCEDURE — 99232 SBSQ HOSP IP/OBS MODERATE 35: CPT | Performed by: INTERNAL MEDICINE

## 2023-09-27 PROCEDURE — 2580000003 HC RX 258: Performed by: INTERNAL MEDICINE

## 2023-09-27 PROCEDURE — 2700000000 HC OXYGEN THERAPY PER DAY

## 2023-09-27 PROCEDURE — 6360000002 HC RX W HCPCS: Performed by: STUDENT IN AN ORGANIZED HEALTH CARE EDUCATION/TRAINING PROGRAM

## 2023-09-27 RX ORDER — PREDNISONE 10 MG/1
40 TABLET ORAL DAILY
Qty: 12 TABLET | Refills: 0 | Status: SHIPPED | OUTPATIENT
Start: 2023-09-27 | End: 2023-09-30

## 2023-09-27 RX ORDER — SODIUM CHLORIDE FOR INHALATION 3 %
4 VIAL, NEBULIZER (ML) INHALATION 3 TIMES DAILY
Qty: 100 ML | Refills: 0 | Status: SHIPPED | OUTPATIENT
Start: 2023-09-27

## 2023-09-27 RX ORDER — CEFDINIR 300 MG/1
300 CAPSULE ORAL 2 TIMES DAILY
Qty: 6 CAPSULE | Refills: 0 | Status: SHIPPED | OUTPATIENT
Start: 2023-09-27 | End: 2023-09-30

## 2023-09-27 RX ADMIN — SODIUM CHLORIDE SOLN NEBU 3% 15 ML: 3 NEBU SOLN at 12:13

## 2023-09-27 RX ADMIN — ALBUTEROL SULFATE 2.5 MG: 2.5 SOLUTION RESPIRATORY (INHALATION) at 05:04

## 2023-09-27 RX ADMIN — GUAIFENESIN 600 MG: 600 TABLET ORAL at 08:55

## 2023-09-27 RX ADMIN — PREDNISONE 40 MG: 20 TABLET ORAL at 08:55

## 2023-09-27 RX ADMIN — IPRATROPIUM BROMIDE AND ALBUTEROL SULFATE 1 DOSE: 2.5; .5 SOLUTION RESPIRATORY (INHALATION) at 07:53

## 2023-09-27 RX ADMIN — IPRATROPIUM BROMIDE AND ALBUTEROL SULFATE 1 DOSE: 2.5; .5 SOLUTION RESPIRATORY (INHALATION) at 12:13

## 2023-09-27 RX ADMIN — METHADONE HYDROCHLORIDE 95 MG: 10 TABLET ORAL at 08:59

## 2023-09-27 RX ADMIN — ENOXAPARIN SODIUM 40 MG: 100 INJECTION SUBCUTANEOUS at 08:55

## 2023-09-27 RX ADMIN — MOMETASONE FUROATE AND FORMOTEROL FUMARATE DIHYDRATE 2 PUFF: 100; 5 AEROSOL RESPIRATORY (INHALATION) at 07:53

## 2023-09-27 RX ADMIN — SODIUM CHLORIDE SOLN NEBU 3% 15 ML: 3 NEBU SOLN at 07:53

## 2023-09-27 NOTE — CARE COORDINATION
CASE MANAGEMENT DISCHARGE SUMMARY:    DISCHARGE DATE: 09/27/2023    DISCHARGED TO: Home with Home Health       Discharging to Facility/ Agency   Name:  [facility]    Address: [address] [address] [place], [address]    Phone: [phone]               TRANSPORTATION: Family to Transport  TIME: TBD by pt and bedside RN    PREFERRED PHARMACY:      [address], [address] [place] [address] [phone] [facility] [phone]             COMMENTS: pt to dc home with home health care. Established with [facility] and [facility] for home care aides, denies needs. Family to transport. Pt and bedside RN aware of discharge timeline.      Electronically signed by Mahesh Blandon RN on 9/27/2023 at 2:59 PM  Ph: [phone]  Fax: [phone]

## 2023-09-27 NOTE — PLAN OF CARE
Problem: Discharge Planning  Goal: Discharge to home or other facility with appropriate resources  Outcome: Progressing     Problem: Safety - Adult  Goal: Free from fall injury  Outcome: Progressing     Problem: Respiratory - Adult  Goal: Achieves optimal ventilation and oxygenation  Outcome: Progressing     Problem: Pain  Goal: Verbalizes/displays adequate comfort level or baseline comfort level  Outcome: Progressing     Problem: Chronic Conditions and Co-morbidities  Goal: Patient's chronic conditions and co-morbidity symptoms are monitored and maintained or improved  Outcome: Progressing

## 2023-10-02 ENCOUNTER — TELEPHONE (OUTPATIENT)
Dept: PULMONOLOGY | Age: 63
End: 2023-10-02

## 2023-10-02 DIAGNOSIS — J44.89 COPD WITH ASTHMA: Primary | ICD-10-CM

## 2023-10-02 RX ORDER — PREDNISONE 10 MG/1
TABLET ORAL
Qty: 30 TABLET | Refills: 0 | Status: SHIPPED | OUTPATIENT
Start: 2023-10-02 | End: 2023-10-12

## 2023-10-02 NOTE — TELEPHONE ENCOUNTER
Cksandi Gaston says she lost her prednisone, and she had 3 or 4 doses left. She'd like more called in to [facility] on [address]. Thank you.

## 2023-10-02 NOTE — TELEPHONE ENCOUNTER
At this time, patient has certainly been through quite a bit in her life.  She has quite a traumatic background.  I advised patient that I thought it was really essential that she establish with therapy.  This order was placed.  She was advised that she needed to call to schedule herself with either in [place] therapist or 1 in the community.  We also discussed starting Lexapro.  We discussed risks, benefits, side effects.  She was in agreement to start.    Of note, patient also states that she is frequently very anxious about sexually transmitted infections and asks to be screened today.   Prednisone sent

## 2023-10-20 DIAGNOSIS — J44.89 COPD WITH ASTHMA: ICD-10-CM

## 2023-10-20 DIAGNOSIS — R05.9 COUGH: ICD-10-CM

## 2023-10-20 DIAGNOSIS — J30.89 OTHER ALLERGIC RHINITIS: ICD-10-CM

## 2023-10-23 RX ORDER — LORATADINE PSEUDOEPHEDRINE SULFATE 10; 240 MG/1; MG/1
1 TABLET, EXTENDED RELEASE ORAL DAILY
Qty: 30 TABLET | Refills: 11 | Status: SHIPPED | OUTPATIENT
Start: 2023-10-23

## 2023-10-23 RX ORDER — ALBUTEROL SULFATE 2.5 MG/3ML
2.5 SOLUTION RESPIRATORY (INHALATION) EVERY 6 HOURS PRN
Qty: 120 EACH | Refills: 11 | Status: SHIPPED | OUTPATIENT
Start: 2023-10-23

## 2023-10-23 RX ORDER — BROMPHENIRAMINE MALEATE, PSEUDOEPHEDRINE HYDROCHLORIDE, AND DEXTROMETHORPHAN HYDROBROMIDE 2; 30; 10 MG/5ML; MG/5ML; MG/5ML
5 SYRUP ORAL 4 TIMES DAILY PRN
Qty: 118 ML | Refills: 0 | Status: SHIPPED | OUTPATIENT
Start: 2023-10-23

## 2023-10-25 DIAGNOSIS — J44.89 COPD WITH ASTHMA: ICD-10-CM

## 2023-10-25 RX ORDER — ALBUTEROL SULFATE 90 UG/1
2 AEROSOL, METERED RESPIRATORY (INHALATION) EVERY 6 HOURS PRN
Qty: 8.5 G | OUTPATIENT
Start: 2023-10-25

## 2023-11-03 DIAGNOSIS — R05.9 COUGH: ICD-10-CM

## 2023-11-06 RX ORDER — GUAIFENESIN AND DEXTROMETHORPHAN HYDROBROMIDE 1200; 60 MG/1; MG/1
1 TABLET, EXTENDED RELEASE ORAL 2 TIMES DAILY
Qty: 28 TABLET | Refills: 0 | OUTPATIENT
Start: 2023-11-06

## 2023-11-14 ENCOUNTER — OFFICE VISIT (OUTPATIENT)
Dept: ORTHOPEDIC SURGERY | Age: 63
End: 2023-11-14
Payer: MEDICAID

## 2023-11-14 VITALS — RESPIRATION RATE: 16 BRPM | BODY MASS INDEX: 24.77 KG/M2 | WEIGHT: 173 LBS | HEIGHT: 70 IN

## 2023-11-14 DIAGNOSIS — M16.11 PRIMARY OSTEOARTHRITIS OF RIGHT HIP: Primary | ICD-10-CM

## 2023-11-14 PROCEDURE — 99214 OFFICE O/P EST MOD 30 MIN: CPT | Performed by: STUDENT IN AN ORGANIZED HEALTH CARE EDUCATION/TRAINING PROGRAM

## 2023-11-14 RX ORDER — METHOCARBAMOL 750 MG/1
750 TABLET, FILM COATED ORAL 3 TIMES DAILY
Qty: 90 TABLET | Refills: 0 | Status: SHIPPED | OUTPATIENT
Start: 2023-11-14 | End: 2023-12-14

## 2023-11-14 NOTE — PROGRESS NOTES
anti-inflammatory medications, physical therapy, and injections. I will send robaxin to her pharmacy. Counseled on risks, benefits and alternatives and recommended not to take the medicine and drive or operate heavy machinery. Discussed meloxicam but her PCP has advised against NSAID's. Discussed gabapentin but she defers. She did ask about pain medication and we discussed that this is not appropriate at this time given her history. Referral placed to Dr. Isabelle Hobbs for further treatment. Juan Luis Meneses PA-C  Board Certified by the [employer] on Certification of [facility] and [address] [place] [address]: This note was generated with use of a verbal recognition program and an attempt was made to check for errors. It is possible that there are still dictated errors within this office note. If so, please bring any significant errors to my attention for an addendum. All efforts were made to ensure that this office note is accurate.

## 2023-11-17 ENCOUNTER — TELEPHONE (OUTPATIENT)
Dept: PULMONOLOGY | Age: 63
End: 2023-11-17

## 2023-11-17 DIAGNOSIS — R05.9 COUGH: ICD-10-CM

## 2023-11-17 NOTE — TELEPHONE ENCOUNTER
Pt called in wanting something to help with her cough an mucus ongoing for weeks PCP told her to call here. Has a ENT appt scheduled also.  Gil Perez

## 2023-11-18 RX ORDER — BROMPHENIRAMINE MALEATE, PSEUDOEPHEDRINE HYDROCHLORIDE, AND DEXTROMETHORPHAN HYDROBROMIDE 2; 30; 10 MG/5ML; MG/5ML; MG/5ML
5 SYRUP ORAL 4 TIMES DAILY PRN
Qty: 118 ML | Refills: 0 | Status: SHIPPED | OUTPATIENT
Start: 2023-11-18

## 2023-11-22 ENCOUNTER — OFFICE VISIT (OUTPATIENT)
Dept: ENT CLINIC | Age: 63
End: 2023-11-22
Payer: MEDICAID

## 2023-11-22 VITALS
WEIGHT: 169 LBS | BODY MASS INDEX: 24.2 KG/M2 | HEIGHT: 70 IN | OXYGEN SATURATION: 98 % | RESPIRATION RATE: 16 BRPM | SYSTOLIC BLOOD PRESSURE: 100 MMHG | DIASTOLIC BLOOD PRESSURE: 69 MMHG | HEART RATE: 61 BPM | TEMPERATURE: 97.1 F

## 2023-11-22 DIAGNOSIS — E04.1 THYROID NODULE: ICD-10-CM

## 2023-11-22 DIAGNOSIS — K21.9 LARYNGOPHARYNGEAL REFLUX (LPR): ICD-10-CM

## 2023-11-22 DIAGNOSIS — R13.14 PHARYNGOESOPHAGEAL DYSPHAGIA: Primary | ICD-10-CM

## 2023-11-22 PROCEDURE — 3074F SYST BP LT 130 MM HG: CPT | Performed by: OTOLARYNGOLOGY

## 2023-11-22 PROCEDURE — 99214 OFFICE O/P EST MOD 30 MIN: CPT | Performed by: OTOLARYNGOLOGY

## 2023-11-22 PROCEDURE — 31575 DIAGNOSTIC LARYNGOSCOPY: CPT | Performed by: OTOLARYNGOLOGY

## 2023-11-22 PROCEDURE — 3078F DIAST BP <80 MM HG: CPT | Performed by: OTOLARYNGOLOGY

## 2023-11-22 RX ORDER — PANTOPRAZOLE SODIUM 40 MG/1
40 TABLET, DELAYED RELEASE ORAL
Qty: 90 TABLET | Refills: 1 | Status: SHIPPED | OUTPATIENT
Start: 2023-11-22

## 2023-11-22 NOTE — PROGRESS NOTES
[facility] HEAD & NECK SURGERY  Follow up      Patient Name: Georges Hopper  Medical Record Number:  [id number]  Primary Care Physician:  Shonda Wright MD  Date of Consultation: 11/22/2023    Chief Complaint: Swallowing issues        Interval History    Patient is following up mostly for issues swallowing. She said that she has been having issues swallowing. She feels as though she has mucus in her throat and is difficult to get it to go down. It is worse at night when she is lying down. She does not get  frequent heartburn. She stopped smoking years ago. She says it feels similar to what it did before remove the left side of her thyroid. She had a large thyroid nodule on that side. REVIEW OF SYSTEMS  As above    PHYSICAL EXAM  GENERAL: No Acute Distress, Alert and Oriented, no Hoarseness, strong voice  EYES: EOMI, Anti-icteric  HENT:   Head: Normocephalic and atraumatic. Face:  Symmetric, facial nerve intact, no sinus tenderness  Right Ear: Normal external ear, normal external auditory canal, intact tympanic membrane with normal mobility and aerated middle ear  Left Ear: Normal external ear, normal external auditory canal, intact tympanic membrane with normal mobility and aerated middle ear  Mouth/Oral Cavity:  normal lips, Uvula is midline, no mucosal lesions, no trismus  Oropharynx/Larynx:  normal oropharynx,  Nose:Normal external nasal appearance. NECK: Well-healed thyroidectomy incision. Difficult to feel a nodule on the right side. CHEST: Normal respiratory effort, no retractions, breathing comfortably  SKIN: No rashes, normal appearing skin, no evidence of skin lesions/tumors  Neuro:  cranial nerve II-XII intact; normal gait  Cardio:  no edema      PROCEDURE    Flexible laryngoscopy. Afrin and lidocaine were applied to right nasal cavity. A flexible scope was passed the nasal cavity.   Nasopharynx was normal.  Base of tongue vallecula normal.

## 2023-11-24 ENCOUNTER — HOSPITAL ENCOUNTER (OUTPATIENT)
Dept: ULTRASOUND IMAGING | Age: 63
Discharge: HOME | End: 2023-11-24
Attending: OTOLARYNGOLOGY
Payer: MEDICAID

## 2023-11-24 DIAGNOSIS — E04.1 THYROID NODULE: ICD-10-CM

## 2023-11-24 PROCEDURE — 76536 US EXAM OF HEAD AND NECK: CPT

## 2023-11-30 ENCOUNTER — TELEPHONE (OUTPATIENT)
Dept: ENT CLINIC | Age: 63
End: 2023-11-30

## 2023-12-12 ENCOUNTER — TELEPHONE (OUTPATIENT)
Dept: ENT CLINIC | Age: 63
End: 2023-12-12

## 2023-12-12 NOTE — TELEPHONE ENCOUNTER
Pt calling, states Dr Gia Mukherjee prescribed a medication for reflux and that it helps with the reflux but causes her to have \"severe anxiety and go into a deep depression\". She is asking if he can prescribe something different. Please call to advise.

## 2023-12-13 NOTE — TELEPHONE ENCOUNTER
Pt calling back, wants to know if she does not take the protonix, is there something else she can take for the mucous in her throat. Please call to advise.

## 2023-12-13 NOTE — TELEPHONE ENCOUNTER
I am unaware of that side effect of the Protonix. I am not sure what else I could give her that would not cause issues.

## 2023-12-28 ENCOUNTER — TELEPHONE (OUTPATIENT)
Dept: PULMONOLOGY | Age: 63
End: 2023-12-28

## 2023-12-28 DIAGNOSIS — J44.89 COPD WITH ASTHMA: Primary | ICD-10-CM

## 2023-12-28 RX ORDER — PREDNISONE 10 MG/1
TABLET ORAL
Qty: 30 TABLET | Refills: 0 | Status: SHIPPED | OUTPATIENT
Start: 2023-12-28 | End: 2024-01-07

## 2023-12-28 NOTE — TELEPHONE ENCOUNTER
Pt called stating she is not feeling well and wants to know if Dr. Kwan Burgess can prescribe Prednisone.  Pt uses the [facility] on [address]

## 2024-01-19 DIAGNOSIS — J30.89 OTHER ALLERGIC RHINITIS: ICD-10-CM

## 2024-01-22 RX ORDER — LORATADINE PSEUDOEPHEDRINE SULFATE 10; 240 MG/1; MG/1
1 TABLET, EXTENDED RELEASE ORAL DAILY
Qty: 30 TABLET | Refills: 11 | OUTPATIENT
Start: 2024-01-22

## 2024-01-22 RX ORDER — PANTOPRAZOLE SODIUM 40 MG/1
40 TABLET, DELAYED RELEASE ORAL
Qty: 90 TABLET | Refills: 1 | Status: SHIPPED | OUTPATIENT
Start: 2024-01-22

## 2024-03-01 ENCOUNTER — TELEPHONE (OUTPATIENT)
Dept: PULMONOLOGY | Age: 64
End: 2024-03-01

## 2024-03-01 DIAGNOSIS — J44.89 COPD WITH ASTHMA: Primary | ICD-10-CM

## 2024-03-01 RX ORDER — PREDNISONE 10 MG/1
TABLET ORAL
Qty: 30 TABLET | Refills: 0 | Status: SHIPPED | OUTPATIENT
Start: 2024-03-01 | End: 2024-03-11

## 2024-03-01 NOTE — TELEPHONE ENCOUNTER
Pt called in and stated that her asthma is flared up and she does not want to have to go to ED if she run into any problems this weekend. She is asking that prednisone to be called into her pharm that she has on file. I did not see where you prescribed before.

## 2024-04-05 ENCOUNTER — TELEPHONE (OUTPATIENT)
Dept: PULMONOLOGY | Age: 64
End: 2024-04-05

## 2024-04-05 NOTE — TELEPHONE ENCOUNTER
Pt called requesting an rx for prednisone. She said due to the weather change she needs it to help her with her wheezing. Pt is using the [facility] on [address]

## 2024-04-05 NOTE — TELEPHONE ENCOUNTER
Pt called requesting an rx for prednisone. She said due to the weather change she needs it to help her with her wheezing. Pt is using the [facility] on [address]

## 2024-04-05 NOTE — TELEPHONE ENCOUNTER
Prednisone 10 mg #30  40 mg x 3 days  30 mg x 3 days  20 mg x 2 days  10 mg x 3 days    Called in spoke to Tristen the pharmacist

## 2024-04-19 DIAGNOSIS — J44.89 COPD WITH ASTHMA (HCC): ICD-10-CM

## 2024-04-19 RX ORDER — PREDNISONE 10 MG/1
10 TABLET ORAL DAILY
Qty: 10 TABLET | Refills: 0 | OUTPATIENT
Start: 2024-04-19 | End: 2024-04-29

## 2024-04-19 RX ORDER — PREDNISONE 10 MG/1
TABLET ORAL
Qty: 30 TABLET | Refills: 0 | Status: CANCELLED | OUTPATIENT
Start: 2024-04-19 | End: 2024-04-29

## 2024-04-19 NOTE — TELEPHONE ENCOUNTER
Patient is asking for prednisone. The weather has her struggling to breathe. She is using her nebulizer. Her nose is dripping clear, is there anything that she can take?

## 2024-04-20 ENCOUNTER — HOSPITAL ENCOUNTER (INPATIENT)
Age: 64
LOS: 2 days | Discharge: HOME | DRG: 140 | End: 2024-04-22
Attending: EMERGENCY MEDICINE | Admitting: INTERNAL MEDICINE
Payer: MEDICAID

## 2024-04-20 ENCOUNTER — APPOINTMENT (OUTPATIENT)
Dept: GENERAL RADIOLOGY | Age: 64
DRG: 140 | End: 2024-04-20
Payer: MEDICAID

## 2024-04-20 DIAGNOSIS — J44.1 COPD EXACERBATION (HCC): Primary | ICD-10-CM

## 2024-04-20 DIAGNOSIS — J96.02 ACUTE RESPIRATORY FAILURE WITH HYPOXIA AND HYPERCARBIA (HCC): ICD-10-CM

## 2024-04-20 DIAGNOSIS — J44.89 COPD WITH ASTHMA (HCC): ICD-10-CM

## 2024-04-20 DIAGNOSIS — J96.01 ACUTE RESPIRATORY FAILURE WITH HYPOXIA AND HYPERCARBIA (HCC): ICD-10-CM

## 2024-04-20 LAB
ANION GAP SERPL CALCULATED.3IONS-SCNC: 12 MMOL/L (ref 3–16)
BASE EXCESS BLDV CALC-SCNC: 5.7 MMOL/L (ref -3–3)
BASOPHILS # BLD: 0.1 K/UL (ref 0–0.2)
BASOPHILS NFR BLD: 1.5 %
BUN SERPL-MCNC: 7 MG/DL (ref 7–20)
CALCIUM SERPL-MCNC: 8.9 MG/DL (ref 8.3–10.6)
CHLORIDE SERPL-SCNC: 102 MMOL/L (ref 99–110)
CO2 BLDV-SCNC: 30 MMOL/L
CO2 SERPL-SCNC: 25 MMOL/L (ref 21–32)
CREAT SERPL-MCNC: 0.7 MG/DL (ref 0.6–1.2)
DEPRECATED RDW RBC AUTO: 14.5 % (ref 12.4–15.4)
EOSINOPHIL # BLD: 0.2 K/UL (ref 0–0.6)
EOSINOPHIL NFR BLD: 4.2 %
FLUAV RNA UPPER RESP QL NAA+PROBE: NEGATIVE
FLUBV AG NPH QL: NEGATIVE
GFR SERPLBLD CREATININE-BSD FMLA CKD-EPI: >90 ML/MIN/{1.73_M2}
GLUCOSE SERPL-MCNC: 97 MG/DL (ref 70–99)
HCO3 BLDV-SCNC: 30.7 MMOL/L (ref 23–29)
HCT VFR BLD AUTO: 44.9 % (ref 36–48)
HGB BLD-MCNC: 15 G/DL (ref 12–16)
LYMPHOCYTES # BLD: 1.8 K/UL (ref 1–5.1)
LYMPHOCYTES NFR BLD: 35.9 %
MCH RBC QN AUTO: 30.1 PG (ref 26–34)
MCHC RBC AUTO-ENTMCNC: 33.3 G/DL (ref 31–36)
MCV RBC AUTO: 90.6 FL (ref 80–100)
MONOCYTES # BLD: 0.6 K/UL (ref 0–1.3)
MONOCYTES NFR BLD: 12.4 %
NEUTROPHILS # BLD: 2.3 K/UL (ref 1.7–7.7)
NEUTROPHILS NFR BLD: 46 %
NT-PROBNP SERPL-MCNC: 213 PG/ML (ref 0–124)
O2 THERAPY: ABNORMAL
PCO2 BLDV: 50.7 MMHG (ref 40–50)
PH BLDV: 7.39 [PH] (ref 7.35–7.45)
PLATELET # BLD AUTO: 190 K/UL (ref 135–450)
PMV BLD AUTO: 7.7 FL (ref 5–10.5)
PO2 BLDV: 46.4 MMHG (ref 25–40)
POTASSIUM SERPL-SCNC: 4 MMOL/L (ref 3.5–5.1)
PROCALCITONIN SERPL IA-MCNC: 0.04 NG/ML (ref 0–0.15)
RBC # BLD AUTO: 4.96 M/UL (ref 4–5.2)
REPORT: NORMAL
RESP PATH DNA+RNA PNL NPH NAA+NON-PROBE: NORMAL
SAO2 % BLDV: 81 %
SARS-COV-2 RDRP RESP QL NAA+PROBE: NOT DETECTED
SODIUM SERPL-SCNC: 139 MMOL/L (ref 136–145)
TROPONIN, HIGH SENSITIVITY: 9 NG/L (ref 0–14)
TROPONIN, HIGH SENSITIVITY: <6 NG/L (ref 0–14)
WBC # BLD AUTO: 5.1 K/UL (ref 4–11)

## 2024-04-20 PROCEDURE — 85025 COMPLETE CBC W/AUTO DIFF WBC: CPT

## 2024-04-20 PROCEDURE — 0202U NFCT DS 22 TRGT SARS-COV-2: CPT

## 2024-04-20 PROCEDURE — 93005 ELECTROCARDIOGRAM TRACING: CPT | Performed by: EMERGENCY MEDICINE

## 2024-04-20 PROCEDURE — 82803 BLOOD GASES ANY COMBINATION: CPT

## 2024-04-20 PROCEDURE — 71045 X-RAY EXAM CHEST 1 VIEW: CPT

## 2024-04-20 PROCEDURE — 96365 THER/PROPH/DIAG IV INF INIT: CPT

## 2024-04-20 PROCEDURE — 94640 AIRWAY INHALATION TREATMENT: CPT

## 2024-04-20 PROCEDURE — 96366 THER/PROPH/DIAG IV INF ADDON: CPT

## 2024-04-20 PROCEDURE — 6370000000 HC RX 637 (ALT 250 FOR IP): Performed by: INTERNAL MEDICINE

## 2024-04-20 PROCEDURE — 6360000002 HC RX W HCPCS: Performed by: INTERNAL MEDICINE

## 2024-04-20 PROCEDURE — 2580000003 HC RX 258: Performed by: INTERNAL MEDICINE

## 2024-04-20 PROCEDURE — 36415 COLL VENOUS BLD VENIPUNCTURE: CPT

## 2024-04-20 PROCEDURE — 87635 SARS-COV-2 COVID-19 AMP PRB: CPT

## 2024-04-20 PROCEDURE — 80048 BASIC METABOLIC PNL TOTAL CA: CPT

## 2024-04-20 PROCEDURE — 6360000002 HC RX W HCPCS: Performed by: EMERGENCY MEDICINE

## 2024-04-20 PROCEDURE — 6370000000 HC RX 637 (ALT 250 FOR IP): Performed by: EMERGENCY MEDICINE

## 2024-04-20 PROCEDURE — 94760 N-INVAS EAR/PLS OXIMETRY 1: CPT

## 2024-04-20 PROCEDURE — 87804 INFLUENZA ASSAY W/OPTIC: CPT

## 2024-04-20 PROCEDURE — 83880 ASSAY OF NATRIURETIC PEPTIDE: CPT

## 2024-04-20 PROCEDURE — 1200000000 HC SEMI PRIVATE

## 2024-04-20 PROCEDURE — 99223 1ST HOSP IP/OBS HIGH 75: CPT | Performed by: INTERNAL MEDICINE

## 2024-04-20 PROCEDURE — 84484 ASSAY OF TROPONIN QUANT: CPT

## 2024-04-20 PROCEDURE — 99285 EMERGENCY DEPT VISIT HI MDM: CPT

## 2024-04-20 PROCEDURE — 84145 PROCALCITONIN (PCT): CPT

## 2024-04-20 RX ORDER — IPRATROPIUM BROMIDE 42 UG/1
2 SPRAY, METERED NASAL 2 TIMES DAILY
Status: DISCONTINUED | OUTPATIENT
Start: 2024-04-20 | End: 2024-04-22 | Stop reason: HOSPADM

## 2024-04-20 RX ORDER — MAGNESIUM SULFATE IN WATER 40 MG/ML
2000 INJECTION, SOLUTION INTRAVENOUS ONCE
Status: COMPLETED | OUTPATIENT
Start: 2024-04-20 | End: 2024-04-20

## 2024-04-20 RX ORDER — ONDANSETRON 2 MG/ML
4 INJECTION INTRAMUSCULAR; INTRAVENOUS EVERY 6 HOURS PRN
Status: DISCONTINUED | OUTPATIENT
Start: 2024-04-20 | End: 2024-04-22 | Stop reason: HOSPADM

## 2024-04-20 RX ORDER — METHADONE HYDROCHLORIDE 10 MG/ML
95 CONCENTRATE ORAL DAILY
Status: DISCONTINUED | OUTPATIENT
Start: 2024-04-20 | End: 2024-04-20

## 2024-04-20 RX ORDER — PREDNISONE 20 MG/1
60 TABLET ORAL ONCE
Status: COMPLETED | OUTPATIENT
Start: 2024-04-20 | End: 2024-04-20

## 2024-04-20 RX ORDER — ENOXAPARIN SODIUM 100 MG/ML
40 INJECTION SUBCUTANEOUS DAILY
Status: DISCONTINUED | OUTPATIENT
Start: 2024-04-20 | End: 2024-04-22 | Stop reason: HOSPADM

## 2024-04-20 RX ORDER — POLYETHYLENE GLYCOL 3350 17 G/17G
17 POWDER, FOR SOLUTION ORAL DAILY PRN
Status: DISCONTINUED | OUTPATIENT
Start: 2024-04-20 | End: 2024-04-22 | Stop reason: HOSPADM

## 2024-04-20 RX ORDER — BROMPHENIRAMINE MALEATE, PSEUDOEPHEDRINE HYDROCHLORIDE, AND DEXTROMETHORPHAN HYDROBROMIDE 2; 30; 10 MG/5ML; MG/5ML; MG/5ML
5 SYRUP ORAL 4 TIMES DAILY PRN
Status: DISCONTINUED | OUTPATIENT
Start: 2024-04-20 | End: 2024-04-22 | Stop reason: HOSPADM

## 2024-04-20 RX ORDER — RISPERIDONE 1 MG/1
1 TABLET ORAL DAILY
COMMUNITY

## 2024-04-20 RX ORDER — BUDESONIDE AND FORMOTEROL FUMARATE DIHYDRATE 160; 4.5 UG/1; UG/1
2 AEROSOL RESPIRATORY (INHALATION) 2 TIMES DAILY
COMMUNITY

## 2024-04-20 RX ORDER — ACETAMINOPHEN 325 MG/1
650 TABLET ORAL EVERY 6 HOURS PRN
Status: DISCONTINUED | OUTPATIENT
Start: 2024-04-20 | End: 2024-04-22 | Stop reason: HOSPADM

## 2024-04-20 RX ORDER — ALBUTEROL SULFATE 2.5 MG/3ML
2.5 SOLUTION RESPIRATORY (INHALATION) ONCE
Status: COMPLETED | OUTPATIENT
Start: 2024-04-20 | End: 2024-04-20

## 2024-04-20 RX ORDER — IPRATROPIUM BROMIDE AND ALBUTEROL SULFATE 2.5; .5 MG/3ML; MG/3ML
2 SOLUTION RESPIRATORY (INHALATION) ONCE
Status: COMPLETED | OUTPATIENT
Start: 2024-04-20 | End: 2024-04-20

## 2024-04-20 RX ORDER — AMLODIPINE BESYLATE 5 MG/1
5 TABLET ORAL DAILY
Status: DISCONTINUED | OUTPATIENT
Start: 2024-04-20 | End: 2024-04-22 | Stop reason: HOSPADM

## 2024-04-20 RX ORDER — POTASSIUM CHLORIDE 20 MEQ/1
40 TABLET, EXTENDED RELEASE ORAL PRN
Status: DISCONTINUED | OUTPATIENT
Start: 2024-04-20 | End: 2024-04-22 | Stop reason: HOSPADM

## 2024-04-20 RX ORDER — IPRATROPIUM BROMIDE AND ALBUTEROL SULFATE 2.5; .5 MG/3ML; MG/3ML
1 SOLUTION RESPIRATORY (INHALATION)
Status: DISCONTINUED | OUTPATIENT
Start: 2024-04-20 | End: 2024-04-22 | Stop reason: HOSPADM

## 2024-04-20 RX ORDER — METHOCARBAMOL 500 MG/1
500 TABLET, FILM COATED ORAL 3 TIMES DAILY PRN
Status: DISCONTINUED | OUTPATIENT
Start: 2024-04-20 | End: 2024-04-22 | Stop reason: HOSPADM

## 2024-04-20 RX ORDER — SODIUM CHLORIDE 0.9 % (FLUSH) 0.9 %
5-40 SYRINGE (ML) INJECTION PRN
Status: DISCONTINUED | OUTPATIENT
Start: 2024-04-20 | End: 2024-04-22 | Stop reason: HOSPADM

## 2024-04-20 RX ORDER — DOXYCYCLINE HYCLATE 100 MG
100 TABLET ORAL EVERY 12 HOURS SCHEDULED
Status: DISCONTINUED | OUTPATIENT
Start: 2024-04-20 | End: 2024-04-22 | Stop reason: HOSPADM

## 2024-04-20 RX ORDER — ONDANSETRON 4 MG/1
4 TABLET, ORALLY DISINTEGRATING ORAL EVERY 8 HOURS PRN
Status: DISCONTINUED | OUTPATIENT
Start: 2024-04-20 | End: 2024-04-22 | Stop reason: HOSPADM

## 2024-04-20 RX ORDER — ACETAMINOPHEN 650 MG/1
650 SUPPOSITORY RECTAL EVERY 6 HOURS PRN
Status: DISCONTINUED | OUTPATIENT
Start: 2024-04-20 | End: 2024-04-22 | Stop reason: HOSPADM

## 2024-04-20 RX ORDER — METHYLPREDNISOLONE SODIUM SUCCINATE 40 MG/ML
40 INJECTION, POWDER, LYOPHILIZED, FOR SOLUTION INTRAMUSCULAR; INTRAVENOUS EVERY 8 HOURS
Status: DISCONTINUED | OUTPATIENT
Start: 2024-04-20 | End: 2024-04-20

## 2024-04-20 RX ORDER — SODIUM CHLORIDE 0.9 % (FLUSH) 0.9 %
5-40 SYRINGE (ML) INJECTION EVERY 12 HOURS SCHEDULED
Status: DISCONTINUED | OUTPATIENT
Start: 2024-04-20 | End: 2024-04-22 | Stop reason: HOSPADM

## 2024-04-20 RX ORDER — POTASSIUM CHLORIDE 7.45 MG/ML
10 INJECTION INTRAVENOUS PRN
Status: DISCONTINUED | OUTPATIENT
Start: 2024-04-20 | End: 2024-04-22 | Stop reason: HOSPADM

## 2024-04-20 RX ORDER — SODIUM CHLORIDE FOR INHALATION 3 %
4 VIAL, NEBULIZER (ML) INHALATION
Status: DISCONTINUED | OUTPATIENT
Start: 2024-04-20 | End: 2024-04-22 | Stop reason: HOSPADM

## 2024-04-20 RX ORDER — MAGNESIUM SULFATE IN WATER 40 MG/ML
2000 INJECTION, SOLUTION INTRAVENOUS PRN
Status: DISCONTINUED | OUTPATIENT
Start: 2024-04-20 | End: 2024-04-22 | Stop reason: HOSPADM

## 2024-04-20 RX ORDER — PANTOPRAZOLE SODIUM 40 MG/1
40 TABLET, DELAYED RELEASE ORAL
Status: DISCONTINUED | OUTPATIENT
Start: 2024-04-21 | End: 2024-04-22 | Stop reason: HOSPADM

## 2024-04-20 RX ORDER — METHADONE HYDROCHLORIDE 10 MG/1
95 TABLET ORAL DAILY
Status: DISCONTINUED | OUTPATIENT
Start: 2024-04-20 | End: 2024-04-22 | Stop reason: HOSPADM

## 2024-04-20 RX ORDER — SODIUM CHLORIDE 9 MG/ML
INJECTION, SOLUTION INTRAVENOUS PRN
Status: DISCONTINUED | OUTPATIENT
Start: 2024-04-20 | End: 2024-04-22 | Stop reason: HOSPADM

## 2024-04-20 RX ADMIN — SODIUM CHLORIDE SOLN NEBU 3% 4 ML: 3 NEBU SOLN at 12:20

## 2024-04-20 RX ADMIN — MOMETASONE FUROATE AND FORMOTEROL FUMARATE DIHYDRATE 2 PUFF: 100; 5 AEROSOL RESPIRATORY (INHALATION) at 12:20

## 2024-04-20 RX ADMIN — WATER 40 MG: 1 INJECTION INTRAMUSCULAR; INTRAVENOUS; SUBCUTANEOUS at 11:42

## 2024-04-20 RX ADMIN — ACETAMINOPHEN 325MG 650 MG: 325 TABLET ORAL at 09:30

## 2024-04-20 RX ADMIN — MAGNESIUM SULFATE HEPTAHYDRATE 2000 MG: 40 INJECTION, SOLUTION INTRAVENOUS at 05:25

## 2024-04-20 RX ADMIN — IPRATROPIUM BROMIDE AND ALBUTEROL SULFATE 1 DOSE: 2.5; .5 SOLUTION RESPIRATORY (INHALATION) at 16:29

## 2024-04-20 RX ADMIN — PREDNISONE 60 MG: 20 TABLET ORAL at 05:24

## 2024-04-20 RX ADMIN — Medication 10 ML: at 20:15

## 2024-04-20 RX ADMIN — IPRATROPIUM BROMIDE 2 SPRAY: 42 SPRAY NASAL at 21:04

## 2024-04-20 RX ADMIN — SODIUM CHLORIDE SOLN NEBU 3% 4 ML: 3 NEBU SOLN at 20:40

## 2024-04-20 RX ADMIN — METHADONE HYDROCHLORIDE 95 MG: 10 TABLET ORAL at 11:41

## 2024-04-20 RX ADMIN — AMLODIPINE BESYLATE 5 MG: 5 TABLET ORAL at 10:19

## 2024-04-20 RX ADMIN — IPRATROPIUM BROMIDE AND ALBUTEROL SULFATE 2 DOSE: 2.5; .5 SOLUTION RESPIRATORY (INHALATION) at 05:11

## 2024-04-20 RX ADMIN — IPRATROPIUM BROMIDE AND ALBUTEROL SULFATE 1 DOSE: 2.5; .5 SOLUTION RESPIRATORY (INHALATION) at 12:20

## 2024-04-20 RX ADMIN — IPRATROPIUM BROMIDE AND ALBUTEROL SULFATE 1 DOSE: 2.5; .5 SOLUTION RESPIRATORY (INHALATION) at 20:40

## 2024-04-20 RX ADMIN — WATER 40 MG: 1 INJECTION INTRAMUSCULAR; INTRAVENOUS; SUBCUTANEOUS at 20:14

## 2024-04-20 RX ADMIN — Medication 10 ML: at 09:30

## 2024-04-20 RX ADMIN — ACETAMINOPHEN 325MG 650 MG: 325 TABLET ORAL at 20:59

## 2024-04-20 RX ADMIN — DOXYCYCLINE HYCLATE 100 MG: 100 TABLET, COATED ORAL at 20:14

## 2024-04-20 RX ADMIN — IPRATROPIUM BROMIDE 2 SPRAY: 42 SPRAY NASAL at 15:18

## 2024-04-20 RX ADMIN — ALBUTEROL SULFATE 2.5 MG: 2.5 SOLUTION RESPIRATORY (INHALATION) at 06:21

## 2024-04-20 RX ADMIN — MOMETASONE FUROATE AND FORMOTEROL FUMARATE DIHYDRATE 2 PUFF: 100; 5 AEROSOL RESPIRATORY (INHALATION) at 20:40

## 2024-04-20 RX ADMIN — ENOXAPARIN SODIUM 40 MG: 100 INJECTION SUBCUTANEOUS at 11:41

## 2024-04-20 ASSESSMENT — PAIN DESCRIPTION - LOCATION
LOCATION: HEAD
LOCATION: HEAD

## 2024-04-20 ASSESSMENT — LIFESTYLE VARIABLES: HOW OFTEN DO YOU HAVE A DRINK CONTAINING ALCOHOL: NEVER

## 2024-04-20 ASSESSMENT — PAIN SCALES - GENERAL
PAINLEVEL_OUTOF10: 6
PAINLEVEL_OUTOF10: 0
PAINLEVEL_OUTOF10: 0
PAINLEVEL_OUTOF10: 3

## 2024-04-20 ASSESSMENT — PAIN - FUNCTIONAL ASSESSMENT
PAIN_FUNCTIONAL_ASSESSMENT: NONE - DENIES PAIN
PAIN_FUNCTIONAL_ASSESSMENT: ACTIVITIES ARE NOT PREVENTED

## 2024-04-20 ASSESSMENT — PAIN DESCRIPTION - ORIENTATION: ORIENTATION: ANTERIOR

## 2024-04-20 ASSESSMENT — PAIN DESCRIPTION - DESCRIPTORS
DESCRIPTORS: ACHING
DESCRIPTORS: ACHING

## 2024-04-20 ASSESSMENT — PAIN DESCRIPTION - PAIN TYPE: TYPE: ACUTE PAIN

## 2024-04-20 NOTE — ED NOTES
Patient expresses desire to go home.  Educated patient regarding reasons why she needs admission.  Patient ambulatory to bathroom and back to room.  O2 sat 93% without oxygen after ambulating to bathroom.  Denies shortness of breath after ambulating.

## 2024-04-20 NOTE — PLAN OF CARE
Problem: Discharge Planning  Goal: Discharge to home or other facility with appropriate resources  Outcome: Progressing  Flowsheets (Taken 4/20/2024 1116)  Discharge to home or other facility with appropriate resources: Identify barriers to discharge with patient and caregiver     Problem: Safety - Adult  Goal: Free from fall injury  Outcome: Progressing

## 2024-04-20 NOTE — H&P
V2.0  History and Physical      Name:  Nati Dover /Age/Sex: 1960  (63 y.o. female)   MRN & CSN:  [id number] & [id number] Encounter Date/Time: 2024 7:24 AM EDT   Location:  QC  PCP: Hunter Barr MD       Hospital Day: 1    Assessment and Plan:   Nati Dover is a 63 y.o. female with medical history significant for COPD, nonallergic rhinitis, opiate use disorder, essential hypertension, GERD who presents with COPD exacerbation (HCC)    Assessment and Plan  1.  Acute exacerbation of COPD.  She failed several rounds of outpatient systemic steroid.  Will check respiratory viral panel and procalcitonin.  Chest x-ray without infiltrate.  Start IV steroid, azithromycin, nebs.  Continue ICS/LABA.  She follow-up with pulmonary and will consult them while she is here.    2.  Acute respiratory failure with hypoxia and hypercapnia.  Oxygen saturation 85% on room air on arrival to the ER and had increased work of breathing.   VBG with hypercapnia and hypoxia.Wean off oxygen as tolerated.     3.  Nonallergic rhinitis with postnasal drip.  Will start on ipratropium bromide nasal spray.    4.  History of opiate use disorder currently on methadone.    5.  Essential hypertension.  Continue amlodipine and monitor.    6.  GERD.  Continue PPI.      Disposition:   Current Living situation: Home  Expected Disposition: Home  Estimated D/C: 2 to 3 days    Diet No diet orders on file   DVT Prophylaxis [x] Lovenox, []  Heparin, [] SCDs, [] Ambulation,  [] Eliquis, [] Xarelto, [] Coumadin   Code Status Full code.  Confirmed with patient on admission   Surrogate Decision Maker/ POA None     Personally reviewed Lab Studies and Imaging     Discussed management of the case with ER provider who recommended admission for further evaluation and treatment.       Imaging that was interpreted personally includes CXR without acute finding          History from:     Patient, EMR and ER physician     History of

## 2024-04-20 NOTE — ED NOTES
Introduce myself to the patient, identification band inplace, stretcher in the lowest position for safety, and the call light is in reach. Updated patient on Emergency Department plan of care, no additional needs at this time, will continue with care.

## 2024-04-20 NOTE — ED PROVIDER NOTES
below, if available at the time of this note:    Discussed with Radiologist:    XR CHEST PORTABLE   Preliminary Result   1. No acute cardiopulmonary process.           No results found.    No results found.    PROCEDURES   Unless otherwise noted below, none     Procedures    CRITICAL CARE TIME (.cct)     Critical Care  There was a high probability of life-threatening clinical deterioration in the patient's condition requiring my urgent intervention.  Total critical care time with the patient was 35 minutes excluding separately reportable procedures.  Critical care required due to patients acute hypoxic and hypercapnic respiratory failure.       PAST MEDICAL HISTORY      has a past medical history of Anxiety and depression, Arthritis, Asthma, Bipolar disorder (HCC), CKD (chronic kidney disease), COPD (chronic obstructive pulmonary disease) (HCC), Empyema (HCC), Hepatitis C, History of heroin use, Hypertension, Narcotic abuse (HCC), and UTI (lower urinary tract infection).     EMERGENCY DEPARTMENT COURSE and DIFFERENTIAL DIAGNOSIS/MDM:   Vitals:    Vitals:    04/20/24 0613 04/20/24 0630 04/20/24 0659 04/20/24 0701   BP:  133/86     Pulse: 65 64 77    Resp: 14 11 19    Temp:       SpO2: (!) 89% 100% (!) 89% 93%   Weight:       Height:           Patient was given the following medications:  Medications   predniSONE (DELTASONE) tablet 60 mg (60 mg Oral Given 4/20/24 0524)   ipratropium 0.5 mg-albuterol 2.5 mg (DUONEB) nebulizer solution 2 Dose (2 Doses Inhalation Given 4/20/24 0511)   magnesium sulfate 2000 mg in 50 mL IVPB premix (0 mg IntraVENous Stopped 4/20/24 0658)   albuterol (PROVENTIL) (2.5 MG/3ML) 0.083% nebulizer solution 2.5 mg (2.5 mg Nebulization Given 4/20/24 0621)             Is this patient to be included in the SEP-1 Core Measure due to severe sepsis or septic shock?   No   Exclusion criteria - the patient is NOT to be included for SEP-1 Core Measure due to:  2+ SIRS criteria are not met    CC/HPI

## 2024-04-20 NOTE — CONSULTS
surgery.    FINDINGS:  No lines or tubes. Normal cardiomediastinal silhouette.  The lungs are clear  without focal consolidation or pleural effusion.  No suspicious pulmonary  nodules.  No pulmonary edema. No pneumothorax.    No acute osseous abnormality.    Impression  1. No acute cardiopulmonary process.      Osiris Evans MD, MGARETT.            4/20/2024, 12:37 PM

## 2024-04-20 NOTE — ED NOTES
[facility] transport team here.  Report given.  Patient transported to [facility] O2 @ 1 l/nc.

## 2024-04-20 NOTE — ED NOTES
Ambulated the pt; gait even and steady. Pt was huffing and puffing after her walk. Pt O2 sat 88% on RA. EDMD aware. Pt O2 was dropped to 1L NC; O2 sat 93%.

## 2024-04-21 LAB
ANION GAP SERPL CALCULATED.3IONS-SCNC: 10 MMOL/L (ref 3–16)
BASOPHILS # BLD: 0 K/UL (ref 0–0.2)
BASOPHILS NFR BLD: 0.1 %
BUN SERPL-MCNC: 13 MG/DL (ref 7–20)
CALCIUM SERPL-MCNC: 9.1 MG/DL (ref 8.3–10.6)
CHLORIDE SERPL-SCNC: 100 MMOL/L (ref 99–110)
CO2 SERPL-SCNC: 26 MMOL/L (ref 21–32)
CREAT SERPL-MCNC: 0.7 MG/DL (ref 0.6–1.2)
DEPRECATED RDW RBC AUTO: 13.9 % (ref 12.4–15.4)
EOSINOPHIL # BLD: 0 K/UL (ref 0–0.6)
EOSINOPHIL NFR BLD: 0 %
GFR SERPLBLD CREATININE-BSD FMLA CKD-EPI: >90 ML/MIN/{1.73_M2}
GLUCOSE SERPL-MCNC: 131 MG/DL (ref 70–99)
HCT VFR BLD AUTO: 41.7 % (ref 36–48)
HGB BLD-MCNC: 14.2 G/DL (ref 12–16)
LYMPHOCYTES # BLD: 0.5 K/UL (ref 1–5.1)
LYMPHOCYTES NFR BLD: 8.2 %
MCH RBC QN AUTO: 30.5 PG (ref 26–34)
MCHC RBC AUTO-ENTMCNC: 34 G/DL (ref 31–36)
MCV RBC AUTO: 89.7 FL (ref 80–100)
MONOCYTES # BLD: 0.2 K/UL (ref 0–1.3)
MONOCYTES NFR BLD: 3.9 %
NEUTROPHILS # BLD: 5.6 K/UL (ref 1.7–7.7)
NEUTROPHILS NFR BLD: 87.8 %
PLATELET # BLD AUTO: 163 K/UL (ref 135–450)
PMV BLD AUTO: 8.3 FL (ref 5–10.5)
POTASSIUM SERPL-SCNC: 4.7 MMOL/L (ref 3.5–5.1)
RBC # BLD AUTO: 4.65 M/UL (ref 4–5.2)
SODIUM SERPL-SCNC: 136 MMOL/L (ref 136–145)
WBC # BLD AUTO: 6.4 K/UL (ref 4–11)

## 2024-04-21 PROCEDURE — 80048 BASIC METABOLIC PNL TOTAL CA: CPT

## 2024-04-21 PROCEDURE — 1200000000 HC SEMI PRIVATE

## 2024-04-21 PROCEDURE — 94640 AIRWAY INHALATION TREATMENT: CPT

## 2024-04-21 PROCEDURE — 6370000000 HC RX 637 (ALT 250 FOR IP): Performed by: INTERNAL MEDICINE

## 2024-04-21 PROCEDURE — 36415 COLL VENOUS BLD VENIPUNCTURE: CPT

## 2024-04-21 PROCEDURE — 85025 COMPLETE CBC W/AUTO DIFF WBC: CPT

## 2024-04-21 PROCEDURE — 2580000003 HC RX 258: Performed by: INTERNAL MEDICINE

## 2024-04-21 PROCEDURE — 99232 SBSQ HOSP IP/OBS MODERATE 35: CPT | Performed by: INTERNAL MEDICINE

## 2024-04-21 PROCEDURE — 94760 N-INVAS EAR/PLS OXIMETRY 1: CPT

## 2024-04-21 PROCEDURE — 6360000002 HC RX W HCPCS: Performed by: INTERNAL MEDICINE

## 2024-04-21 RX ORDER — PREDNISONE 20 MG/1
40 TABLET ORAL DAILY
Status: DISCONTINUED | OUTPATIENT
Start: 2024-04-21 | End: 2024-04-22 | Stop reason: HOSPADM

## 2024-04-21 RX ADMIN — IPRATROPIUM BROMIDE AND ALBUTEROL SULFATE 1 DOSE: 2.5; .5 SOLUTION RESPIRATORY (INHALATION) at 08:16

## 2024-04-21 RX ADMIN — MOMETASONE FUROATE AND FORMOTEROL FUMARATE DIHYDRATE 2 PUFF: 100; 5 AEROSOL RESPIRATORY (INHALATION) at 08:17

## 2024-04-21 RX ADMIN — METHADONE HYDROCHLORIDE 95 MG: 10 TABLET ORAL at 08:16

## 2024-04-21 RX ADMIN — DOXYCYCLINE HYCLATE 100 MG: 100 TABLET, COATED ORAL at 20:20

## 2024-04-21 RX ADMIN — Medication 10 ML: at 20:20

## 2024-04-21 RX ADMIN — ENOXAPARIN SODIUM 40 MG: 100 INJECTION SUBCUTANEOUS at 08:18

## 2024-04-21 RX ADMIN — PANTOPRAZOLE SODIUM 40 MG: 40 TABLET, DELAYED RELEASE ORAL at 06:11

## 2024-04-21 RX ADMIN — AMLODIPINE BESYLATE 5 MG: 5 TABLET ORAL at 08:16

## 2024-04-21 RX ADMIN — SODIUM CHLORIDE SOLN NEBU 3% 4 ML: 3 NEBU SOLN at 20:33

## 2024-04-21 RX ADMIN — PREDNISONE 40 MG: 20 TABLET ORAL at 11:50

## 2024-04-21 RX ADMIN — IPRATROPIUM BROMIDE AND ALBUTEROL SULFATE 1 DOSE: 2.5; .5 SOLUTION RESPIRATORY (INHALATION) at 20:30

## 2024-04-21 RX ADMIN — WATER 40 MG: 1 INJECTION INTRAMUSCULAR; INTRAVENOUS; SUBCUTANEOUS at 03:58

## 2024-04-21 RX ADMIN — MOMETASONE FUROATE AND FORMOTEROL FUMARATE DIHYDRATE 2 PUFF: 100; 5 AEROSOL RESPIRATORY (INHALATION) at 20:32

## 2024-04-21 RX ADMIN — DOXYCYCLINE HYCLATE 100 MG: 100 TABLET, COATED ORAL at 08:16

## 2024-04-21 RX ADMIN — IPRATROPIUM BROMIDE AND ALBUTEROL SULFATE 1 DOSE: 2.5; .5 SOLUTION RESPIRATORY (INHALATION) at 15:26

## 2024-04-21 RX ADMIN — IPRATROPIUM BROMIDE 2 SPRAY: 42 SPRAY NASAL at 20:55

## 2024-04-21 RX ADMIN — Medication 10 ML: at 08:18

## 2024-04-21 RX ADMIN — SODIUM CHLORIDE SOLN NEBU 3% 4 ML: 3 NEBU SOLN at 08:16

## 2024-04-21 RX ADMIN — SODIUM CHLORIDE SOLN NEBU 3% 4 ML: 3 NEBU SOLN at 12:06

## 2024-04-21 RX ADMIN — IPRATROPIUM BROMIDE AND ALBUTEROL SULFATE 1 DOSE: 2.5; .5 SOLUTION RESPIRATORY (INHALATION) at 12:06

## 2024-04-21 NOTE — PLAN OF CARE
Problem: Discharge Planning  Goal: Discharge to home or other facility with appropriate resources  4/21/2024 1035 by Cindy Alfred, RN  Outcome: Progressing     Problem: Safety - Adult  Goal: Free from fall injury  4/21/2024 1035 by Cindy Alfred, RN  Outcome: Progressing     Problem: Pain  Goal: Verbalizes/displays adequate comfort level or baseline comfort level  4/21/2024 1035 by Cindy Alfred, RN  Outcome: Progressing

## 2024-04-21 NOTE — PLAN OF CARE
Problem: Discharge Planning  Goal: Discharge to home or other facility with appropriate resources  4/20/2024 2007 by Stephanie Diehl, RN  Outcome: Progressing     Problem: Safety - Adult  Goal: Free from fall injury  4/20/2024 2007 by Stephanie Diehl, RN  Outcome: Progressing

## 2024-04-21 NOTE — PLAN OF CARE
Problem: Discharge Planning  Goal: Discharge to home or other facility with appropriate resources  4/20/2024 2242 by Stephanie Diehl, RN  Outcome: Progressing     Problem: Safety - Adult  Goal: Free from fall injury  4/20/2024 2242 by Stephanie Diehl, RN  Outcome: Progressing     Problem: Pain  Goal: Verbalizes/displays adequate comfort level or baseline comfort level  Outcome: Progressing

## 2024-04-22 VITALS
DIASTOLIC BLOOD PRESSURE: 88 MMHG | RESPIRATION RATE: 20 BRPM | OXYGEN SATURATION: 100 % | HEIGHT: 70 IN | SYSTOLIC BLOOD PRESSURE: 144 MMHG | WEIGHT: 168.65 LBS | BODY MASS INDEX: 24.14 KG/M2 | TEMPERATURE: 98 F | HEART RATE: 72 BPM

## 2024-04-22 LAB
ANION GAP SERPL CALCULATED.3IONS-SCNC: 12 MMOL/L (ref 3–16)
BASOPHILS # BLD: 0 K/UL (ref 0–0.2)
BASOPHILS NFR BLD: 0.1 %
BUN SERPL-MCNC: 18 MG/DL (ref 7–20)
CALCIUM SERPL-MCNC: 8.8 MG/DL (ref 8.3–10.6)
CHLORIDE SERPL-SCNC: 100 MMOL/L (ref 99–110)
CO2 SERPL-SCNC: 25 MMOL/L (ref 21–32)
CREAT SERPL-MCNC: 1 MG/DL (ref 0.6–1.2)
DEPRECATED RDW RBC AUTO: 14.8 % (ref 12.4–15.4)
EKG ATRIAL RATE: 67 BPM
EKG DIAGNOSIS: NORMAL
EKG P AXIS: 67 DEGREES
EKG P-R INTERVAL: 148 MS
EKG Q-T INTERVAL: 378 MS
EKG QRS DURATION: 88 MS
EKG QTC CALCULATION (BAZETT): 399 MS
EKG R AXIS: -23 DEGREES
EKG T AXIS: -11 DEGREES
EKG VENTRICULAR RATE: 67 BPM
EOSINOPHIL # BLD: 0 K/UL (ref 0–0.6)
EOSINOPHIL NFR BLD: 0 %
GFR SERPLBLD CREATININE-BSD FMLA CKD-EPI: 64 ML/MIN/{1.73_M2}
GLUCOSE SERPL-MCNC: 124 MG/DL (ref 70–99)
HCT VFR BLD AUTO: 40.8 % (ref 36–48)
HGB BLD-MCNC: 13.7 G/DL (ref 12–16)
LYMPHOCYTES # BLD: 0.9 K/UL (ref 1–5.1)
LYMPHOCYTES NFR BLD: 10 %
MCH RBC QN AUTO: 30 PG (ref 26–34)
MCHC RBC AUTO-ENTMCNC: 33.6 G/DL (ref 31–36)
MCV RBC AUTO: 89.4 FL (ref 80–100)
MONOCYTES # BLD: 0.7 K/UL (ref 0–1.3)
MONOCYTES NFR BLD: 7.9 %
NEUTROPHILS # BLD: 7 K/UL (ref 1.7–7.7)
NEUTROPHILS NFR BLD: 82 %
PLATELET # BLD AUTO: 168 K/UL (ref 135–450)
PMV BLD AUTO: 8.2 FL (ref 5–10.5)
POTASSIUM SERPL-SCNC: 4.3 MMOL/L (ref 3.5–5.1)
RBC # BLD AUTO: 4.56 M/UL (ref 4–5.2)
SODIUM SERPL-SCNC: 137 MMOL/L (ref 136–145)
WBC # BLD AUTO: 8.5 K/UL (ref 4–11)

## 2024-04-22 PROCEDURE — 6370000000 HC RX 637 (ALT 250 FOR IP): Performed by: INTERNAL MEDICINE

## 2024-04-22 PROCEDURE — 36415 COLL VENOUS BLD VENIPUNCTURE: CPT

## 2024-04-22 PROCEDURE — 99232 SBSQ HOSP IP/OBS MODERATE 35: CPT | Performed by: INTERNAL MEDICINE

## 2024-04-22 PROCEDURE — 80048 BASIC METABOLIC PNL TOTAL CA: CPT

## 2024-04-22 PROCEDURE — 2580000003 HC RX 258: Performed by: INTERNAL MEDICINE

## 2024-04-22 PROCEDURE — 85025 COMPLETE CBC W/AUTO DIFF WBC: CPT

## 2024-04-22 PROCEDURE — 93010 ELECTROCARDIOGRAM REPORT: CPT | Performed by: INTERNAL MEDICINE

## 2024-04-22 RX ORDER — PREDNISONE 10 MG/1
20 TABLET ORAL DAILY
Qty: 6 TABLET | Refills: 0 | Status: SHIPPED | OUTPATIENT
Start: 2024-04-22 | End: 2024-04-25

## 2024-04-22 RX ORDER — PREDNISONE 5 MG/1
5 TABLET ORAL DAILY
Qty: 3 TABLET | Refills: 0 | Status: SHIPPED | OUTPATIENT
Start: 2024-04-22 | End: 2024-04-25

## 2024-04-22 RX ORDER — PREDNISONE 10 MG/1
10 TABLET ORAL DAILY
Qty: 3 TABLET | Refills: 0 | Status: SHIPPED | OUTPATIENT
Start: 2024-04-22 | End: 2024-04-25

## 2024-04-22 RX ORDER — IPRATROPIUM BROMIDE 42 UG/1
2 SPRAY, METERED NASAL 2 TIMES DAILY
Qty: 15 ML | Refills: 3 | Status: SHIPPED | OUTPATIENT
Start: 2024-04-22

## 2024-04-22 RX ORDER — DOXYCYCLINE HYCLATE 100 MG
100 TABLET ORAL EVERY 12 HOURS SCHEDULED
Qty: 7 TABLET | Refills: 0 | Status: SHIPPED | OUTPATIENT
Start: 2024-04-22 | End: 2024-04-26

## 2024-04-22 RX ORDER — PREDNISONE 20 MG/1
40 TABLET ORAL DAILY
Qty: 6 TABLET | Refills: 0 | Status: SHIPPED | OUTPATIENT
Start: 2024-04-22 | End: 2024-04-25

## 2024-04-22 RX ORDER — ALBUTEROL SULFATE 90 UG/1
2 AEROSOL, METERED RESPIRATORY (INHALATION) EVERY 6 HOURS PRN
Qty: 1 EACH | Refills: 11 | Status: SHIPPED | OUTPATIENT
Start: 2024-04-22 | End: 2024-07-21

## 2024-04-22 RX ORDER — IPRATROPIUM BROMIDE AND ALBUTEROL SULFATE 2.5; .5 MG/3ML; MG/3ML
1 SOLUTION RESPIRATORY (INHALATION) EVERY 4 HOURS PRN
Qty: 360 ML | Refills: 5 | Status: SHIPPED | OUTPATIENT
Start: 2024-04-22

## 2024-04-22 RX ORDER — FLUTICASONE PROPIONATE 50 MCG
2 SPRAY, SUSPENSION (ML) NASAL DAILY
Qty: 16 G | Refills: 0 | Status: SHIPPED | OUTPATIENT
Start: 2024-04-22

## 2024-04-22 RX ADMIN — MOMETASONE FUROATE AND FORMOTEROL FUMARATE DIHYDRATE 2 PUFF: 100; 5 AEROSOL RESPIRATORY (INHALATION) at 08:00

## 2024-04-22 RX ADMIN — IPRATROPIUM BROMIDE AND ALBUTEROL SULFATE 1 DOSE: 2.5; .5 SOLUTION RESPIRATORY (INHALATION) at 08:00

## 2024-04-22 RX ADMIN — AMLODIPINE BESYLATE 5 MG: 5 TABLET ORAL at 09:54

## 2024-04-22 RX ADMIN — SODIUM CHLORIDE SOLN NEBU 3% 4 ML: 3 NEBU SOLN at 08:00

## 2024-04-22 RX ADMIN — METHADONE HYDROCHLORIDE 95 MG: 10 TABLET ORAL at 09:55

## 2024-04-22 RX ADMIN — PREDNISONE 40 MG: 20 TABLET ORAL at 09:54

## 2024-04-22 RX ADMIN — PANTOPRAZOLE SODIUM 40 MG: 40 TABLET, DELAYED RELEASE ORAL at 06:03

## 2024-04-22 RX ADMIN — Medication 10 ML: at 09:56

## 2024-04-22 RX ADMIN — DOXYCYCLINE HYCLATE 100 MG: 100 TABLET, COATED ORAL at 09:54

## 2024-04-22 ASSESSMENT — PAIN DESCRIPTION - LOCATION: LOCATION: OTHER (COMMENT)

## 2024-04-22 ASSESSMENT — PAIN DESCRIPTION - DESCRIPTORS
DESCRIPTORS: ACHING
DESCRIPTORS: OTHER (COMMENT)

## 2024-04-22 ASSESSMENT — PAIN SCALES - GENERAL
PAINLEVEL_OUTOF10: 0

## 2024-04-22 ASSESSMENT — PAIN - FUNCTIONAL ASSESSMENT
PAIN_FUNCTIONAL_ASSESSMENT: ACTIVITIES ARE NOT PREVENTED
PAIN_FUNCTIONAL_ASSESSMENT: ACTIVITIES ARE NOT PREVENTED

## 2024-04-22 ASSESSMENT — PAIN DESCRIPTION - PAIN TYPE: TYPE: ACUTE PAIN

## 2024-04-22 NOTE — DISCHARGE SUMMARY
V2.0  Discharge Summary    Name:  Nati Dover /Age/Sex: 1960 (63 y.o. female)   Admit Date: 2024  Discharge Date: 24    MRN & CSN:  [id number] & [id number] Encounter Date and Time 24 11:42 AM EDT    Attending:  Pola Eckert MD Discharging Provider: Pola Eckert MD       Hospital Course:     Brief HPI: Nati Dover is a 63 y.o. female with medical history significant for COPD, nonallergic rhinitis, opiate use disorder, essential hypertension, GERD who presents with shortness of breath due to COPD exacerbation after failing several rounds of oral prednisone and antibiotics.  She was started on IV steroid, nebs and doxycycline.  Chest x-ray without any infiltrate.  Respiratory viral panel was negative.  Was seen by pulmonary.  Her hypoxia resolved.  She was also started on ipratropium bromide and Flonase for nonallergic rhinitis with significant improvement in postnasal drip and cough.  Seen by a respiratory and was provided a spacer and reinforce the technique for use of ICS/LABA.  She was discharged home in a hemodynamically stable condition and recommended to follow-up with her pulmonologist as outpatient.     The patient expressed appropriate understanding of, and agreement with the discharge recommendations, medications, and plan.     Consults this admission:  IP CONSULT TO PULMONOLOGY    Discharge Diagnosis:   COPD exacerbation (HCC)  Postnasal drip due to nonallergic rhinitis  Acute respiratory failure with hypoxia and hypercapnia    Discharge Instruction:   Follow up appointments: Pulmonary  Primary care physician: Hunter Barr MD within 2 weeks  Diet: low fat, low cholesterol diet   Activity: activity as tolerated  Disposition: Discharged to:   [x]Home, []C, []SNF, []Acute Rehab, []Hospice   Condition on discharge: Stable  Labs and Tests to be Followed up as an outpatient by PCP or Specialist: None    Discharge Medications:        Medication List

## 2024-04-22 NOTE — PROGRESS NOTES
Pulmonary progress note     Patient's name:  Nati Dover  Medical Record Number: [id number]  Patient's account/billing number: [id number]  Patient's YOB: 1960  Age: 63 y.o.  Date of Admission: 4/20/2024  4:50 AM  Date of Consult: 4/22/2024      Primary Care Physician: Hunter Barr MD      Code Status: Full Code    Reason for consult: COPD with acute exacerbation    Assessment and Plan     COPD/asthma with acute exacerbation  Chronic rhinitis      Plan:  Prednisone for additional 4 days  Doxycycline for 5 days total  Dulera and DuoNeb  Follow up with Dr. Chacon  Ok to d/c from pulmonary stand point      HISTORY OF PRESENT ILLNESS:   Mr./Ms. Nati Dover is a 63 y.o. lady with past medical history stated below significant for COPD  Patient with recurrent exacerbations treated multiple times with outpatient steroid and antibiotics presented with worsening shortness of breath satting 85% on room air chest x-ray without acute cardiopulmonary pathology      REVIEW OF SYSTEMS:  Review of Systems -   General ROS: negative  Psychological ROS: negative  Ophthalmic ROS: negative  ENT ROS: negative  Allergy and Immunology ROS: negative  Hematological and Lymphatic ROS: negative  Endocrine ROS: negative  Breast ROS: negative  Respiratory ROS: no cough, shortness of breath, or wheezing  Cardiovascular ROS: no chest pain or dyspnea on exertion  Gastrointestinal ROS:negative  Genito-Urinary ROS: negative  Musculoskeletal ROS: negative  Neurological ROS: negative  Dermatological ROS: negative        Physical Exam:    Vitals: BP (!) 144/88   Pulse 72   Temp 98 °F (36.7 °C) (Oral)   Resp 20   Ht 1.778 m (5' 10\")   Wt 76.5 kg (168 lb 10.4 oz)   LMP 09/13/2017   SpO2 100%   BMI 24.20 kg/m²     Last Body weight:   Wt Readings from Last 3 Encounters:   04/22/24 76.5 kg (168 lb 10.4 oz)   11/22/23 76.7 kg (169 lb)   11/14/23 78.5 kg (173 lb) 
    V2.0  [facility] [place]      Name:  Nati Dover /Age/Sex: 1960  (63 y.o. female)   MRN & CSN:  [id number] & [id number] Encounter Date/Time: 2024 8:34 AM EDT    Location:  [id number] PCP: Hunter Barr MD       Hospital Day: 2    Assessment and Plan:     Nati Dover is a 63 y.o. female with medical history significant for COPD, nonallergic rhinitis, opiate use disorder, essential hypertension, GERD who presents with COPD exacerbation (HCC)     Assessment and Plan  1.  Acute exacerbation of COPD.  She failed several rounds of outpatient systemic steroid.  Respiratory viral panel was negative.  Chest x-ray without infiltrate.  Continue systemic steroid, antibiotics nebs.  Continue ICS/LABA.  Pulmonary is following..     2.  Acute respiratory failure with hypoxia and hypercapnia.  Oxygen saturation 85% on room air on arrival to the ER and had increased work of breathing.   VBG with hypercapnia and hypoxia.Wean off oxygen as tolerated.      3.  Nonallergic rhinitis with postnasal drip.  Started on ipratropium bromide nasal spray.     4.  History of opiate use disorder currently on methadone.     5.  Essential hypertension.  Continue amlodipine and monitor.     6.  GERD.  Continue PPI.  DVT prophylaxis:Lovenox     Disposition: 2 more days.    Personally reviewed Lab Studies and Imaging      Medical Decision Making:  The following items were considered in medical decision making:  Discussion of patient care with other providers  Reviewed clinical lab tests  Reviewed radiology tests  Reviewed other diagnostic tests/interventions  Independent review of radiologic images  Microbiology cultures and other micro tests reviewed        Subjective:     Chief Complaint: Shortness of breath    HPI:Nati Dover is a 63 y.o. female who presents with shortness of breath.  Still has some cough but wheezing has improved.         Review of Systems:    As above     Objective: 
CLINICAL PHARMACY NOTE: MEDS TO BEDS    Total # of Prescriptions Filled: 6   The following medications were delivered to the patient:  Current Discharge Medication List        START taking these medications    Details   ipratropium (ATROVENT) 0.06 % nasal spray 2 sprays by Each Nostril route 2 times daily  Qty: 15 mL, Refills: 3      doxycycline hyclate (VIBRA-TABS) 100 MG tablet Take 1 tablet by mouth every 12 hours for 7 doses  Qty: 7 tablet, Refills: 0      !! predniSONE (DELTASONE) 20 MG tablet Take 2 tablets by mouth daily for 3 days  Qty: 6 tablet, Refills: 0      !! predniSONE (DELTASONE) 10 MG tablet Take 2 tablets by mouth daily for 3 days  Qty: 6 tablet, Refills: 0      !! predniSONE (DELTASONE) 10 MG tablet Take 1 tablet by mouth daily for 3 days  Qty: 3 tablet, Refills: 0      !! predniSONE (DELTASONE) 5 MG tablet Take 1 tablet by mouth daily for 3 days  Qty: 3 tablet, Refills: 0      fluticasone (FLONASE) 50 MCG/ACT nasal spray 2 sprays by Each Nostril route daily  Qty: 16 g, Refills: 0       !! - Potential duplicate medications found. Please discuss with provider.      Albuterol sulfate hfa 108  Ipratropium-Albuterol 0.5-2.5 solution    Additional Documentation:    
of  the mA/kV was utilized to reduce the radiation dose to as low as reasonably  achievable.    COMPARISON:  None.    HISTORY:  ORDERING SYSTEM PROVIDED HISTORY: Shortness breath, history of COPD, recently  seen nodule right apex,  TECHNOLOGIST PROVIDED HISTORY:  Reason for exam:->Shortness breath, history of COPD, recently seen nodule  right apex,  Decision Support Exception - unselect if not a suspected or confirmed  emergency medical condition->Emergency Medical Condition ([place])  Reason for Exam: Shortness breath, history of COPD, recently seen nodule  right apex,    FINDINGS:  Mediastinum: Thoracic aorta is nonaneurysmal.  Pulmonary arteries, heart,  pericardium and mediastinum appear unremarkable.  No evidence of  lymphadenopathy.    Lungs/pleura: Mild changes of emphysema.  No active consolidative  infiltrates.  Minimal atelectatic changes right middle lobe and lingula.  No  evidence of pleural effusion or pneumothorax.    Upper Abdomen: No acute abnormality.    Soft Tissues/Bones: Mild superior endplate compression at T3, T4, and T5.    Impression  No evidence of right apical pulmonary nodule.  Mild emphysema.  Mild scarring  in the right middle lobe and lingula.  No acute lung parenchyma or pleural  disease.    No active mediastinal abnormality.      CTPA: Results for orders placed during the hospital encounter of 09/23/23    CT CHEST PULMONARY EMBOLISM W CONTRAST    Narrative  EXAMINATION:  CTA OF THE CHEST 9/23/2023 3:58 pm    TECHNIQUE:  CTA of the chest was performed after the administration of intravenous  contrast.  Multiplanar reformatted images are provided for review.  MIP  images are provided for review. Automated exposure control, iterative  reconstruction, and/or weight based adjustment of the mA/kV was utilized to  reduce the radiation dose to as low as reasonably achievable.    COMPARISON:  06/09/2023    HISTORY:  ORDERING SYSTEM PROVIDED HISTORY: pe r/o  TECHNOLOGIST PROVIDED HISTORY:  Reason

## 2024-04-22 NOTE — PLAN OF CARE
Problem: Discharge Planning  Goal: Discharge to home or other facility with appropriate resources  4/22/2024 0114 by Denise Jansen, RN  Outcome: Progressing     Problem: Respiratory - Adult  Goal: Achieves optimal ventilation and oxygenation  Outcome: Progressing     Problem: Pain  Goal: Verbalizes/displays adequate comfort level or baseline comfort level  4/22/2024 0113 by Denise Jansen, RN  Outcome: Progressing     Problem: Safety - Adult  Goal: Free from fall injury  4/22/2024 0114 by Denise Jansen, RN  Outcome: Progressing

## 2024-05-08 ENCOUNTER — OFFICE VISIT (OUTPATIENT)
Dept: PULMONOLOGY | Age: 64
End: 2024-05-08
Payer: MEDICAID

## 2024-05-08 VITALS
HEART RATE: 76 BPM | RESPIRATION RATE: 18 BRPM | TEMPERATURE: 97.6 F | BODY MASS INDEX: 24.48 KG/M2 | DIASTOLIC BLOOD PRESSURE: 78 MMHG | OXYGEN SATURATION: 90 % | SYSTOLIC BLOOD PRESSURE: 126 MMHG | HEIGHT: 70 IN | WEIGHT: 171 LBS

## 2024-05-08 DIAGNOSIS — J30.89 OTHER ALLERGIC RHINITIS: ICD-10-CM

## 2024-05-08 DIAGNOSIS — J44.89 COPD WITH ASTHMA (HCC): Primary | ICD-10-CM

## 2024-05-08 PROBLEM — J16.0 CAP (COMMUNITY ACQUIRED PNEUMONIA) DUE TO CHLAMYDIA SPECIES: Status: RESOLVED | Noted: 2023-09-23 | Resolved: 2024-05-08

## 2024-05-08 PROCEDURE — 3078F DIAST BP <80 MM HG: CPT | Performed by: INTERNAL MEDICINE

## 2024-05-08 PROCEDURE — 3074F SYST BP LT 130 MM HG: CPT | Performed by: INTERNAL MEDICINE

## 2024-05-08 PROCEDURE — 99214 OFFICE O/P EST MOD 30 MIN: CPT | Performed by: INTERNAL MEDICINE

## 2024-05-08 NOTE — ASSESSMENT & PLAN NOTE
Recently admitted.  Doing well after discharge.    Symbicort twice daily along with DuoNebs 1-2 times daily.

## 2024-05-08 NOTE — PROGRESS NOTES
REASON FOR CONSULTATION/CC: asthma  Chief Complaint   Patient presents with    Follow-up    Follow-Up from Hospital     Doing better          PCP: Hunter Barr MD    HISTORY OF PRESENT ILLNESS: Nati Dover is a 63 y.o. year old female with a history of asthma who presents :               Asthma history   Nucala, failed.  Did not response  Dupexiant, side effects.    side effects to Spiriva   Hx of chronic steroids then weaned off         Currently          COPD with Asthma and allergic rhinitis   She was recently in the hospital .  Feeling much much better.    Hypoxemia resolved.        Duoneb's  - using 1-2 times per day. Typically once in monring.    Symbicort 2 puff bid.       allergic rhinitis   Flonase  daily   ipratropium daily  nasal.                        Objective:   PHYSICAL EXAM:  Blood pressure 126/78, pulse 76, temperature 97.6 °F (36.4 °C), resp. rate 18, height 1.778 m (5' 10\"), weight 77.6 kg (171 lb), last menstrual period 09/13/2017, SpO2 90 %, not currently breastfeeding.'    Gen: No distress.   Eyes:    ENT:    Neck:    Resp: No accessory muscle use. No crackles. No wheezes. No rhonchi.    CV: Regular rate. Regular rhythm. No murmur or rub. No edema.   GI:    Skin:    Lymph:    M/S: No cyanosis. No clubbing.     Neuro: Moves all four extremities.   Psych: Oriented x 3. No anxiety.  Awake. Alert. Intact judgement and insight.          Data Reviewed:        Assessment:      Asthma  allergic rhinitis   Leg numbness      Plan:      Problem List Items Addressed This Visit       Other allergic rhinitis     Controlled with ipratropium and Flonase daily.         COPD with asthma ([facility]) - Primary     Recently admitted.  Doing well after discharge.    Symbicort twice daily along with DuoNebs 1-2 times daily.         Relevant Orders    [facility] [place]    6 Minute Walk Test                  This note was transcribed using Dragon Dictation software. Please disregard any translational

## 2024-05-28 ENCOUNTER — TELEPHONE (OUTPATIENT)
Dept: PULMONOLOGY | Age: 64
End: 2024-05-28

## 2024-05-28 RX ORDER — PREDNISONE 20 MG/1
20 TABLET ORAL 2 TIMES DAILY
Qty: 10 TABLET | Refills: 0 | Status: SHIPPED | OUTPATIENT
Start: 2024-05-28 | End: 2024-06-02

## 2024-05-28 NOTE — TELEPHONE ENCOUNTER
Pt has SOB and wheezing. Was doing a breathing treatment when she called. She is requesting an rx for Prednisone to be sent to [facility] on [facility]

## 2024-06-06 ENCOUNTER — TELEPHONE (OUTPATIENT)
Dept: PULMONOLOGY | Age: 64
End: 2024-06-06

## 2024-06-06 RX ORDER — PREDNISONE 20 MG/1
20 TABLET ORAL DAILY
Qty: 7 TABLET | Refills: 0 | Status: SHIPPED | OUTPATIENT
Start: 2024-06-06 | End: 2024-06-13

## 2024-06-06 NOTE — TELEPHONE ENCOUNTER
Complains of cough and SOB  Duration OVER A WEEK  Cough with sputum production? YES  Color CLEAR  Fever? NO  Any other Symptoms? NO  Any current treatment tried? NEBULIZER  Using inhalers? YES do they help? SOME  Pharmacy? [facility] says she's been over exerting herself helping her  who's been released from the hospital, and she doesn't want to end up in the hospital herself so she wants prednisone sent in.

## 2024-06-13 ENCOUNTER — TELEPHONE (OUTPATIENT)
Dept: PULMONOLOGY | Age: 64
End: 2024-06-13

## 2024-06-13 DIAGNOSIS — J44.89 COPD WITH ASTHMA (HCC): Primary | ICD-10-CM

## 2024-06-13 RX ORDER — PREDNISONE 10 MG/1
TABLET ORAL
Qty: 30 TABLET | Refills: 0 | Status: SHIPPED | OUTPATIENT
Start: 2024-06-13 | End: 2024-06-23

## 2024-06-24 ENCOUNTER — TELEPHONE (OUTPATIENT)
Dept: PULMONOLOGY | Age: 64
End: 2024-06-24

## 2024-06-24 DIAGNOSIS — J44.89 COPD WITH ASTHMA (HCC): Primary | ICD-10-CM

## 2024-06-24 RX ORDER — PREDNISONE 10 MG/1
TABLET ORAL
Qty: 30 TABLET | Refills: 0 | Status: SHIPPED | OUTPATIENT
Start: 2024-06-24 | End: 2024-07-04

## 2024-06-24 NOTE — TELEPHONE ENCOUNTER
INR at goal. Medications and chart reviewed. No changes noted to necessitate adjustment of warfarin or follow-up plan. See calendar.      Pt notified

## 2024-06-24 NOTE — TELEPHONE ENCOUNTER
Complains of cough and SOB  Duration trying to prevent herself from going to ED so would not answer question  Cough with sputum production? no  Color n/a  Fever? no  Any other Symptoms? no  Any current treatment tried? tylenol  Using inhalers? yes  do they help? A little, has to take nebulizer  Pharmacy?   [id number] [place] [id number] - P [phone] - F [phone]       Rg says her sister took the air conditioner and she needs prednisone. She can't breathe, especially with the weather.

## 2024-07-04 ENCOUNTER — HOSPITAL ENCOUNTER (EMERGENCY)
Age: 64
Discharge: HOME | End: 2024-07-04
Payer: MEDICAID

## 2024-07-04 VITALS
HEIGHT: 70 IN | BODY MASS INDEX: 23.96 KG/M2 | TEMPERATURE: 97.6 F | RESPIRATION RATE: 18 BRPM | WEIGHT: 167.33 LBS | DIASTOLIC BLOOD PRESSURE: 81 MMHG | HEART RATE: 78 BPM | OXYGEN SATURATION: 100 % | SYSTOLIC BLOOD PRESSURE: 117 MMHG

## 2024-07-04 DIAGNOSIS — J44.1 COPD WITH EXACERBATION (HCC): Primary | ICD-10-CM

## 2024-07-04 PROCEDURE — 99283 EMERGENCY DEPT VISIT LOW MDM: CPT

## 2024-07-04 PROCEDURE — 6360000002 HC RX W HCPCS: Performed by: PHYSICIAN ASSISTANT

## 2024-07-04 RX ORDER — PREDNISONE 10 MG/1
TABLET ORAL
Qty: 24 TABLET | Refills: 0 | Status: SHIPPED | OUTPATIENT
Start: 2024-07-04

## 2024-07-04 RX ORDER — DEXAMETHASONE 4 MG/1
10 TABLET ORAL ONCE
Status: COMPLETED | OUTPATIENT
Start: 2024-07-04 | End: 2024-07-04

## 2024-07-04 RX ADMIN — DEXAMETHASONE 10 MG: 4 TABLET ORAL at 17:02

## 2024-07-04 NOTE — ED PROVIDER NOTES
**ADVANCED PRACTICE PROVIDER, I HAVE EVALUATED THIS PATIENT**        [facility]  EMERGENCY DEPARTMENT ENCOUNTER      Pt Name: Nati Dover  MRN:[id number]  Birthdate 1960  Date of evaluation: 7/4/2024  Provider: Rachid Hobson PA-C  Note Started: 4:53 PM EDT 7/4/24        Chief Complaint:   breathing troubles        Nursing Notes, Past Medical Hx, Past Surgical Hx, Social Hx, Allergies, and Family Hx were all reviewed and agreed with or any disagreements were addressed in the HPI.    HPI: (Location, Duration, Timing, Severity, Quality, Assoc Sx, Context, Modifying factors)    History From:patient          Chief Complaint of COPD worse again    This is a  63 y.o. female who presents stay that her family is waking her up a number of times nightly, she is doing too hard of work at home during the day, she is exhausted in the evening, and feels like her COPD keeps on getting worse every time she winds down on a dose of prednisone that she has been on.  Wonders if there is something else that she can be doing.  She continues on her DuoNebs and Symbicort and did take a DuoNeb before coming in.  She is also on Flovent and tries to avoid allergen exposures but has been exposed to grass when family has mowed, she has been outside when people have barbecued, she has not been successful in getting family to work together with her.  She has just finished her prednisone pulmonology put her on.    PastMedical/Surgical History:      Diagnosis Date    Anxiety and depression     Arthritis     Asthma     Bipolar disorder (HCC)     CKD (chronic kidney disease)     stage 3    COPD (chronic obstructive pulmonary disease) (HCC)     Empyema (HCC)     Hepatitis C     Patient denies     History of heroin use     Hypertension     Narcotic abuse (HCC)     heroin, clean 1 year    UTI (lower urinary tract infection)          Procedure Laterality Date    COLONOSCOPY  10/23/2018    Colonoscopy with

## 2024-07-04 NOTE — DISCHARGE INSTRUCTIONS
Use medications as written, avoid the heat, wear mask when exposed to possible allergens like grass being cut, and avoid excessive activity and exertion, making sure to get plenty of sleep as well at night.  Call pulmonology tomorrow to arrange close outpatient follow-up next week for further care and treatment.  Return to the ER for any emergency worsening or concern.

## 2024-07-05 NOTE — ED NOTES
[id number]   Brought immediately back to room 14 due to pt feeling SOB.  Pt walked independently.  Pt reports intermittent SOB off and on since end of may requiring multiple prescriptions of prednisone.  Pt currently off of prednisone and states today felt more SOB than usual.  Pt states she has been doing too much taking care of her  recently.  No chest pain. No cough, fever, sore throat, or other URI symptoms.  Lung sounds clear.   Took 2 duoneb HHN's at home before coming.  Feels mildly/moderately SOB now.

## 2024-07-15 RX ORDER — PREDNISONE 10 MG/1
TABLET ORAL
Qty: 24 TABLET | Refills: 0 | Status: SHIPPED | OUTPATIENT
Start: 2024-07-15

## 2024-07-15 NOTE — TELEPHONE ENCOUNTER
Patient is SOB patient has a lot going on moving and breathing treatments O2 80 something.   She started taking deep breaths and brings it back up to 97. Patient is asking for prednisone until she is seen on 7/30/24.    predniSONE (DELTASONE) 10 MG tablet [[id number]]

## 2024-07-23 ENCOUNTER — TELEPHONE (OUTPATIENT)
Dept: PULMONOLOGY | Age: 64
End: 2024-07-23

## 2024-07-23 NOTE — TELEPHONE ENCOUNTER
Pt is requesting more prednisone be sent to the [facility] on [facility] to hold her over until her next appt.

## 2024-07-25 RX ORDER — PREDNISONE 10 MG/1
10 TABLET ORAL DAILY
Qty: 30 TABLET | Refills: 2 | Status: SHIPPED | OUTPATIENT
Start: 2024-07-25

## 2024-07-25 NOTE — TELEPHONE ENCOUNTER
She says she is good with 10mg a day. Please send to [facility].  She is also out of nebulizer solution. Please send to [facility].  She states that when she was in the hospital they gave her nose spray and she would like that as well sent to [facility].

## 2024-07-30 ENCOUNTER — OFFICE VISIT (OUTPATIENT)
Dept: PULMONOLOGY | Age: 64
End: 2024-07-30
Payer: MEDICAID

## 2024-07-30 VITALS
HEART RATE: 64 BPM | DIASTOLIC BLOOD PRESSURE: 80 MMHG | SYSTOLIC BLOOD PRESSURE: 110 MMHG | TEMPERATURE: 97.8 F | OXYGEN SATURATION: 99 % | RESPIRATION RATE: 18 BRPM | WEIGHT: 170.6 LBS | HEIGHT: 70 IN | BODY MASS INDEX: 24.42 KG/M2

## 2024-07-30 DIAGNOSIS — J44.89 COPD WITH ASTHMA (HCC): Primary | ICD-10-CM

## 2024-07-30 DIAGNOSIS — J30.89 OTHER ALLERGIC RHINITIS: ICD-10-CM

## 2024-07-30 PROCEDURE — 3074F SYST BP LT 130 MM HG: CPT | Performed by: INTERNAL MEDICINE

## 2024-07-30 PROCEDURE — 99214 OFFICE O/P EST MOD 30 MIN: CPT | Performed by: INTERNAL MEDICINE

## 2024-07-30 PROCEDURE — 3079F DIAST BP 80-89 MM HG: CPT | Performed by: INTERNAL MEDICINE

## 2024-07-30 RX ORDER — FLUTICASONE PROPIONATE 50 MCG
2 SPRAY, SUSPENSION (ML) NASAL DAILY
Qty: 16 G | Refills: 11 | Status: SHIPPED | OUTPATIENT
Start: 2024-07-30

## 2024-07-30 RX ORDER — IPRATROPIUM BROMIDE AND ALBUTEROL SULFATE 2.5; .5 MG/3ML; MG/3ML
1 SOLUTION RESPIRATORY (INHALATION) EVERY 4 HOURS PRN
Qty: 360 ML | Refills: 11 | Status: SHIPPED | OUTPATIENT
Start: 2024-07-30

## 2024-07-30 RX ORDER — IPRATROPIUM BROMIDE 42 UG/1
2 SPRAY, METERED NASAL 2 TIMES DAILY
Qty: 15 ML | Refills: 11 | Status: SHIPPED | OUTPATIENT
Start: 2024-07-30

## 2024-07-30 RX ORDER — BUDESONIDE, GLYCOPYRROLATE, AND FORMOTEROL FUMARATE 160; 9; 4.8 UG/1; UG/1; UG/1
2 AEROSOL, METERED RESPIRATORY (INHALATION) 2 TIMES DAILY
Qty: 1 EACH | Refills: 0 | Status: SHIPPED | COMMUNITY
Start: 2024-07-30 | End: 2024-08-06

## 2024-07-30 NOTE — PROGRESS NOTES
REASON FOR CONSULTATION/CC: asthma  Chief Complaint   Patient presents with    COPD          PCP: Hunter Barr MD    HISTORY OF PRESENT ILLNESS: Nati Dover is a 63 y.o. year old female with a history of asthma who presents :               Asthma history   Nucala, failed.  Did not response  Dupexiant, side effects.    side effects to Spiriva   Hx of chronic steroids then weaned off         Currently          Copd with asthma.    Has air purifier.      Started on daiily steoida t 10 mg daily secondary to frequent exacerbations.  This is is helping.     Duoneb's  - needs refills   Symbicort   Has not started pulmonary rehab.          allergic rhinitis   ipratropium   Flonase    Refilled                     Objective:   PHYSICAL EXAM:  Blood pressure 110/80, pulse 64, temperature 97.8 °F (36.6 °C), temperature source Infrared, resp. rate 18, height 1.778 m (5' 10\"), weight 77.4 kg (170 lb 9.6 oz), last menstrual period 09/13/2017, SpO2 99 %, not currently breastfeeding.'    Gen: No distress.   Eyes:    ENT:    Neck:    Resp: No accessory muscle use. No crackles. No wheezes. No rhonchi.    CV: Regular rate. Regular rhythm. No murmur or rub. No edema.   GI:    Skin:    Lymph:    M/S: No cyanosis. No clubbing.     Neuro: Moves all four extremities.   Psych: Oriented x 3. No anxiety.  Awake. Alert. Intact judgement and insight.          Data Reviewed:        Assessment:      Asthma  allergic rhinitis   Leg numbness      Plan:      Problem List Items Addressed This Visit       Other allergic rhinitis     Controlled with ipratropium and Flonase daily.         Relevant Medications    fluticasone (FLONASE) 50 MCG/ACT nasal spray    ipratropium (ATROVENT) 0.06 % nasal spray    ipratropium 0.5 mg-albuterol 2.5 mg (DUONEB) 0.5-2.5 (3) MG/3ML SOLN nebulizer solution    Budeson-Glycopyrrol-Formoterol (BREZTRI AEROSPHERE) 160-9-4.8 MCG/ACT AERO    COPD with asthma (HCC) - Primary     Will try Breztri with Duoneb's

## 2024-07-30 NOTE — ASSESSMENT & PLAN NOTE
Will try Breztri with Duoneb's      Consider repeat trial of biologic.      Chronic steroids secondary to repeated request for prednisone burst.  Improved control with this.

## 2024-08-19 RX ORDER — PANTOPRAZOLE SODIUM 40 MG/1
40 TABLET, DELAYED RELEASE ORAL
Qty: 90 TABLET | Refills: 1 | OUTPATIENT
Start: 2024-08-19

## 2024-08-23 NOTE — TELEPHONE ENCOUNTER
Refill Request:     Last Office Visit:  11/22/2023     Next Scheduled Visit : Visit date not found     Medication Requested: PROTONIX 40mg         Pharmacy:    [facility] #[id number] - [place], [place] - [address] [phone] - F [phone] [address] [facility] [address]  Phone: [phone] Fax: [phone]

## 2024-08-25 RX ORDER — PANTOPRAZOLE SODIUM 40 MG/1
40 TABLET, DELAYED RELEASE ORAL
Qty: 90 TABLET | Refills: 1 | OUTPATIENT
Start: 2024-08-25

## 2024-08-30 ENCOUNTER — HOSPITAL ENCOUNTER (EMERGENCY)
Age: 64
Discharge: HOME | End: 2024-08-30
Attending: EMERGENCY MEDICINE
Payer: MEDICAID

## 2024-08-30 VITALS
RESPIRATION RATE: 18 BRPM | WEIGHT: 170.42 LBS | SYSTOLIC BLOOD PRESSURE: 136 MMHG | HEIGHT: 70 IN | BODY MASS INDEX: 24.4 KG/M2 | DIASTOLIC BLOOD PRESSURE: 89 MMHG | HEART RATE: 66 BPM | OXYGEN SATURATION: 100 % | TEMPERATURE: 98.4 F

## 2024-08-30 DIAGNOSIS — H11.31 SUBCONJUNCTIVAL HEMORRHAGE OF RIGHT EYE: ICD-10-CM

## 2024-08-30 DIAGNOSIS — S05.01XA ABRASION OF RIGHT CORNEA, INITIAL ENCOUNTER: Primary | ICD-10-CM

## 2024-08-30 DIAGNOSIS — J44.89 COPD WITH ASTHMA (HCC): ICD-10-CM

## 2024-08-30 DIAGNOSIS — J44.1 COPD EXACERBATION (HCC): ICD-10-CM

## 2024-08-30 PROCEDURE — 6370000000 HC RX 637 (ALT 250 FOR IP): Performed by: EMERGENCY MEDICINE

## 2024-08-30 PROCEDURE — 99283 EMERGENCY DEPT VISIT LOW MDM: CPT

## 2024-08-30 RX ORDER — SULFACETAMIDE SODIUM 100 MG/ML
2 SOLUTION/ DROPS OPHTHALMIC 4 TIMES DAILY
Qty: 10 ML | Refills: 0 | Status: SHIPPED | OUTPATIENT
Start: 2024-08-30 | End: 2024-09-06

## 2024-08-30 RX ORDER — TETRACAINE HYDROCHLORIDE 5 MG/ML
1 SOLUTION OPHTHALMIC ONCE
Status: COMPLETED | OUTPATIENT
Start: 2024-08-30 | End: 2024-08-30

## 2024-08-30 RX ORDER — PREDNISONE 10 MG/1
40 TABLET ORAL DAILY
Qty: 20 TABLET | Refills: 0 | Status: SHIPPED | OUTPATIENT
Start: 2024-08-30 | End: 2024-09-04

## 2024-08-30 RX ORDER — IPRATROPIUM BROMIDE AND ALBUTEROL SULFATE 2.5; .5 MG/3ML; MG/3ML
1 SOLUTION RESPIRATORY (INHALATION) EVERY 4 HOURS PRN
Qty: 360 ML | Refills: 1 | Status: SHIPPED | OUTPATIENT
Start: 2024-08-30

## 2024-08-30 RX ORDER — PREDNISONE 20 MG/1
40 TABLET ORAL ONCE
Status: COMPLETED | OUTPATIENT
Start: 2024-08-30 | End: 2024-08-30

## 2024-08-30 RX ORDER — IPRATROPIUM BROMIDE AND ALBUTEROL SULFATE 2.5; .5 MG/3ML; MG/3ML
1 SOLUTION RESPIRATORY (INHALATION) ONCE
Status: COMPLETED | OUTPATIENT
Start: 2024-08-30 | End: 2024-08-30

## 2024-08-30 RX ADMIN — PREDNISONE 40 MG: 20 TABLET ORAL at 22:36

## 2024-08-30 RX ADMIN — FLUORESCEIN SODIUM 1 MG: 1 STRIP OPHTHALMIC at 22:38

## 2024-08-30 RX ADMIN — IPRATROPIUM BROMIDE AND ALBUTEROL SULFATE 1 DOSE: .5; 2.5 SOLUTION RESPIRATORY (INHALATION) at 22:37

## 2024-08-30 RX ADMIN — TETRACAINE HYDROCHLORIDE 1 DROP: 5 SOLUTION OPHTHALMIC at 22:39

## 2024-08-30 ASSESSMENT — PAIN DESCRIPTION - ORIENTATION: ORIENTATION: RIGHT

## 2024-08-30 ASSESSMENT — PAIN - FUNCTIONAL ASSESSMENT: PAIN_FUNCTIONAL_ASSESSMENT: 0-10

## 2024-08-30 ASSESSMENT — PAIN DESCRIPTION - DESCRIPTORS: DESCRIPTORS: SHARP

## 2024-08-30 ASSESSMENT — LIFESTYLE VARIABLES
HOW MANY STANDARD DRINKS CONTAINING ALCOHOL DO YOU HAVE ON A TYPICAL DAY: PATIENT DOES NOT DRINK
HOW OFTEN DO YOU HAVE A DRINK CONTAINING ALCOHOL: NEVER

## 2024-08-30 ASSESSMENT — PAIN SCALES - GENERAL: PAINLEVEL_OUTOF10: 7

## 2024-08-30 ASSESSMENT — PAIN DESCRIPTION - LOCATION: LOCATION: EYE

## 2024-08-31 NOTE — ED PROVIDER NOTES
[facility]     EMERGENCY DEPARTMENT ENCOUNTER            Pt Name: Nati Dover   MRN: [id number]   Birthdate 1960   Date of evaluation: 8/30/2024   Provider: German Ackerman MD   PCP: Hunter Barr MD   Note Started: 10:18 PM EDT 8/30/24          CHIEF COMPLAINT     Chief Complaint   Patient presents with    Eye Injury     Right eye injury while doing her eyelashes.    Shortness of Breath     Complains of increasing shortness of breath, not labored, speaking without difficulty, 100% RA           HISTORY OF PRESENT ILLNESS:   History from : Patient   Limitations to history : None     Nati Dover is a 63 y.o. female who presents to the emergency room via private vehicle with her daughter for evaluation of injury to the right eye.  Patient states that her friend was trying to place artificial eyelashes on her right eye and ended up poking her in the right eye.  Patient states she noticed some redness around the white part of the right eye on the lateral aspect.  Patient denies any vision changes in the right eye.  She denies any pain in the right eye.  States she initially had some watering from the eye that has resolved.  She denies any headaches.  No contact use.  Patient also states she has some shortness of breath.  She has a history of COPD.  States that she thinks the weather change has caused her to have some increasing shortness of breath.  She states she has a chronic cough which is unchanged from prior.  She denies any chest pain.  No sputum production with the cough.  No fevers or chills.  No nausea or vomiting.  No lower extremity swelling or calf tenderness bilaterally.  Patient states she is on prednisone 10 mg daily prescribed by her pulmonologist, Dr. Chacon.    Nursing Notes were all reviewed and agreed with, or any disagreements were addressed in the HPI.     REVIEW OF SYSTEMS :    Positives and Pertinent negatives as per HPI.      MEDICAL  IMPRESSION    1. Abrasion of right cornea, initial encounter    2. Subconjunctival hemorrhage of right eye    3. COPD exacerbation (HCC)    4. COPD with asthma (HCC)           DISPOSITION/PLAN:   DISPOSITION Decision To Discharge 08/30/2024 10:55:21 PM  Condition at Disposition: Good      PATIENT REFERRED TO:   Hunter Barr MD  [address] [facility] [address]  [phone]    Call in 1 day  For a follow up appointment.    [facility] [place]  [address]  [facility] [id number] [phone]    Call in 1 day  for a follow up appointment.       DISCHARGE MEDICATIONS:   New Prescriptions    PREDNISONE (DELTASONE) 10 MG TABLET    Take 4 tablets by mouth daily for 5 doses    SULFACETAMIDE (BLEPH-10) 10 % OPHTHALMIC SOLUTION    Place 2 drops into both eyes 4 times daily for 7 days        DISCONTINUED MEDICATIONS:   Discontinued Medications    No medications on file              (Please note that portions of this note were completed with a voice recognition program.  Efforts were made to edit the dictations but occasionally words are mis-transcribed.)       German Ackerman MD (electronically signed)              German Ackerman MD  08/30/24 1935

## 2024-08-31 NOTE — DISCHARGE INSTRUCTIONS
Call today or tomorrow to follow up with Hunter Barr MD  in 4-5 days.    You were discharged home with a prednisone burst for your COPD exacerbation.  Please continue your normal prednisone after you finish your prednisone burst.    If you are given an antibiotic then make sure you get the prescription filled and take the antibiotics by placing 2 drops into the affected eye 4 times daily while you are awake over the next 7 days.    [facility], [facility] has some antibiotics for free; [facility] and [facility] has a 4 dollar prescription plan for some antibiotics.    Return to the Emergency Department for worsening of swelling to eye, drainage from eye, the white of your eye turns red, inability to see, any other care or concern.

## 2024-08-31 NOTE — ED TRIAGE NOTES
Patient to ED for evaluation of injury to right eye.  Patient is alert and oriented with GCS 15.  Patient reports getting poked in the eye while doing her eye lashes.  Patient has definite area of redness to the eye, no drainage noted.  Patient also complains of shortness of breath.  Patient has hx COPD and reports increasing trouble due to recent heat.  Patient has no increased work of breathing, no accessory muscle use, and is able to speak without difficulty.  Patient denies any other complaints at this time.

## 2024-09-23 RX ORDER — PREDNISONE 10 MG/1
TABLET ORAL
Qty: 24 TABLET | Refills: 0 | Status: SHIPPED | OUTPATIENT
Start: 2024-09-23

## 2024-09-30 NOTE — TELEPHONE ENCOUNTER
Pt called is having a flare up wants prednisone sent in also likes how the Breztri worked for her an would like a script for that .    Last appt: 7/30/2024    Next appt: Visit date not found    Medication matches medication on Epic list

## 2024-10-01 RX ORDER — PREDNISONE 10 MG/1
10 TABLET ORAL DAILY
Qty: 30 TABLET | Refills: 2 | Status: SHIPPED | OUTPATIENT
Start: 2024-10-01

## 2024-10-09 ENCOUNTER — TELEPHONE (OUTPATIENT)
Dept: PULMONOLOGY | Age: 64
End: 2024-10-09

## 2024-10-09 DIAGNOSIS — J44.9 MODERATE COPD (CHRONIC OBSTRUCTIVE PULMONARY DISEASE) (HCC): Primary | ICD-10-CM

## 2024-10-09 NOTE — TELEPHONE ENCOUNTER
Rg called in stating that she was given a Breztri sample and it worked well for her. She says she would like a script sent in to [facility].  She also states her Symbicort is ready to be picked up and she's not picking it up because Dr Chacon told her not to. Please confirm.

## 2024-11-01 ENCOUNTER — TELEPHONE (OUTPATIENT)
Dept: PULMONOLOGY | Age: 64
End: 2024-11-01

## 2024-11-01 DIAGNOSIS — B37.0 THRUSH: ICD-10-CM

## 2024-11-01 DIAGNOSIS — J44.89 COPD WITH ASTHMA (HCC): Primary | ICD-10-CM

## 2024-11-04 ENCOUNTER — TELEPHONE (OUTPATIENT)
Dept: PULMONOLOGY | Age: 64
End: 2024-11-04

## 2024-11-04 RX ORDER — PREDNISONE 20 MG/1
20 TABLET ORAL DAILY
Qty: 7 TABLET | Refills: 0 | Status: SHIPPED | OUTPATIENT
Start: 2024-11-04 | End: 2024-11-11

## 2024-11-06 RX ORDER — FLUCONAZOLE 100 MG/1
100 TABLET ORAL DAILY
Qty: 7 TABLET | Refills: 0 | Status: SHIPPED | OUTPATIENT
Start: 2024-11-06 | End: 2024-11-06

## 2024-11-06 RX ORDER — FLUCONAZOLE 100 MG/1
100 TABLET ORAL DAILY
Qty: 7 TABLET | Refills: 0 | Status: SHIPPED | OUTPATIENT
Start: 2024-11-06 | End: 2024-11-13

## 2024-11-06 NOTE — TELEPHONE ENCOUNTER
Diflucan sent.   
Patient states she has severe thrush and asks that we send in medication to [facility]. She says she can't always rinse after she uses her inhalers because she's having a hard time breathing.  
Pt called stating her COPD is worse nd she is having troubles breathing. She is wanting a script for predisone     [facility] is [facility]   
Sent in and message to   
The patient has been notified of this information and all questions answered.    
71

## 2024-11-13 ENCOUNTER — HOSPITAL ENCOUNTER (EMERGENCY)
Age: 64
Discharge: HOME | End: 2024-11-14
Attending: EMERGENCY MEDICINE
Payer: MEDICAID

## 2024-11-13 DIAGNOSIS — J44.89 COPD WITH ASTHMA (HCC): ICD-10-CM

## 2024-11-13 DIAGNOSIS — J44.1 COPD EXACERBATION (HCC): Primary | ICD-10-CM

## 2024-11-13 PROCEDURE — 6370000000 HC RX 637 (ALT 250 FOR IP): Performed by: EMERGENCY MEDICINE

## 2024-11-13 PROCEDURE — 99284 EMERGENCY DEPT VISIT MOD MDM: CPT

## 2024-11-13 RX ORDER — METHYLPREDNISOLONE SODIUM SUCCINATE 125 MG/2ML
60 INJECTION, POWDER, LYOPHILIZED, FOR SOLUTION INTRAMUSCULAR; INTRAVENOUS ONCE
Status: COMPLETED | OUTPATIENT
Start: 2024-11-14 | End: 2024-11-14

## 2024-11-13 RX ORDER — IPRATROPIUM BROMIDE AND ALBUTEROL SULFATE 2.5; .5 MG/3ML; MG/3ML
1 SOLUTION RESPIRATORY (INHALATION) ONCE
Status: COMPLETED | OUTPATIENT
Start: 2024-11-14 | End: 2024-11-13

## 2024-11-13 RX ADMIN — IPRATROPIUM BROMIDE AND ALBUTEROL SULFATE 1 DOSE: .5; 2.5 SOLUTION RESPIRATORY (INHALATION) at 23:57

## 2024-11-13 ASSESSMENT — LIFESTYLE VARIABLES: HOW OFTEN DO YOU HAVE A DRINK CONTAINING ALCOHOL: NEVER

## 2024-11-14 ENCOUNTER — APPOINTMENT (OUTPATIENT)
Dept: GENERAL RADIOLOGY | Age: 64
End: 2024-11-14
Payer: MEDICAID

## 2024-11-14 VITALS
DIASTOLIC BLOOD PRESSURE: 83 MMHG | SYSTOLIC BLOOD PRESSURE: 127 MMHG | HEIGHT: 70 IN | TEMPERATURE: 99.2 F | HEART RATE: 67 BPM | WEIGHT: 176.15 LBS | RESPIRATION RATE: 11 BRPM | BODY MASS INDEX: 25.22 KG/M2 | OXYGEN SATURATION: 94 %

## 2024-11-14 LAB
ALBUMIN SERPL-MCNC: 4.4 G/DL (ref 3.4–5)
ALBUMIN/GLOB SERPL: 2.2 {RATIO} (ref 1.1–2.2)
ALP SERPL-CCNC: 66 U/L (ref 40–129)
ALT SERPL-CCNC: 21 U/L (ref 10–40)
ANION GAP SERPL CALCULATED.3IONS-SCNC: 8 MMOL/L (ref 3–16)
AST SERPL-CCNC: 17 U/L (ref 15–37)
BASE EXCESS BLDV CALC-SCNC: 10.2 MMOL/L (ref -3–3)
BASOPHILS # BLD: 0 K/UL (ref 0–0.2)
BASOPHILS NFR BLD: 0.8 %
BILIRUB SERPL-MCNC: 0.4 MG/DL (ref 0–1)
BUN SERPL-MCNC: 13 MG/DL (ref 7–20)
CALCIUM SERPL-MCNC: 9.5 MG/DL (ref 8.3–10.6)
CHLORIDE SERPL-SCNC: 101 MMOL/L (ref 99–110)
CO2 BLDV-SCNC: 37 MMOL/L
CO2 SERPL-SCNC: 32 MMOL/L (ref 21–32)
CREAT SERPL-MCNC: 0.9 MG/DL (ref 0.6–1.2)
DEPRECATED RDW RBC AUTO: 14.3 % (ref 12.4–15.4)
EOSINOPHIL # BLD: 0.2 K/UL (ref 0–0.6)
EOSINOPHIL NFR BLD: 3.1 %
FLUAV RNA UPPER RESP QL NAA+PROBE: NEGATIVE
FLUBV AG NPH QL: NEGATIVE
GFR SERPLBLD CREATININE-BSD FMLA CKD-EPI: 72 ML/MIN/{1.73_M2}
GLUCOSE SERPL-MCNC: 74 MG/DL (ref 70–99)
HCO3 BLDV-SCNC: 36.6 MMOL/L (ref 23–29)
HCT VFR BLD AUTO: 44.4 % (ref 36–48)
HGB BLD-MCNC: 15.1 G/DL (ref 12–16)
LACTATE BLDV-SCNC: 1.7 MMOL/L (ref 0.4–1.9)
LYMPHOCYTES # BLD: 2.2 K/UL (ref 1–5.1)
LYMPHOCYTES NFR BLD: 39 %
MCH RBC QN AUTO: 31.3 PG (ref 26–34)
MCHC RBC AUTO-ENTMCNC: 34 G/DL (ref 31–36)
MCV RBC AUTO: 91.9 FL (ref 80–100)
MONOCYTES # BLD: 0.6 K/UL (ref 0–1.3)
MONOCYTES NFR BLD: 9.7 %
NEUTROPHILS # BLD: 2.7 K/UL (ref 1.7–7.7)
NEUTROPHILS NFR BLD: 47.4 %
O2 THERAPY: ABNORMAL
PCO2 BLDV: 74 MMHG (ref 40–50)
PH BLDV: 7.3 [PH] (ref 7.35–7.45)
PLATELET # BLD AUTO: 173 K/UL (ref 135–450)
PMV BLD AUTO: 8 FL (ref 5–10.5)
PO2 BLDV: 33.8 MMHG (ref 25–40)
POTASSIUM SERPL-SCNC: 4.3 MMOL/L (ref 3.5–5.1)
PROT SERPL-MCNC: 6.4 G/DL (ref 6.4–8.2)
RBC # BLD AUTO: 4.83 M/UL (ref 4–5.2)
SAO2 % BLDV: 56 %
SARS-COV-2 RDRP RESP QL NAA+PROBE: NOT DETECTED
SODIUM SERPL-SCNC: 141 MMOL/L (ref 136–145)
WBC # BLD AUTO: 5.7 K/UL (ref 4–11)

## 2024-11-14 PROCEDURE — 96374 THER/PROPH/DIAG INJ IV PUSH: CPT

## 2024-11-14 PROCEDURE — 82803 BLOOD GASES ANY COMBINATION: CPT

## 2024-11-14 PROCEDURE — 87040 BLOOD CULTURE FOR BACTERIA: CPT

## 2024-11-14 PROCEDURE — 6370000000 HC RX 637 (ALT 250 FOR IP): Performed by: EMERGENCY MEDICINE

## 2024-11-14 PROCEDURE — 71046 X-RAY EXAM CHEST 2 VIEWS: CPT

## 2024-11-14 PROCEDURE — 36415 COLL VENOUS BLD VENIPUNCTURE: CPT

## 2024-11-14 PROCEDURE — 87804 INFLUENZA ASSAY W/OPTIC: CPT

## 2024-11-14 PROCEDURE — 85025 COMPLETE CBC W/AUTO DIFF WBC: CPT

## 2024-11-14 PROCEDURE — 80053 COMPREHEN METABOLIC PANEL: CPT

## 2024-11-14 PROCEDURE — 87635 SARS-COV-2 COVID-19 AMP PRB: CPT

## 2024-11-14 PROCEDURE — 83605 ASSAY OF LACTIC ACID: CPT

## 2024-11-14 PROCEDURE — 6360000002 HC RX W HCPCS: Performed by: EMERGENCY MEDICINE

## 2024-11-14 RX ORDER — PREDNISONE 20 MG/1
60 TABLET ORAL DAILY
Qty: 15 TABLET | Refills: 0 | Status: SHIPPED | OUTPATIENT
Start: 2024-11-14 | End: 2024-11-19

## 2024-11-14 RX ORDER — IPRATROPIUM BROMIDE AND ALBUTEROL SULFATE 2.5; .5 MG/3ML; MG/3ML
1 SOLUTION RESPIRATORY (INHALATION) EVERY 4 HOURS PRN
Qty: 360 ML | Refills: 1 | Status: SHIPPED | OUTPATIENT
Start: 2024-11-14

## 2024-11-14 RX ORDER — DOXYCYCLINE HYCLATE 100 MG
100 TABLET ORAL 2 TIMES DAILY
Qty: 14 TABLET | Refills: 0 | Status: SHIPPED | OUTPATIENT
Start: 2024-11-14 | End: 2024-11-21

## 2024-11-14 RX ORDER — DOXYCYCLINE HYCLATE 100 MG
100 TABLET ORAL ONCE
Status: COMPLETED | OUTPATIENT
Start: 2024-11-14 | End: 2024-11-14

## 2024-11-14 RX ADMIN — METHYLPREDNISOLONE SODIUM SUCCINATE 60 MG: 125 INJECTION INTRAMUSCULAR; INTRAVENOUS at 01:01

## 2024-11-14 RX ADMIN — DOXYCYCLINE HYCLATE 100 MG: 100 TABLET, COATED ORAL at 01:44

## 2024-11-14 NOTE — ED PROVIDER NOTES
MG capsule Take 1 capsule by mouth 3 times daily as needed for Anxiety      methadone (DOLOPHINE) 10 MG/ML solution Take 9.5 mLs by mouth daily.       Allergies   Allergen Reactions    Acetylcysteine Shortness Of Breath and Anaphylaxis     Increased bronchospasm    Codeine Hives     Pt states no recent problems     Levaquin [Levofloxacin In D5w] Itching    Penicillins Hives and Itching    Dupixent [Dupilumab] Other (See Comments)     Right leg pain    Escitalopram Headaches    Ibuprofen Diarrhea, Nausea And Vomiting and Other (See Comments)       REVIEW OF SYSTEMS  Positives and pertinent negatives as per HPI.  Ten other systems were reviewed and are negative.  Nursing notes pertaining to ROS were reviewed.        PHYSICAL EXAM   /83   Pulse 67   Temp 99.2 °F (37.3 °C) (Oral)   Resp 11   Ht 1.778 m (5' 10\")   Wt 79.9 kg (176 lb 2.4 oz)   LMP 09/13/2017   SpO2 94%   BMI 25.27 kg/m²   General: Alert and oriented x 3, NAD.  No increased work of breathing or accessory muscle use.  Non-ill appearing.  Appropriate and interactive  Eyes: PERRL, no scleral icterus, injection or exudate.  EOMI.    HENT: Atraumatic.  Oral pharynx is clear, moist, no enanthem.  No tonsillar hypertropy or exudate. Nasal mucous membranes are clear.  TM's are clear without evidence of otitis media.    Neck:  No Lymphadenopathy.  Non-tender to palpation.  Normal ROM.  No JVD.  No thyromegaly.  No Mass.    PULMONARY: Diffuse expiratory wheezes with no significant expiratory phase prolongation and no rales or rhonchi.  CV: Regular rate and rhythm without murmurs, rubs or gallops.    ABD: Soft, non-tender, non-distended, normal bowel sounds, no hepatosplenomegaly, no masses.  No peritoneal signs, rebound or guarding.    Back:  No CVAT, no rash.    EXT: No cyanosis or clubbing.  No rash.  CR < 2 seconds.  No tenderness to palpation.  No lower extremity edema.    +2 pulses in upper/lower extremities bilaterally.  Skin is warm and dry.  No

## 2024-11-14 NOTE — ED NOTES

## 2024-11-14 NOTE — ED TRIAGE NOTES
Pt came in from home for shortness of breath that has been going on for a few weeks. Has been in basement that has mold and mildew. Takes Prednisone at home that she has been tapering. Made an appointment w/ pulmonologist for this Friday regarding the prednisone. 88% on RA during triage, placed on 2L NC, non-dependent at home. Hx of COPD and asthma. Pt a&ox4, ambulated from lobby to room.

## 2024-11-15 LAB — BACTERIA BLD CULT: NORMAL

## 2024-11-18 ENCOUNTER — TELEPHONE (OUTPATIENT)
Dept: PULMONOLOGY | Age: 64
End: 2024-11-18

## 2024-11-18 DIAGNOSIS — J44.89 COPD WITH ASTHMA (HCC): Primary | ICD-10-CM

## 2024-11-18 DIAGNOSIS — K21.9 GASTROESOPHAGEAL REFLUX DISEASE WITHOUT ESOPHAGITIS: ICD-10-CM

## 2024-11-18 LAB — BACTERIA BLD CULT: NORMAL

## 2024-11-18 RX ORDER — PANTOPRAZOLE SODIUM 40 MG/1
40 TABLET, DELAYED RELEASE ORAL
Qty: 90 TABLET | Refills: 1 | OUTPATIENT
Start: 2024-11-18

## 2024-11-18 RX ORDER — PANTOPRAZOLE SODIUM 40 MG/1
40 TABLET, DELAYED RELEASE ORAL
Qty: 90 TABLET | Refills: 3 | Status: SHIPPED | OUTPATIENT
Start: 2024-11-18

## 2024-11-18 NOTE — TELEPHONE ENCOUNTER
Patient had an appt tomorrow but had to cancel due to car in shop. She is rescheduled for 11/26 but wanted to know if she could get her acid reflux medication called into pharmacy.

## 2024-11-18 NOTE — TELEPHONE ENCOUNTER
Refill Request:     Last Office Visit:  11/22/2023     Next Scheduled Visit : Visit date not found     Medication Requested:   Requested Prescriptions     Pending Prescriptions Disp Refills    pantoprazole (PROTONIX) 40 MG tablet [Pharmacy Med Name: PANTOPRAZOLE 40MG TABLETS] 90 tablet 1     Sig: TAKE 1 TABLET BY MOUTH EVERY MORNING BEFORE BREAKFAST        Pharmacy:    [facility] #[id number] [facility] [address] [facility] [phone] - F [phone]  [facility] [address]  [facility] [address]  Phone: [phone] Fax: [phone]

## 2024-11-26 ENCOUNTER — OFFICE VISIT (OUTPATIENT)
Dept: PULMONOLOGY | Age: 64
End: 2024-11-26
Payer: MEDICAID

## 2024-11-26 VITALS
TEMPERATURE: 97.7 F | HEART RATE: 64 BPM | HEIGHT: 70 IN | BODY MASS INDEX: 24.88 KG/M2 | SYSTOLIC BLOOD PRESSURE: 115 MMHG | DIASTOLIC BLOOD PRESSURE: 70 MMHG | WEIGHT: 173.8 LBS | RESPIRATION RATE: 18 BRPM

## 2024-11-26 DIAGNOSIS — J30.89 OTHER ALLERGIC RHINITIS: ICD-10-CM

## 2024-11-26 DIAGNOSIS — K21.9 GASTROESOPHAGEAL REFLUX DISEASE WITHOUT ESOPHAGITIS: ICD-10-CM

## 2024-11-26 DIAGNOSIS — J44.9 MODERATE COPD (CHRONIC OBSTRUCTIVE PULMONARY DISEASE) (HCC): ICD-10-CM

## 2024-11-26 DIAGNOSIS — J44.89 COPD WITH ASTHMA (HCC): Primary | ICD-10-CM

## 2024-11-26 PROCEDURE — 99214 OFFICE O/P EST MOD 30 MIN: CPT | Performed by: INTERNAL MEDICINE

## 2024-11-26 PROCEDURE — 3074F SYST BP LT 130 MM HG: CPT | Performed by: INTERNAL MEDICINE

## 2024-11-26 PROCEDURE — 3078F DIAST BP <80 MM HG: CPT | Performed by: INTERNAL MEDICINE

## 2024-11-26 RX ORDER — PANTOPRAZOLE SODIUM 40 MG/1
40 TABLET, DELAYED RELEASE ORAL 2 TIMES DAILY
Qty: 60 TABLET | Refills: 11 | Status: SHIPPED | OUTPATIENT
Start: 2024-11-26

## 2024-11-26 RX ORDER — IPRATROPIUM BROMIDE 42 UG/1
2 SPRAY, METERED NASAL 2 TIMES DAILY
Qty: 15 ML | Refills: 11 | Status: SHIPPED | OUTPATIENT
Start: 2024-11-26

## 2024-11-26 ASSESSMENT — COPD QUESTIONNAIRES
QUESTION8_ENERGYLEVEL: 4
QUESTION5_HOMEACTIVITIES: 3
QUESTION1_COUGHFREQUENCY: 2
QUESTION3_CHESTTIGHTNESS: 2
QUESTION2_CHESTPHLEGM: 5
QUESTION4_WALKINCLINE: 5
QUESTION6_LEAVINGHOUSE: 0
CAT_TOTALSCORE: 25
QUESTION7_SLEEPQUALITY: 4

## 2024-11-26 NOTE — ASSESSMENT & PLAN NOTE
She believes she is uncontrolled with Protonix 40 mg daily.  symptoms may be secondary to uncontrolled asthma. Will increase to bid until able to start Tezspire and assess effect / control.

## 2024-11-26 NOTE — PROGRESS NOTES
to bid until able to start Tezspire and assess effect / control.               Relevant Medications    pantoprazole (PROTONIX) 40 MG tablet    COPD with asthma (HCC) - Primary     While Adelina was sent to pharmacy, this was not able to be filled.  It appears that this was sent and received.  Breztri resent to pharmacy.  Continue DuoNebs.    Continues to require prednisone 10 mg a day.  We discussed this.  Ideally, she would be weaned off.  Dupixent was effective but had significant side effects.  She did not have a adequate response to Nucala.  After discussing Fasenra versus Tezspire, the patient has agreed to have a trial of Tezspire.           Relevant Medications    Budeson-Glycopyrrol-Formoterol 160-9-4.8 MCG/ACT AERO    ipratropium (ATROVENT) 0.06 % nasal spray     Other Visit Diagnoses       Moderate COPD (chronic obstructive pulmonary disease) (HCC)        Relevant Medications    Budeson-Glycopyrrol-Formoterol 160-9-4.8 MCG/ACT AERO    ipratropium (ATROVENT) 0.06 % nasal spray                         This note was transcribed using Dragon Dictation software. Please disregard any translational errors.    KATE DELANEY   [facility] Pulmonary, Sleep and Critical Care  [phone]

## 2024-11-26 NOTE — ASSESSMENT & PLAN NOTE
While Adelina was sent to pharmacy, this was not able to be filled.  It appears that this was sent and received.  Breztri resent to pharmacy.  Continue DuoNebs.    Continues to require prednisone 10 mg a day.  We discussed this.  Ideally, she would be weaned off.  Dupixent was effective but had significant side effects.  She did not have a adequate response to Nucala.  After discussing Fasenra versus Tezspire, the patient has agreed to have a trial of Tezspire.

## 2024-11-29 ENCOUNTER — TELEPHONE (OUTPATIENT)
Dept: PULMONOLOGY | Age: 64
End: 2024-11-29

## 2024-11-29 NOTE — TELEPHONE ENCOUNTER
Tezspire called and said they need the diagnosis code, any allergies, and product selection (page @) and for the refills to be marked off (page 3 sec 7) on the enrollment form     Fax # [phone]

## 2024-12-04 NOTE — TELEPHONE ENCOUNTER
[facility] called from [facility]. She said you can not get a pen with Medicaid. Other information was also missing from the enrollment form. She asks that Dolores call her back.

## 2024-12-05 ENCOUNTER — TELEPHONE (OUTPATIENT)
Dept: ADMINISTRATIVE | Age: 64
End: 2024-12-05

## 2024-12-05 NOTE — TELEPHONE ENCOUNTER
Submitted [facility] for [facility] Aerosphere 160-9-4.8MCG/ACT aerosol   Via CMM Key: [id number])  STATUS: PENDING.    Follow up done daily; if no decision with in three days we will refax.  If another three days goes by with no decision will call the insurance for status.

## 2024-12-06 NOTE — TELEPHONE ENCOUNTER
Denied on December 5 by [place] [employer] 2017  Coverage is provided when the member meets ALL of the following requirements: 1. Coverage is provided when the member has a history of at least 14 days of therapy with at least TWO preferred medication(s) used concomitantly which include but are not limited to: Advair Diskus (Brand name is covered by the plan), Advair HFA (Brand name is covered by the plan), Anoro Ellipta, Arnuity Ellipta, Asmanex Twisthaler, Atrovent HFA, Dulera, Pulmicort Flexhaler, Qvar, Serevent Diskus, Spiriva (Brand name is covered by the plan), Stiolto and Symbicort (Brand name is covered by the plan) AND 2. Documentation must be provided of medical necessity beyond convenience for why the member cannot be changed to the preferred drug(s). The [facility] Policy for Medical Necessity as posted on the [facility] website and [place] Unified Preferred Drug List criteria were reviewed and per [place] Administrative Code Rule 5160-1-01 (C) and 5160-26-03 (B), a medically necessary service must include: generally accepted standards of medical practice, be clinically appropriate in administration, treatment and outcome and be the lowest cost alternative to effectively treat the condition. Please contact your provider to assist you with other treatment options that might be covered under your benefit package, or other services that might be available through the community.

## 2024-12-16 ENCOUNTER — TELEPHONE (OUTPATIENT)
Dept: PULMONOLOGY | Age: 64
End: 2024-12-16

## 2024-12-16 NOTE — TELEPHONE ENCOUNTER
Pt called and said she got a letter saying she was denied for the injections (she couldn't remember the name)    Wants to know what next steps are and if there is anything MD can do

## 2024-12-19 ENCOUNTER — TELEPHONE (OUTPATIENT)
Dept: PULMONOLOGY | Age: 64
End: 2024-12-19

## 2024-12-19 DIAGNOSIS — J44.9 MODERATE COPD (CHRONIC OBSTRUCTIVE PULMONARY DISEASE) (HCC): Primary | ICD-10-CM

## 2024-12-19 RX ORDER — PREDNISONE 20 MG/1
20 TABLET ORAL DAILY
Qty: 10 TABLET | Refills: 0 | Status: SHIPPED | OUTPATIENT
Start: 2024-12-19 | End: 2024-12-29

## 2024-12-19 NOTE — TELEPHONE ENCOUNTER
Complains of cough and SOB  Duration over the weekend  Cough with sputum production? no  Color white but not from coughing it up, just in her throat  Fever? no  Any other Symptoms? no  Any current treatment tried? Dayquil  Using inhalers? yes do they help? some  Pharmacy? [facility] says she doesn't want to go to the ED so she wants prednisone. Please review and advise. Thank you.

## 2024-12-19 NOTE — TELEPHONE ENCOUNTER
Fax [phone] [facility] needs the most recent ov notes and pft faxed for the [facility]

## 2024-12-31 DIAGNOSIS — J44.89 COPD WITH ASTHMA (HCC): ICD-10-CM

## 2024-12-31 RX ORDER — PREDNISONE 10 MG/1
10 TABLET ORAL DAILY
Qty: 30 TABLET | Refills: 2 | Status: SHIPPED | OUTPATIENT
Start: 2024-12-31

## 2025-03-05 ENCOUNTER — TELEPHONE (OUTPATIENT)
Dept: PULMONOLOGY | Age: 65
End: 2025-03-05

## 2025-03-05 RX ORDER — PREDNISONE 20 MG/1
20 TABLET ORAL DAILY
Qty: 7 TABLET | Refills: 0 | Status: SHIPPED | OUTPATIENT
Start: 2025-03-05 | End: 2025-03-12

## 2025-03-05 NOTE — TELEPHONE ENCOUNTER
Pt is experiencing increased of SOB    Wants prednisone called in  She does not think  she needs to go to ER  prednisone always iraj boyer

## 2025-03-24 ENCOUNTER — TELEPHONE (OUTPATIENT)
Dept: PULMONOLOGY | Age: 65
End: 2025-03-24

## 2025-03-25 ENCOUNTER — TELEPHONE (OUTPATIENT)
Dept: PULMONOLOGY | Age: 65
End: 2025-03-25

## 2025-03-25 RX ORDER — PREDNISONE 20 MG/1
20 TABLET ORAL DAILY
Qty: 5 TABLET | Refills: 0 | Status: SHIPPED | OUTPATIENT
Start: 2025-03-25 | End: 2025-03-30

## 2025-03-25 NOTE — TELEPHONE ENCOUNTER
Pt called said [facility] she can't hardly breath an doesn't want to go to ER.

## 2025-03-25 NOTE — TELEPHONE ENCOUNTER
I sent her note yesterday that she did not respond to.  Therefore, I sent in 20 mg of prednisone x 5 days.  Is not clear what she is using at home nor what she is requesting.  She requesting a refill of her chronic prednisone for a burst.

## 2025-03-26 ENCOUNTER — TELEPHONE (OUTPATIENT)
Dept: PULMONOLOGY | Age: 65
End: 2025-03-26

## 2025-03-26 DIAGNOSIS — J44.89 COPD WITH ASTHMA (HCC): ICD-10-CM

## 2025-03-26 RX ORDER — IPRATROPIUM BROMIDE AND ALBUTEROL SULFATE 2.5; .5 MG/3ML; MG/3ML
1 SOLUTION RESPIRATORY (INHALATION) EVERY 6 HOURS PRN
Qty: 360 ML | Refills: 11 | Status: SHIPPED | OUTPATIENT
Start: 2025-03-26

## 2025-03-26 NOTE — TELEPHONE ENCOUNTER
Pt called and is saying that she [facility]     I explained to her that is says Dr Culver is the ordering MD on that but she insisted that Dr Chacon has given it to her and wouldn't listen to what I had to say

## 2025-04-01 ENCOUNTER — TELEPHONE (OUTPATIENT)
Dept: PULMONOLOGY | Age: 65
End: 2025-04-01

## 2025-04-01 DIAGNOSIS — J44.89 COPD WITH ASTHMA (HCC): Primary | ICD-10-CM

## 2025-04-01 RX ORDER — PREDNISONE 10 MG/1
10 TABLET ORAL DAILY
Qty: 30 TABLET | Refills: 2 | Status: SHIPPED | OUTPATIENT
Start: 2025-04-01

## 2025-04-01 NOTE — TELEPHONE ENCOUNTER
Pt called and is requesting more prednisone bc she is out. I offered her the 11:20AM appt tomorrow but she refused.     Next OV: 4/8    Pharmacy: [facility]

## 2025-04-08 ENCOUNTER — OFFICE VISIT (OUTPATIENT)
Dept: PULMONOLOGY | Age: 65
End: 2025-04-08
Payer: MEDICAID

## 2025-04-08 VITALS
WEIGHT: 175 LBS | BODY MASS INDEX: 25.05 KG/M2 | DIASTOLIC BLOOD PRESSURE: 82 MMHG | SYSTOLIC BLOOD PRESSURE: 124 MMHG | OXYGEN SATURATION: 92 % | HEART RATE: 60 BPM | RESPIRATION RATE: 18 BRPM | TEMPERATURE: 97.6 F | HEIGHT: 70 IN

## 2025-04-08 DIAGNOSIS — K21.9 GASTROESOPHAGEAL REFLUX DISEASE WITHOUT ESOPHAGITIS: ICD-10-CM

## 2025-04-08 DIAGNOSIS — Z79.52 CURRENT CHRONIC USE OF SYSTEMIC STEROIDS: ICD-10-CM

## 2025-04-08 DIAGNOSIS — J44.89 COPD WITH ASTHMA (HCC): Primary | ICD-10-CM

## 2025-04-08 PROCEDURE — G2211 COMPLEX E/M VISIT ADD ON: HCPCS | Performed by: INTERNAL MEDICINE

## 2025-04-08 PROCEDURE — 3074F SYST BP LT 130 MM HG: CPT | Performed by: INTERNAL MEDICINE

## 2025-04-08 PROCEDURE — 99214 OFFICE O/P EST MOD 30 MIN: CPT | Performed by: INTERNAL MEDICINE

## 2025-04-08 PROCEDURE — 3079F DIAST BP 80-89 MM HG: CPT | Performed by: INTERNAL MEDICINE

## 2025-04-08 NOTE — PROGRESS NOTES
REASON FOR CONSULTATION/CC: asthma  Chief Complaint   Patient presents with    COPD     Struggling the past few weeks to breath.          PCP: Hunter Barr MD    HISTORY OF PRESENT ILLNESS: Nati Dover is a 64 y.o. year old female with a history of asthma who presents :               Asthma history   Nucala, failed.  Did not response  Dupexiant, side effects.    side effects to Spiriva   Hx of chronic steroids then weaned off  Tespire - April 2025.  No clear this is working.  complains of bone pain / side effects.   History of similar         Currently         Copd with asthma.    Currently on Symbicort . Tried Breztri and worked \"ok\"    This was sent oct 9th but not clear why she could not fill \  Still needing daily prednisone 10 mg daily.      respiratory allergen profile 2019, IgE 611  Peak Eosinophila 400    GERD  Increased symptoms at night.   LPR by ENT .       Fasenra vs  Tezspire.        allergic rhinitis       Discussed prednisone.  Requestion too much.    Requesting to change of infusion.    On Tezspire.       Will stop prednisone on occasion but states will having shortness of breath with change of weather / rain.       Chronic prednisone 10 mg daily   Breztri   Duoneb's          Having diarrhea.  May be secondary to diarrhea.          GERD  Controlled.           Objective:   PHYSICAL EXAM:  Blood pressure 124/82, pulse 60, temperature 97.6 °F (36.4 °C), temperature source Temporal, resp. rate 18, height 1.778 m (5' 10\"), weight 79.4 kg (175 lb), last menstrual period 09/13/2017, SpO2 92%, not currently breastfeeding.'    Gen: No distress.   Eyes:    ENT:    Neck:    Resp: No accessory muscle use. No crackles. slight wheezes. No rhonchi.    CV: Regular rate. Regular rhythm. No murmur or rub. No edema.   GI:    Skin:    Lymph:    M/S: No cyanosis. No clubbing.     Neuro: Moves all four extremities.   Psych: Oriented x 3. No anxiety.  Awake. Alert. Intact judgement and insight.          Data

## 2025-04-09 NOTE — ASSESSMENT & PLAN NOTE
Patient is going for very frequent burst of prednisone.  This is despite being on prednisone 10 mg daily.  In addition to that, the patient will, on her own without advice, stop the prednisone 10 mg daily for several days and then restart.  Eosinophils have been suppressed on CBC.  Peak eosinophil 400.  This was approximately 2019.  She has failed to respond to Nucala, side effects of Dupixent, side effects to Spiriva, currently on Tezspire with concerns for side effects of bone pain.    Will try to change to Fasenra.  Very concerned about Xolair with her history of side effects.    Continue chronic prednisone 10 mg daily.  Breztri.  DuoNebs.

## 2025-04-09 NOTE — ASSESSMENT & PLAN NOTE
Very concerned about long-term steroid use and frequent boluses of steroids.  discussed.  Discussed concern for osteopenia/porosis.

## 2025-04-22 ENCOUNTER — TELEPHONE (OUTPATIENT)
Dept: PULMONOLOGY | Age: 65
End: 2025-04-22

## 2025-04-22 DIAGNOSIS — J44.89 COPD WITH ASTHMA (HCC): Primary | ICD-10-CM

## 2025-04-22 RX ORDER — PREDNISONE 10 MG/1
TABLET ORAL
Qty: 30 TABLET | Refills: 0 | Status: SHIPPED | OUTPATIENT
Start: 2025-04-22 | End: 2025-05-02

## 2025-04-29 ENCOUNTER — HOSPITAL ENCOUNTER (EMERGENCY)
Age: 65
Discharge: HOME | End: 2025-04-29
Attending: EMERGENCY MEDICINE
Payer: MEDICAID

## 2025-04-29 ENCOUNTER — APPOINTMENT (OUTPATIENT)
Dept: GENERAL RADIOLOGY | Age: 65
End: 2025-04-29
Payer: MEDICAID

## 2025-04-29 VITALS
TEMPERATURE: 98 F | HEART RATE: 100 BPM | DIASTOLIC BLOOD PRESSURE: 82 MMHG | OXYGEN SATURATION: 100 % | RESPIRATION RATE: 20 BRPM | SYSTOLIC BLOOD PRESSURE: 129 MMHG

## 2025-04-29 DIAGNOSIS — J44.1 COPD EXACERBATION (HCC): Primary | ICD-10-CM

## 2025-04-29 LAB
ANION GAP SERPL CALCULATED.3IONS-SCNC: 15 MMOL/L (ref 3–16)
BASE EXCESS BLDV CALC-SCNC: 5.3 MMOL/L (ref -3–3)
BASOPHILS # BLD: 0 K/UL (ref 0–0.2)
BASOPHILS NFR BLD: 0.1 %
BUN SERPL-MCNC: 13 MG/DL (ref 7–20)
CALCIUM SERPL-MCNC: 8.7 MG/DL (ref 8.3–10.6)
CHLORIDE SERPL-SCNC: 98 MMOL/L (ref 99–110)
CO2 BLDV-SCNC: 27 MMOL/L
CO2 SERPL-SCNC: 25 MMOL/L (ref 21–32)
CREAT SERPL-MCNC: 1 MG/DL (ref 0.6–1.2)
DEPRECATED RDW RBC AUTO: 14.5 % (ref 12.4–15.4)
EOSINOPHIL # BLD: 0 K/UL (ref 0–0.6)
EOSINOPHIL NFR BLD: 0.2 %
FLUAV RNA UPPER RESP QL NAA+PROBE: NEGATIVE
FLUBV AG NPH QL: NEGATIVE
GFR SERPLBLD CREATININE-BSD FMLA CKD-EPI: 63 ML/MIN/{1.73_M2}
GLUCOSE SERPL-MCNC: 121 MG/DL (ref 70–99)
HCO3 BLDV-SCNC: 28.6 MMOL/L (ref 23–29)
HCT VFR BLD AUTO: 47.7 % (ref 36–48)
HGB BLD-MCNC: 15.7 G/DL (ref 12–16)
LACTATE BLDV-SCNC: 3 MMOL/L (ref 0.4–2)
LYMPHOCYTES # BLD: 0.3 K/UL (ref 1–5.1)
LYMPHOCYTES NFR BLD: 3.5 %
MCH RBC QN AUTO: 30.6 PG (ref 26–34)
MCHC RBC AUTO-ENTMCNC: 33 G/DL (ref 31–36)
MCV RBC AUTO: 92.5 FL (ref 80–100)
MONOCYTES # BLD: 0.1 K/UL (ref 0–1.3)
MONOCYTES NFR BLD: 1.4 %
NEUTROPHILS # BLD: 8.2 K/UL (ref 1.7–7.7)
NEUTROPHILS NFR BLD: 94.8 %
O2 THERAPY: ABNORMAL
PCO2 BLDV: 37.2 MMHG (ref 40–50)
PH BLDV: 7.49 [PH] (ref 7.35–7.45)
PLATELET # BLD AUTO: 175 K/UL (ref 135–450)
PMV BLD AUTO: 8 FL (ref 5–10.5)
PO2 BLDV: 146.6 MMHG (ref 25–40)
POTASSIUM SERPL-SCNC: 4 MMOL/L (ref 3.5–5.1)
RBC # BLD AUTO: 5.15 M/UL (ref 4–5.2)
SAO2 % BLDV: 99 %
SARS-COV-2 RDRP RESP QL NAA+PROBE: NOT DETECTED
SODIUM SERPL-SCNC: 138 MMOL/L (ref 136–145)
WBC # BLD AUTO: 8.7 K/UL (ref 4–11)

## 2025-04-29 PROCEDURE — 93005 ELECTROCARDIOGRAM TRACING: CPT | Performed by: PHYSICIAN ASSISTANT

## 2025-04-29 PROCEDURE — 87804 INFLUENZA ASSAY W/OPTIC: CPT

## 2025-04-29 PROCEDURE — 6370000000 HC RX 637 (ALT 250 FOR IP): Performed by: EMERGENCY MEDICINE

## 2025-04-29 PROCEDURE — 71046 X-RAY EXAM CHEST 2 VIEWS: CPT

## 2025-04-29 PROCEDURE — 87635 SARS-COV-2 COVID-19 AMP PRB: CPT

## 2025-04-29 PROCEDURE — 6370000000 HC RX 637 (ALT 250 FOR IP): Performed by: PHYSICIAN ASSISTANT

## 2025-04-29 PROCEDURE — 83605 ASSAY OF LACTIC ACID: CPT

## 2025-04-29 PROCEDURE — 36415 COLL VENOUS BLD VENIPUNCTURE: CPT

## 2025-04-29 PROCEDURE — 80048 BASIC METABOLIC PNL TOTAL CA: CPT

## 2025-04-29 PROCEDURE — 82803 BLOOD GASES ANY COMBINATION: CPT

## 2025-04-29 PROCEDURE — 99285 EMERGENCY DEPT VISIT HI MDM: CPT

## 2025-04-29 PROCEDURE — 85025 COMPLETE CBC W/AUTO DIFF WBC: CPT

## 2025-04-29 RX ORDER — PREDNISONE 50 MG/1
50 TABLET ORAL DAILY
Qty: 4 TABLET | Refills: 0 | Status: SHIPPED | OUTPATIENT
Start: 2025-04-29 | End: 2025-05-03

## 2025-04-29 RX ORDER — IPRATROPIUM BROMIDE AND ALBUTEROL SULFATE 2.5; .5 MG/3ML; MG/3ML
1 SOLUTION RESPIRATORY (INHALATION)
Status: COMPLETED | OUTPATIENT
Start: 2025-04-29 | End: 2025-04-29

## 2025-04-29 RX ORDER — IPRATROPIUM BROMIDE AND ALBUTEROL SULFATE 2.5; .5 MG/3ML; MG/3ML
1 SOLUTION RESPIRATORY (INHALATION)
Status: DISCONTINUED | OUTPATIENT
Start: 2025-04-29 | End: 2025-04-29

## 2025-04-29 RX ADMIN — IPRATROPIUM BROMIDE AND ALBUTEROL SULFATE 1 DOSE: .5; 2.5 SOLUTION RESPIRATORY (INHALATION) at 17:20

## 2025-04-29 RX ADMIN — IPRATROPIUM BROMIDE AND ALBUTEROL SULFATE 1 DOSE: .5; 2.5 SOLUTION RESPIRATORY (INHALATION) at 16:30

## 2025-04-29 RX ADMIN — IPRATROPIUM BROMIDE AND ALBUTEROL SULFATE 1 DOSE: .5; 2.5 SOLUTION RESPIRATORY (INHALATION) at 16:46

## 2025-04-29 RX ADMIN — IPRATROPIUM BROMIDE AND ALBUTEROL SULFATE 1 DOSE: .5; 2.5 SOLUTION RESPIRATORY (INHALATION) at 17:35

## 2025-04-29 RX ADMIN — IPRATROPIUM BROMIDE AND ALBUTEROL SULFATE 1 DOSE: .5; 2.5 SOLUTION RESPIRATORY (INHALATION) at 16:47

## 2025-04-29 RX ADMIN — IPRATROPIUM BROMIDE AND ALBUTEROL SULFATE 1 DOSE: .5; 2.5 SOLUTION RESPIRATORY (INHALATION) at 17:51

## 2025-04-29 ASSESSMENT — PAIN SCALES - GENERAL: PAINLEVEL_OUTOF10: 1

## 2025-04-29 NOTE — ED PROVIDER NOTES
I personally saw the patient and made/approved the management plan and take responsibility for the patient management.    History: Patient is a 64-year-old female with history of COPD and asthma who presents due to shortness of breath and wheezing.  Patient also reports nonproductive cough.  Patient denies any chest pain leg swelling or hemoptysis.  Patient reports this is similar to previous asthma exacerbations.    Exam: Pleasant adult -American female with mild tachypnea.  Still speaking in 5-6 word sentences.  Tight and expiratory wheezing equal bilaterally.  No leg swelling.  No palpable venous cords.    MDM: Patient is mildly tachypneic but is afebrile nontoxic-appearing.  Laboratory valuation shows normal white blood cell count, no signs of acute emergent endorgan damage, lactic acid is mildly elevated at 3.0 however this could be due to breathing treatments and or mild dehydration.  Chest x-rays performed the emergency department and is read by radiology as well independently reviewed by myself does not show any evidence of pneumonia or acute cardiopulmonary pathology.  Rapid influenza and COVID test negative.  I discussed the potential of cardiac pathology or pulm embolism with the patient and have considered and discussed cardiac workup and/or CT chest with contrast however shared medical decision making is made to treat as an asthma exacerbation as patient reports substantial relief of her symptoms after breathing treatments.  Patient did already take 70 mg of prednisone prior to coming to the emergency department and usually takes 10 daily, will have her take 50 mg for the next 4 days to complete a 5-day burst and then resume her normal 10 mg tablets.  Very strict ER return precautions given for fever, chest pain, hemoptysis, or worsening of symptoms despite conservative management.  Patient expresses understanding and agreement with this plan and is discharged home.    I have considered and 
heart rate 98, respirate 24, BP of 153/103.  She was not in respiratory distress.  Had some wheezing on exam.  Lab workup showed some mild respiratory alkalosis, lactate 3.0, but normal serum white blood cell count, negative test for influenza and COVID-19, and chest x-ray with no acute findings.  Reports history of CKD but creatinine today is 1.0 with a GFR of 63.  She maintained good oxygenation on room air throughout her stay in the emergency department.  After several breathing treatments, she reported improvement in her breathing and lung sounds were improved.  She is already taken 70 mg oral prednisone today, so no additional steroids given here in the ED.  Patient likely has a COPD exacerbation, and there is no indication for hospitalization or further workup.  She will be discharged with prescription for a further course of steroids before returning to her previously prescribed low dosage.  Will also be advised to follow-up with primary care.  The patient verbalized understanding and agreement with this plan of care. The patient was advised to return to the emergency department if symptoms should significantly worsen or if new and concerning symptoms should appear.     I estimate there is LOW risk for ACUTE CORONARY SYNDROME, PULMONARY EMBOLISM, CARDIAC TAMPONADE, MYOCARDITIS, PERICARDITIS, STROKE, TRANSIENT ISCHEMIC ATTACK, THORACIC AORTIC DISSECTION, HEMORRHAGE, OR CRITICAL CARDIAC ARRHYTHMIA, thus I consider the discharge disposition reasonable.     CRITICAL CARE TIME   None.    FINAL IMPRESSION      1. COPD exacerbation (HCC)          DISPOSITION/PLAN   DISPOSITION Decision To Discharge 04/29/2025 06:04:15 PM   DISPOSITION CONDITION Stable           PATIENT REFERRED TO:  Hunter Barr MD  [address] [facility] [address]  [phone]    In 2 days      [facility] Emergency Department  [address] [facility] [id number] [phone]    If symptoms worsen      DISCHARGE

## 2025-04-29 NOTE — DISCHARGE INSTRUCTIONS
They feel that any chest pain, cough up any blood, develop a fever, or your symptoms worsen please come back to the ER for repeat evaluation.

## 2025-04-29 NOTE — ED NOTES
Patient DCed from ED at this time. Discussed AVS, follow up, and scripts. They verbalized understanding. Reinforced that should symptoms persist or worsen to return to the ED. They verbalized understanding. Patient ambulated out of ED. RN thanked patient for choosing [facility].

## 2025-04-30 ENCOUNTER — TELEPHONE (OUTPATIENT)
Dept: PULMONOLOGY | Age: 65
End: 2025-04-30

## 2025-04-30 LAB
EKG ATRIAL RATE: 87 BPM
EKG DIAGNOSIS: NORMAL
EKG P AXIS: 69 DEGREES
EKG P-R INTERVAL: 136 MS
EKG Q-T INTERVAL: 342 MS
EKG QRS DURATION: 84 MS
EKG QTC CALCULATION (BAZETT): 411 MS
EKG R AXIS: -45 DEGREES
EKG T AXIS: 30 DEGREES
EKG VENTRICULAR RATE: 87 BPM

## 2025-04-30 PROCEDURE — 93010 ELECTROCARDIOGRAM REPORT: CPT | Performed by: INTERNAL MEDICINE

## 2025-04-30 NOTE — TELEPHONE ENCOUNTER
Appointment canceled for Nati Jazzmine ([id number])  Visit type: FOLLOW UP - PULM  4/30/2025 11:00 AM (20 minutes) with Dr. Hasmukh Chacon MD in [facility] PULMONOLOGY     Reason for cancellation: Other     Patient comments: Good morning I went to the ER yesterday on  4/29/25 they took labs   X-rays EKG put me on another taper of prednisone I had a real bad cold so I just would need to just reschedule thank you very much . And Thank you so much have a good day.

## 2025-05-01 ENCOUNTER — TELEPHONE (OUTPATIENT)
Dept: PULMONOLOGY | Age: 65
End: 2025-05-01

## 2025-05-01 NOTE — TELEPHONE ENCOUNTER
Im confused.  She is on Tespire.  We are trying to get over to Fasenra .  She has tried Dupixent .

## 2025-05-01 NOTE — TELEPHONE ENCOUNTER
[facility]etta called and said in order for pt to get Tezspire she needs to try dupixent or sesnra first     PH: [phone]

## 2025-05-02 NOTE — TELEPHONE ENCOUNTER
She is on a high dose ICS, LABA with LACH now... Breztri 160.      Further, she had side effects to Spiriva so cannot go back to that. See the top of my note

## 2025-05-02 NOTE — TELEPHONE ENCOUNTER
Called [facility] they said pt needs to be on or have tried a medium dose ICS LSBS inhaler with Tiotropium, or high dose ICS LABS. They will fax over a list when received will place on provider desk.

## 2025-05-06 ENCOUNTER — HOSPITAL ENCOUNTER (INPATIENT)
Age: 65
LOS: 4 days | Discharge: HOME | DRG: 720 | End: 2025-05-11
Attending: EMERGENCY MEDICINE | Admitting: HOSPITALIST
Payer: MEDICAID

## 2025-05-06 DIAGNOSIS — R06.89 DYSPNEA AND RESPIRATORY ABNORMALITIES: ICD-10-CM

## 2025-05-06 DIAGNOSIS — A41.9 SEPSIS WITHOUT ACUTE ORGAN DYSFUNCTION, DUE TO UNSPECIFIED ORGANISM (HCC): ICD-10-CM

## 2025-05-06 DIAGNOSIS — R06.00 DYSPNEA AND RESPIRATORY ABNORMALITIES: ICD-10-CM

## 2025-05-06 DIAGNOSIS — J18.9 PNEUMONIA OF RIGHT LOWER LOBE DUE TO INFECTIOUS ORGANISM: Primary | ICD-10-CM

## 2025-05-06 PROCEDURE — 93005 ELECTROCARDIOGRAM TRACING: CPT | Performed by: EMERGENCY MEDICINE

## 2025-05-06 PROCEDURE — 99285 EMERGENCY DEPT VISIT HI MDM: CPT

## 2025-05-06 ASSESSMENT — PAIN DESCRIPTION - DESCRIPTORS: DESCRIPTORS: SHARP

## 2025-05-06 ASSESSMENT — PAIN - FUNCTIONAL ASSESSMENT: PAIN_FUNCTIONAL_ASSESSMENT: 0-10

## 2025-05-06 ASSESSMENT — PAIN DESCRIPTION - LOCATION: LOCATION: CHEST

## 2025-05-07 ENCOUNTER — APPOINTMENT (OUTPATIENT)
Dept: GENERAL RADIOLOGY | Age: 65
DRG: 720 | End: 2025-05-07
Payer: MEDICAID

## 2025-05-07 PROBLEM — A41.9 SEPSIS (HCC): Status: ACTIVE | Noted: 2025-05-07

## 2025-05-07 LAB
ALBUMIN SERPL-MCNC: 3.5 G/DL (ref 3.4–5)
ALBUMIN/GLOB SERPL: 1.3 {RATIO} (ref 1.1–2.2)
ALP SERPL-CCNC: 75 U/L (ref 40–129)
ALT SERPL-CCNC: 20 U/L (ref 10–40)
ANION GAP SERPL CALCULATED.3IONS-SCNC: 12 MMOL/L (ref 3–16)
AST SERPL-CCNC: 33 U/L (ref 15–37)
BASE EXCESS BLDV CALC-SCNC: 6 MMOL/L
BASOPHILS # BLD: 0.1 K/UL (ref 0–0.2)
BASOPHILS NFR BLD: 0.4 %
BILIRUB SERPL-MCNC: 0.9 MG/DL (ref 0–1)
BUN SERPL-MCNC: 14 MG/DL (ref 7–20)
CALCIUM SERPL-MCNC: 8.9 MG/DL (ref 8.3–10.6)
CHLORIDE SERPL-SCNC: 97 MMOL/L (ref 99–110)
CO2 BLDV-SCNC: 36 MMOL/L
CO2 SERPL-SCNC: 27 MMOL/L (ref 21–32)
COHGB MFR BLDV: 3.3 %
CREAT SERPL-MCNC: 1.2 MG/DL (ref 0.6–1.2)
DEPRECATED RDW RBC AUTO: 14.4 % (ref 12.4–15.4)
EKG ATRIAL RATE: 98 BPM
EKG DIAGNOSIS: NORMAL
EKG P AXIS: 72 DEGREES
EKG P-R INTERVAL: 300 MS
EKG Q-T INTERVAL: 346 MS
EKG QRS DURATION: 74 MS
EKG QTC CALCULATION (BAZETT): 441 MS
EKG R AXIS: -51 DEGREES
EKG T AXIS: -77 DEGREES
EKG VENTRICULAR RATE: 98 BPM
EOSINOPHIL # BLD: 0 K/UL (ref 0–0.6)
EOSINOPHIL NFR BLD: 0.1 %
GFR SERPLBLD CREATININE-BSD FMLA CKD-EPI: 51 ML/MIN/{1.73_M2}
GLUCOSE BLD-MCNC: 139 MG/DL (ref 70–99)
GLUCOSE BLD-MCNC: 140 MG/DL (ref 70–99)
GLUCOSE BLD-MCNC: 48 MG/DL (ref 70–99)
GLUCOSE BLD-MCNC: 72 MG/DL (ref 70–99)
GLUCOSE SERPL-MCNC: 104 MG/DL (ref 70–99)
HCO3 BLDV-SCNC: 34 MMOL/L (ref 23–29)
HCT VFR BLD AUTO: 51.3 % (ref 36–48)
HGB BLD-MCNC: 17.2 G/DL (ref 12–16)
LACTATE BLDV-SCNC: 1.2 MMOL/L (ref 0.4–2)
LACTATE BLDV-SCNC: 1.5 MMOL/L (ref 0.4–2)
LACTATE BLDV-SCNC: 1.9 MMOL/L (ref 0.4–1.9)
LYMPHOCYTES # BLD: 1.5 K/UL (ref 1–5.1)
LYMPHOCYTES NFR BLD: 4.2 %
MAGNESIUM SERPL-MCNC: 1.79 MG/DL (ref 1.8–2.4)
MCH RBC QN AUTO: 30 PG (ref 26–34)
MCHC RBC AUTO-ENTMCNC: 33.5 G/DL (ref 31–36)
MCV RBC AUTO: 89.4 FL (ref 80–100)
METHGB MFR BLDV: 0.5 %
MONOCYTES # BLD: 0.8 K/UL (ref 0–1.3)
MONOCYTES NFR BLD: 2.2 %
MRSA DNA SPEC QL NAA+PROBE: NORMAL
NEUTROPHILS # BLD: 33 K/UL (ref 1.7–7.7)
NEUTROPHILS NFR BLD: 93.1 %
NT-PROBNP SERPL-MCNC: 601 PG/ML (ref 0–124)
O2 THERAPY: ABNORMAL
ORGANISM: ABNORMAL
ORGANISM: ABNORMAL
PCO2 BLDV: 61.1 MMHG (ref 40–50)
PERFORMED ON: ABNORMAL
PERFORMED ON: NORMAL
PH BLDV: 7.36 [PH] (ref 7.35–7.45)
PLATELET # BLD AUTO: 200 K/UL (ref 135–450)
PMV BLD AUTO: 8.5 FL (ref 5–10.5)
PO2 BLDV: 33 MMHG
POTASSIUM SERPL-SCNC: 4.3 MMOL/L (ref 3.5–5.1)
PROCALCITONIN SERPL IA-MCNC: 12.5 NG/ML (ref 0–0.15)
PROT SERPL-MCNC: 6.1 G/DL (ref 6.4–8.2)
RBC # BLD AUTO: 5.74 M/UL (ref 4–5.2)
REASON FOR REJECTION: NORMAL
REJECTED TEST: NORMAL
REPORT: NORMAL
RESP PATH DNA+RNA PNL L RESP NAA+NON-PRB: ABNORMAL
RESP PATH DNA+RNA PNL L RESP NAA+NON-PRB: ABNORMAL
SAO2 % BLDV: 58 %
SODIUM SERPL-SCNC: 136 MMOL/L (ref 136–145)
TROPONIN, HIGH SENSITIVITY: 12 NG/L (ref 0–14)
TROPONIN, HIGH SENSITIVITY: 13 NG/L (ref 0–14)
WBC # BLD AUTO: 35.5 K/UL (ref 4–11)

## 2025-05-07 PROCEDURE — 96374 THER/PROPH/DIAG INJ IV PUSH: CPT

## 2025-05-07 PROCEDURE — 92610 EVALUATE SWALLOWING FUNCTION: CPT

## 2025-05-07 PROCEDURE — 80053 COMPREHEN METABOLIC PANEL: CPT

## 2025-05-07 PROCEDURE — 83880 ASSAY OF NATRIURETIC PEPTIDE: CPT

## 2025-05-07 PROCEDURE — 6370000000 HC RX 637 (ALT 250 FOR IP): Performed by: HOSPITALIST

## 2025-05-07 PROCEDURE — 1200000000 HC SEMI PRIVATE

## 2025-05-07 PROCEDURE — 6360000002 HC RX W HCPCS: Performed by: HOSPITALIST

## 2025-05-07 PROCEDURE — 94640 AIRWAY INHALATION TREATMENT: CPT

## 2025-05-07 PROCEDURE — 87186 SC STD MICRODIL/AGAR DIL: CPT

## 2025-05-07 PROCEDURE — 87040 BLOOD CULTURE FOR BACTERIA: CPT

## 2025-05-07 PROCEDURE — 83735 ASSAY OF MAGNESIUM: CPT

## 2025-05-07 PROCEDURE — 2580000003 HC RX 258: Performed by: EMERGENCY MEDICINE

## 2025-05-07 PROCEDURE — 2700000000 HC OXYGEN THERAPY PER DAY

## 2025-05-07 PROCEDURE — 2500000003 HC RX 250 WO HCPCS: Performed by: HOSPITALIST

## 2025-05-07 PROCEDURE — 2500000003 HC RX 250 WO HCPCS: Performed by: EMERGENCY MEDICINE

## 2025-05-07 PROCEDURE — 36415 COLL VENOUS BLD VENIPUNCTURE: CPT

## 2025-05-07 PROCEDURE — 84484 ASSAY OF TROPONIN QUANT: CPT

## 2025-05-07 PROCEDURE — 71045 X-RAY EXAM CHEST 1 VIEW: CPT

## 2025-05-07 PROCEDURE — 2500000003 HC RX 250 WO HCPCS

## 2025-05-07 PROCEDURE — 94761 N-INVAS EAR/PLS OXIMETRY MLT: CPT

## 2025-05-07 PROCEDURE — 6360000002 HC RX W HCPCS: Performed by: EMERGENCY MEDICINE

## 2025-05-07 PROCEDURE — 87449 NOS EACH ORGANISM AG IA: CPT

## 2025-05-07 PROCEDURE — 83605 ASSAY OF LACTIC ACID: CPT

## 2025-05-07 PROCEDURE — 93010 ELECTROCARDIOGRAM REPORT: CPT | Performed by: INTERNAL MEDICINE

## 2025-05-07 PROCEDURE — 87181 SC STD AGAR DILUTION PER AGT: CPT

## 2025-05-07 PROCEDURE — 87077 CULTURE AEROBIC IDENTIFY: CPT

## 2025-05-07 PROCEDURE — 2580000003 HC RX 258: Performed by: HOSPITALIST

## 2025-05-07 PROCEDURE — 87633 RESP VIRUS 12-25 TARGETS: CPT

## 2025-05-07 PROCEDURE — 84145 PROCALCITONIN (PCT): CPT

## 2025-05-07 PROCEDURE — 5A0935A ASSISTANCE WITH RESPIRATORY VENTILATION, LESS THAN 24 CONSECUTIVE HOURS, HIGH NASAL FLOW/VELOCITY: ICD-10-PCS | Performed by: STUDENT IN AN ORGANIZED HEALTH CARE EDUCATION/TRAINING PROGRAM

## 2025-05-07 PROCEDURE — 87070 CULTURE OTHR SPECIMN AEROBIC: CPT

## 2025-05-07 PROCEDURE — 87205 SMEAR GRAM STAIN: CPT

## 2025-05-07 PROCEDURE — 85025 COMPLETE CBC W/AUTO DIFF WBC: CPT

## 2025-05-07 PROCEDURE — 87641 MR-STAPH DNA AMP PROBE: CPT

## 2025-05-07 PROCEDURE — 82803 BLOOD GASES ANY COMBINATION: CPT

## 2025-05-07 PROCEDURE — 6370000000 HC RX 637 (ALT 250 FOR IP): Performed by: EMERGENCY MEDICINE

## 2025-05-07 RX ORDER — 0.9 % SODIUM CHLORIDE 0.9 %
30 INTRAVENOUS SOLUTION INTRAVENOUS ONCE
Status: COMPLETED | OUTPATIENT
Start: 2025-05-07 | End: 2025-05-07

## 2025-05-07 RX ORDER — BUDESONIDE AND FORMOTEROL FUMARATE DIHYDRATE 160; 4.5 UG/1; UG/1
2 AEROSOL RESPIRATORY (INHALATION) 2 TIMES DAILY
COMMUNITY

## 2025-05-07 RX ORDER — BUDESONIDE AND FORMOTEROL FUMARATE DIHYDRATE 160; 4.5 UG/1; UG/1
2 AEROSOL RESPIRATORY (INHALATION)
Status: DISCONTINUED | OUTPATIENT
Start: 2025-05-07 | End: 2025-05-11 | Stop reason: HOSPADM

## 2025-05-07 RX ORDER — SODIUM CHLORIDE 0.9 % (FLUSH) 0.9 %
5-40 SYRINGE (ML) INJECTION PRN
Status: DISCONTINUED | OUTPATIENT
Start: 2025-05-07 | End: 2025-05-11 | Stop reason: HOSPADM

## 2025-05-07 RX ORDER — WATER 10 ML/10ML
INJECTION INTRAMUSCULAR; INTRAVENOUS; SUBCUTANEOUS
Status: COMPLETED
Start: 2025-05-07 | End: 2025-05-07

## 2025-05-07 RX ORDER — POTASSIUM CHLORIDE 7.45 MG/ML
10 INJECTION INTRAVENOUS PRN
Status: DISCONTINUED | OUTPATIENT
Start: 2025-05-07 | End: 2025-05-11 | Stop reason: HOSPADM

## 2025-05-07 RX ORDER — PANTOPRAZOLE SODIUM 40 MG/1
40 TABLET, DELAYED RELEASE ORAL
Status: DISCONTINUED | OUTPATIENT
Start: 2025-05-07 | End: 2025-05-11 | Stop reason: HOSPADM

## 2025-05-07 RX ORDER — METHADONE HYDROCHLORIDE 10 MG/1
95 TABLET ORAL DAILY
Status: DISCONTINUED | OUTPATIENT
Start: 2025-05-07 | End: 2025-05-11 | Stop reason: HOSPADM

## 2025-05-07 RX ORDER — ONDANSETRON 4 MG/1
4 TABLET, ORALLY DISINTEGRATING ORAL EVERY 8 HOURS PRN
Status: DISCONTINUED | OUTPATIENT
Start: 2025-05-07 | End: 2025-05-11 | Stop reason: HOSPADM

## 2025-05-07 RX ORDER — METHYLPREDNISOLONE SODIUM SUCCINATE 40 MG/ML
40 INJECTION INTRAMUSCULAR; INTRAVENOUS EVERY 8 HOURS
Status: DISCONTINUED | OUTPATIENT
Start: 2025-05-07 | End: 2025-05-08

## 2025-05-07 RX ORDER — ACETAMINOPHEN 650 MG/1
650 SUPPOSITORY RECTAL EVERY 6 HOURS PRN
Status: DISCONTINUED | OUTPATIENT
Start: 2025-05-07 | End: 2025-05-11 | Stop reason: HOSPADM

## 2025-05-07 RX ORDER — SODIUM CHLORIDE 9 MG/ML
INJECTION, SOLUTION INTRAVENOUS PRN
Status: DISCONTINUED | OUTPATIENT
Start: 2025-05-07 | End: 2025-05-11 | Stop reason: HOSPADM

## 2025-05-07 RX ORDER — DEXAMETHASONE SODIUM PHOSPHATE 4 MG/ML
8 INJECTION, SOLUTION INTRA-ARTICULAR; INTRALESIONAL; INTRAMUSCULAR; INTRAVENOUS; SOFT TISSUE ONCE
Status: COMPLETED | OUTPATIENT
Start: 2025-05-07 | End: 2025-05-07

## 2025-05-07 RX ORDER — SODIUM CHLORIDE 0.9 % (FLUSH) 0.9 %
5-40 SYRINGE (ML) INJECTION EVERY 12 HOURS SCHEDULED
Status: DISCONTINUED | OUTPATIENT
Start: 2025-05-07 | End: 2025-05-11 | Stop reason: HOSPADM

## 2025-05-07 RX ORDER — ALBUTEROL SULFATE 90 UG/1
2 INHALANT RESPIRATORY (INHALATION) EVERY 6 HOURS PRN
Status: DISCONTINUED | OUTPATIENT
Start: 2025-05-07 | End: 2025-05-11 | Stop reason: HOSPADM

## 2025-05-07 RX ORDER — POTASSIUM CHLORIDE 1500 MG/1
40 TABLET, EXTENDED RELEASE ORAL PRN
Status: DISCONTINUED | OUTPATIENT
Start: 2025-05-07 | End: 2025-05-11 | Stop reason: HOSPADM

## 2025-05-07 RX ORDER — POLYETHYLENE GLYCOL 3350 17 G/17G
17 POWDER, FOR SOLUTION ORAL DAILY PRN
Status: DISCONTINUED | OUTPATIENT
Start: 2025-05-07 | End: 2025-05-11 | Stop reason: HOSPADM

## 2025-05-07 RX ORDER — ONDANSETRON 2 MG/ML
4 INJECTION INTRAMUSCULAR; INTRAVENOUS EVERY 6 HOURS PRN
Status: DISCONTINUED | OUTPATIENT
Start: 2025-05-07 | End: 2025-05-11 | Stop reason: HOSPADM

## 2025-05-07 RX ORDER — RISPERIDONE 1 MG/1
1 TABLET ORAL DAILY
Status: DISCONTINUED | OUTPATIENT
Start: 2025-05-07 | End: 2025-05-11 | Stop reason: HOSPADM

## 2025-05-07 RX ORDER — ENOXAPARIN SODIUM 100 MG/ML
40 INJECTION SUBCUTANEOUS DAILY
Status: DISCONTINUED | OUTPATIENT
Start: 2025-05-07 | End: 2025-05-11 | Stop reason: HOSPADM

## 2025-05-07 RX ORDER — IPRATROPIUM BROMIDE AND ALBUTEROL SULFATE 2.5; .5 MG/3ML; MG/3ML
1 SOLUTION RESPIRATORY (INHALATION)
Status: DISCONTINUED | OUTPATIENT
Start: 2025-05-07 | End: 2025-05-08

## 2025-05-07 RX ORDER — FLUTICASONE PROPIONATE 50 MCG
2 SPRAY, SUSPENSION (ML) NASAL DAILY PRN
Status: DISCONTINUED | OUTPATIENT
Start: 2025-05-07 | End: 2025-05-11 | Stop reason: HOSPADM

## 2025-05-07 RX ORDER — SODIUM CHLORIDE 9 MG/ML
INJECTION, SOLUTION INTRAVENOUS CONTINUOUS
Status: ACTIVE | OUTPATIENT
Start: 2025-05-07 | End: 2025-05-08

## 2025-05-07 RX ORDER — MAGNESIUM SULFATE IN WATER 40 MG/ML
2000 INJECTION, SOLUTION INTRAVENOUS PRN
Status: DISCONTINUED | OUTPATIENT
Start: 2025-05-07 | End: 2025-05-11 | Stop reason: HOSPADM

## 2025-05-07 RX ORDER — ACETAMINOPHEN 325 MG/1
650 TABLET ORAL EVERY 6 HOURS PRN
Status: DISCONTINUED | OUTPATIENT
Start: 2025-05-07 | End: 2025-05-11 | Stop reason: HOSPADM

## 2025-05-07 RX ORDER — ACETAMINOPHEN 325 MG/1
650 TABLET ORAL EVERY 6 HOURS PRN
Status: DISCONTINUED | OUTPATIENT
Start: 2025-05-07 | End: 2025-05-07

## 2025-05-07 RX ORDER — IPRATROPIUM BROMIDE AND ALBUTEROL SULFATE 2.5; .5 MG/3ML; MG/3ML
3 SOLUTION RESPIRATORY (INHALATION) ONCE
Status: COMPLETED | OUTPATIENT
Start: 2025-05-07 | End: 2025-05-07

## 2025-05-07 RX ADMIN — IPRATROPIUM BROMIDE AND ALBUTEROL SULFATE 1 DOSE: 2.5; .5 SOLUTION RESPIRATORY (INHALATION) at 19:23

## 2025-05-07 RX ADMIN — SODIUM CHLORIDE, PRESERVATIVE FREE 10 ML: 5 INJECTION INTRAVENOUS at 23:30

## 2025-05-07 RX ADMIN — CEFTRIAXONE 1000 MG: 1 INJECTION, POWDER, FOR SOLUTION INTRAMUSCULAR; INTRAVENOUS at 04:50

## 2025-05-07 RX ADMIN — IPRATROPIUM BROMIDE AND ALBUTEROL SULFATE 3 DOSE: .5; 3 SOLUTION RESPIRATORY (INHALATION) at 01:22

## 2025-05-07 RX ADMIN — ACETAMINOPHEN 650 MG: 325 TABLET ORAL at 23:29

## 2025-05-07 RX ADMIN — TIOTROPIUM BROMIDE INHALATION SPRAY 2 PUFF: 3.12 SPRAY, METERED RESPIRATORY (INHALATION) at 08:35

## 2025-05-07 RX ADMIN — ENOXAPARIN SODIUM 40 MG: 100 INJECTION SUBCUTANEOUS at 08:58

## 2025-05-07 RX ADMIN — METHADONE HYDROCHLORIDE 95 MG: 10 TABLET ORAL at 12:06

## 2025-05-07 RX ADMIN — METHYLPREDNISOLONE SODIUM SUCCINATE 40 MG: 40 INJECTION, POWDER, LYOPHILIZED, FOR SOLUTION INTRAMUSCULAR; INTRAVENOUS at 08:58

## 2025-05-07 RX ADMIN — RISPERIDONE 1 MG: 1 TABLET, FILM COATED ORAL at 08:57

## 2025-05-07 RX ADMIN — SODIUM CHLORIDE 2376 ML: 9 INJECTION, SOLUTION INTRAVENOUS at 01:49

## 2025-05-07 RX ADMIN — IPRATROPIUM BROMIDE AND ALBUTEROL SULFATE 1 DOSE: 2.5; .5 SOLUTION RESPIRATORY (INHALATION) at 15:52

## 2025-05-07 RX ADMIN — BUDESONIDE AND FORMOTEROL FUMARATE DIHYDRATE 2 PUFF: 160; 4.5 AEROSOL RESPIRATORY (INHALATION) at 19:23

## 2025-05-07 RX ADMIN — METHYLPREDNISOLONE SODIUM SUCCINATE 40 MG: 40 INJECTION, POWDER, LYOPHILIZED, FOR SOLUTION INTRAMUSCULAR; INTRAVENOUS at 16:40

## 2025-05-07 RX ADMIN — DEXAMETHASONE SODIUM PHOSPHATE 8 MG: 4 INJECTION, SOLUTION INTRAMUSCULAR; INTRAVENOUS at 01:51

## 2025-05-07 RX ADMIN — PANTOPRAZOLE SODIUM 40 MG: 40 TABLET, DELAYED RELEASE ORAL at 16:40

## 2025-05-07 RX ADMIN — METHYLPREDNISOLONE SODIUM SUCCINATE 40 MG: 40 INJECTION, POWDER, LYOPHILIZED, FOR SOLUTION INTRAMUSCULAR; INTRAVENOUS at 23:29

## 2025-05-07 RX ADMIN — AZITHROMYCIN MONOHYDRATE 500 MG: 500 INJECTION, POWDER, LYOPHILIZED, FOR SOLUTION INTRAVENOUS at 04:50

## 2025-05-07 RX ADMIN — IPRATROPIUM BROMIDE AND ALBUTEROL SULFATE 1 DOSE: 2.5; .5 SOLUTION RESPIRATORY (INHALATION) at 12:46

## 2025-05-07 RX ADMIN — ACETAMINOPHEN 650 MG: 325 TABLET ORAL at 06:53

## 2025-05-07 RX ADMIN — PANTOPRAZOLE SODIUM 40 MG: 40 TABLET, DELAYED RELEASE ORAL at 08:56

## 2025-05-07 RX ADMIN — WATER 10 ML: 1 INJECTION INTRAMUSCULAR; INTRAVENOUS; SUBCUTANEOUS at 23:33

## 2025-05-07 RX ADMIN — IPRATROPIUM BROMIDE AND ALBUTEROL SULFATE 1 DOSE: 2.5; .5 SOLUTION RESPIRATORY (INHALATION) at 08:34

## 2025-05-07 RX ADMIN — SODIUM CHLORIDE: 0.9 INJECTION, SOLUTION INTRAVENOUS at 06:52

## 2025-05-07 RX ADMIN — BUDESONIDE AND FORMOTEROL FUMARATE DIHYDRATE 2 PUFF: 160; 4.5 AEROSOL RESPIRATORY (INHALATION) at 08:35

## 2025-05-07 ASSESSMENT — PAIN DESCRIPTION - ORIENTATION: ORIENTATION: MID;RIGHT;LEFT

## 2025-05-07 ASSESSMENT — PAIN DESCRIPTION - LOCATION
LOCATION: BACK
LOCATION: HEAD

## 2025-05-07 ASSESSMENT — PAIN SCALES - GENERAL
PAINLEVEL_OUTOF10: 0
PAINLEVEL_OUTOF10: 7
PAINLEVEL_OUTOF10: 8

## 2025-05-07 ASSESSMENT — PAIN DESCRIPTION - DESCRIPTORS
DESCRIPTORS: ACHING;DISCOMFORT
DESCRIPTORS: ACHING

## 2025-05-07 NOTE — ED NOTES
Pt connected to cardiac monitor, side rails x2, call light is within the patient's reach, and instructions for use were provided.  The bed is in the lowest position with wheels locked.  The patient has been advised to notify staff, using the call light, if there is a need to get up or use restroom.  The patient verbalized understanding of safety precautions and how to contact staff for assistance.

## 2025-05-07 NOTE — ED PROVIDER NOTES
criteria with heart rate of 99 and respiratory rate of 23.  Her white count is elevated at 35.5.  H&H is 17 and 51.  Lactic acid is negative.  BNP is mildly elevated 601.  Chemistries are relatively normal.  Chest x-ray shows a right basilar pneumonia.  Therefore, patient with sepsis criteria.  She was given Rocephin and Zithromax because this is community-acquired pneumonia.  Blood cultures obtained.  Patient is admitted for sepsis and pneumonia.  Hospitalist was notified at 0230    Diagnostic considerations include but are not limited to:  Acute Coronary Syndrome, Congestive Heart Failure, Myocardial Infarction, Pulmonary Embolus, Pneumonia, Pneumothorax, sepsis, DKA, airway obstruction, bronchitis, burn injury, other     EKG-ordered to rule out myocardial infarction, rhythm disturbances, conduction disturbances, LVH, MISSAEL, pericarditis, voltage abnormalities, or other pathology that might be causing the patient's symptoms.    Chest x-ray-chest x-ray was ordered to rule out pneumonia, pneumothorax, congestive heart failure, cardiomegaly, chest masses, aortic aneurysm, hiatal hernia, rib fractures, or any other pathology that might be causing the patient's symptoms    CBC-CBC was ordered to rule out anemia, infection, abnormal platelet count, polycythemia, abnormal Red cell pathology, or any other pathology that might be causing the patient's symptoms    CMP-CMP was ordered to rule out electrolyte abnormalities, kidney dysfunction, electrolyte imbalance, abnormal transaminases, liver dysfunction, or any other pathology that might be causing the patient's symptoms.    Urine-urinalysis was ordered to rule out infection, renal failure, dehydration, pregnancy, proteinuria, bilirubinuria, or any other pathology that might be causing the patient's symptoms    The patient's blood pressure was found to be elevated according to CMS/Medicare and the Affordable Care Act/ObamaCare criteria. Elevated blood pressure could occur

## 2025-05-07 NOTE — PROGRESS NOTES
4 Eyes Skin Assessment     NAME:  Nati Dover  YOB: 1960  MEDICAL RECORD NUMBER:  [id number]    The patient is being assessed for  Admission    I agree that at least one RN has performed a thorough Head to Toe Skin Assessment on the patient. ALL assessment sites listed below have been assessed.      Areas assessed by both nurses:    Head, Face, Ears, Shoulders, Back, Chest, Arms, Elbows, Hands, Sacrum. Buttock, Coccyx, Ischium, and Legs. Feet and Heels        Does the Patient have a Wound? No noted wound(s)       Oleg Prevention initiated by RN: No  Wound Care Orders initiated by RN: No    Pressure Injury (Stage 3,4, Unstageable, DTI, NWPT, and Complex wounds) if present, place Wound referral order by RN under : No    New Ostomies, if present place, Ostomy referral order under : No     Nurse 1 eSignature: Electronically signed by SUSIE MENDIOLA RN on 5/7/25 at 4:08 PM EDT    **SHARE this note so that the co-signing nurse can place an eSignature**    Nurse 2 eSignature: Electronically signed by Teressa Evans RN on 5/7/25 at 6:19 PM EDT

## 2025-05-07 NOTE — H&P
HISTORY AND PHYSICAL             Date: 5/7/2025        Patient Name: Nait Dover     YOB: 1960      Age:  64 y.o.    Chief Complaint     Chief Complaint   Patient presents with    Shortness of Breath     Pt to ED via [facility] with c/o SOB x several weeks. EMS reports pt was o2 88% placed on NRB o2 97%. Pt o2 on 6L o2 93% at time of triage. Pt hx of COPD, CKD, thyroid disease.           History Obtained From   patient    History of Present Illness   64f with history of copd, not on home O2, presents to the ER with worsening sob, cough. She states she was treated for bronchitis 2 weeks ago with abx and prednisone but noted no improvement. Sob, davila progressed along with generalized weakness prompting ER consultation.   She does note ongoing chronic loose stools, no melena or hematochezia, denies abdominal pain but has had anorexia ongoing for months. She also notes increased difficulty of swallowing solids and liquids.    Past Medical History     Past Medical History:   Diagnosis Date    Anxiety and depression     Arthritis     Asthma     Bipolar disorder (HCC)     CKD (chronic kidney disease)     stage 3    COPD (chronic obstructive pulmonary disease) (HCC)     Empyema (HCC)     Hepatitis C     Patient denies     History of heroin use     Hypertension     Narcotic abuse (HCC)     heroin, clean 1 year    UTI (lower urinary tract infection)         Past Surgical History     Past Surgical History:   Procedure Laterality Date    COLONOSCOPY  10/23/2018    Colonoscopy with polypectomy (cold snare), polypectomy (cold biopsy)    COLONOSCOPY N/A 10/23/2018    COLONOSCOPY POLYPECTOMY SNARE/COLD BIOPSY performed by Victor M Heart MD at [facility] ENDOSCOPY    FOOT SURGERY Left 12/16/2016    INTRACAPSULAR CATARACT EXTRACTION Right 02/21/2020    PHACOEMULSIFICATION WITH INTRAOCULAR LENS IMPLANT performed by Zhen Nunez MD at [facility]    INTRACAPSULAR CATARACT EXTRACTION Left 02/28/2020

## 2025-05-07 NOTE — ED NOTES
Order placed with dietary for food tray- scrambled eggs w/cheese, [employer] toast w/butter&syrup, potatoes, Coffee w/cream&sugar.

## 2025-05-07 NOTE — PROGRESS NOTES
V2.0    [facility] Progress Note      Name:  Nati Dover /Age/Sex: 1960  (64 y.o. female)   MRN & CSN:  [id number] & [id number] Encounter Date/Time: 2025 12:03 PM EDT   Location:   PCP: Hunter Barr MD     Attending:Gamal Rider DO       Hospital Day: 2  Brief HPI  Nati Dover is a 64 y.o. female with pertinent PMHx of COPD, CKD 3, HTN, substance abuse who presented complaining of progressively worsening shortness of breath and cough.  Imaging was concerning for pneumonia and she was made a for further evaluation care.  Assessment & Plan     Community acquired pneumonia  - Chest x-ray showing concern for right-sided pneumonia  - Check respiratory culture, pneumonia panel, urine strep and urine Legionella  - Antibiotics with IV Rocephin - and IV azithromycin -  - Repeat procalcitonin tomorrow    COPD with acute exacerbation  - Wean supplemental oxygen as tolerated  - IV Solu-Medrol  - Symbicort  - Scheduled DuoNebs    Mood disorder  - Continue risperidone    History of substance abuse  - Continue methadone    Diet ADULT DIET; Regular   DVT Prophylaxis [x] Lovenox, []  Heparin, [] SCDs, [] Ambulation,  [] Eliquis, [] Xarelto  [] Coumadin   Code Status Full Code   Disposition From: Home  Expected Disposition: Home  Estimated Date of Discharge: 5/10-           Subjective:     Chief Complaint: Shortness of breath, cough    Patient was seen and examined today lying in bed, still in the ED  Complaining of cough and congestion, says that her cough is bloody, small amount of time  Still has some shortness of breath  Afebrile at this time      Review of Systems:      Pertinent positives and negatives discussed in HPI    Objective:   No intake or output data in the 24 hours ending 25 1203   Vitals:   Vitals:    25 1044 25 1116 25 1131 25 1145   BP: (!) 93/58 107/79  (!) 136/95   Pulse: 88 86 84 90   Resp: 16 17 15 24   Temp:

## 2025-05-07 NOTE — PROGRESS NOTES
[facility]: [facility] EMERGENCY DEPARTMENT  DYSPHAGIA BEDSIDE SWALLOW EVALUATION     Patient: Nati Dover   : 1960   MRN: [id number]      Evaluation Date: 2025     Admitting Diagnosis:   Sepsis (HCC) [A41.9]   has a past medical history of Anxiety and depression, Arthritis, Asthma, Bipolar disorder (HCC), CKD (chronic kidney disease), COPD (chronic obstructive pulmonary disease) (HCC), Empyema (HCC), Hepatitis C, History of heroin use, Hypertension, Narcotic abuse (HCC), and UTI (lower urinary tract infection).   has a past surgical history that includes other surgical history (12/15/2016); Foot surgery (Left, 2016); Colonoscopy (10/23/2018); Colonoscopy (N/A, 10/23/2018); Lung surgery (Right); Intracapsular cataract extraction (Right, 2020); Intracapsular cataract extraction (Left, 2020); and Thyroidectomy (Left, 2022).  Allergies: medication allergies noted  Dysphagia History including instrumental assessment: none on record  GI history: referred to GI 2024 by PCP d/t food sticking when swallowing; patient did not pursue  ENT history: followed for thyroid nodules; 2023 note refers to concerns for reflux  Baseline/Prior Level of Function: Living Status: admitted from home; Baseline diet: regular/thin    Onset Date: 2025        Reason for referral: SLP evaluation orders received due to pt/family reports of trouble swallowing                         CURRENT ENCOUNTER DIAGNOSTICS/COURSE OF ADMISSION     2025 admitted with c/o SOB. MD ADMISSION H&P HPI: \"64f with history of copd, not on home O2, presents to the ER with worsening sob, cough. She states she was treated for bronchitis 2 weeks ago with abx and prednisone but noted no improvement. Sob, davila progressed along with generalized weakness prompting ER consultation. She does note ongoing chronic loose stools, no melena or hematochezia, denies abdominal pain but has had anorexia ongoing for

## 2025-05-07 NOTE — ED NOTES
Pt informed a urine sample is needed, but patient was just placed on a bedpan around 7am and a urine was not needed at that time. Patient will inform this RN when she needs to urinate.

## 2025-05-07 NOTE — ED NOTES
Phlebotomy called at this time to obtain blood cultures due to pt being a hard stick. Lab stated they will send someone.

## 2025-05-07 NOTE — PROGRESS NOTES
Pt arrived to room 4250. VSS. Pt alert and oriented to room and call light. All needs met @ this time. Electronically signed by SUSIE MENDIOLA RN on 5/7/2025 at 6:40 PM

## 2025-05-07 NOTE — ED NOTES
ED TO INPATIENT SBAR HANDOFF    Patient Name: Nati Dover   Preferred Name: Rg  : 1960  64 y.o.   Family/Caregiver Present: no   Code Status Order: Full Code  PO Status: NPO:No  Telemetry Order: Yes  C-SSRS: Risk of Suicide: No Risk  Sitter no     Restraints:     Sepsis Risk Score      Situation  Chief Complaint   Patient presents with    Shortness of Breath     Pt to ED via [facility] with c/o SOB x several weeks. EMS reports pt was o2 88% placed on NRB o2 97%. Pt o2 on 6L o2 93% at time of triage. Pt hx of COPD, CKD, thyroid disease.      Brief Description of Patient's Condition: Pt resting in bed comfortably with eyes closed. Respirations even and unlabored, no distress noted. Pt is on 4L nasal cannula and tolerating well. A&Ox4.  Mental Status: oriented  Arrived from:Home  Imaging:   XR CHEST PORTABLE   Final Result   Mild central pulmonary congestion pulmonary artery hypertension      Moderate right basilar consolidation which probably represents pneumonia and   a questionable small infiltrate along the right upper lobe      Small right pleural effusion.           Abnormal labs:   Abnormal Labs Reviewed   PNEUMONIA PANEL, MOLECULAR - Abnormal; Notable for the following components:       Result Value    Organism Streptococcus pneumoniae DNA Detected (*)     Organism Human Rhinovirus/Enterovirus by PCR (*)     All other components within normal limits    Narrative:     ORDER#: [id number]                          ORDERED BY: ROJAS HAIDER  SOURCE: Sputum Suctioned                   COLLECTED:  25 07:41  ANTIBIOTICS AT CATHY.:                      RECEIVED :  25 08:07   CBC WITH AUTO DIFFERENTIAL - Abnormal; Notable for the following components:    WBC 35.5 (*)     RBC 5.74 (*)     Hemoglobin 17.2 (*)     Hematocrit 51.3 (*)     Neutrophils Absolute 33.0 (*)     All other components within normal limits    Narrative:     CALL  Chun  SKERK tel. [phone],  Rejected Test Name/Called

## 2025-05-07 NOTE — ED NOTES
Pt BG checked due to lab stating glucose was low. BG 48 ED MD Carmichael aware. Pt given apple juice.

## 2025-05-07 NOTE — PROGRESS NOTES
Medication Reconciliation    List of medications patient is currently taking is complete.     Source of information: 1. Conversation with patient at bedside                                      2. EPIC records         Notes regarding home medications:   1. Confirmed methadone dose is 95mg daily. Patient last dosed in clinic on 4/18/25 and is given 20 days of take home doses.  2. Added vitamin D and Symbicort  3. Removed Adelina Castillo, [facility]   5/7/2025  10:27 AM

## 2025-05-08 LAB
ALBUMIN SERPL-MCNC: 2.8 G/DL (ref 3.4–5)
ALP SERPL-CCNC: 71 U/L (ref 40–129)
ALT SERPL-CCNC: 15 U/L (ref 10–40)
ANION GAP SERPL CALCULATED.3IONS-SCNC: 9 MMOL/L (ref 3–16)
AST SERPL-CCNC: 23 U/L (ref 15–37)
BASOPHILS # BLD: 0.4 K/UL (ref 0–0.2)
BASOPHILS NFR BLD: 1.3 %
BILIRUB DIRECT SERPL-MCNC: <0.1 MG/DL (ref 0–0.3)
BILIRUB INDIRECT SERPL-MCNC: 0.3 MG/DL (ref 0–1)
BILIRUB SERPL-MCNC: 0.4 MG/DL (ref 0–1)
BUN SERPL-MCNC: 15 MG/DL (ref 7–20)
CALCIUM SERPL-MCNC: 8.2 MG/DL (ref 8.3–10.6)
CHLORIDE SERPL-SCNC: 105 MMOL/L (ref 99–110)
CO2 SERPL-SCNC: 22 MMOL/L (ref 21–32)
CREAT SERPL-MCNC: 0.9 MG/DL (ref 0.6–1.2)
DEPRECATED RDW RBC AUTO: 14.2 % (ref 12.4–15.4)
EOSINOPHIL # BLD: 0 K/UL (ref 0–0.6)
EOSINOPHIL NFR BLD: 0.1 %
GFR SERPLBLD CREATININE-BSD FMLA CKD-EPI: 72 ML/MIN/{1.73_M2}
GLUCOSE SERPL-MCNC: 131 MG/DL (ref 70–99)
HCT VFR BLD AUTO: 43.7 % (ref 36–48)
HGB BLD-MCNC: 14.1 G/DL (ref 12–16)
LEGIONELLA AG UR QL: NORMAL
LYMPHOCYTES # BLD: 0.3 K/UL (ref 1–5.1)
LYMPHOCYTES NFR BLD: 1.1 %
MCH RBC QN AUTO: 29.1 PG (ref 26–34)
MCHC RBC AUTO-ENTMCNC: 32.3 G/DL (ref 31–36)
MCV RBC AUTO: 90 FL (ref 80–100)
MONOCYTES # BLD: 0.4 K/UL (ref 0–1.3)
MONOCYTES NFR BLD: 1.3 %
NEUTROPHILS # BLD: 26.4 K/UL (ref 1.7–7.7)
NEUTROPHILS NFR BLD: 96.2 %
PLATELET # BLD AUTO: ABNORMAL K/UL (ref 135–450)
PLATELET BLD QL SMEAR: ABNORMAL
PMV BLD AUTO: ABNORMAL FL (ref 5–10.5)
POTASSIUM SERPL-SCNC: 4.4 MMOL/L (ref 3.5–5.1)
PROCALCITONIN SERPL IA-MCNC: 11.8 NG/ML (ref 0–0.15)
PROT SERPL-MCNC: 5.4 G/DL (ref 6.4–8.2)
RBC # BLD AUTO: 4.86 M/UL (ref 4–5.2)
RBC MORPH BLD: NORMAL
S PNEUM AG UR QL: NORMAL
SLIDE REVIEW: ABNORMAL
SODIUM SERPL-SCNC: 136 MMOL/L (ref 136–145)
WBC # BLD AUTO: 27.4 K/UL (ref 4–11)

## 2025-05-08 PROCEDURE — 6360000002 HC RX W HCPCS: Performed by: HOSPITALIST

## 2025-05-08 PROCEDURE — 36415 COLL VENOUS BLD VENIPUNCTURE: CPT

## 2025-05-08 PROCEDURE — 6370000000 HC RX 637 (ALT 250 FOR IP): Performed by: HOSPITALIST

## 2025-05-08 PROCEDURE — 80048 BASIC METABOLIC PNL TOTAL CA: CPT

## 2025-05-08 PROCEDURE — 2700000000 HC OXYGEN THERAPY PER DAY

## 2025-05-08 PROCEDURE — 2500000003 HC RX 250 WO HCPCS: Performed by: HOSPITALIST

## 2025-05-08 PROCEDURE — 80076 HEPATIC FUNCTION PANEL: CPT

## 2025-05-08 PROCEDURE — 2580000003 HC RX 258: Performed by: HOSPITALIST

## 2025-05-08 PROCEDURE — 6370000000 HC RX 637 (ALT 250 FOR IP): Performed by: STUDENT IN AN ORGANIZED HEALTH CARE EDUCATION/TRAINING PROGRAM

## 2025-05-08 PROCEDURE — 94761 N-INVAS EAR/PLS OXIMETRY MLT: CPT

## 2025-05-08 PROCEDURE — 97165 OT EVAL LOW COMPLEX 30 MIN: CPT

## 2025-05-08 PROCEDURE — 84145 PROCALCITONIN (PCT): CPT

## 2025-05-08 PROCEDURE — 85025 COMPLETE CBC W/AUTO DIFF WBC: CPT

## 2025-05-08 PROCEDURE — 1200000000 HC SEMI PRIVATE

## 2025-05-08 PROCEDURE — 6370000000 HC RX 637 (ALT 250 FOR IP): Performed by: NURSE PRACTITIONER

## 2025-05-08 PROCEDURE — 97161 PT EVAL LOW COMPLEX 20 MIN: CPT | Performed by: PHYSICAL THERAPIST

## 2025-05-08 PROCEDURE — 97530 THERAPEUTIC ACTIVITIES: CPT

## 2025-05-08 PROCEDURE — 94640 AIRWAY INHALATION TREATMENT: CPT

## 2025-05-08 RX ORDER — PREDNISONE 20 MG/1
40 TABLET ORAL DAILY
Status: COMPLETED | OUTPATIENT
Start: 2025-05-08 | End: 2025-05-11

## 2025-05-08 RX ORDER — OXYCODONE AND ACETAMINOPHEN 5; 325 MG/1; MG/1
1 TABLET ORAL ONCE
Refills: 0 | Status: COMPLETED | OUTPATIENT
Start: 2025-05-08 | End: 2025-05-08

## 2025-05-08 RX ORDER — IPRATROPIUM BROMIDE AND ALBUTEROL SULFATE 2.5; .5 MG/3ML; MG/3ML
1 SOLUTION RESPIRATORY (INHALATION) EVERY 4 HOURS PRN
Status: DISCONTINUED | OUTPATIENT
Start: 2025-05-08 | End: 2025-05-11 | Stop reason: HOSPADM

## 2025-05-08 RX ORDER — GUAIFENESIN/DEXTROMETHORPHAN 100-10MG/5
10 SYRUP ORAL EVERY 4 HOURS PRN
Status: DISCONTINUED | OUTPATIENT
Start: 2025-05-08 | End: 2025-05-11 | Stop reason: HOSPADM

## 2025-05-08 RX ORDER — IPRATROPIUM BROMIDE AND ALBUTEROL SULFATE 2.5; .5 MG/3ML; MG/3ML
1 SOLUTION RESPIRATORY (INHALATION)
Status: DISCONTINUED | OUTPATIENT
Start: 2025-05-08 | End: 2025-05-11 | Stop reason: HOSPADM

## 2025-05-08 RX ORDER — IPRATROPIUM BROMIDE AND ALBUTEROL SULFATE 2.5; .5 MG/3ML; MG/3ML
1 SOLUTION RESPIRATORY (INHALATION) ONCE
Status: DISCONTINUED | OUTPATIENT
Start: 2025-05-08 | End: 2025-05-09

## 2025-05-08 RX ADMIN — IPRATROPIUM BROMIDE AND ALBUTEROL SULFATE 1 DOSE: 2.5; .5 SOLUTION RESPIRATORY (INHALATION) at 07:33

## 2025-05-08 RX ADMIN — AZITHROMYCIN MONOHYDRATE 500 MG: 500 INJECTION, POWDER, LYOPHILIZED, FOR SOLUTION INTRAVENOUS at 04:36

## 2025-05-08 RX ADMIN — PANTOPRAZOLE SODIUM 40 MG: 40 TABLET, DELAYED RELEASE ORAL at 15:37

## 2025-05-08 RX ADMIN — GUAIFENESIN SYRUP AND DEXTROMETHORPHAN 10 ML: 100; 10 SYRUP ORAL at 23:09

## 2025-05-08 RX ADMIN — ACETAMINOPHEN 650 MG: 325 TABLET ORAL at 23:09

## 2025-05-08 RX ADMIN — BUDESONIDE AND FORMOTEROL FUMARATE DIHYDRATE 2 PUFF: 160; 4.5 AEROSOL RESPIRATORY (INHALATION) at 20:09

## 2025-05-08 RX ADMIN — ENOXAPARIN SODIUM 40 MG: 100 INJECTION SUBCUTANEOUS at 08:03

## 2025-05-08 RX ADMIN — IPRATROPIUM BROMIDE AND ALBUTEROL SULFATE 1 DOSE: 2.5; .5 SOLUTION RESPIRATORY (INHALATION) at 15:45

## 2025-05-08 RX ADMIN — IPRATROPIUM BROMIDE AND ALBUTEROL SULFATE 1 DOSE: 2.5; .5 SOLUTION RESPIRATORY (INHALATION) at 20:09

## 2025-05-08 RX ADMIN — PANTOPRAZOLE SODIUM 40 MG: 40 TABLET, DELAYED RELEASE ORAL at 08:02

## 2025-05-08 RX ADMIN — METHADONE HYDROCHLORIDE 95 MG: 10 TABLET ORAL at 08:03

## 2025-05-08 RX ADMIN — SODIUM CHLORIDE, PRESERVATIVE FREE 10 ML: 5 INJECTION INTRAVENOUS at 08:04

## 2025-05-08 RX ADMIN — IPRATROPIUM BROMIDE AND ALBUTEROL SULFATE 1 DOSE: 2.5; .5 SOLUTION RESPIRATORY (INHALATION) at 23:54

## 2025-05-08 RX ADMIN — WATER 1000 MG: 1 INJECTION INTRAMUSCULAR; INTRAVENOUS; SUBCUTANEOUS at 04:36

## 2025-05-08 RX ADMIN — SODIUM CHLORIDE, PRESERVATIVE FREE 10 ML: 5 INJECTION INTRAVENOUS at 22:03

## 2025-05-08 RX ADMIN — OXYCODONE AND ACETAMINOPHEN 1 TABLET: 325; 5 TABLET ORAL at 02:10

## 2025-05-08 RX ADMIN — PREDNISONE 40 MG: 20 TABLET ORAL at 08:02

## 2025-05-08 RX ADMIN — FLUTICASONE PROPIONATE 2 SPRAY: 50 SPRAY, METERED NASAL at 01:26

## 2025-05-08 RX ADMIN — RISPERIDONE 1 MG: 1 TABLET, FILM COATED ORAL at 08:02

## 2025-05-08 RX ADMIN — IPRATROPIUM BROMIDE AND ALBUTEROL SULFATE 1 DOSE: 2.5; .5 SOLUTION RESPIRATORY (INHALATION) at 11:49

## 2025-05-08 RX ADMIN — ALBUTEROL SULFATE 2 PUFF: 90 AEROSOL, METERED RESPIRATORY (INHALATION) at 02:33

## 2025-05-08 RX ADMIN — BUDESONIDE AND FORMOTEROL FUMARATE DIHYDRATE 2 PUFF: 160; 4.5 AEROSOL RESPIRATORY (INHALATION) at 07:43

## 2025-05-08 RX ADMIN — SODIUM CHLORIDE: 0.9 INJECTION, SOLUTION INTRAVENOUS at 02:13

## 2025-05-08 ASSESSMENT — PAIN - FUNCTIONAL ASSESSMENT: PAIN_FUNCTIONAL_ASSESSMENT: ACTIVITIES ARE NOT PREVENTED

## 2025-05-08 ASSESSMENT — PAIN DESCRIPTION - LOCATION
LOCATION: HEAD
LOCATION: BACK

## 2025-05-08 ASSESSMENT — PAIN SCALES - GENERAL
PAINLEVEL_OUTOF10: 0
PAINLEVEL_OUTOF10: 8
PAINLEVEL_OUTOF10: 6
PAINLEVEL_OUTOF10: 0

## 2025-05-08 ASSESSMENT — PAIN DESCRIPTION - ORIENTATION: ORIENTATION: MID

## 2025-05-08 ASSESSMENT — PAIN DESCRIPTION - DESCRIPTORS
DESCRIPTORS: ACHING;DISCOMFORT
DESCRIPTORS: ACHING

## 2025-05-08 NOTE — CONSULTS
ENDOSCOPY    FOOT SURGERY Left 2016    INTRACAPSULAR CATARACT EXTRACTION Right 2020    PHACOEMULSIFICATION WITH INTRAOCULAR LENS IMPLANT performed by Zhen Nunez MD at [facility]    INTRACAPSULAR CATARACT EXTRACTION Left 2020    PHACOEMULSIFICATION WITH INTRAOCULAR LENS IMPLANT performed by Zhen Nunez MD at [facility]    LUNG SURGERY Right     26 yrs for lung infection     OTHER SURGICAL HISTORY  12/15/2016    EDMOND BUNIONECTOMY WITH APPLICATION OF AMNIOX LEFT FOOT    THYROIDECTOMY Left 2022    LEFT HEMITHYROIDECTOMY performed by Jimmie French MD at [facility] OR     Family History   Problem Relation Age of Onset    Cancer Mother     Kidney Disease Mother     Heart Disease Mother     Diabetes Father     Heart Disease Father     Diabetes Sister     Heart Disease Brother     High Blood Pressure Brother      Social History     Socioeconomic History    Marital status: Life Partner     Spouse name: None    Number of children: None    Years of education: None    Highest education level: None   Tobacco Use    Smoking status: Every Day     Current packs/day: 0.00     Average packs/day: 0.5 packs/day for 45.0 years (22.5 ttl pk-yrs)     Types: Cigarettes     Start date: 1971     Last attempt to quit: 2016     Years since quittin.9     Passive exposure: Current    Smokeless tobacco: Never   Vaping Use    Vaping status: Never Used   Substance and Sexual Activity    Alcohol use: No    Drug use: Not Currently     Comment: past h/o heroin on methadone--last used 5 years ago    Sexual activity: Not Currently     Social Drivers of Health     Food Insecurity: No Food Insecurity (2025)    Hunger Vital Sign     Worried About Running Out of Food in the Last Year: Never true     Ran Out of Food in the Last Year: Never true   Transportation Needs: Unmet Transportation Needs (2025)    PRAPARE - Transportation     Lack of Transportation (Medical): Yes     Lack of

## 2025-05-08 NOTE — PROGRESS NOTES
Patient reports 9/10 back pain.   Patient was given Tylenol PRN  an hour ago, still c/o severe pain. MD notified. One time dose of Percocet was given as ordered.     0219: Patient reports that she feels bubbly on her throat and coughs out brown or rust colored sputum since she came here at the hospital. MD notified. Awaiting MD response.     0229: Pt is asking to change the frequency of breathing treatment, to be done every 4 hours but per MAR and Respiratory, it's schedule every 4 hours at 0800, 1200,1600 however Per Patient she does it every 4 hours at home. MD notified. Awaiting MD response.

## 2025-05-08 NOTE — PROGRESS NOTES
V2.0    [facility] Progress Note      Name:  Nati Dover /Age/Sex: 1960  (64 y.o. female)   MRN & CSN:  [id number] & [id number] Encounter Date/Time: 2025 12:03 PM EDT   Location:  [id number] PCP: Hunter Barr MD     Attending:Gamal Rider DO       Hospital Day: 3  Brief HPI  Nati Dover is a 64 y.o. female with pertinent PMHx of COPD, CKD 3, HTN, substance abuse who presented complaining of progressively worsening shortness of breath and cough.  Imaging was concerning for pneumonia and she was made a for further evaluation care.  Assessment & Plan     Community acquired pneumonia  - Chest x-ray showing concern for right-sided pneumonia  - Check pneumonia panel positive for strep pneumoniae and human rhinovirus  - Continue antibiotics with IV Rocephin - and IV azithromycin -  - Pro-German starting to trend down, will repeat tomorrow    COPD with acute exacerbation  - Wean supplemental oxygen as tolerated  - Transition IV Solu-Medrol to oral prednisone  - Symbicort  - Scheduled DuoNebs    Esophageal dysmotility  - Reports chronic gastric reflux and has issues with food getting stuck in her chest and issues with swallowing, given the right-sided pneumonia recurrent aspiration could be contributing  - Protonix 40 mg twice daily  - Gastroenterology consulted    Mood disorder  - Continue risperidone    History of substance abuse  - Continue methadone    Diet Diet NPO   DVT Prophylaxis [x] Lovenox, []  Heparin, [] SCDs, [] Ambulation,  [] Eliquis, [] Xarelto  [] Coumadin   Code Status Full Code   Disposition From: Home  Expected Disposition: Home  Estimated Date of Discharge: 5/10-           Subjective:     Chief Complaint: Shortness of breath, cough    Patient was seen and examined today sitting up in chair in room  Says she feels a little bit better today, still having a lot of cough with blood-tinged to brownish sputum  Has not noticed any fevers  Says she has

## 2025-05-08 NOTE — PROGRESS NOTES
Physical Therapy      [facility] - Physical Therapy   Phone: [phone]    Physical Therapy  Facility/Department:[address] MED SURG    [x] Initial Evaluation            [] Daily Treatment Note         [] Discharge Summary      Patient: Nati Dover   : 1960   MRN: [id number]   Date of Service:  2025  Staff Mobility Recommendation: up with SBA    AM-PAC score: 20/24  Discharge Recommendations: Home; if pt needs O2 at discharge then may benefit from home PT    Admitting Diagnosis: Sepsis (HCC)  Ordering Physician: Dr JULIA Rider  Current Admission Summary: Nati Dover is a 64 y.o. female with pertinent PMHx of COPD, CKD 3, HTN, substance abuse who presented complaining of progressively worsening shortness of breath and cough.  Imaging was concerning for pneumonia; pt NPO for procedure this date and has a GI consult    Past Medical History:  has a past medical history of Anxiety and depression, Arthritis, Asthma, Bipolar disorder (HCC), CKD (chronic kidney disease), COPD (chronic obstructive pulmonary disease) (HCC), Empyema (HCC), Hepatitis C, History of heroin use, Hypertension, Narcotic abuse (HCC), and UTI (lower urinary tract infection).  Past Surgical History:  has a past surgical history that includes other surgical history (12/15/2016); Foot surgery (Left, 2016); Colonoscopy (10/23/2018); Colonoscopy (N/A, 10/23/2018); Lung surgery (Right); Intracapsular cataract extraction (Right, 2020); Intracapsular cataract extraction (Left, 2020); and Thyroidectomy (Left, 2022).    Assessment  Activity Tolerance: Good  Impairments Requiring Therapeutic Intervention: decreased functional mobility, decreased endurance  Prognosis: good    Clinical Assessment: Pt is a 65 yo female who was adm to hosp with SOB and currently in droplet precautions.  Pt at baseline lives with her  and amb without an AD.  Pt currently is below her baseline due to needing O2 via NC.  Pt

## 2025-05-08 NOTE — PROGRESS NOTES
[facility] - Occupational Therapy   Phone: [phone]    Occupational Therapy  Facility/Department:[address] MED SURG    [x] Initial Evaluation            [x] Daily Treatment Note         [] Discharge Summary      Patient: Nati Dover   : 1960   MRN: [id number]   Date of Service:  2025    Staff Mobility Recommendation: SBA    AM-PAC Score:   Discharge Recommendations: home    Admitting Diagnosis:  Sepsis (HCC)  Ordering Physician: Gamal Rider DO   Current Admission Summary: per ED note, Nati Dover is a 64 y.o. female who presents with shortness of breath has been present for couple weeks.  She states is gotten worse over the past 2 weeks.  She was seen in the emergency department a couple weeks ago and was diagnosed with bronchitis and discharged on antibiotics and steroids.  She quit smoking several years ago.  She does have a history of COPD and denies a history of CHF.  She been coughing up brown sputum.  She is also had mild diarrhea.  She denies any fevers.  No one at home has been sick.  She denies any other complaints at this time.     Past Medical History:  has a past medical history of Anxiety and depression, Arthritis, Asthma, Bipolar disorder (HCC), CKD (chronic kidney disease), COPD (chronic obstructive pulmonary disease) (HCC), Empyema (HCC), Hepatitis C, History of heroin use, Hypertension, Narcotic abuse (HCC), and UTI (lower urinary tract infection).  Past Surgical History:  has a past surgical history that includes other surgical history (12/15/2016); Foot surgery (Left, 2016); Colonoscopy (10/23/2018); Colonoscopy (N/A, 10/23/2018); Lung surgery (Right); Intracapsular cataract extraction (Right, 2020); Intracapsular cataract extraction (Left, 2020); and Thyroidectomy (Left, 2022).    Assessment  Activity Tolerance: Good  Impairments Requiring Therapeutic Intervention: decreased functional mobility, decreased ADL status, decreased

## 2025-05-08 NOTE — PROGRESS NOTES
SLP NOTE    Patient NPO pending GI consult at this time. ST to hold dysphagia follow-up pending GI/PO status unless otherwise notified.    Thank you.  Charleen Cannon MA, CCC-SLP, #[id number]  Speech-Language Pathologist  Portable phone: [phone]

## 2025-05-08 NOTE — PLAN OF CARE
Problem: Discharge Planning  Goal: Discharge to home or other facility with appropriate resources  5/8/2025 0857 by Susie Canada RN  Outcome: Progressing  5/8/2025 0028 by Edith Calderon RN  Outcome: Progressing     Problem: Pain  Goal: Verbalizes/displays adequate comfort level or baseline comfort level  5/8/2025 0857 by Susie Canada RN  Outcome: Progressing  5/8/2025 0028 by Edith Calderon RN  Outcome: Progressing     Problem: Safety - Adult  Goal: Free from fall injury  5/8/2025 0857 by Susie Canada RN  Outcome: Progressing  5/8/2025 0028 by Edith Calderon RN  Outcome: Progressing     Problem: Neurosensory - Adult  Goal: Achieves stable or improved neurological status  Outcome: Progressing  Goal: Achieves maximal functionality and self care  Outcome: Progressing     Problem: Respiratory - Adult  Goal: Achieves optimal ventilation and oxygenation  Outcome: Progressing     Problem: Cardiovascular - Adult  Goal: Maintains optimal cardiac output and hemodynamic stability  Outcome: Progressing  Goal: Absence of cardiac dysrhythmias or at baseline  Outcome: Progressing     Problem: Skin/Tissue Integrity - Adult  Goal: Skin integrity remains intact  Outcome: Progressing  Flowsheets (Taken 5/8/2025 0857)  Skin Integrity Remains Intact: Monitor for areas of redness and/or skin breakdown  Goal: Incisions, wounds, or drain sites healing without S/S of infection  Outcome: Progressing  Goal: Oral mucous membranes remain intact  Outcome: Progressing     Problem: Musculoskeletal - Adult  Goal: Return mobility to safest level of function  Outcome: Progressing  Goal: Maintain proper alignment of affected body part  Outcome: Progressing  Goal: Return ADL status to a safe level of function  Outcome: Progressing     Problem: Gastrointestinal - Adult  Goal: Minimal or absence of nausea and vomiting  Outcome: Progressing  Goal: Maintains or returns to baseline bowel function  Outcome: Progressing  Goal:

## 2025-05-08 NOTE — PROGRESS NOTES
Patient was at 4L O2 via HF NC, however unstable O2 sats at low 90s and goes down to low 80s. O2 was bumped up and currently on 5L sustaining O2 sats at low 90s. MD and respiratory therapist were notified.

## 2025-05-08 NOTE — CARE COORDINATION
Case Management Assessment  Initial Evaluation    Date/Time of Evaluation: 5/8/2025 9:40 AM  Assessment Completed by: MICHELLE Solomon    If patient is discharged prior to next notation, then this note serves as note for discharge by case management.    Patient Name: Nati Dover                   YOB: 1960  Diagnosis: Dyspnea and respiratory abnormalities [R06.00, R06.89]  Sepsis (HCC) [A41.9]  Pneumonia of right lower lobe due to infectious organism [J18.9]  Sepsis without acute organ dysfunction, due to unspecified organism (HCC) [A41.9]                   Date / Time: 5/6/2025 11:39 PM    Patient Admission Status: Inpatient   Readmission Risk (Low < 19, Mod (19-27), High > 27): Readmission Risk Score: 11.6    Current PCP: Hunter Barr MD  PCP verified by CM? (P) Yes    Chart Reviewed: Yes      History Provided by: (P) Patient  Patient Orientation: (P) Alert and Oriented    Patient Cognition: (P) Alert    Hospitalization in the last 30 days (Readmission):  No    If yes, Readmission Assessment in CM Navigator will be completed.    Advance Directives:      Code Status: Full Code   Patient's Primary Decision Maker is: (P) Legal Next of Kin    Primary Decision Maker: Kelton Chen - Spouse - [phone]    Primary Decision Maker: Susanne Walter - Child - [phone]    Discharge Planning:    Patient lives with: (P) Family Members Type of Home: (P) Apartment  Primary Care Giver: (P) Family  Patient Support Systems include: (P) Family Members   Current Financial resources: (P) None  Current community resources: (P) None  Current services prior to admission: (P) None, [employer] ([employer] aide; 5hrs a day, M-F, 3hrs sat and 2hrs Sun)            Current DME:              Type of Home Care services:  (P) None    ADLS  Prior functional level: (P) Independent in ADLs/IADLs  Current functional level: (P) Independent in ADLs/IADLs    PT AM-PAC:   /24  OT AM-PAC: 19 /24    Family can provide

## 2025-05-08 NOTE — PROGRESS NOTES
Consent signed, and placed into chart for EGD.    Electronically signed by Susie Canada RN on 5/8/2025 at 12:04 PM

## 2025-05-08 NOTE — PROGRESS NOTES
Pt AO x4. VSS. Pt denies pain when asked. Morning medications given, whole tolerated well. Morning assessment completed. Q2 turns being implemented, pt does turn self but needs to be reminded. Pt has no questions or concerns. Standard safety measures in place.     Electronically signed by Susie Canada RN on 5/8/2025 at 8:59 AM

## 2025-05-09 ENCOUNTER — ANESTHESIA EVENT (OUTPATIENT)
Dept: ENDOSCOPY | Age: 65
DRG: 720 | End: 2025-05-09
Payer: MEDICAID

## 2025-05-09 ENCOUNTER — ANESTHESIA (OUTPATIENT)
Dept: ENDOSCOPY | Age: 65
DRG: 720 | End: 2025-05-09
Payer: MEDICAID

## 2025-05-09 LAB
BASOPHILS # BLD: 0 K/UL (ref 0–0.2)
BASOPHILS NFR BLD: 0.2 %
DEPRECATED RDW RBC AUTO: 14 % (ref 12.4–15.4)
EOSINOPHIL # BLD: 0 K/UL (ref 0–0.6)
EOSINOPHIL NFR BLD: 0 %
HCT VFR BLD AUTO: 36.8 % (ref 36–48)
HGB BLD-MCNC: 12.5 G/DL (ref 12–16)
LYMPHOCYTES # BLD: 0.4 K/UL (ref 1–5.1)
LYMPHOCYTES NFR BLD: 2.7 %
MCH RBC QN AUTO: 30.1 PG (ref 26–34)
MCHC RBC AUTO-ENTMCNC: 34 G/DL (ref 31–36)
MCV RBC AUTO: 88.6 FL (ref 80–100)
MONOCYTES # BLD: 0.6 K/UL (ref 0–1.3)
MONOCYTES NFR BLD: 3.8 %
NEUTROPHILS # BLD: 14.5 K/UL (ref 1.7–7.7)
NEUTROPHILS NFR BLD: 93.3 %
PLATELET # BLD AUTO: 118 K/UL (ref 135–450)
PMV BLD AUTO: 8.4 FL (ref 5–10.5)
PROCALCITONIN SERPL IA-MCNC: 8.5 NG/ML (ref 0–0.15)
RBC # BLD AUTO: 4.15 M/UL (ref 4–5.2)
WBC # BLD AUTO: 15.5 K/UL (ref 4–11)

## 2025-05-09 PROCEDURE — 6370000000 HC RX 637 (ALT 250 FOR IP): Performed by: HOSPITALIST

## 2025-05-09 PROCEDURE — 6360000002 HC RX W HCPCS: Performed by: HOSPITALIST

## 2025-05-09 PROCEDURE — 94761 N-INVAS EAR/PLS OXIMETRY MLT: CPT

## 2025-05-09 PROCEDURE — 6370000000 HC RX 637 (ALT 250 FOR IP): Performed by: NURSE PRACTITIONER

## 2025-05-09 PROCEDURE — 6370000000 HC RX 637 (ALT 250 FOR IP): Performed by: STUDENT IN AN ORGANIZED HEALTH CARE EDUCATION/TRAINING PROGRAM

## 2025-05-09 PROCEDURE — 85025 COMPLETE CBC W/AUTO DIFF WBC: CPT

## 2025-05-09 PROCEDURE — 2500000003 HC RX 250 WO HCPCS: Performed by: HOSPITALIST

## 2025-05-09 PROCEDURE — 2580000003 HC RX 258: Performed by: HOSPITALIST

## 2025-05-09 PROCEDURE — 2700000000 HC OXYGEN THERAPY PER DAY

## 2025-05-09 PROCEDURE — 1200000000 HC SEMI PRIVATE

## 2025-05-09 PROCEDURE — 94640 AIRWAY INHALATION TREATMENT: CPT

## 2025-05-09 PROCEDURE — 97530 THERAPEUTIC ACTIVITIES: CPT

## 2025-05-09 PROCEDURE — 92526 ORAL FUNCTION THERAPY: CPT

## 2025-05-09 PROCEDURE — 84145 PROCALCITONIN (PCT): CPT

## 2025-05-09 PROCEDURE — 36415 COLL VENOUS BLD VENIPUNCTURE: CPT

## 2025-05-09 PROCEDURE — 97535 SELF CARE MNGMENT TRAINING: CPT

## 2025-05-09 RX ORDER — AZITHROMYCIN 500 MG/1
500 TABLET, FILM COATED ORAL DAILY
Status: COMPLETED | OUTPATIENT
Start: 2025-05-10 | End: 2025-05-11

## 2025-05-09 RX ADMIN — METHADONE HYDROCHLORIDE 95 MG: 10 TABLET ORAL at 08:51

## 2025-05-09 RX ADMIN — ACETAMINOPHEN 650 MG: 325 TABLET ORAL at 21:34

## 2025-05-09 RX ADMIN — BUDESONIDE AND FORMOTEROL FUMARATE DIHYDRATE 2 PUFF: 160; 4.5 AEROSOL RESPIRATORY (INHALATION) at 07:46

## 2025-05-09 RX ADMIN — PREDNISONE 40 MG: 20 TABLET ORAL at 08:51

## 2025-05-09 RX ADMIN — GUAIFENESIN SYRUP AND DEXTROMETHORPHAN 10 ML: 100; 10 SYRUP ORAL at 08:51

## 2025-05-09 RX ADMIN — PANTOPRAZOLE SODIUM 40 MG: 40 TABLET, DELAYED RELEASE ORAL at 17:16

## 2025-05-09 RX ADMIN — IPRATROPIUM BROMIDE AND ALBUTEROL SULFATE 1 DOSE: 2.5; .5 SOLUTION RESPIRATORY (INHALATION) at 04:27

## 2025-05-09 RX ADMIN — GUAIFENESIN SYRUP AND DEXTROMETHORPHAN 10 ML: 100; 10 SYRUP ORAL at 21:33

## 2025-05-09 RX ADMIN — PANTOPRAZOLE SODIUM 40 MG: 40 TABLET, DELAYED RELEASE ORAL at 08:52

## 2025-05-09 RX ADMIN — IPRATROPIUM BROMIDE AND ALBUTEROL SULFATE 1 DOSE: 2.5; .5 SOLUTION RESPIRATORY (INHALATION) at 23:58

## 2025-05-09 RX ADMIN — IPRATROPIUM BROMIDE AND ALBUTEROL SULFATE 1 DOSE: 2.5; .5 SOLUTION RESPIRATORY (INHALATION) at 20:01

## 2025-05-09 RX ADMIN — IPRATROPIUM BROMIDE AND ALBUTEROL SULFATE 1 DOSE: 2.5; .5 SOLUTION RESPIRATORY (INHALATION) at 15:38

## 2025-05-09 RX ADMIN — RISPERIDONE 1 MG: 1 TABLET, FILM COATED ORAL at 08:51

## 2025-05-09 RX ADMIN — IPRATROPIUM BROMIDE AND ALBUTEROL SULFATE 1 DOSE: 2.5; .5 SOLUTION RESPIRATORY (INHALATION) at 07:46

## 2025-05-09 RX ADMIN — WATER 1000 MG: 1 INJECTION INTRAMUSCULAR; INTRAVENOUS; SUBCUTANEOUS at 05:32

## 2025-05-09 RX ADMIN — IPRATROPIUM BROMIDE AND ALBUTEROL SULFATE 1 DOSE: 2.5; .5 SOLUTION RESPIRATORY (INHALATION) at 11:33

## 2025-05-09 RX ADMIN — SODIUM CHLORIDE, PRESERVATIVE FREE 10 ML: 5 INJECTION INTRAVENOUS at 21:34

## 2025-05-09 RX ADMIN — SODIUM CHLORIDE, PRESERVATIVE FREE 10 ML: 5 INJECTION INTRAVENOUS at 08:53

## 2025-05-09 RX ADMIN — AZITHROMYCIN MONOHYDRATE 500 MG: 500 INJECTION, POWDER, LYOPHILIZED, FOR SOLUTION INTRAVENOUS at 05:32

## 2025-05-09 RX ADMIN — BUDESONIDE AND FORMOTEROL FUMARATE DIHYDRATE 2 PUFF: 160; 4.5 AEROSOL RESPIRATORY (INHALATION) at 20:01

## 2025-05-09 ASSESSMENT — PAIN SCALES - GENERAL: PAINLEVEL_OUTOF10: 2

## 2025-05-09 ASSESSMENT — PAIN DESCRIPTION - DESCRIPTORS: DESCRIPTORS: ACHING

## 2025-05-09 ASSESSMENT — PAIN DESCRIPTION - ORIENTATION: ORIENTATION: INNER

## 2025-05-09 ASSESSMENT — PAIN DESCRIPTION - LOCATION: LOCATION: HEAD

## 2025-05-09 NOTE — PLAN OF CARE
Problem: Discharge Planning  Goal: Discharge to home or other facility with appropriate resources  Outcome: Progressing  Flowsheets (Taken 5/9/2025 0128)  Discharge to home or other facility with appropriate resources:   Identify barriers to discharge with patient and caregiver   Arrange for needed discharge resources and transportation as appropriate   Identify discharge learning needs (meds, wound care, etc)   Arrange for interpreters to assist at discharge as needed     Problem: Pain  Goal: Verbalizes/displays adequate comfort level or baseline comfort level  Outcome: Progressing  Flowsheets (Taken 5/9/2025 0128)  Verbalizes/displays adequate comfort level or baseline comfort level:   Encourage patient to monitor pain and request assistance   Assess pain using appropriate pain scale   Administer analgesics based on type and severity of pain and evaluate response   Consider cultural and social influences on pain and pain management   Implement non-pharmacological measures as appropriate and evaluate response     Problem: Safety - Adult  Goal: Free from fall injury  Outcome: Progressing     Problem: Respiratory - Adult  Goal: Achieves optimal ventilation and oxygenation  Outcome: Progressing  Flowsheets (Taken 5/9/2025 0128)  Achieves optimal ventilation and oxygenation:   Assess for changes in respiratory status   Assess for changes in mentation and behavior   Position to facilitate oxygenation and minimize respiratory effort   Oxygen supplementation based on oxygen saturation or arterial blood gases   Initiate smoking cessation protocol as indicated   Encourage broncho-pulmonary hygiene including cough, deep breathe, incentive spirometry   Assess the need for suctioning and aspirate as needed     Problem: Cardiovascular - Adult  Goal: Maintains optimal cardiac output and hemodynamic stability  Outcome: Progressing  Flowsheets (Taken 5/9/2025 0128)  Maintains optimal cardiac output and hemodynamic stability:

## 2025-05-09 NOTE — CARE COORDINATION
Chart review done, rounds completed. Pt is from home, here with pneumonia, likely dc on Sunday or Monday. Still high O2, will try to wean.    Johnathan Sarkar LMSW, [facility] Social Work Case Management   Phone: [phone]  Fax: [phone]

## 2025-05-09 NOTE — PROGRESS NOTES
V2.0    [facility]      Name:  Nati Dover /Age/Sex: 1960  (64 y.o. female)   MRN & CSN:  [id number] & [id number] Encounter Date/Time: 2025 12:03 PM EDT   Location:  [id number] PCP: Hunter Barr MD     Attending:Gamal Rider DO       Hospital Day: 4  Brief HPI  Nati Dover is a 64 y.o. female with pertinent PMHx of COPD, CKD 3, HTN, substance abuse who presented complaining of progressively worsening shortness of breath and cough.  Imaging was concerning for pneumonia and she was made a for further evaluation care.  Assessment & Plan     Community acquired pneumonia  - Chest x-ray showing  right-sided pneumonia  - pneumonia panel positive for strep pneumoniae and human rhinovirus  -Respiratory culture also with light growth of Streptococcus pneumoniae  - Continue antibiotics with IV Rocephin -, completing course of IV azithromycin today  - Leukocytosis and procalcitonin downtrending    COPD with acute exacerbation  - Wean supplemental oxygen as tolerated, remains on 5 L, room air baseline  - Continue oral prednisone  - Symbicort  - Scheduled DuoNebs    Esophageal dysmotility  - Protonix 40 mg twice daily  - Gastroenterology following, planning for EGD however awaiting improvement in respiratory status    Mood disorder  - Continue risperidone    History of substance abuse  - Continue methadone    Diet ADULT DIET; Dysphagia - Soft and Bite Sized   DVT Prophylaxis [x] Lovenox, []  Heparin, [] SCDs, [] Ambulation,  [] Eliquis, [] Xarelto  [] Coumadin   Code Status Full Code   Disposition From: Home  Expected Disposition: Home  Estimated Date of Discharge: -           Subjective:     Chief Complaint: Shortness of breath, cough    Patient was seen and examined today standing up in room.  She reports feeling a little bit better today, still having some less colored sputum  Denies any chest pain or palpitations  Shortness of breath is also

## 2025-05-09 NOTE — PROGRESS NOTES
0900- Morning assessments and vital signs complete. Morning labs reviewed. Call received from Endoscopy informing that patient will not have EGD done today. GI team at bedside, update of care discussed with patient. Verbal order noted per  for a soft and moist diet. Diet order entered, patient assisted to order breakfast. Morning medications given as prescribed, including PRN cough syrup.     1000- OT/PT at bedside. Patient assisted to ambulate from bed to bathroom to chair using SBA.

## 2025-05-09 NOTE — PROGRESS NOTES
Gastroenterology Progress Note    Nati Dover is a 64 y.o. female patient.  Hospitalization Day:2    SUBJECTIVE:  Still on 5L O2.  No O2 worn at home. Tolerating diet.  Reflux symptoms controlled.     ROS:  Gastrointestinal ROS: positive for - shortness of breath, reflux, dysphagia    Physical    VITALS:  /82   Pulse 82   Temp 98.4 °F (36.9 °C) (Oral)   Resp 17   Wt 81.2 kg (179 lb 0.2 oz)   LMP 2017   SpO2 95%   BMI 25.69 kg/m²   TEMPERATURE:  Current - Temp: 98.4 °F (36.9 °C); Max - Temp  Av.2 °F (36.8 °C)  Min: 97.9 °F (36.6 °C)  Max: 98.4 °F (36.9 °C)    General:  Alert and oriented,  No apparent distress  Skin- without jaundice  Eyes: anicteric sclera  Cardiac: RRR, Nl s1s2, without murmurs  Lungs right sided rhonchi noted, normal effort  Abdomen soft, ND, NT, no HSM, Bowel sounds normal  Ext: without edema  Neuro: no asterixis     Data    Data Review:    Recent Labs     25  0003 25  0432 25  0429   WBC 35.5* 27.4* 15.5*   HGB 17.2* 14.1 12.5   HCT 51.3* 43.7 36.8   MCV 89.4 90.0 88.6    see below 118*     Recent Labs     25  0147 25  0432    136   K 4.3 4.4   CL 97* 105   CO2 27 22   BUN 14 15   CREATININE 1.2 0.9     Recent Labs     25  0147 25  0432   AST 33 23   ALT 20 15   BILIDIR  --  <0.1   BILITOT 0.9 0.4   ALKPHOS 75 71     No results for input(s): \"LIPASE\", \"AMYLASE\" in the last 72 hours.  No results for input(s): \"PROTIME\", \"INR\" in the last 72 hours.    Radiology Review:    XR CHEST PORTABLE   Final Result   Mild central pulmonary congestion pulmonary artery hypertension      Moderate right basilar consolidation which probably represents pneumonia and   a questionable small infiltrate along the right upper lobe      Small right pleural effusion.               ASSESSMENT:  1)Esophageal dysphagia- Hx of GERD. ? Reflux induced stricture vs Schatzki's ring. Need to rule out hiatal hernia and Cornelius's and occult

## 2025-05-09 NOTE — PROGRESS NOTES
Physician Progress Note      PATIENT:               ZHANNA PAUL  CSN #:                  [id number]  :                       1960  ADMIT DATE:       2025 11:39 PM  DISCH DATE:  RESPONDING  PROVIDER #:        Gamal Rider DO          QUERY TEXT:    Sepsis was documented in the medical record per  H&P.  Presenting WBC:   35.5, HR: 98-99, RR: 19-26., Pro-German 12.50  The diagnosis was not noted in   subsequent documentation.  Please clarify the status of this condition.    The clinical indicators include:  Pt admitted with sepsis per H&P and has pneumonia.  Presenting WBC: 35.5, HR:   98-99, RR: 19-26, ProCal: 12.50  No further documentation of sepsis in attending  PN.  Documentation of   procal trending down.  Pt with PMH: Anxiety and depression, asthma, Bipolar disorder, CKD stage 3,   COPD, Hepatitis C, HTN, Narcotic abuse, UTI  Per H&P: Presented with SOB x several week.  Pt states she was treated for   bronchitis 2 weeks prior to admission with abx and prednisone but noted no   improvement prompting ER visit.  Options provided:  -- Sepsis confirmed after study  -- Sepsis ruled out after study  -- Other - I will add my own diagnosis  -- Disagree - Not applicable / Not valid  -- Disagree - Clinically unable to determine / Unknown  -- Refer to Clinical Documentation Reviewer    PROVIDER RESPONSE TEXT:    Sepsis confirmed after study.    Query created by: Isrrael Gong on 2025 9:40 PM      Electronically signed by:  Gamal Rider DO 2025 7:35 AM

## 2025-05-09 NOTE — PROGRESS NOTES
[facility]  SLP DYSPHAGIA THERAPY    Patient: Nati Dover   : 1960   MRN: [id number]    Treatment Date: 2025   Admitting Diagnosis:   Dyspnea and respiratory abnormalities [R06.00, R06.89]  Sepsis (HCC) [A41.9]  Pneumonia of right lower lobe due to infectious organism [J18.9]  Sepsis without acute organ dysfunction, due to unspecified organism (HCC) [A41.9]   has a past medical history of Anxiety and depression, Arthritis, Asthma, Bipolar disorder (HCC), CKD (chronic kidney disease), COPD (chronic obstructive pulmonary disease) (HCC), Empyema (HCC), Hepatitis C, History of heroin use, Hypertension, Narcotic abuse (HCC), and UTI (lower urinary tract infection).   has a past surgical history that includes other surgical history (12/15/2016); Foot surgery (Left, 2016); Colonoscopy (10/23/2018); Colonoscopy (N/A, 10/23/2018); Lung surgery (Right); Intracapsular cataract extraction (Right, 2020); Intracapsular cataract extraction (Left, 2020); and Thyroidectomy (Left, 2022).  Allergies: allergy list reviewed  GI history: referred to GI 2024 by PCP d/t food sticking when swallowing; patient did not pursue  ENT history: followed for thyroid nodules; 2023 note refers to concerns for reflux  Baseline/Prior Level of Function: Living Status: admitted from home; Baseline diet: regular/thin  Onset: 2025     Treatment Diagnosis:  Dysphagia    CURRENT ENCOUNTER DIAGNOSTICS/COURSE OF ADMISSION     H&P:   admitted with c/o SOB.   MD ADMISSION H&P HPI: \"64f with history of copd, not on home O2, presents to the ER with worsening sob, cough. She states she was treated for bronchitis 2 weeks ago with abx and prednisone but noted no improvement. Sob, davila progressed along with generalized weakness prompting ER consultation. She does note ongoing chronic loose stools, no melena or hematochezia, denies abdominal pain but has had anorexia ongoing for months. She also notes increased

## 2025-05-09 NOTE — PLAN OF CARE
Problem: Safety - Adult  Goal: Free from fall injury  5/9/2025 1025 by Les Camacho, RN  Outcome: Progressing  5/9/2025 0128 by Cesar Witt, RN  Outcome: Progressing

## 2025-05-09 NOTE — PROGRESS NOTES
[facility] - Occupational Therapy   Phone: [phone]    Occupational Therapy  Facility/Department:[address] MED SURG    [] Initial Evaluation            [x] Daily Treatment Note         [] Discharge Summary      Patient: Nati Dover   : 1960   MRN: [id number]   Date of Service:  2025    Staff Mobility Recommendation: SBA    AM-PAC Score:   Discharge Recommendations: home    Admitting Diagnosis:  Sepsis (HCC)  Ordering Physician: Gamal Rider DO   Current Admission Summary: per ED note, Nati Dover is a 64 y.o. female who presents with shortness of breath has been present for couple weeks.  She states is gotten worse over the past 2 weeks.  She was seen in the emergency department a couple weeks ago and was diagnosed with bronchitis and discharged on antibiotics and steroids.  She quit smoking several years ago.  She does have a history of COPD and denies a history of CHF.  She been coughing up brown sputum.  She is also had mild diarrhea.  She denies any fevers.  No one at home has been sick.  She denies any other complaints at this time.     Past Medical History:  has a past medical history of Anxiety and depression, Arthritis, Asthma, Bipolar disorder (HCC), CKD (chronic kidney disease), COPD (chronic obstructive pulmonary disease) (HCC), Empyema (HCC), Hepatitis C, History of heroin use, Hypertension, Narcotic abuse (HCC), and UTI (lower urinary tract infection).  Past Surgical History:  has a past surgical history that includes other surgical history (12/15/2016); Foot surgery (Left, 2016); Colonoscopy (10/23/2018); Colonoscopy (N/A, 10/23/2018); Lung surgery (Right); Intracapsular cataract extraction (Right, 2020); Intracapsular cataract extraction (Left, 2020); and Thyroidectomy (Left, 2022).    Assessment  Activity Tolerance: Good  Impairments Requiring Therapeutic Intervention: decreased functional mobility, decreased ADL status, decreased

## 2025-05-10 LAB
BACTERIA SPEC RESP CULT: ABNORMAL
BACTERIA SPEC RESP CULT: ABNORMAL
GRAM STN SPEC: ABNORMAL
ORGANISM: ABNORMAL

## 2025-05-10 PROCEDURE — 6370000000 HC RX 637 (ALT 250 FOR IP): Performed by: STUDENT IN AN ORGANIZED HEALTH CARE EDUCATION/TRAINING PROGRAM

## 2025-05-10 PROCEDURE — 6370000000 HC RX 637 (ALT 250 FOR IP): Performed by: HOSPITALIST

## 2025-05-10 PROCEDURE — 6360000002 HC RX W HCPCS: Performed by: HOSPITALIST

## 2025-05-10 PROCEDURE — 2700000000 HC OXYGEN THERAPY PER DAY

## 2025-05-10 PROCEDURE — 94640 AIRWAY INHALATION TREATMENT: CPT

## 2025-05-10 PROCEDURE — 94761 N-INVAS EAR/PLS OXIMETRY MLT: CPT

## 2025-05-10 PROCEDURE — 2500000003 HC RX 250 WO HCPCS: Performed by: HOSPITALIST

## 2025-05-10 PROCEDURE — 1200000000 HC SEMI PRIVATE

## 2025-05-10 PROCEDURE — 6370000000 HC RX 637 (ALT 250 FOR IP): Performed by: NURSE PRACTITIONER

## 2025-05-10 RX ORDER — LOPERAMIDE HYDROCHLORIDE 2 MG/1
2 CAPSULE ORAL 4 TIMES DAILY PRN
Status: DISCONTINUED | OUTPATIENT
Start: 2025-05-10 | End: 2025-05-11 | Stop reason: HOSPADM

## 2025-05-10 RX ADMIN — IPRATROPIUM BROMIDE AND ALBUTEROL SULFATE 1 DOSE: 2.5; .5 SOLUTION RESPIRATORY (INHALATION) at 07:24

## 2025-05-10 RX ADMIN — SODIUM CHLORIDE, PRESERVATIVE FREE 10 ML: 5 INJECTION INTRAVENOUS at 08:25

## 2025-05-10 RX ADMIN — BUDESONIDE AND FORMOTEROL FUMARATE DIHYDRATE 2 PUFF: 160; 4.5 AEROSOL RESPIRATORY (INHALATION) at 07:24

## 2025-05-10 RX ADMIN — IPRATROPIUM BROMIDE AND ALBUTEROL SULFATE 1 DOSE: 2.5; .5 SOLUTION RESPIRATORY (INHALATION) at 04:14

## 2025-05-10 RX ADMIN — METHADONE HYDROCHLORIDE 95 MG: 10 TABLET ORAL at 08:25

## 2025-05-10 RX ADMIN — FLUTICASONE PROPIONATE 2 SPRAY: 50 SPRAY, METERED NASAL at 08:25

## 2025-05-10 RX ADMIN — IPRATROPIUM BROMIDE AND ALBUTEROL SULFATE 1 DOSE: 2.5; .5 SOLUTION RESPIRATORY (INHALATION) at 23:23

## 2025-05-10 RX ADMIN — SODIUM CHLORIDE, PRESERVATIVE FREE 10 ML: 5 INJECTION INTRAVENOUS at 20:18

## 2025-05-10 RX ADMIN — IPRATROPIUM BROMIDE AND ALBUTEROL SULFATE 1 DOSE: 2.5; .5 SOLUTION RESPIRATORY (INHALATION) at 16:03

## 2025-05-10 RX ADMIN — PANTOPRAZOLE SODIUM 40 MG: 40 TABLET, DELAYED RELEASE ORAL at 06:37

## 2025-05-10 RX ADMIN — AZITHROMYCIN 500 MG: 500 TABLET, FILM COATED ORAL at 08:24

## 2025-05-10 RX ADMIN — WATER 1000 MG: 1 INJECTION INTRAMUSCULAR; INTRAVENOUS; SUBCUTANEOUS at 06:38

## 2025-05-10 RX ADMIN — PREDNISONE 40 MG: 20 TABLET ORAL at 08:24

## 2025-05-10 RX ADMIN — IPRATROPIUM BROMIDE AND ALBUTEROL SULFATE 1 DOSE: 2.5; .5 SOLUTION RESPIRATORY (INHALATION) at 19:26

## 2025-05-10 RX ADMIN — RISPERIDONE 1 MG: 1 TABLET, FILM COATED ORAL at 08:24

## 2025-05-10 RX ADMIN — GUAIFENESIN SYRUP AND DEXTROMETHORPHAN 10 ML: 100; 10 SYRUP ORAL at 20:18

## 2025-05-10 RX ADMIN — BUDESONIDE AND FORMOTEROL FUMARATE DIHYDRATE 2 PUFF: 160; 4.5 AEROSOL RESPIRATORY (INHALATION) at 19:26

## 2025-05-10 RX ADMIN — PANTOPRAZOLE SODIUM 40 MG: 40 TABLET, DELAYED RELEASE ORAL at 15:48

## 2025-05-10 RX ADMIN — IPRATROPIUM BROMIDE AND ALBUTEROL SULFATE 1 DOSE: 2.5; .5 SOLUTION RESPIRATORY (INHALATION) at 11:53

## 2025-05-10 RX ADMIN — LOPERAMIDE HYDROCHLORIDE 2 MG: 2 CAPSULE ORAL at 10:17

## 2025-05-10 ASSESSMENT — PAIN SCALES - GENERAL: PAINLEVEL_OUTOF10: 0

## 2025-05-10 NOTE — PLAN OF CARE
Problem: Discharge Planning  Goal: Discharge to home or other facility with appropriate resources  Outcome: Progressing     Problem: Pain  Goal: Verbalizes/displays adequate comfort level or baseline comfort level  Outcome: Progressing     Problem: Safety - Adult  Goal: Free from fall injury  Outcome: Progressing     Problem: Neurosensory - Adult  Goal: Achieves stable or improved neurological status  Outcome: Progressing  Goal: Achieves maximal functionality and self care  Outcome: Progressing     Problem: Respiratory - Adult  Goal: Achieves optimal ventilation and oxygenation  Outcome: Progressing     Problem: Cardiovascular - Adult  Goal: Maintains optimal cardiac output and hemodynamic stability  Outcome: Progressing  Goal: Absence of cardiac dysrhythmias or at baseline  Outcome: Progressing     Problem: Skin/Tissue Integrity - Adult  Goal: Skin integrity remains intact  Outcome: Progressing  Goal: Incisions, wounds, or drain sites healing without S/S of infection  Outcome: Progressing  Goal: Oral mucous membranes remain intact  Outcome: Progressing     Problem: Musculoskeletal - Adult  Goal: Return mobility to safest level of function  Outcome: Progressing  Goal: Maintain proper alignment of affected body part  Outcome: Progressing  Goal: Return ADL status to a safe level of function  Outcome: Progressing     Problem: Gastrointestinal - Adult  Goal: Minimal or absence of nausea and vomiting  Outcome: Progressing  Goal: Maintains or returns to baseline bowel function  Outcome: Progressing  Goal: Maintains adequate nutritional intake  Outcome: Progressing     Problem: Genitourinary - Adult  Goal: Absence of urinary retention  Outcome: Progressing     Problem: Infection - Adult  Goal: Absence of infection at discharge  Outcome: Progressing  Goal: Absence of infection during hospitalization  Outcome: Progressing  Goal: Absence of fever/infection during anticipated neutropenic period  Outcome: Progressing

## 2025-05-10 NOTE — PLAN OF CARE
Problem: Discharge Planning  Goal: Discharge to home or other facility with appropriate resources  5/10/2025 0956 by Barbara Carroll RN  Outcome: Progressing  5/10/2025 0206 by Anil Solares RN  Outcome: Progressing     Problem: Pain  Goal: Verbalizes/displays adequate comfort level or baseline comfort level  5/10/2025 0956 by Barbara Carroll RN  Outcome: Progressing  5/10/2025 0206 by Anil Solares RN  Outcome: Progressing     Problem: Safety - Adult  Goal: Free from fall injury  5/10/2025 0956 by Barbara Carroll RN  Outcome: Progressing  5/10/2025 0206 by Anil Solares RN  Outcome: Progressing     Problem: Neurosensory - Adult  Goal: Achieves stable or improved neurological status  5/10/2025 0956 by Barbara Carroll RN  Outcome: Progressing  5/10/2025 0206 by Anil Solares RN  Outcome: Progressing  Goal: Achieves maximal functionality and self care  5/10/2025 0956 by Barbara Carroll RN  Outcome: Progressing  5/10/2025 0206 by Anil Solares RN  Outcome: Progressing     Problem: Respiratory - Adult  Goal: Achieves optimal ventilation and oxygenation  5/10/2025 0956 by Barbara Carroll RN  Outcome: Progressing  5/10/2025 0206 by Anil Solares RN  Outcome: Progressing     Problem: Cardiovascular - Adult  Goal: Maintains optimal cardiac output and hemodynamic stability  5/10/2025 0956 by Barbara Carroll RN  Outcome: Progressing  5/10/2025 0206 by Anil Solares RN  Outcome: Progressing  Goal: Absence of cardiac dysrhythmias or at baseline  5/10/2025 0956 by Barbara Carroll RN  Outcome: Progressing  5/10/2025 0206 by Anil Solares RN  Outcome: Progressing     Problem: Skin/Tissue Integrity - Adult  Goal: Skin integrity remains intact  5/10/2025 0956 by Barbara Carroll RN  Outcome: Progressing  5/10/2025 0206 by Anil Solares RN  Outcome: Progressing  Goal: Incisions, wounds, or drain sites healing

## 2025-05-10 NOTE — PROGRESS NOTES
V2.0    [facility] [place] Progress Note      Name:  Nati Dover /Age/Sex: 1960  (64 y.o. female)   MRN & CSN:  [id number] & [id number] Encounter Date/Time: 5/10/2025 12:03 PM EDT   Location:  [id number] PCP: Hunter Barr MD     Attending:Gamal Rider DO       Hospital Day: 5  Brief HPI  Nati Dover is a 64 y.o. female with pertinent PMHx of COPD, CKD 3, HTN, substance abuse who presented complaining of progressively worsening shortness of breath and cough.  Imaging was concerning for pneumonia and she was made a for further evaluation care.  Assessment & Plan     Community acquired pneumonia  - Chest x-ray showing  right-sided pneumonia  - pneumonia panel positive for strep pneumoniae and human rhinovirus  - Respiratory culture also with light growth of Streptococcus pneumoniae  - Continue antibiotics with IV Rocephin -, s/p 3-day course of IV azithromycin    COPD with acute exacerbation  - Coming down on supplemental oxygen requirements, currently on 2 L via nasal cannula walking around room  - Continue oral prednisone burst  - Symbicort  - Scheduled DuoNebs    Esophageal dysmotility  - Protonix 40 mg twice daily  - Gastroenterology consulted, recommend outpatient follow-up for EGD  - Soft and bite-size diet    Mood disorder  - Continue risperidone    History of substance abuse  - Continue methadone    Diet ADULT DIET; Dysphagia - Soft and Bite Sized   DVT Prophylaxis [x] Lovenox, []  Heparin, [] SCDs, [] Ambulation,  [] Eliquis, [] Xarelto  [] Coumadin   Code Status Full Code   Disposition From: Home  Expected Disposition: Home  Estimated Date of Discharge:            Subjective:     Chief Complaint: Shortness of breath, cough    Patient seen and examined walking around room today  Says she is feeling better, cough is improving and becoming less productive  Shortness of breath also improving  Has not noticed any fevers  Tolerating her diet    Review of Systems:

## 2025-05-11 VITALS
WEIGHT: 179.01 LBS | RESPIRATION RATE: 18 BRPM | BODY MASS INDEX: 25.69 KG/M2 | TEMPERATURE: 98.6 F | OXYGEN SATURATION: 92 % | HEART RATE: 77 BPM | SYSTOLIC BLOOD PRESSURE: 133 MMHG | DIASTOLIC BLOOD PRESSURE: 88 MMHG

## 2025-05-11 LAB
BACTERIA BLD CULT ORG #2: NORMAL
BACTERIA BLD CULT: NORMAL
BASOPHILS # BLD: 0 K/UL (ref 0–0.2)
BASOPHILS NFR BLD: 0.5 %
DEPRECATED RDW RBC AUTO: 14 % (ref 12.4–15.4)
EOSINOPHIL # BLD: 0 K/UL (ref 0–0.6)
EOSINOPHIL NFR BLD: 0.4 %
HCT VFR BLD AUTO: 40.5 % (ref 36–48)
HGB BLD-MCNC: 13.5 G/DL (ref 12–16)
LYMPHOCYTES # BLD: 1 K/UL (ref 1–5.1)
LYMPHOCYTES NFR BLD: 15.5 %
MCH RBC QN AUTO: 29.7 PG (ref 26–34)
MCHC RBC AUTO-ENTMCNC: 33.3 G/DL (ref 31–36)
MCV RBC AUTO: 89.1 FL (ref 80–100)
MONOCYTES # BLD: 0.6 K/UL (ref 0–1.3)
MONOCYTES NFR BLD: 9.2 %
NEUTROPHILS # BLD: 4.9 K/UL (ref 1.7–7.7)
NEUTROPHILS NFR BLD: 74.4 %
PLATELET # BLD AUTO: 144 K/UL (ref 135–450)
PMV BLD AUTO: 7.6 FL (ref 5–10.5)
PROCALCITONIN SERPL IA-MCNC: 2.51 NG/ML (ref 0–0.15)
RBC # BLD AUTO: 4.55 M/UL (ref 4–5.2)
WBC # BLD AUTO: 6.6 K/UL (ref 4–11)

## 2025-05-11 PROCEDURE — 84145 PROCALCITONIN (PCT): CPT

## 2025-05-11 PROCEDURE — 36415 COLL VENOUS BLD VENIPUNCTURE: CPT

## 2025-05-11 PROCEDURE — 6370000000 HC RX 637 (ALT 250 FOR IP): Performed by: STUDENT IN AN ORGANIZED HEALTH CARE EDUCATION/TRAINING PROGRAM

## 2025-05-11 PROCEDURE — 94640 AIRWAY INHALATION TREATMENT: CPT

## 2025-05-11 PROCEDURE — 6370000000 HC RX 637 (ALT 250 FOR IP): Performed by: NURSE PRACTITIONER

## 2025-05-11 PROCEDURE — 6360000002 HC RX W HCPCS: Performed by: HOSPITALIST

## 2025-05-11 PROCEDURE — 94760 N-INVAS EAR/PLS OXIMETRY 1: CPT

## 2025-05-11 PROCEDURE — 6370000000 HC RX 637 (ALT 250 FOR IP): Performed by: HOSPITALIST

## 2025-05-11 PROCEDURE — 85025 COMPLETE CBC W/AUTO DIFF WBC: CPT

## 2025-05-11 PROCEDURE — 2500000003 HC RX 250 WO HCPCS: Performed by: HOSPITALIST

## 2025-05-11 RX ORDER — CEFDINIR 300 MG/1
300 CAPSULE ORAL EVERY 12 HOURS SCHEDULED
Qty: 3 CAPSULE | Refills: 0 | Status: SHIPPED | OUTPATIENT
Start: 2025-05-11 | End: 2025-05-13 | Stop reason: ALTCHOICE

## 2025-05-11 RX ORDER — ONDANSETRON 4 MG/1
4 TABLET, ORALLY DISINTEGRATING ORAL EVERY 8 HOURS PRN
Qty: 12 TABLET | Refills: 0 | Status: SHIPPED | OUTPATIENT
Start: 2025-05-11

## 2025-05-11 RX ORDER — CEFDINIR 300 MG/1
300 CAPSULE ORAL EVERY 12 HOURS SCHEDULED
Status: DISCONTINUED | OUTPATIENT
Start: 2025-05-11 | End: 2025-05-11 | Stop reason: HOSPADM

## 2025-05-11 RX ORDER — LOPERAMIDE HYDROCHLORIDE 2 MG/1
2 CAPSULE ORAL 4 TIMES DAILY PRN
Qty: 20 CAPSULE | Refills: 0 | Status: SHIPPED | OUTPATIENT
Start: 2025-05-11 | End: 2025-05-16

## 2025-05-11 RX ADMIN — WATER 1000 MG: 1 INJECTION INTRAMUSCULAR; INTRAVENOUS; SUBCUTANEOUS at 06:46

## 2025-05-11 RX ADMIN — CEFDINIR 300 MG: 300 CAPSULE ORAL at 09:11

## 2025-05-11 RX ADMIN — GUAIFENESIN SYRUP AND DEXTROMETHORPHAN 10 ML: 100; 10 SYRUP ORAL at 06:45

## 2025-05-11 RX ADMIN — IPRATROPIUM BROMIDE AND ALBUTEROL SULFATE 1 DOSE: 2.5; .5 SOLUTION RESPIRATORY (INHALATION) at 07:44

## 2025-05-11 RX ADMIN — PANTOPRAZOLE SODIUM 40 MG: 40 TABLET, DELAYED RELEASE ORAL at 06:45

## 2025-05-11 RX ADMIN — RISPERIDONE 1 MG: 1 TABLET, FILM COATED ORAL at 09:11

## 2025-05-11 RX ADMIN — IPRATROPIUM BROMIDE AND ALBUTEROL SULFATE 1 DOSE: 2.5; .5 SOLUTION RESPIRATORY (INHALATION) at 04:31

## 2025-05-11 RX ADMIN — AZITHROMYCIN 500 MG: 500 TABLET, FILM COATED ORAL at 09:11

## 2025-05-11 RX ADMIN — LOPERAMIDE HYDROCHLORIDE 2 MG: 2 CAPSULE ORAL at 07:45

## 2025-05-11 RX ADMIN — METHADONE HYDROCHLORIDE 95 MG: 10 TABLET ORAL at 09:10

## 2025-05-11 RX ADMIN — PREDNISONE 40 MG: 20 TABLET ORAL at 09:11

## 2025-05-11 RX ADMIN — IPRATROPIUM BROMIDE AND ALBUTEROL SULFATE 1 DOSE: 2.5; .5 SOLUTION RESPIRATORY (INHALATION) at 12:33

## 2025-05-11 RX ADMIN — ENOXAPARIN SODIUM 40 MG: 100 INJECTION SUBCUTANEOUS at 09:11

## 2025-05-11 RX ADMIN — ACETAMINOPHEN 650 MG: 325 TABLET ORAL at 06:45

## 2025-05-11 RX ADMIN — BUDESONIDE AND FORMOTEROL FUMARATE DIHYDRATE 2 PUFF: 160; 4.5 AEROSOL RESPIRATORY (INHALATION) at 07:44

## 2025-05-11 ASSESSMENT — PAIN DESCRIPTION - LOCATION: LOCATION: HEAD

## 2025-05-11 ASSESSMENT — PAIN SCALES - WONG BAKER: WONGBAKER_NUMERICALRESPONSE: HURTS A LITTLE BIT

## 2025-05-11 ASSESSMENT — PAIN DESCRIPTION - ORIENTATION: ORIENTATION: MID

## 2025-05-11 ASSESSMENT — PAIN SCALES - GENERAL
PAINLEVEL_OUTOF10: 5
PAINLEVEL_OUTOF10: 2

## 2025-05-11 ASSESSMENT — PAIN DESCRIPTION - DESCRIPTORS: DESCRIPTORS: ACHING

## 2025-05-11 NOTE — DISCHARGE SUMMARY
V2.0  Discharge Summary    Name:  Nati Dover /Age/Sex: 1960 (64 y.o. female)   Admit Date: 2025  Discharge Date: 25    MRN & CSN:  [id number] & [id number] Encounter Date and Time 25 9:23 AM EDT    Attending:  Gamal Rider DO Discharging Provider: Gamal Rider DO       Hospital Course:     Brief HPI: Nati Dover is a 64 y.o. female with a pertinent PMHx of COPD, CKD 3, HTN, substance abuse who presented complaining of progressively worsening shortness of breath and cough.  Imaging was concerning for pneumonia and was admitted for further evaluation and care.    Brief Problem Based Course:     Community-acquired pneumonia  - Chest x-ray showed right-sided pneumonia  - Pneumonia panel was positive for Streptococcus pneumoniae and human rhinovirus  - Respiratory culture was also with light growth of Streptococcus pneumoniae  - Completed 5 days IV Rocephin and azithromycin, transitioned to oral cefdinir for 2 additional days to complete a total of a 7-day course of antibiotics  - Successfully weaned from 5 L nasal cannula down to room air    COPD with acute exacerbation  - Completed 5-day course of prednisone  - Continue home bronchodilators    Esophageal dysmotility  - Gastroenterology was consulted who recommended EGD however this was deferred secondary to her increased oxygen requirements at the time  - Outpatient follow-up with Gastroenterology    The patient expressed appropriate understanding of, and agreement with the discharge recommendations, medications, and plan.     Consults this admission:  IP CONSULT TO GI    Discharge Diagnosis:   Community-acquired pneumonia  Human rhinovirus infection  COPD with acute exacerbation    Discharge Instruction:   Follow up appointments: PCP, Gastroenterology  Primary care physician: Hunter Barr MD within 1 week  Diet: regular diet   Activity: activity as tolerated  Disposition: Discharged to:   [x]Home, []C, []SNF,

## 2025-05-11 NOTE — CARE COORDINATION
CASE MANAGEMENT DISCHARGE SUMMARY:  No Needs    DISCHARGE DATE: 5/11/25    DISCHARGED TO HOME     TRANSPORTATION: Family/Friend             Electronically signed by MARKUS Bartlett on 5/11/2025 at 9:29 AM

## 2025-05-11 NOTE — DISCHARGE INSTR - COC
Continuity of Care Form    Patient Name: Nati Dover   :  1960  MRN:  [id number]    Admit date:  2025  Discharge date:  ***    Code Status Order: Full Code   Advance Directives:    Date/Time Healthcare Directive Type of Healthcare Directive Copy in Chart Healthcare Agent Appointed Healthcare Agent's Name Healthcare Agent's Phone Number    25 0641 No, patient does not have an advance directive for healthcare treatment  --  --  --  --  --     25 1200 No, patient does not have an advance directive for healthcare treatment  --  --  --  --  --             Admitting Physician:  Cristal Chery Jr., MD  PCP: Hunter Barr MD    Discharging Nurse: ***  Discharging Hospital Unit/Room#: G4Q-8338/4250-01  Discharging Unit Phone Number: ***    Emergency Contact:   Extended Emergency Contact Information  Primary Emergency Contact: Kelton Chen  Address: [address] [place] [address]  Home Phone: [phone]  Work Phone: [phone]  Mobile Phone: [phone]  Relation: Spouse   needed? No  Secondary Emergency Contact: Susanne Walter  Address: [address] [place] [address]  Home Phone: [phone]  Mobile Phone: [phone]  Relation: Child   needed? No    Past Surgical History:  Past Surgical History:   Procedure Laterality Date    COLONOSCOPY  10/23/2018    Colonoscopy with polypectomy (cold snare), polypectomy (cold biopsy)    COLONOSCOPY N/A 10/23/2018    COLONOSCOPY POLYPECTOMY SNARE/COLD BIOPSY performed by Victor M Heart MD at [facility] ENDOSCOPY    FOOT SURGERY Left 2016    INTRACAPSULAR CATARACT EXTRACTION Right 2020    PHACOEMULSIFICATION WITH INTRAOCULAR LENS IMPLANT performed by Zhen Nunez MD at [facility]    INTRACAPSULAR CATARACT EXTRACTION Left 2020    PHACOEMULSIFICATION WITH INTRAOCULAR LENS IMPLANT performed by Zhen Nunez MD

## 2025-05-11 NOTE — PLAN OF CARE
Problem: Discharge Planning  Goal: Discharge to home or other facility with appropriate resources  5/11/2025 1044 by Gricel Najera RN  Outcome: Completed  5/11/2025 0222 by Anil Solares RN  Outcome: Progressing     Problem: Pain  Goal: Verbalizes/displays adequate comfort level or baseline comfort level  5/11/2025 1044 by Gricel Najera RN  Outcome: Completed  5/11/2025 0222 by Anil Solares RN  Outcome: Progressing     Problem: Safety - Adult  Goal: Free from fall injury  5/11/2025 1044 by Gricel Najera RN  Outcome: Completed  5/11/2025 0222 by Anil Solares RN  Outcome: Progressing     Problem: Neurosensory - Adult  Goal: Achieves stable or improved neurological status  5/11/2025 1044 by Gricel Najera RN  Outcome: Completed  5/11/2025 0222 by Anil Solares RN  Outcome: Progressing  Goal: Achieves maximal functionality and self care  5/11/2025 1044 by Gricel Najera RN  Outcome: Completed  5/11/2025 0222 by Anil Solares RN  Outcome: Progressing     Problem: Respiratory - Adult  Goal: Achieves optimal ventilation and oxygenation  5/11/2025 1044 by Gricel Najera RN  Outcome: Completed  5/11/2025 0222 by Anil Solares RN  Outcome: Progressing     Problem: Cardiovascular - Adult  Goal: Maintains optimal cardiac output and hemodynamic stability  5/11/2025 1044 by Gricel Najera RN  Outcome: Completed  5/11/2025 0222 by Anil Solares RN  Outcome: Progressing  Goal: Absence of cardiac dysrhythmias or at baseline  5/11/2025 1044 by Gricel Najera RN  Outcome: Completed  5/11/2025 0222 by Anil Solares RN  Outcome: Progressing     Problem: Skin/Tissue Integrity - Adult  Goal: Skin integrity remains intact  5/11/2025 1044 by Gricel Najera RN  Outcome: Completed  5/11/2025 0222 by Anil Solares RN  Outcome: Progressing  Goal: Incisions, wounds, or drain sites healing without S/S of infection  5/11/2025 1044 by Reinaldo

## 2025-05-11 NOTE — PROGRESS NOTES
Patient Discharged. Discharge instruction provided; verbalized understanding. IV removed without complications. Patient ambulate with spouse down to her ride to home.

## 2025-05-19 ENCOUNTER — ANESTHESIA EVENT (OUTPATIENT)
Dept: ENDOSCOPY | Age: 65
End: 2025-05-19
Payer: MEDICAID

## 2025-05-20 ENCOUNTER — HOSPITAL ENCOUNTER (OUTPATIENT)
Age: 65
Setting detail: OUTPATIENT SURGERY
Discharge: HOME | End: 2025-05-20
Attending: INTERNAL MEDICINE | Admitting: INTERNAL MEDICINE
Payer: MEDICAID

## 2025-05-20 ENCOUNTER — APPOINTMENT (OUTPATIENT)
Dept: ENDOSCOPY | Age: 65
End: 2025-05-20
Attending: INTERNAL MEDICINE
Payer: MEDICAID

## 2025-05-20 ENCOUNTER — ANESTHESIA (OUTPATIENT)
Dept: ENDOSCOPY | Age: 65
End: 2025-05-20
Payer: MEDICAID

## 2025-05-20 VITALS
RESPIRATION RATE: 14 BRPM | SYSTOLIC BLOOD PRESSURE: 105 MMHG | TEMPERATURE: 98.6 F | HEIGHT: 70 IN | BODY MASS INDEX: 26.51 KG/M2 | OXYGEN SATURATION: 94 % | WEIGHT: 185.19 LBS | HEART RATE: 66 BPM | DIASTOLIC BLOOD PRESSURE: 63 MMHG

## 2025-05-20 DIAGNOSIS — R13.14 DYSPHAGIA, PHARYNGOESOPHAGEAL PHASE: ICD-10-CM

## 2025-05-20 PROCEDURE — 3609012700 HC EGD DILATION SAVORY: Performed by: INTERNAL MEDICINE

## 2025-05-20 PROCEDURE — 88305 TISSUE EXAM BY PATHOLOGIST: CPT

## 2025-05-20 PROCEDURE — 7100000010 HC PHASE II RECOVERY - FIRST 15 MIN: Performed by: INTERNAL MEDICINE

## 2025-05-20 PROCEDURE — 2580000003 HC RX 258: Performed by: ANESTHESIOLOGY

## 2025-05-20 PROCEDURE — 7100000001 HC PACU RECOVERY - ADDTL 15 MIN: Performed by: INTERNAL MEDICINE

## 2025-05-20 PROCEDURE — 7100000011 HC PHASE II RECOVERY - ADDTL 15 MIN: Performed by: INTERNAL MEDICINE

## 2025-05-20 PROCEDURE — 3700000000 HC ANESTHESIA ATTENDED CARE: Performed by: INTERNAL MEDICINE

## 2025-05-20 PROCEDURE — 3700000001 HC ADD 15 MINUTES (ANESTHESIA): Performed by: INTERNAL MEDICINE

## 2025-05-20 PROCEDURE — 3609012400 HC EGD TRANSORAL BIOPSY SINGLE/MULTIPLE: Performed by: INTERNAL MEDICINE

## 2025-05-20 PROCEDURE — 6360000002 HC RX W HCPCS: Performed by: NURSE ANESTHETIST, CERTIFIED REGISTERED

## 2025-05-20 PROCEDURE — 7100000000 HC PACU RECOVERY - FIRST 15 MIN: Performed by: INTERNAL MEDICINE

## 2025-05-20 PROCEDURE — 2709999900 HC NON-CHARGEABLE SUPPLY: Performed by: INTERNAL MEDICINE

## 2025-05-20 PROCEDURE — C1769 GUIDE WIRE: HCPCS | Performed by: INTERNAL MEDICINE

## 2025-05-20 RX ORDER — ONDANSETRON 2 MG/ML
4 INJECTION INTRAMUSCULAR; INTRAVENOUS
Status: DISCONTINUED | OUTPATIENT
Start: 2025-05-20 | End: 2025-05-20 | Stop reason: HOSPADM

## 2025-05-20 RX ORDER — SODIUM CHLORIDE 0.9 % (FLUSH) 0.9 %
5-40 SYRINGE (ML) INJECTION EVERY 12 HOURS SCHEDULED
Status: DISCONTINUED | OUTPATIENT
Start: 2025-05-20 | End: 2025-05-20 | Stop reason: HOSPADM

## 2025-05-20 RX ORDER — NALOXONE HYDROCHLORIDE 0.4 MG/ML
INJECTION, SOLUTION INTRAMUSCULAR; INTRAVENOUS; SUBCUTANEOUS PRN
Status: DISCONTINUED | OUTPATIENT
Start: 2025-05-20 | End: 2025-05-20 | Stop reason: HOSPADM

## 2025-05-20 RX ORDER — PROPOFOL 10 MG/ML
INJECTION, EMULSION INTRAVENOUS
Status: DISCONTINUED | OUTPATIENT
Start: 2025-05-20 | End: 2025-05-20 | Stop reason: SDUPTHER

## 2025-05-20 RX ORDER — SODIUM CHLORIDE 9 MG/ML
INJECTION, SOLUTION INTRAVENOUS PRN
Status: DISCONTINUED | OUTPATIENT
Start: 2025-05-20 | End: 2025-05-20 | Stop reason: HOSPADM

## 2025-05-20 RX ORDER — LIDOCAINE HYDROCHLORIDE 20 MG/ML
INJECTION, SOLUTION INFILTRATION; PERINEURAL
Status: DISCONTINUED | OUTPATIENT
Start: 2025-05-20 | End: 2025-05-20 | Stop reason: SDUPTHER

## 2025-05-20 RX ORDER — SODIUM CHLORIDE 0.9 % (FLUSH) 0.9 %
5-40 SYRINGE (ML) INJECTION PRN
Status: DISCONTINUED | OUTPATIENT
Start: 2025-05-20 | End: 2025-05-20 | Stop reason: HOSPADM

## 2025-05-20 RX ORDER — DIPHENHYDRAMINE HYDROCHLORIDE 50 MG/ML
12.5 INJECTION, SOLUTION INTRAMUSCULAR; INTRAVENOUS
Status: DISCONTINUED | OUTPATIENT
Start: 2025-05-20 | End: 2025-05-20 | Stop reason: HOSPADM

## 2025-05-20 RX ADMIN — PROPOFOL 150 MCG/KG/MIN: 10 INJECTION, EMULSION INTRAVENOUS at 08:37

## 2025-05-20 RX ADMIN — SODIUM CHLORIDE: 9 INJECTION, SOLUTION INTRAVENOUS at 08:30

## 2025-05-20 RX ADMIN — LIDOCAINE HYDROCHLORIDE 50 MG: 20 INJECTION, SOLUTION INFILTRATION; PERINEURAL at 08:36

## 2025-05-20 RX ADMIN — PROPOFOL 80 MG: 10 INJECTION, EMULSION INTRAVENOUS at 08:36

## 2025-05-20 ASSESSMENT — PAIN SCALES - GENERAL: PAINLEVEL_OUTOF10: 0

## 2025-05-20 ASSESSMENT — PAIN - FUNCTIONAL ASSESSMENT
PAIN_FUNCTIONAL_ASSESSMENT: NONE - DENIES PAIN
PAIN_FUNCTIONAL_ASSESSMENT: 0-10

## 2025-05-20 ASSESSMENT — LIFESTYLE VARIABLES: SMOKING_STATUS: 0

## 2025-05-20 NOTE — PROGRESS NOTES
Patient Oxygen desating to mid to high 80s when patient talking and resting/sleeping, once patient took some deep breaths it went up to 99%. Patient was just recently admitted to the hospital for PNM and was on oxygen while admitted. Patient has no been on O2 since being discharged. MD Sánchez aware.     All jewelry removed, jewelry waiver signed for nose ring and placed in chart.   
Pt awake on arrival to phase 2.  VSS.  Snacks given.   to bedside.  
Pt awake, denies pain. Abd soft.  To phase 2 care.   
Pt tolerating juice and guillermina crackers.  Denies pain.  Iv d/c'd.  Pt given discharge instructions and she verbalized understanding.   to take patient home.  
Sedation provided by anesthesia. see anesthesia record for vitals and medication administration  
To pacu from endo.  Pt awake, denies pain. Abd soft. IV infusing.  Monitor in sinus rhythm.   
your surgery.    C-Difficile admission screening and protocol:       * Admitted with diarrhea?                         [] YES    [x]  NO     *Prior history of C-Diff. In last 3 months? [] YES    [x]  NO     *Antibiotic use in the past 6-8 weeks?      []  NO    [x]  YES                 If yes, which ANTIBIOTIC AND REASON______     *Prior hospitalization or nursing home in the last month? []  YES    [x]  NO        SAFETY FIRST...call before you fall!!!

## 2025-05-20 NOTE — ANESTHESIA PRE PROCEDURE
Department of Anesthesiology  Preprocedure Note       Name:  Nati Dover   Age:  64 y.o.  :  1960                                          MRN:  [id number]         Date:  2025      Surgeon: Surgeon(s):  Victor M Heart MD    Procedure: Procedure(s):  ESOPHAGOGASTRODUODENOSCOPY    Medications prior to admission:   Prior to Admission medications    Medication Sig Start Date End Date Taking? Authorizing Provider   ondansetron (ZOFRAN-ODT) 4 MG disintegrating tablet Take 1 tablet by mouth every 8 hours as needed for Nausea or Vomiting 25  Yes Gamal Rider DO   SYMBICORT 160-4.5 MCG/ACT AERO Inhale 2 puffs into the lungs 2 times daily   Yes Humphrey Ramirez MD   vitamin D (CHOLECALCIFEROL) 50 MCG ( UT) CAPS capsule Take 1 tablet by mouth daily   Yes Humphrey Ramirez MD   ipratropium 0.5 mg-albuterol 2.5 mg (DUONEB) 0.5-2.5 (3) MG/3ML SOLN nebulizer solution Take 3 mLs by nebulization every 6 hours as needed for Shortness of Breath 3/26/25  Yes Hasmukh Chacon MD   predniSONE (DELTASONE) 10 MG tablet Take 1 tablet by mouth daily 24  Yes Hasmukh Chacon MD   pantoprazole (PROTONIX) 40 MG tablet Take 1 tablet by mouth in the morning and at bedtime 24  Yes Hasmukh Chacon MD   fluticasone (FLONASE) 50 MCG/ACT nasal spray 2 sprays by Each Nostril route daily 24  Yes Hasmukh Chacon MD   risperiDONE (RISPERDAL) 1 MG tablet Take 1 tablet by mouth daily   Yes Humphrey Ramirez MD   amLODIPine (NORVASC) 5 MG tablet Take 1 tablet by mouth daily   Yes Humphrey Ramirez MD   methadone (DOLOPHINE) 10 MG/ML solution Take 9.5 mLs by mouth daily.   Yes Humphrey Ramirez MD   ipratropium (ATROVENT) 0.06 % nasal spray 2 sprays by Each Nostril route 2 times daily 24   Hasmukh Chacon MD   albuterol sulfate HFA (PROAIR HFA) 108 (90 Base) MCG/ACT inhaler Inhale 2 puffs into the lungs every 6 hours as needed for Wheezing 24

## 2025-05-20 NOTE — ANESTHESIA POSTPROCEDURE EVALUATION
Department of Anesthesiology  Postprocedure Note    Patient: Nati Dover  MRN: [id number]  YOB: 1960  Date of evaluation: 5/20/2025    Procedure Summary       Date: 05/20/25 Room / Location: [place] [address] / [facility]    Anesthesia Start: 0830 Anesthesia Stop: 0846    Procedures:       ESOPHAGOGASTRODUODENOSCOPY BIOPSY      ESOPHAGOGASTRODUODENOSCOPY DILATION SAVORY Diagnosis:       Dysphagia, pharyngoesophageal phase      (Dysphagia, pharyngoesophageal phase [R13.14])    Surgeons: Victor M Heart MD Responsible Provider: Gregg Sánchez MD    Anesthesia Type: MAC ASA Status: 3            Anesthesia Type: No value filed.    Paulo Phase I: Paulo Score: 10    Paulo Phase II: Paulo Score: 10    Anesthesia Post Evaluation    Patient location during evaluation: bedside  Patient participation: complete - patient participated  Level of consciousness: awake and alert  Pain score: 0  Nausea & Vomiting: no nausea  Cardiovascular status: hemodynamically stable  Respiratory status: acceptable  Hydration status: stable  Pain management: adequate    No notable events documented.

## 2025-05-20 NOTE — DISCHARGE INSTRUCTIONS
Endoscopy Discharge Instructions    Recommendations:   - Follow-up pathology results in 7-10 days by checking the patient portal via [employer] [facility]  - Resume regular medications.  - Resume diet as tolerated.  - Repeat EGD as needed for dysphagia    Call the physician that did your procedure for any questions or concerns.    You may be drowsy or lightheaded after receiving sedation.  DO NOT operate  a vehicle (automobile, bicycle, motorcycle, machinery, or power tools), no  alcoholic beverages, and do not make any important decisions today.                 Plan on bed rest or quiet relaxation today.  Resume normal activities in the morning.     Resume normal activity tomorrow unless otherwise advised by your physician.            Eat a light first meal, avoiding spicy and fatty foods, then resume normal diet unless  you are told otherwise by your physician.    If the intravenous medication site is painful, apply warm compresses on the site until the soreness is relieved and elevate the arm above the heart. Call your physician if no improvement  in 2-3 days.       POSSIBLE SYMPTOMS TO WATCH:     1. fever (greater than 100) 5. increased abdominal bloating   2. severe pain   6. excessive bleeding   3. nausea and vomiting  7. chest pain   4. chills    8. shortness of breath       Notify your physician if these problems occur     Expected as normal and remedies:  Sore throat: use over the counter throat lozenges or gargle with warm salt water.  Redness or soreness at the IV site: apply warm compress  Gaseous discomfort: belching or passing flatus (gas).                    Call for biopsy results in :____7 days____________________                                Call for follow-up appointment on:[phone]______________                  Educational information given on:_______________________                       Please review these discharge instructions this evening or tomorrow for               information you

## 2025-05-20 NOTE — OP NOTE
Endoscopy Note    Patient: Nati Dover  : 1960  Acct#:     Procedure: Esophagogastroduodenoscopy with biopsy, esophageal dilation                         Date:  2025     Surgeon:   Victor M Heart MD    Referring Physician:  Hunter Barr MD    Indications: This is a 64 y.o. female  who presents for EGD due to dysphagia.     Postoperative Diagnosis:    - Normal esophagus and exam for dysphagia. Biopsies obtained from the proximal and distal esophagus to evaluate for EoE.  The esophagus was empirically dilated 54 Danish savory dilator without resistance.  - Diffuse gastric mucosal erythema. Biopsies were obtained from the gastric antrum, incisura and body to evaluate for H. Pylori.     Anesthesia:  MAC    Consent:  The patient or their legal guardian has signed a consent, and is aware of the potential risks, benefits, alternatives, and potential complications of this procedure.  These include, but are not limited to hemorrhage, bleeding, post procedural pain, perforation, phlebitis, aspiration, hypotension, hypoxia, cardiovascular events such as arryhthmia, and possibly death.  Additionally, the possibility of missed colonic polyps and interval colon cancer was discussed in the consent.    Description of Procedure: The patient was then taken to the endoscopy suite, placed in the left lateral decubitus position and the above IV sedation was administrered.    The Olympus video endoscope was placed through the patient's oropharynx without difficulty to the extent of the 2nd portion of the duodenum.  Both forward and retroflexed views of the stomach were obtained.      Findings:    Esophagus: The esophagus appeared normal without evidence of Cornelius's esophagus or reflux esophagitis.  Z-line was slightly irregular, located 38 cm from the incisors. Biopsies obtained from the proximal and distal esophagus to evaluate for EoE.  The esophagus was empirically dilated 54 Danish savory

## 2025-05-20 NOTE — H&P
Comment: AFTER DEEP BREATHS  BMI 26.57 kg/m²    Airway: No stridor or wheezing noted.  Good air movement  Pulmonary: without wheezes.  Clear to auscultation  Cardiac:regular rate and rhythm without loud murmurs  Abdomen:soft, nontender,  Bowel sounds present    Pre-Procedure Assessment / Plan:  1) EGD     ASA Grade:  ASA 3 - Patient with moderate systemic disease with functional limitations  Mallampati Classification:  Class II    Level of Sedation Plan: MAC    Post Procedure plan: Return to same level of care    I assessed the patient and find that the patient is in satisfactory condition to proceed with the planned procedure and sedation plan.    I have explained the risk, benefits, and alternatives to the procedure; the patient understands and agrees to proceed.       Victor M Heart MD  5/20/2025

## 2025-05-25 NOTE — OP NOTE
Sadie Dover    OPERATIVE NOTE    Preoperative Diagnosis: Cataract left eye    Postoperative Diagnosis: Cataract left eye    Procedure: Phacoemulsification with intraocular lens implantation, left eye  Surgeon: Donna Arnold MD    Anesthesia: MAC, topical.    Complications: none    Estimated blood loss: less than 1 ml. Specimens: none    Indications for procedure: The patient is a 64y.o. year old with decreased vision, glare and halos around lights, and trouble with activities of daily living. Examination revealed a visually significant cataract in the left eye. Risks, benefits, and alternatives to surgery were discussed with the patient and the patient elected to proceed with phacoemulsification with lens implantation. Details of the procedure: Following informed consent, the patient was taken to the operating room and placed in the supine position. Monitored anesthesia care was administered. The eye was prepped and draped in the usual sterile fashion using aseptic technique for cataract surgery. A side port incision was made. 1% preservative free lidocaine was injected through the side port incision for topical anesthesia. The eye was filled with viscoelastic and a 2.4 mm keratome blade was used to make a 3-plane clear corneal incision in the temporal cornea. The cystitome was used to make a tear in the anterior capsule and a Utrata forceps was used to make a complete curvilinear capsulorrhexis. The lens was hydrodissected and freely rotated. Phacoemulsification was performed. Irrigation/aspiration was used to remove all cortical material from the capsular bag. The eye was filled with viscoelastic and a foldable posterior chamber intraocular lens was injected into the capsular bag. The lens was rotated to the appropriate axis as needed. Irrigation/aspiration was used to remove all excess viscoelastic.   The eye was pressurized with BSS and the wounds were check for leaks and none were No (0)

## 2025-06-06 ENCOUNTER — TELEPHONE (OUTPATIENT)
Dept: PULMONOLOGY | Age: 65
End: 2025-06-06

## 2025-06-06 RX ORDER — PREDNISONE 10 MG/1
TABLET ORAL
Qty: 30 TABLET | Refills: 0 | Status: SHIPPED | OUTPATIENT
Start: 2025-06-06 | End: 2025-06-16

## 2025-06-17 ENCOUNTER — TELEPHONE (OUTPATIENT)
Dept: PULMONOLOGY | Age: 65
End: 2025-06-17

## 2025-06-17 NOTE — TELEPHONE ENCOUNTER
Patient called in again asking for more prednisone stating she was at a birthday party and caught a cold.  Advised her she needs to be seen by her PCP. Dr Chacon is out of the office.  She said she would go to the ED  Advised her to see her PCP

## 2025-06-18 ENCOUNTER — APPOINTMENT (OUTPATIENT)
Dept: GENERAL RADIOLOGY | Age: 65
DRG: 137 | End: 2025-06-18
Payer: MEDICAID

## 2025-06-18 ENCOUNTER — HOSPITAL ENCOUNTER (INPATIENT)
Age: 65
LOS: 6 days | Discharge: HOME | DRG: 137 | End: 2025-06-24
Attending: EMERGENCY MEDICINE | Admitting: INTERNAL MEDICINE
Payer: MEDICAID

## 2025-06-18 DIAGNOSIS — J96.01 ACUTE RESPIRATORY FAILURE WITH HYPOXIA AND HYPERCARBIA (HCC): Primary | ICD-10-CM

## 2025-06-18 DIAGNOSIS — J96.02 ACUTE RESPIRATORY FAILURE WITH HYPOXIA AND HYPERCARBIA (HCC): Primary | ICD-10-CM

## 2025-06-18 DIAGNOSIS — J44.1 COPD EXACERBATION (HCC): ICD-10-CM

## 2025-06-18 LAB
ALBUMIN SERPL-MCNC: 4 G/DL (ref 3.4–5)
ALBUMIN/GLOB SERPL: 1.4 {RATIO} (ref 1.1–2.2)
ALP SERPL-CCNC: 60 U/L (ref 40–129)
ALT SERPL-CCNC: 15 U/L (ref 10–40)
ANION GAP SERPL CALCULATED.3IONS-SCNC: 11 MMOL/L (ref 3–16)
AST SERPL-CCNC: 22 U/L (ref 15–37)
BASE EXCESS BLDV CALC-SCNC: 4.7 MMOL/L
BASOPHILS # BLD: 0 K/UL (ref 0–0.2)
BASOPHILS NFR BLD: 0.5 %
BILIRUB SERPL-MCNC: 0.5 MG/DL (ref 0–1)
BUN SERPL-MCNC: 10 MG/DL (ref 7–20)
CALCIUM SERPL-MCNC: 8.8 MG/DL (ref 8.3–10.6)
CHLORIDE SERPL-SCNC: 99 MMOL/L (ref 99–110)
CO2 BLDV-SCNC: 33 MMOL/L
CO2 SERPL-SCNC: 26 MMOL/L (ref 21–32)
COHGB MFR BLDV: 2.1 %
CREAT SERPL-MCNC: 1 MG/DL (ref 0.6–1.2)
DEPRECATED RDW RBC AUTO: 15 % (ref 12.4–15.4)
EOSINOPHIL # BLD: 0 K/UL (ref 0–0.6)
EOSINOPHIL NFR BLD: 0.1 %
FLUAV + FLUBV AG NOSE IA.RAPID: NOT DETECTED
FLUAV + FLUBV AG NOSE IA.RAPID: NOT DETECTED
GFR SERPLBLD CREATININE-BSD FMLA CKD-EPI: 63 ML/MIN/{1.73_M2}
GLUCOSE BLD-MCNC: 167 MG/DL (ref 70–99)
GLUCOSE SERPL-MCNC: 167 MG/DL (ref 70–99)
HCO3 BLDV-SCNC: 31 MMOL/L (ref 23–29)
HCT VFR BLD AUTO: 45.1 % (ref 36–48)
HGB BLD-MCNC: 14.9 G/DL (ref 12–16)
LYMPHOCYTES # BLD: 0.3 K/UL (ref 1–5.1)
LYMPHOCYTES NFR BLD: 10.7 %
MCH RBC QN AUTO: 30.2 PG (ref 26–34)
MCHC RBC AUTO-ENTMCNC: 33 G/DL (ref 31–36)
MCV RBC AUTO: 91.4 FL (ref 80–100)
METHGB MFR BLDV: 0 %
MONOCYTES # BLD: 0.1 K/UL (ref 0–1.3)
MONOCYTES NFR BLD: 4.4 %
NEUTROPHILS # BLD: 2.6 K/UL (ref 1.7–7.7)
NEUTROPHILS NFR BLD: 84.3 %
NT-PROBNP SERPL-MCNC: 175 PG/ML (ref 0–124)
O2 THERAPY: ABNORMAL
PCO2 BLDV: 50.4 MMHG (ref 40–50)
PERFORMED ON: ABNORMAL
PH BLDV: 7.4 [PH] (ref 7.35–7.45)
PLATELET # BLD AUTO: 155 K/UL (ref 135–450)
PMV BLD AUTO: 8.4 FL (ref 5–10.5)
PO2 BLDV: 48 MMHG
POTASSIUM SERPL-SCNC: 4.8 MMOL/L (ref 3.5–5.1)
PROT SERPL-MCNC: 6.8 G/DL (ref 6.4–8.2)
RBC # BLD AUTO: 4.93 M/UL (ref 4–5.2)
SAO2 % BLDV: 83 %
SARS-COV-2 RDRP RESP QL NAA+PROBE: NOT DETECTED
SODIUM SERPL-SCNC: 136 MMOL/L (ref 136–145)
TROPONIN, HIGH SENSITIVITY: 7 NG/L (ref 0–14)
WBC # BLD AUTO: 3 K/UL (ref 4–11)

## 2025-06-18 PROCEDURE — 1200000000 HC SEMI PRIVATE

## 2025-06-18 PROCEDURE — 83880 ASSAY OF NATRIURETIC PEPTIDE: CPT

## 2025-06-18 PROCEDURE — 84484 ASSAY OF TROPONIN QUANT: CPT

## 2025-06-18 PROCEDURE — 94640 AIRWAY INHALATION TREATMENT: CPT

## 2025-06-18 PROCEDURE — 71045 X-RAY EXAM CHEST 1 VIEW: CPT

## 2025-06-18 PROCEDURE — 82803 BLOOD GASES ANY COMBINATION: CPT

## 2025-06-18 PROCEDURE — 93005 ELECTROCARDIOGRAM TRACING: CPT | Performed by: EMERGENCY MEDICINE

## 2025-06-18 PROCEDURE — 6360000002 HC RX W HCPCS: Performed by: NURSE PRACTITIONER

## 2025-06-18 PROCEDURE — 94760 N-INVAS EAR/PLS OXIMETRY 1: CPT

## 2025-06-18 PROCEDURE — 85025 COMPLETE CBC W/AUTO DIFF WBC: CPT

## 2025-06-18 PROCEDURE — 99285 EMERGENCY DEPT VISIT HI MDM: CPT

## 2025-06-18 PROCEDURE — 87502 INFLUENZA DNA AMP PROBE: CPT

## 2025-06-18 PROCEDURE — 2700000000 HC OXYGEN THERAPY PER DAY

## 2025-06-18 PROCEDURE — 6370000000 HC RX 637 (ALT 250 FOR IP): Performed by: EMERGENCY MEDICINE

## 2025-06-18 PROCEDURE — 87635 SARS-COV-2 COVID-19 AMP PRB: CPT

## 2025-06-18 PROCEDURE — 80053 COMPREHEN METABOLIC PANEL: CPT

## 2025-06-18 RX ORDER — DEXAMETHASONE SODIUM PHOSPHATE 4 MG/ML
10 INJECTION, SOLUTION INTRA-ARTICULAR; INTRALESIONAL; INTRAMUSCULAR; INTRAVENOUS; SOFT TISSUE ONCE
Status: COMPLETED | OUTPATIENT
Start: 2025-06-18 | End: 2025-06-18

## 2025-06-18 RX ORDER — IPRATROPIUM BROMIDE AND ALBUTEROL SULFATE 2.5; .5 MG/3ML; MG/3ML
1 SOLUTION RESPIRATORY (INHALATION) ONCE
Status: COMPLETED | OUTPATIENT
Start: 2025-06-18 | End: 2025-06-18

## 2025-06-18 RX ORDER — IPRATROPIUM BROMIDE AND ALBUTEROL SULFATE 2.5; .5 MG/3ML; MG/3ML
1 SOLUTION RESPIRATORY (INHALATION) EVERY 4 HOURS PRN
Status: DISCONTINUED | OUTPATIENT
Start: 2025-06-18 | End: 2025-06-19

## 2025-06-18 RX ADMIN — IPRATROPIUM BROMIDE AND ALBUTEROL SULFATE 1 DOSE: .5; 2.5 SOLUTION RESPIRATORY (INHALATION) at 21:31

## 2025-06-18 RX ADMIN — DEXAMETHASONE SODIUM PHOSPHATE 10 MG: 4 INJECTION, SOLUTION INTRAMUSCULAR; INTRAVENOUS at 23:08

## 2025-06-18 ASSESSMENT — PAIN - FUNCTIONAL ASSESSMENT: PAIN_FUNCTIONAL_ASSESSMENT: 0-10

## 2025-06-18 ASSESSMENT — PAIN DESCRIPTION - PAIN TYPE: TYPE: ACUTE PAIN

## 2025-06-18 ASSESSMENT — PAIN SCALES - GENERAL: PAINLEVEL_OUTOF10: 6

## 2025-06-18 ASSESSMENT — PAIN DESCRIPTION - ORIENTATION: ORIENTATION: MID

## 2025-06-18 ASSESSMENT — PAIN DESCRIPTION - DESCRIPTORS: DESCRIPTORS: NUMBNESS;THROBBING

## 2025-06-18 ASSESSMENT — PAIN DESCRIPTION - LOCATION: LOCATION: BACK

## 2025-06-19 LAB
ANION GAP SERPL CALCULATED.3IONS-SCNC: 10 MMOL/L (ref 3–16)
BASOPHILS # BLD: 0 K/UL (ref 0–0.2)
BASOPHILS NFR BLD: 0.3 %
BUN SERPL-MCNC: 11 MG/DL (ref 7–20)
CALCIUM SERPL-MCNC: 8.9 MG/DL (ref 8.3–10.6)
CHLORIDE SERPL-SCNC: 98 MMOL/L (ref 99–110)
CO2 SERPL-SCNC: 27 MMOL/L (ref 21–32)
CREAT SERPL-MCNC: 0.8 MG/DL (ref 0.6–1.2)
DEPRECATED RDW RBC AUTO: 14.9 % (ref 12.4–15.4)
EKG ATRIAL RATE: 72 BPM
EKG DIAGNOSIS: NORMAL
EKG P AXIS: 74 DEGREES
EKG P-R INTERVAL: 140 MS
EKG Q-T INTERVAL: 402 MS
EKG QRS DURATION: 88 MS
EKG QTC CALCULATION (BAZETT): 440 MS
EKG R AXIS: -37 DEGREES
EKG T AXIS: 57 DEGREES
EKG VENTRICULAR RATE: 72 BPM
EOSINOPHIL # BLD: 0 K/UL (ref 0–0.6)
EOSINOPHIL NFR BLD: 0 %
GFR SERPLBLD CREATININE-BSD FMLA CKD-EPI: 83 ML/MIN/{1.73_M2}
GLUCOSE SERPL-MCNC: 154 MG/DL (ref 70–99)
HCT VFR BLD AUTO: 43.1 % (ref 36–48)
HGB BLD-MCNC: 14.5 G/DL (ref 12–16)
LYMPHOCYTES # BLD: 0.4 K/UL (ref 1–5.1)
LYMPHOCYTES NFR BLD: 8.8 %
MCH RBC QN AUTO: 30.5 PG (ref 26–34)
MCHC RBC AUTO-ENTMCNC: 33.7 G/DL (ref 31–36)
MCV RBC AUTO: 90.5 FL (ref 80–100)
MONOCYTES # BLD: 0.2 K/UL (ref 0–1.3)
MONOCYTES NFR BLD: 4.2 %
NEUTROPHILS # BLD: 3.8 K/UL (ref 1.7–7.7)
NEUTROPHILS NFR BLD: 86.7 %
PLATELET # BLD AUTO: 141 K/UL (ref 135–450)
PMV BLD AUTO: 8.3 FL (ref 5–10.5)
POTASSIUM SERPL-SCNC: 4.5 MMOL/L (ref 3.5–5.1)
RBC # BLD AUTO: 4.76 M/UL (ref 4–5.2)
SODIUM SERPL-SCNC: 135 MMOL/L (ref 136–145)
WBC # BLD AUTO: 4.3 K/UL (ref 4–11)

## 2025-06-19 PROCEDURE — 6370000000 HC RX 637 (ALT 250 FOR IP): Performed by: INTERNAL MEDICINE

## 2025-06-19 PROCEDURE — 94640 AIRWAY INHALATION TREATMENT: CPT

## 2025-06-19 PROCEDURE — 36415 COLL VENOUS BLD VENIPUNCTURE: CPT

## 2025-06-19 PROCEDURE — 2500000003 HC RX 250 WO HCPCS: Performed by: NURSE PRACTITIONER

## 2025-06-19 PROCEDURE — 6370000000 HC RX 637 (ALT 250 FOR IP): Performed by: NURSE PRACTITIONER

## 2025-06-19 PROCEDURE — 94761 N-INVAS EAR/PLS OXIMETRY MLT: CPT

## 2025-06-19 PROCEDURE — 6370000000 HC RX 637 (ALT 250 FOR IP): Performed by: STUDENT IN AN ORGANIZED HEALTH CARE EDUCATION/TRAINING PROGRAM

## 2025-06-19 PROCEDURE — 87070 CULTURE OTHR SPECIMN AEROBIC: CPT

## 2025-06-19 PROCEDURE — 87205 SMEAR GRAM STAIN: CPT

## 2025-06-19 PROCEDURE — 6360000002 HC RX W HCPCS: Performed by: NURSE PRACTITIONER

## 2025-06-19 PROCEDURE — 93010 ELECTROCARDIOGRAM REPORT: CPT | Performed by: INTERNAL MEDICINE

## 2025-06-19 PROCEDURE — 85025 COMPLETE CBC W/AUTO DIFF WBC: CPT

## 2025-06-19 PROCEDURE — 80048 BASIC METABOLIC PNL TOTAL CA: CPT

## 2025-06-19 PROCEDURE — 87077 CULTURE AEROBIC IDENTIFY: CPT

## 2025-06-19 PROCEDURE — 1200000000 HC SEMI PRIVATE

## 2025-06-19 PROCEDURE — 87186 SC STD MICRODIL/AGAR DIL: CPT

## 2025-06-19 PROCEDURE — 2700000000 HC OXYGEN THERAPY PER DAY

## 2025-06-19 RX ORDER — DOXYCYCLINE HYCLATE 100 MG
100 TABLET ORAL EVERY 12 HOURS SCHEDULED
Status: DISCONTINUED | OUTPATIENT
Start: 2025-06-19 | End: 2025-06-21

## 2025-06-19 RX ORDER — POTASSIUM CHLORIDE 1500 MG/1
40 TABLET, EXTENDED RELEASE ORAL PRN
Status: DISCONTINUED | OUTPATIENT
Start: 2025-06-19 | End: 2025-06-24 | Stop reason: HOSPADM

## 2025-06-19 RX ORDER — IPRATROPIUM BROMIDE AND ALBUTEROL SULFATE 2.5; .5 MG/3ML; MG/3ML
1 SOLUTION RESPIRATORY (INHALATION) EVERY 4 HOURS PRN
Status: DISCONTINUED | OUTPATIENT
Start: 2025-06-19 | End: 2025-06-24 | Stop reason: HOSPADM

## 2025-06-19 RX ORDER — MAGNESIUM SULFATE IN WATER 40 MG/ML
2000 INJECTION, SOLUTION INTRAVENOUS PRN
Status: DISCONTINUED | OUTPATIENT
Start: 2025-06-19 | End: 2025-06-24 | Stop reason: HOSPADM

## 2025-06-19 RX ORDER — IPRATROPIUM BROMIDE AND ALBUTEROL SULFATE 2.5; .5 MG/3ML; MG/3ML
1 SOLUTION RESPIRATORY (INHALATION)
Status: DISCONTINUED | OUTPATIENT
Start: 2025-06-19 | End: 2025-06-24 | Stop reason: HOSPADM

## 2025-06-19 RX ORDER — SODIUM CHLORIDE 0.9 % (FLUSH) 0.9 %
5-40 SYRINGE (ML) INJECTION EVERY 12 HOURS SCHEDULED
Status: DISCONTINUED | OUTPATIENT
Start: 2025-06-19 | End: 2025-06-24 | Stop reason: HOSPADM

## 2025-06-19 RX ORDER — ONDANSETRON 4 MG/1
4 TABLET, ORALLY DISINTEGRATING ORAL EVERY 8 HOURS PRN
Status: DISCONTINUED | OUTPATIENT
Start: 2025-06-19 | End: 2025-06-24 | Stop reason: HOSPADM

## 2025-06-19 RX ORDER — POTASSIUM CHLORIDE 7.45 MG/ML
10 INJECTION INTRAVENOUS PRN
Status: DISCONTINUED | OUTPATIENT
Start: 2025-06-19 | End: 2025-06-24 | Stop reason: HOSPADM

## 2025-06-19 RX ORDER — PREDNISONE 20 MG/1
20 TABLET ORAL DAILY
Status: COMPLETED | OUTPATIENT
Start: 2025-06-19 | End: 2025-06-21

## 2025-06-19 RX ORDER — ENOXAPARIN SODIUM 100 MG/ML
40 INJECTION SUBCUTANEOUS DAILY
Status: DISCONTINUED | OUTPATIENT
Start: 2025-06-19 | End: 2025-06-24 | Stop reason: HOSPADM

## 2025-06-19 RX ORDER — SODIUM CHLORIDE 0.9 % (FLUSH) 0.9 %
5-40 SYRINGE (ML) INJECTION PRN
Status: DISCONTINUED | OUTPATIENT
Start: 2025-06-19 | End: 2025-06-24 | Stop reason: HOSPADM

## 2025-06-19 RX ORDER — ONDANSETRON 2 MG/ML
4 INJECTION INTRAMUSCULAR; INTRAVENOUS EVERY 6 HOURS PRN
Status: DISCONTINUED | OUTPATIENT
Start: 2025-06-19 | End: 2025-06-24 | Stop reason: HOSPADM

## 2025-06-19 RX ORDER — ACETAMINOPHEN 325 MG/1
650 TABLET ORAL EVERY 6 HOURS PRN
Status: DISCONTINUED | OUTPATIENT
Start: 2025-06-19 | End: 2025-06-24 | Stop reason: HOSPADM

## 2025-06-19 RX ORDER — PREDNISONE 5 MG/1
15 TABLET ORAL DAILY
Status: DISCONTINUED | OUTPATIENT
Start: 2025-06-22 | End: 2025-06-21

## 2025-06-19 RX ORDER — PREDNISONE 5 MG/1
10 TABLET ORAL DAILY
Status: DISCONTINUED | OUTPATIENT
Start: 2025-06-25 | End: 2025-06-21

## 2025-06-19 RX ORDER — AMLODIPINE BESYLATE 5 MG/1
5 TABLET ORAL DAILY
Status: DISCONTINUED | OUTPATIENT
Start: 2025-06-19 | End: 2025-06-24 | Stop reason: HOSPADM

## 2025-06-19 RX ORDER — POLYETHYLENE GLYCOL 3350 17 G/17G
17 POWDER, FOR SOLUTION ORAL DAILY PRN
Status: DISCONTINUED | OUTPATIENT
Start: 2025-06-19 | End: 2025-06-24 | Stop reason: HOSPADM

## 2025-06-19 RX ORDER — ACETAMINOPHEN 650 MG/1
650 SUPPOSITORY RECTAL EVERY 6 HOURS PRN
Status: DISCONTINUED | OUTPATIENT
Start: 2025-06-19 | End: 2025-06-24 | Stop reason: HOSPADM

## 2025-06-19 RX ORDER — METHADONE HYDROCHLORIDE 10 MG/ML
95 CONCENTRATE ORAL DAILY
Status: DISCONTINUED | OUTPATIENT
Start: 2025-06-19 | End: 2025-06-19

## 2025-06-19 RX ORDER — BUDESONIDE AND FORMOTEROL FUMARATE DIHYDRATE 160; 4.5 UG/1; UG/1
2 AEROSOL RESPIRATORY (INHALATION) 2 TIMES DAILY
Status: DISCONTINUED | OUTPATIENT
Start: 2025-06-19 | End: 2025-06-24 | Stop reason: HOSPADM

## 2025-06-19 RX ORDER — PREDNISONE 5 MG/1
5 TABLET ORAL DAILY
Status: DISCONTINUED | OUTPATIENT
Start: 2025-06-28 | End: 2025-06-21

## 2025-06-19 RX ORDER — RISPERIDONE 1 MG/1
1 TABLET ORAL EVERY EVENING
Status: DISCONTINUED | OUTPATIENT
Start: 2025-06-19 | End: 2025-06-24 | Stop reason: HOSPADM

## 2025-06-19 RX ORDER — SODIUM CHLORIDE 9 MG/ML
INJECTION, SOLUTION INTRAVENOUS PRN
Status: DISCONTINUED | OUTPATIENT
Start: 2025-06-19 | End: 2025-06-24 | Stop reason: HOSPADM

## 2025-06-19 RX ORDER — BUDESONIDE AND FORMOTEROL FUMARATE DIHYDRATE 160; 4.5 UG/1; UG/1
2 AEROSOL RESPIRATORY (INHALATION) 2 TIMES DAILY
Status: DISCONTINUED | OUTPATIENT
Start: 2025-06-19 | End: 2025-06-19

## 2025-06-19 RX ORDER — PANTOPRAZOLE SODIUM 40 MG/1
40 TABLET, DELAYED RELEASE ORAL 2 TIMES DAILY
Status: DISCONTINUED | OUTPATIENT
Start: 2025-06-19 | End: 2025-06-24 | Stop reason: HOSPADM

## 2025-06-19 RX ADMIN — IPRATROPIUM BROMIDE AND ALBUTEROL SULFATE 1 DOSE: .5; 2.5 SOLUTION RESPIRATORY (INHALATION) at 15:54

## 2025-06-19 RX ADMIN — BUDESONIDE AND FORMOTEROL FUMARATE DIHYDRATE 2 PUFF: 160; 4.5 AEROSOL RESPIRATORY (INHALATION) at 07:25

## 2025-06-19 RX ADMIN — ENOXAPARIN SODIUM 40 MG: 100 INJECTION SUBCUTANEOUS at 09:28

## 2025-06-19 RX ADMIN — METHADONE HYDROCHLORIDE 95 MG: 10 TABLET ORAL at 09:26

## 2025-06-19 RX ADMIN — PANTOPRAZOLE SODIUM 40 MG: 40 TABLET, DELAYED RELEASE ORAL at 02:54

## 2025-06-19 RX ADMIN — SODIUM CHLORIDE, PRESERVATIVE FREE 10 ML: 5 INJECTION INTRAVENOUS at 09:26

## 2025-06-19 RX ADMIN — PREDNISONE 20 MG: 20 TABLET ORAL at 09:26

## 2025-06-19 RX ADMIN — IPRATROPIUM BROMIDE AND ALBUTEROL SULFATE 1 DOSE: .5; 2.5 SOLUTION RESPIRATORY (INHALATION) at 11:25

## 2025-06-19 RX ADMIN — DOXYCYCLINE HYCLATE 100 MG: 100 TABLET, COATED ORAL at 20:45

## 2025-06-19 RX ADMIN — IPRATROPIUM BROMIDE AND ALBUTEROL SULFATE 1 DOSE: .5; 2.5 SOLUTION RESPIRATORY (INHALATION) at 07:25

## 2025-06-19 RX ADMIN — GUAIFENESIN AND DEXTROMETHORPHAN HYDROBROMIDE 1 TABLET: 600; 30 TABLET, EXTENDED RELEASE ORAL at 11:15

## 2025-06-19 RX ADMIN — IPRATROPIUM BROMIDE AND ALBUTEROL SULFATE 1 DOSE: .5; 2.5 SOLUTION RESPIRATORY (INHALATION) at 20:10

## 2025-06-19 RX ADMIN — AMLODIPINE BESYLATE 5 MG: 5 TABLET ORAL at 09:25

## 2025-06-19 RX ADMIN — RISPERIDONE 1 MG: 1 TABLET, FILM COATED ORAL at 18:36

## 2025-06-19 RX ADMIN — GUAIFENESIN AND DEXTROMETHORPHAN HYDROBROMIDE 1 TABLET: 600; 30 TABLET, EXTENDED RELEASE ORAL at 19:05

## 2025-06-19 RX ADMIN — BUDESONIDE AND FORMOTEROL FUMARATE DIHYDRATE 2 PUFF: 160; 4.5 AEROSOL RESPIRATORY (INHALATION) at 20:10

## 2025-06-19 RX ADMIN — PANTOPRAZOLE SODIUM 40 MG: 40 TABLET, DELAYED RELEASE ORAL at 20:45

## 2025-06-19 RX ADMIN — ACETAMINOPHEN 650 MG: 325 TABLET ORAL at 06:23

## 2025-06-19 RX ADMIN — DOXYCYCLINE HYCLATE 100 MG: 100 TABLET, COATED ORAL at 09:25

## 2025-06-19 RX ADMIN — SODIUM CHLORIDE, PRESERVATIVE FREE 10 ML: 5 INJECTION INTRAVENOUS at 20:47

## 2025-06-19 RX ADMIN — IPRATROPIUM BROMIDE AND ALBUTEROL SULFATE 1 DOSE: .5; 2.5 SOLUTION RESPIRATORY (INHALATION) at 02:46

## 2025-06-19 RX ADMIN — PANTOPRAZOLE SODIUM 40 MG: 40 TABLET, DELAYED RELEASE ORAL at 09:25

## 2025-06-19 ASSESSMENT — PAIN SCALES - GENERAL: PAINLEVEL_OUTOF10: 4

## 2025-06-19 ASSESSMENT — PAIN DESCRIPTION - LOCATION: LOCATION: HEAD

## 2025-06-19 ASSESSMENT — PAIN - FUNCTIONAL ASSESSMENT: PAIN_FUNCTIONAL_ASSESSMENT: PREVENTS OR INTERFERES SOME ACTIVE ACTIVITIES AND ADLS

## 2025-06-19 ASSESSMENT — PAIN DESCRIPTION - ORIENTATION: ORIENTATION: OTHER (COMMENT)

## 2025-06-19 ASSESSMENT — PAIN DESCRIPTION - DESCRIPTORS: DESCRIPTORS: ACHING

## 2025-06-19 NOTE — ED NOTES
ED TO INPATIENT SBAR HANDOFF    Patient Name: Nati Dover   Preferred Name: Rg  : 1960  64 y.o.   Family/Caregiver Present: no   Code Status Order: Prior  PO Status: NPO:No  Telemetry Order: Yes  C-SSRS: Risk of Suicide: No Risk  Sitter no   Restraints:     Sepsis Risk Score      Situation  Chief Complaint   Patient presents with    Shortness of Breath     Presents from home via EMS for shortness of breath. Pt states she was discharged on mothers day from 5 day admission for her COPD. Pt went to [place] Wednesday and began feeling poorly on Thursday. Pt states using too many rescue inhalers at home and that she can't keep her SpO2 up. Considerably worse throughout yesterday and today. EMS provided (1) duoneb, pt states she felt much better after that.      Brief Description of Patient's Condition: Nati Dover is a 64 y.o. female who  has a past medical history of Anxiety and depression, Arthritis, Asthma, Bipolar disorder (HCC), CKD (chronic kidney disease), COPD (chronic obstructive pulmonary disease) ([facility]), Empyema (HCC), GERD (gastroesophageal reflux disease), Hepatitis C, History of heroin use, Hypertension, Narcotic abuse (HCC), Pre-diabetes, and UTI (lower urinary tract infection). presents to the emergency room with EMS from home for evaluation of shortness of breath.  Patient states she has a history of COPD and asthma and has been using her nebulizer and inhalers much more frequently than she feels like she should be.  Patient states over the past 4 to 5 days she has been very short of breath with minimal exertion.  States that she has to use a breathing treatment every time she walks back in the bathroom.  Patient states she did go to a birthday party 5 days ago and after the birthday party she developed a cough the next day.  Patient states she took some over-the-counter cough medication and was feeling better but then her shortness of breath got much worse over the

## 2025-06-19 NOTE — DISCHARGE INSTR - COC
UPPER GASTROINTESTINAL ENDOSCOPY  2025    ESOPHAGOGASTRODUODENOSCOPY DILATION SAVORY performed by Victor M Heart MD at [facility] ENDOSCOPY       Immunization History:   Immunization History   Administered Date(s) Administered    COVID-19, PFIZER PURPLE top, DILUTE for use, (age 12 y+), 30mcg/0.3mL 2021, 2021    Influenza Virus Vaccine 2014    Pneumococcal, PPSV23, PNEUMOVAX 23, (age 2y+), SC/IM, 0.5mL 2014       Active Problems:  Patient Active Problem List   Diagnosis Code    Precordial pain R07.2    COPD with asthma ([facility]) J44.89    Unstable angina ([facility]) I20.0    Essential hypertension I10    HCV (hepatitis C virus) B19.20    Hypoxia R09.02    Smoking addiction F17.200    Thrush B37.0    Mild episode of recurrent major depressive disorder F33.0    Over weight E66.3    Severe persistent asthma without complication ([facility]) J45.50    Other allergic rhinitis J30.89    Numbness in left leg R20.0    Left sided sciatica M54.32    Current chronic use of systemic steroids Z79.52    Acute exacerbation of chronic obstructive pulmonary disease ([facility]) J44.1    S/P thyroidectomy Z98.890, Z90.89    Thyroid nodule E04.1    Abnormal CT of the chest R93.89    COPD with acute exacerbation ([facility]) J44.1    Gastroesophageal reflux disease without esophagitis K21.9    Sepsis ([facility]) A41.9    Acute respiratory failure with hypoxia ([facility]) J96.01       Isolation/Infection:   Isolation            No Isolation          Patient Infection Status    No active infections.   Resolved       Infection Onset Added Last Indicated Last Indicated By Resolved Resolved By    Rhinovirus 25 Pneumonia Panel, Molecular 25 history Infection     Rhinovirus 23 Pneumonia Panel, Molecular 10/05/23 Infection     COVID-19 22 COVID-19, Rapid 22 Infection     COVID-19 20 COVID-19 20 Infection

## 2025-06-19 NOTE — CARE COORDINATION
Case Management Assessment  Initial Evaluation    Date/Time of Evaluation: 6/19/2025 2:04 PM  Assessment Completed by: MADELAINE Wilks    If patient is discharged prior to next notation, then this note serves as note for discharge by case management.    Patient Name: Nati Dover                   YOB: 1960  Diagnosis: COPD exacerbation (HCC) [J44.1]  Acute respiratory failure with hypoxia (HCC) [J96.01]  Acute respiratory failure with hypoxia and hypercarbia (HCC) [J96.01, J96.02]                   Date / Time: 6/18/2025  8:31 PM    Patient Admission Status: Inpatient   Readmission Risk (Low < 19, Mod (19-27), High > 27): Readmission Risk Score: 15.5    Current PCP: Hunter Barr MD  PCP verified by CM? Yes    Chart Reviewed: Yes      History Provided by: Patient, Medical Record  Patient Orientation: Alert and Oriented    Patient Cognition: Alert    Hospitalization in the last 30 days (Readmission):  No    If yes, Readmission Assessment in  Navigator will be completed.    Advance Directives:      Code Status: Full Code   Patient's Primary Decision Maker is: Legal Next of Kin    Primary Decision Maker: Kelton Chen - Spouse - [phone]    Primary Decision Maker: Susanne Walter - Child - [phone]    Discharge Planning:    Patient lives with: Spouse/Significant Other Type of Home: House  Primary Care Giver: Self  Patient Support Systems include: Spouse/Significant Other, Children   Current Financial resources: Medicaid  Current community resources: Other (Comment) (COA services daily)  Current services prior to admission: Durable Medical Equipment, [employer]IME[employer]/Passport            Current DME: Cane            Type of Home Care services:  None    ADLS  Prior functional level: Assistance with the following:, Cooking, Housework  Current functional level: Assistance with the following:, Cooking, Housework    PT AM-PAC:   /24  OT AM-PAC:   /24    Family can provide assistance at DC:

## 2025-06-19 NOTE — ED PROVIDER NOTES
[facility] EMERGENCY DEPARTMENT     EMERGENCY DEPARTMENT ENCOUNTER            Pt Name: Nati Dover   MRN: [id number]   Birthdate 1960   Date of evaluation: 6/18/2025   Provider: German Ackerman MD   PCP: Hunter Barr MD   Note Started: 9:13 PM EDT 6/18/25          CHIEF COMPLAINT     Chief Complaint   Patient presents with    Shortness of Breath     Presents from home via EMS for shortness of breath. Pt states she was discharged on mothers day from 5 day admission for her COPD. Pt went to [place] Wednesday and began feeling poorly on Thursday. Pt states using too many rescue inhalers at home and that she can't keep her SpO2 up. Considerably worse throughout yesterday and today. EMS provided (1) duoneb, pt states she felt much better after that.            HISTORY OF PRESENT ILLNESS:   History from : Patient   Limitations to history : None     Nati Dover is a 64 y.o. female who  has a past medical history of Anxiety and depression, Arthritis, Asthma, Bipolar disorder ([facility]), CKD (chronic kidney disease), COPD (chronic obstructive pulmonary disease) ([facility]), Empyema ([facility]), GERD (gastroesophageal reflux disease), Hepatitis C, History of heroin use, Hypertension, Narcotic abuse (HCC), Pre-diabetes, and UTI (lower urinary tract infection). presents to the emergency room with EMS from home for evaluation of shortness of breath.  Patient states she has a history of COPD and asthma and has been using her nebulizer and inhalers much more frequently than she feels like she should be.  Patient states over the past 4 to 5 days she has been very short of breath with minimal exertion.  States that she has to use a breathing treatment every time she walks back in the bathroom.  Patient states she did go to a birthday party 5 days ago and after the birthday party she developed a cough the next day.  Patient states she took some over-the-counter cough medication and was feeling

## 2025-06-19 NOTE — ACP (ADVANCE CARE PLANNING)
Advance Care Planning   Healthcare Decision Maker:    Primary Decision Maker: Kelton Chen - Spouse - [phone]    Primary Decision Maker: Philip Waltertannajesse - Child - [phone]    Electronically signed by MICHELLE Nascimento, LISW, Case Management on 6/19/2025 at 2:09 PM  [place] [phone]

## 2025-06-19 NOTE — ED NOTES
During triage process, pt stated that her last arrival in the ED they found her blood sugar to be 50 mg/dl. Pt states she is not diabetic however is pre-diabetic. POC blood sugar was checked with result of 167 mg/dl.

## 2025-06-19 NOTE — H&P
gastrointestinal endoscopy (2025).    Allergies:   Allergies   Allergen Reactions    Acetylcysteine Shortness Of Breath and Anaphylaxis     Increased bronchospasm    Codeine Hives     Pt states no recent problems     Levaquin [Levofloxacin In D5w] Itching    Penicillins Hives and Itching    Dupixent [Dupilumab] Other (See Comments)     Joint pain throughout body    Escitalopram Headaches    Ibuprofen Diarrhea, Nausea And Vomiting and Other (See Comments)       FAM HX: family history includes Cancer in her mother; Diabetes in her father and sister; Heart Disease in her brother, father, and mother; High Blood Pressure in her brother; Kidney Disease in her mother.  Soc HX:   Social History     Socioeconomic History    Marital status: Life Partner   Tobacco Use    Smoking status: Former     Current packs/day: 0.00     Average packs/day: 0.5 packs/day for 45.0 years (22.5 ttl pk-yrs)     Types: Cigarettes     Start date: 1971     Quit date: 2016     Years since quittin.0     Passive exposure: Current    Smokeless tobacco: Never   Vaping Use    Vaping status: Never Used   Substance and Sexual Activity    Alcohol use: No    Drug use: Not Currently     Comment: past h/o heroin on methadone--last used 5 years ago    Sexual activity: Not Currently     Social Drivers of Health     Food Insecurity: No Food Insecurity (2025)    Hunger Vital Sign     Worried About Running Out of Food in the Last Year: Never true     Ran Out of Food in the Last Year: Never true   Transportation Needs: Unmet Transportation Needs (2025)    PRAPARE - Transportation     Lack of Transportation (Medical): Yes     Lack of Transportation (Non-Medical): Yes   Housing Stability: High Risk (2025)    Housing Stability Vital Sign     Unable to Pay for Housing in the Last Year: Yes     Number of Times Moved in the Last Year: 0     Homeless in the Last Year: No       Medications:   Medications:        Infusions:   PRN Meds:

## 2025-06-19 NOTE — ED NOTES
Pt SpO2 was hovering at 87 to 88% room air. Pt states she is not on oxygen at baseline. Pt was placed on 1 lpm NC with improvement, now maintaining SpO2 in low 90's.

## 2025-06-20 PROCEDURE — 6370000000 HC RX 637 (ALT 250 FOR IP): Performed by: INTERNAL MEDICINE

## 2025-06-20 PROCEDURE — 6360000002 HC RX W HCPCS: Performed by: NURSE PRACTITIONER

## 2025-06-20 PROCEDURE — 6370000000 HC RX 637 (ALT 250 FOR IP): Performed by: STUDENT IN AN ORGANIZED HEALTH CARE EDUCATION/TRAINING PROGRAM

## 2025-06-20 PROCEDURE — 94640 AIRWAY INHALATION TREATMENT: CPT

## 2025-06-20 PROCEDURE — 94761 N-INVAS EAR/PLS OXIMETRY MLT: CPT

## 2025-06-20 PROCEDURE — 2700000000 HC OXYGEN THERAPY PER DAY

## 2025-06-20 PROCEDURE — 1200000000 HC SEMI PRIVATE

## 2025-06-20 PROCEDURE — 2500000003 HC RX 250 WO HCPCS: Performed by: NURSE PRACTITIONER

## 2025-06-20 PROCEDURE — 6370000000 HC RX 637 (ALT 250 FOR IP): Performed by: NURSE PRACTITIONER

## 2025-06-20 RX ADMIN — IPRATROPIUM BROMIDE AND ALBUTEROL SULFATE 1 DOSE: .5; 2.5 SOLUTION RESPIRATORY (INHALATION) at 17:05

## 2025-06-20 RX ADMIN — IPRATROPIUM BROMIDE AND ALBUTEROL SULFATE 1 DOSE: .5; 2.5 SOLUTION RESPIRATORY (INHALATION) at 11:35

## 2025-06-20 RX ADMIN — METHADONE HYDROCHLORIDE 95 MG: 10 TABLET ORAL at 08:22

## 2025-06-20 RX ADMIN — PREDNISONE 20 MG: 20 TABLET ORAL at 08:22

## 2025-06-20 RX ADMIN — IPRATROPIUM BROMIDE AND ALBUTEROL SULFATE 1 DOSE: .5; 2.5 SOLUTION RESPIRATORY (INHALATION) at 20:03

## 2025-06-20 RX ADMIN — PANTOPRAZOLE SODIUM 40 MG: 40 TABLET, DELAYED RELEASE ORAL at 20:30

## 2025-06-20 RX ADMIN — ACETAMINOPHEN 650 MG: 325 TABLET ORAL at 20:30

## 2025-06-20 RX ADMIN — SODIUM CHLORIDE, PRESERVATIVE FREE 10 ML: 5 INJECTION INTRAVENOUS at 08:26

## 2025-06-20 RX ADMIN — IPRATROPIUM BROMIDE AND ALBUTEROL SULFATE 1 DOSE: .5; 2.5 SOLUTION RESPIRATORY (INHALATION) at 03:41

## 2025-06-20 RX ADMIN — AMLODIPINE BESYLATE 5 MG: 5 TABLET ORAL at 08:22

## 2025-06-20 RX ADMIN — RISPERIDONE 1 MG: 1 TABLET, FILM COATED ORAL at 18:04

## 2025-06-20 RX ADMIN — DOXYCYCLINE HYCLATE 100 MG: 100 TABLET, COATED ORAL at 20:31

## 2025-06-20 RX ADMIN — BUDESONIDE AND FORMOTEROL FUMARATE DIHYDRATE 2 PUFF: 160; 4.5 AEROSOL RESPIRATORY (INHALATION) at 08:55

## 2025-06-20 RX ADMIN — ACETAMINOPHEN 650 MG: 325 TABLET ORAL at 09:23

## 2025-06-20 RX ADMIN — Medication 1 LOZENGE: at 09:23

## 2025-06-20 RX ADMIN — Medication 1 LOZENGE: at 18:05

## 2025-06-20 RX ADMIN — Medication 1 LOZENGE: at 14:29

## 2025-06-20 RX ADMIN — BUDESONIDE AND FORMOTEROL FUMARATE DIHYDRATE 2 PUFF: 160; 4.5 AEROSOL RESPIRATORY (INHALATION) at 20:04

## 2025-06-20 RX ADMIN — DOXYCYCLINE HYCLATE 100 MG: 100 TABLET, COATED ORAL at 08:22

## 2025-06-20 RX ADMIN — IPRATROPIUM BROMIDE AND ALBUTEROL SULFATE 1 DOSE: .5; 2.5 SOLUTION RESPIRATORY (INHALATION) at 08:55

## 2025-06-20 RX ADMIN — ACETAMINOPHEN 650 MG: 325 TABLET ORAL at 03:58

## 2025-06-20 RX ADMIN — ENOXAPARIN SODIUM 40 MG: 100 INJECTION SUBCUTANEOUS at 08:22

## 2025-06-20 RX ADMIN — GUAIFENESIN AND DEXTROMETHORPHAN HYDROBROMIDE 1 TABLET: 600; 30 TABLET, EXTENDED RELEASE ORAL at 21:50

## 2025-06-20 RX ADMIN — GUAIFENESIN AND DEXTROMETHORPHAN HYDROBROMIDE 1 TABLET: 600; 30 TABLET, EXTENDED RELEASE ORAL at 09:23

## 2025-06-20 RX ADMIN — PANTOPRAZOLE SODIUM 40 MG: 40 TABLET, DELAYED RELEASE ORAL at 08:22

## 2025-06-20 ASSESSMENT — PAIN DESCRIPTION - ORIENTATION
ORIENTATION: MID

## 2025-06-20 ASSESSMENT — PAIN SCALES - GENERAL
PAINLEVEL_OUTOF10: 3
PAINLEVEL_OUTOF10: 2
PAINLEVEL_OUTOF10: 0
PAINLEVEL_OUTOF10: 6
PAINLEVEL_OUTOF10: 3
PAINLEVEL_OUTOF10: 3
PAINLEVEL_OUTOF10: 5
PAINLEVEL_OUTOF10: 0

## 2025-06-20 ASSESSMENT — PAIN DESCRIPTION - LOCATION
LOCATION: THROAT
LOCATION: HEAD
LOCATION: HEAD
LOCATION: THROAT
LOCATION: THROAT
LOCATION: HEAD;BACK

## 2025-06-20 ASSESSMENT — PAIN DESCRIPTION - ONSET
ONSET: PROGRESSIVE
ONSET: ON-GOING
ONSET: GRADUAL
ONSET: ON-GOING

## 2025-06-20 ASSESSMENT — PAIN DESCRIPTION - FREQUENCY
FREQUENCY: CONTINUOUS
FREQUENCY: INTERMITTENT
FREQUENCY: CONTINUOUS
FREQUENCY: INTERMITTENT

## 2025-06-20 ASSESSMENT — PAIN DESCRIPTION - PAIN TYPE
TYPE: ACUTE PAIN

## 2025-06-20 ASSESSMENT — PAIN DESCRIPTION - DESCRIPTORS
DESCRIPTORS: ACHING;THROBBING;NUMBNESS
DESCRIPTORS: SORE
DESCRIPTORS: SORE
DESCRIPTORS: ACHING
DESCRIPTORS: SORE

## 2025-06-20 ASSESSMENT — PAIN - FUNCTIONAL ASSESSMENT
PAIN_FUNCTIONAL_ASSESSMENT: ACTIVITIES ARE NOT PREVENTED
PAIN_FUNCTIONAL_ASSESSMENT: PREVENTS OR INTERFERES SOME ACTIVE ACTIVITIES AND ADLS

## 2025-06-20 NOTE — CARE COORDINATION
Per rounds and chart review, patient has respiratory cultures pending. Patient is also requiring 2L of O2, RA is baseline.     Will watch for need for home O2 if patient cannot be weaned at DC.     Patient is likely to discharge in the next couple days.    Plan: DC home, resume COA services. Family transport home.     Electronically signed by Raquel Ayon RN on 6/20/2025 at 3:48 PM

## 2025-06-21 ENCOUNTER — APPOINTMENT (OUTPATIENT)
Dept: GENERAL RADIOLOGY | Age: 65
DRG: 137 | End: 2025-06-21
Payer: MEDICAID

## 2025-06-21 LAB
ANION GAP SERPL CALCULATED.3IONS-SCNC: 8 MMOL/L (ref 3–16)
BACTERIA SPEC RESP CULT: ABNORMAL
BACTERIA SPEC RESP CULT: ABNORMAL
BASOPHILS # BLD: 0 K/UL (ref 0–0.2)
BASOPHILS NFR BLD: 0.1 %
BUN SERPL-MCNC: 13 MG/DL (ref 7–20)
CALCIUM SERPL-MCNC: 8.6 MG/DL (ref 8.3–10.6)
CHLORIDE SERPL-SCNC: 98 MMOL/L (ref 99–110)
CO2 SERPL-SCNC: 30 MMOL/L (ref 21–32)
CREAT SERPL-MCNC: 0.9 MG/DL (ref 0.6–1.2)
DEPRECATED RDW RBC AUTO: 14.9 % (ref 12.4–15.4)
EOSINOPHIL # BLD: 0 K/UL (ref 0–0.6)
EOSINOPHIL NFR BLD: 0.4 %
GFR SERPLBLD CREATININE-BSD FMLA CKD-EPI: 72 ML/MIN/{1.73_M2}
GLUCOSE SERPL-MCNC: 92 MG/DL (ref 70–99)
GRAM STN SPEC: ABNORMAL
HCT VFR BLD AUTO: 39.5 % (ref 36–48)
HGB BLD-MCNC: 13.2 G/DL (ref 12–16)
LYMPHOCYTES # BLD: 1.2 K/UL (ref 1–5.1)
LYMPHOCYTES NFR BLD: 13.8 %
MCH RBC QN AUTO: 30.1 PG (ref 26–34)
MCHC RBC AUTO-ENTMCNC: 33.5 G/DL (ref 31–36)
MCV RBC AUTO: 90.1 FL (ref 80–100)
MONOCYTES # BLD: 0.7 K/UL (ref 0–1.3)
MONOCYTES NFR BLD: 7.5 %
NEUTROPHILS # BLD: 6.8 K/UL (ref 1.7–7.7)
NEUTROPHILS NFR BLD: 78.2 %
ORGANISM: ABNORMAL
PLATELET # BLD AUTO: 135 K/UL (ref 135–450)
PMV BLD AUTO: 8 FL (ref 5–10.5)
POTASSIUM SERPL-SCNC: 3.8 MMOL/L (ref 3.5–5.1)
RBC # BLD AUTO: 4.38 M/UL (ref 4–5.2)
SODIUM SERPL-SCNC: 136 MMOL/L (ref 136–145)
WBC # BLD AUTO: 8.8 K/UL (ref 4–11)

## 2025-06-21 PROCEDURE — 36415 COLL VENOUS BLD VENIPUNCTURE: CPT

## 2025-06-21 PROCEDURE — 85025 COMPLETE CBC W/AUTO DIFF WBC: CPT

## 2025-06-21 PROCEDURE — 71045 X-RAY EXAM CHEST 1 VIEW: CPT

## 2025-06-21 PROCEDURE — 1200000000 HC SEMI PRIVATE

## 2025-06-21 PROCEDURE — 6370000000 HC RX 637 (ALT 250 FOR IP): Performed by: INTERNAL MEDICINE

## 2025-06-21 PROCEDURE — 94761 N-INVAS EAR/PLS OXIMETRY MLT: CPT

## 2025-06-21 PROCEDURE — 99223 1ST HOSP IP/OBS HIGH 75: CPT | Performed by: INTERNAL MEDICINE

## 2025-06-21 PROCEDURE — 6370000000 HC RX 637 (ALT 250 FOR IP): Performed by: NURSE PRACTITIONER

## 2025-06-21 PROCEDURE — 2700000000 HC OXYGEN THERAPY PER DAY

## 2025-06-21 PROCEDURE — 6360000002 HC RX W HCPCS: Performed by: NURSE PRACTITIONER

## 2025-06-21 PROCEDURE — 94640 AIRWAY INHALATION TREATMENT: CPT

## 2025-06-21 PROCEDURE — 6370000000 HC RX 637 (ALT 250 FOR IP): Performed by: STUDENT IN AN ORGANIZED HEALTH CARE EDUCATION/TRAINING PROGRAM

## 2025-06-21 PROCEDURE — 80048 BASIC METABOLIC PNL TOTAL CA: CPT

## 2025-06-21 RX ORDER — FLUTICASONE PROPIONATE 50 MCG
1 SPRAY, SUSPENSION (ML) NASAL 2 TIMES DAILY
Status: DISCONTINUED | OUTPATIENT
Start: 2025-06-21 | End: 2025-06-24 | Stop reason: HOSPADM

## 2025-06-21 RX ORDER — IPRATROPIUM BROMIDE 42 UG/1
2 SPRAY, METERED NASAL 2 TIMES DAILY
Status: DISCONTINUED | OUTPATIENT
Start: 2025-06-21 | End: 2025-06-24 | Stop reason: HOSPADM

## 2025-06-21 RX ADMIN — DOXYCYCLINE HYCLATE 100 MG: 100 TABLET, COATED ORAL at 08:24

## 2025-06-21 RX ADMIN — AMLODIPINE BESYLATE 5 MG: 5 TABLET ORAL at 08:24

## 2025-06-21 RX ADMIN — ENOXAPARIN SODIUM 40 MG: 100 INJECTION SUBCUTANEOUS at 08:24

## 2025-06-21 RX ADMIN — GUAIFENESIN AND DEXTROMETHORPHAN HYDROBROMIDE 1 TABLET: 600; 30 TABLET, EXTENDED RELEASE ORAL at 10:45

## 2025-06-21 RX ADMIN — IPRATROPIUM BROMIDE 2 SPRAY: 42 SPRAY NASAL at 15:52

## 2025-06-21 RX ADMIN — FLUTICASONE PROPIONATE 1 SPRAY: 50 SPRAY, METERED NASAL at 15:51

## 2025-06-21 RX ADMIN — BUDESONIDE AND FORMOTEROL FUMARATE DIHYDRATE 2 PUFF: 160; 4.5 AEROSOL RESPIRATORY (INHALATION) at 19:26

## 2025-06-21 RX ADMIN — METHADONE HYDROCHLORIDE 95 MG: 10 TABLET ORAL at 08:24

## 2025-06-21 RX ADMIN — Medication 1 LOZENGE: at 15:00

## 2025-06-21 RX ADMIN — IPRATROPIUM BROMIDE AND ALBUTEROL SULFATE 1 DOSE: .5; 2.5 SOLUTION RESPIRATORY (INHALATION) at 08:06

## 2025-06-21 RX ADMIN — IPRATROPIUM BROMIDE AND ALBUTEROL SULFATE 1 DOSE: .5; 2.5 SOLUTION RESPIRATORY (INHALATION) at 11:47

## 2025-06-21 RX ADMIN — PREDNISONE 20 MG: 20 TABLET ORAL at 08:24

## 2025-06-21 RX ADMIN — ACETAMINOPHEN 650 MG: 325 TABLET ORAL at 03:01

## 2025-06-21 RX ADMIN — GUAIFENESIN AND DEXTROMETHORPHAN HYDROBROMIDE 1 TABLET: 600; 30 TABLET, EXTENDED RELEASE ORAL at 21:14

## 2025-06-21 RX ADMIN — IPRATROPIUM BROMIDE AND ALBUTEROL SULFATE 1 DOSE: .5; 2.5 SOLUTION RESPIRATORY (INHALATION) at 16:06

## 2025-06-21 RX ADMIN — IPRATROPIUM BROMIDE AND ALBUTEROL SULFATE 1 DOSE: .5; 2.5 SOLUTION RESPIRATORY (INHALATION) at 19:26

## 2025-06-21 RX ADMIN — ACETAMINOPHEN 650 MG: 325 TABLET ORAL at 23:43

## 2025-06-21 RX ADMIN — BUDESONIDE AND FORMOTEROL FUMARATE DIHYDRATE 2 PUFF: 160; 4.5 AEROSOL RESPIRATORY (INHALATION) at 08:07

## 2025-06-21 RX ADMIN — POLYETHYLENE GLYCOL 3350 17 G: 17 POWDER, FOR SOLUTION ORAL at 08:32

## 2025-06-21 RX ADMIN — Medication 1 LOZENGE: at 17:56

## 2025-06-21 RX ADMIN — RISPERIDONE 1 MG: 1 TABLET, FILM COATED ORAL at 17:56

## 2025-06-21 RX ADMIN — PANTOPRAZOLE SODIUM 40 MG: 40 TABLET, DELAYED RELEASE ORAL at 08:24

## 2025-06-21 RX ADMIN — Medication 1 LOZENGE: at 10:04

## 2025-06-21 RX ADMIN — PANTOPRAZOLE SODIUM 40 MG: 40 TABLET, DELAYED RELEASE ORAL at 20:12

## 2025-06-21 RX ADMIN — ACETAMINOPHEN 650 MG: 325 TABLET ORAL at 10:04

## 2025-06-21 RX ADMIN — ACETAMINOPHEN 650 MG: 325 TABLET ORAL at 17:56

## 2025-06-21 RX ADMIN — LEVOFLOXACIN 750 MG: 500 TABLET, FILM COATED ORAL at 10:01

## 2025-06-21 RX ADMIN — Medication 1 LOZENGE: at 03:02

## 2025-06-21 ASSESSMENT — PAIN DESCRIPTION - LOCATION
LOCATION: HEAD
LOCATION: THROAT
LOCATION: THROAT
LOCATION: HEAD
LOCATION: THROAT

## 2025-06-21 ASSESSMENT — PAIN DESCRIPTION - FREQUENCY
FREQUENCY: INTERMITTENT

## 2025-06-21 ASSESSMENT — PAIN SCALES - WONG BAKER
WONGBAKER_NUMERICALRESPONSE: NO HURT
WONGBAKER_NUMERICALRESPONSE: HURTS A LITTLE BIT
WONGBAKER_NUMERICALRESPONSE: NO HURT
WONGBAKER_NUMERICALRESPONSE: HURTS A LITTLE BIT
WONGBAKER_NUMERICALRESPONSE: NO HURT

## 2025-06-21 ASSESSMENT — PAIN SCALES - GENERAL
PAINLEVEL_OUTOF10: 5
PAINLEVEL_OUTOF10: 3
PAINLEVEL_OUTOF10: 3
PAINLEVEL_OUTOF10: 0
PAINLEVEL_OUTOF10: 3
PAINLEVEL_OUTOF10: 3

## 2025-06-21 ASSESSMENT — PAIN DESCRIPTION - DESCRIPTORS
DESCRIPTORS: SORE
DESCRIPTORS: ACHING
DESCRIPTORS: SORE

## 2025-06-21 ASSESSMENT — PAIN DESCRIPTION - ONSET
ONSET: ON-GOING

## 2025-06-21 ASSESSMENT — PAIN - FUNCTIONAL ASSESSMENT
PAIN_FUNCTIONAL_ASSESSMENT: ACTIVITIES ARE NOT PREVENTED

## 2025-06-21 ASSESSMENT — PAIN DESCRIPTION - ORIENTATION
ORIENTATION: ANTERIOR
ORIENTATION: ANTERIOR

## 2025-06-21 ASSESSMENT — PAIN DESCRIPTION - PAIN TYPE
TYPE: ACUTE PAIN

## 2025-06-21 NOTE — CONSULTS
Pulmonary Consult Note     Patient's name:  Nati Dover  Medical Record Number: [id number]  Patient's account/billing number: [id number]  Patient's YOB: 1960  Age: 64 y.o.  Date of Admission: 6/18/2025  8:31 PM  Date of Consult: 6/21/2025      Primary Care Physician: Hunter Barr MD      Code Status: Full Code    Reason for consult: COPD/asthma with acute exacerbation    Assessment and Plan     Pseudomonas pneumonia  COPD/asthma severe with acute exacerbation  Acute hypoxic respiratory failure      Plan:  Levaquin for now, follow-up sputum culture sensitivity high risk for drug resistance  Still with severe bronchospasm will switch back to IV Solu-Medrol  Oxygen titrate to keep sats more than 90%  Symbicort and DuoNeb  Acapella  3% nebulized saline      HISTORY OF PRESENT ILLNESS:   Mr./Ms. Nati Dover is a 64 y.o. lady with past medical history stated below significant for COPD/asthma with recurrent exacerbations, was admitted to the hospital a month ago, presented with worsening shortness of breath cough productive of green-yellow/brown sputum over the last week, she stated that she was at the birthday party and her symptoms started since  Sputum culture positive for Pseudomonas          Past Medical History:        Diagnosis Date    Anxiety and depression     Arthritis     Asthma     Bipolar disorder (HCC)     CKD (chronic kidney disease)     stage 3    COPD (chronic obstructive pulmonary disease) (HCC)     Empyema (HCC)     GERD (gastroesophageal reflux disease)     Hepatitis C     Patient denies     History of heroin use     Hypertension     Narcotic abuse (HCC)     heroin, clean 1 year    Pre-diabetes     UTI (lower urinary tract infection)        Past Surgical History:        Procedure Laterality Date    COLONOSCOPY  10/23/2018    Colonoscopy with polypectomy (cold snare), polypectomy (cold biopsy)    COLONOSCOPY N/A

## 2025-06-22 PROCEDURE — 99233 SBSQ HOSP IP/OBS HIGH 50: CPT | Performed by: INTERNAL MEDICINE

## 2025-06-22 PROCEDURE — 6370000000 HC RX 637 (ALT 250 FOR IP): Performed by: INTERNAL MEDICINE

## 2025-06-22 PROCEDURE — 94640 AIRWAY INHALATION TREATMENT: CPT

## 2025-06-22 PROCEDURE — 1200000000 HC SEMI PRIVATE

## 2025-06-22 PROCEDURE — 2700000000 HC OXYGEN THERAPY PER DAY

## 2025-06-22 PROCEDURE — 6360000002 HC RX W HCPCS: Performed by: NURSE PRACTITIONER

## 2025-06-22 PROCEDURE — 94761 N-INVAS EAR/PLS OXIMETRY MLT: CPT

## 2025-06-22 PROCEDURE — 94669 MECHANICAL CHEST WALL OSCILL: CPT

## 2025-06-22 PROCEDURE — 6370000000 HC RX 637 (ALT 250 FOR IP): Performed by: NURSE PRACTITIONER

## 2025-06-22 PROCEDURE — 6370000000 HC RX 637 (ALT 250 FOR IP): Performed by: STUDENT IN AN ORGANIZED HEALTH CARE EDUCATION/TRAINING PROGRAM

## 2025-06-22 RX ORDER — IPRATROPIUM BROMIDE AND ALBUTEROL SULFATE 2.5; .5 MG/3ML; MG/3ML
SOLUTION RESPIRATORY (INHALATION)
Status: DISPENSED
Start: 2025-06-22 | End: 2025-06-22

## 2025-06-22 RX ORDER — BISACODYL 10 MG
10 SUPPOSITORY, RECTAL RECTAL DAILY PRN
Status: DISCONTINUED | OUTPATIENT
Start: 2025-06-22 | End: 2025-06-24 | Stop reason: HOSPADM

## 2025-06-22 RX ADMIN — IPRATROPIUM BROMIDE AND ALBUTEROL SULFATE 1 DOSE: .5; 2.5 SOLUTION RESPIRATORY (INHALATION) at 19:22

## 2025-06-22 RX ADMIN — IPRATROPIUM BROMIDE AND ALBUTEROL SULFATE 1 DOSE: .5; 2.5 SOLUTION RESPIRATORY (INHALATION) at 11:40

## 2025-06-22 RX ADMIN — PANTOPRAZOLE SODIUM 40 MG: 40 TABLET, DELAYED RELEASE ORAL at 21:16

## 2025-06-22 RX ADMIN — ACETAMINOPHEN 650 MG: 325 TABLET ORAL at 08:21

## 2025-06-22 RX ADMIN — ACETAMINOPHEN 650 MG: 325 TABLET ORAL at 21:16

## 2025-06-22 RX ADMIN — IPRATROPIUM BROMIDE AND ALBUTEROL SULFATE 1 DOSE: .5; 2.5 SOLUTION RESPIRATORY (INHALATION) at 16:04

## 2025-06-22 RX ADMIN — IPRATROPIUM BROMIDE AND ALBUTEROL SULFATE 1 DOSE: .5; 2.5 SOLUTION RESPIRATORY (INHALATION) at 00:08

## 2025-06-22 RX ADMIN — RISPERIDONE 1 MG: 1 TABLET, FILM COATED ORAL at 17:42

## 2025-06-22 RX ADMIN — GUAIFENESIN AND DEXTROMETHORPHAN HYDROBROMIDE 1 TABLET: 600; 30 TABLET, EXTENDED RELEASE ORAL at 21:16

## 2025-06-22 RX ADMIN — GUAIFENESIN AND DEXTROMETHORPHAN HYDROBROMIDE 1 TABLET: 600; 30 TABLET, EXTENDED RELEASE ORAL at 08:21

## 2025-06-22 RX ADMIN — METHADONE HYDROCHLORIDE 95 MG: 10 TABLET ORAL at 08:21

## 2025-06-22 RX ADMIN — AMLODIPINE BESYLATE 5 MG: 5 TABLET ORAL at 08:21

## 2025-06-22 RX ADMIN — Medication 1 LOZENGE: at 18:43

## 2025-06-22 RX ADMIN — BUDESONIDE AND FORMOTEROL FUMARATE DIHYDRATE 2 PUFF: 160; 4.5 AEROSOL RESPIRATORY (INHALATION) at 07:43

## 2025-06-22 RX ADMIN — FLUTICASONE PROPIONATE 1 SPRAY: 50 SPRAY, METERED NASAL at 08:20

## 2025-06-22 RX ADMIN — IPRATROPIUM BROMIDE AND ALBUTEROL SULFATE 1 DOSE: .5; 2.5 SOLUTION RESPIRATORY (INHALATION) at 07:42

## 2025-06-22 RX ADMIN — LEVOFLOXACIN 750 MG: 500 TABLET, FILM COATED ORAL at 08:21

## 2025-06-22 RX ADMIN — POLYETHYLENE GLYCOL 3350 17 G: 17 POWDER, FOR SOLUTION ORAL at 08:33

## 2025-06-22 RX ADMIN — Medication 1 LOZENGE: at 08:21

## 2025-06-22 RX ADMIN — BISACODYL 10 MG: 10 SUPPOSITORY RECTAL at 14:10

## 2025-06-22 RX ADMIN — PANTOPRAZOLE SODIUM 40 MG: 40 TABLET, DELAYED RELEASE ORAL at 08:21

## 2025-06-22 RX ADMIN — ENOXAPARIN SODIUM 40 MG: 100 INJECTION SUBCUTANEOUS at 08:20

## 2025-06-22 RX ADMIN — BUDESONIDE AND FORMOTEROL FUMARATE DIHYDRATE 2 PUFF: 160; 4.5 AEROSOL RESPIRATORY (INHALATION) at 19:22

## 2025-06-22 RX ADMIN — IPRATROPIUM BROMIDE 2 SPRAY: 42 SPRAY NASAL at 21:16

## 2025-06-22 RX ADMIN — FLUTICASONE PROPIONATE 1 SPRAY: 50 SPRAY, METERED NASAL at 21:16

## 2025-06-22 RX ADMIN — IPRATROPIUM BROMIDE 2 SPRAY: 42 SPRAY NASAL at 08:20

## 2025-06-22 ASSESSMENT — PAIN DESCRIPTION - ONSET
ONSET: ON-GOING
ONSET: ON-GOING

## 2025-06-22 ASSESSMENT — PAIN DESCRIPTION - LOCATION
LOCATION: HEAD;THROAT
LOCATION: THROAT
LOCATION: HEAD

## 2025-06-22 ASSESSMENT — PAIN SCALES - WONG BAKER
WONGBAKER_NUMERICALRESPONSE: NO HURT
WONGBAKER_NUMERICALRESPONSE: HURTS A LITTLE BIT
WONGBAKER_NUMERICALRESPONSE: NO HURT

## 2025-06-22 ASSESSMENT — PAIN DESCRIPTION - PAIN TYPE
TYPE: ACUTE PAIN

## 2025-06-22 ASSESSMENT — PAIN SCALES - GENERAL
PAINLEVEL_OUTOF10: 3
PAINLEVEL_OUTOF10: 3

## 2025-06-22 ASSESSMENT — PAIN DESCRIPTION - DESCRIPTORS
DESCRIPTORS: SORE
DESCRIPTORS: SORE
DESCRIPTORS: ACHING

## 2025-06-22 ASSESSMENT — PAIN DESCRIPTION - FREQUENCY
FREQUENCY: INTERMITTENT
FREQUENCY: CONTINUOUS
FREQUENCY: INTERMITTENT

## 2025-06-23 LAB
ALBUMIN SERPL-MCNC: 3.4 G/DL (ref 3.4–5)
ANION GAP SERPL CALCULATED.3IONS-SCNC: 7 MMOL/L (ref 3–16)
BASOPHILS # BLD: 0 K/UL (ref 0–0.2)
BASOPHILS NFR BLD: 0.3 %
BUN SERPL-MCNC: 9 MG/DL (ref 7–20)
CALCIUM SERPL-MCNC: 8.8 MG/DL (ref 8.3–10.6)
CHLORIDE SERPL-SCNC: 101 MMOL/L (ref 99–110)
CO2 SERPL-SCNC: 33 MMOL/L (ref 21–32)
CREAT SERPL-MCNC: 1 MG/DL (ref 0.6–1.2)
DEPRECATED RDW RBC AUTO: 14.5 % (ref 12.4–15.4)
EOSINOPHIL # BLD: 0.1 K/UL (ref 0–0.6)
EOSINOPHIL NFR BLD: 1.5 %
GFR SERPLBLD CREATININE-BSD FMLA CKD-EPI: 63 ML/MIN/{1.73_M2}
GLUCOSE SERPL-MCNC: 79 MG/DL (ref 70–99)
HCT VFR BLD AUTO: 39.9 % (ref 36–48)
HGB BLD-MCNC: 13.1 G/DL (ref 12–16)
LYMPHOCYTES # BLD: 1.3 K/UL (ref 1–5.1)
LYMPHOCYTES NFR BLD: 23.4 %
MAGNESIUM SERPL-MCNC: 2.15 MG/DL (ref 1.8–2.4)
MCH RBC QN AUTO: 30 PG (ref 26–34)
MCHC RBC AUTO-ENTMCNC: 32.8 G/DL (ref 31–36)
MCV RBC AUTO: 91.3 FL (ref 80–100)
MONOCYTES # BLD: 0.7 K/UL (ref 0–1.3)
MONOCYTES NFR BLD: 11.8 %
NEUTROPHILS # BLD: 3.6 K/UL (ref 1.7–7.7)
NEUTROPHILS NFR BLD: 63 %
PHOSPHATE SERPL-MCNC: 3.8 MG/DL (ref 2.5–4.9)
PLATELET # BLD AUTO: 161 K/UL (ref 135–450)
PMV BLD AUTO: 8 FL (ref 5–10.5)
POTASSIUM SERPL-SCNC: 4.7 MMOL/L (ref 3.5–5.1)
RBC # BLD AUTO: 4.38 M/UL (ref 4–5.2)
SODIUM SERPL-SCNC: 141 MMOL/L (ref 136–145)
WBC # BLD AUTO: 5.7 K/UL (ref 4–11)

## 2025-06-23 PROCEDURE — 94640 AIRWAY INHALATION TREATMENT: CPT

## 2025-06-23 PROCEDURE — 94669 MECHANICAL CHEST WALL OSCILL: CPT

## 2025-06-23 PROCEDURE — 36415 COLL VENOUS BLD VENIPUNCTURE: CPT

## 2025-06-23 PROCEDURE — 85025 COMPLETE CBC W/AUTO DIFF WBC: CPT

## 2025-06-23 PROCEDURE — 6370000000 HC RX 637 (ALT 250 FOR IP): Performed by: NURSE PRACTITIONER

## 2025-06-23 PROCEDURE — 6370000000 HC RX 637 (ALT 250 FOR IP): Performed by: INTERNAL MEDICINE

## 2025-06-23 PROCEDURE — 80069 RENAL FUNCTION PANEL: CPT

## 2025-06-23 PROCEDURE — 2700000000 HC OXYGEN THERAPY PER DAY

## 2025-06-23 PROCEDURE — 99233 SBSQ HOSP IP/OBS HIGH 50: CPT | Performed by: INTERNAL MEDICINE

## 2025-06-23 PROCEDURE — 6370000000 HC RX 637 (ALT 250 FOR IP): Performed by: STUDENT IN AN ORGANIZED HEALTH CARE EDUCATION/TRAINING PROGRAM

## 2025-06-23 PROCEDURE — 6360000002 HC RX W HCPCS: Performed by: NURSE PRACTITIONER

## 2025-06-23 PROCEDURE — 94761 N-INVAS EAR/PLS OXIMETRY MLT: CPT

## 2025-06-23 PROCEDURE — 1200000000 HC SEMI PRIVATE

## 2025-06-23 PROCEDURE — 83735 ASSAY OF MAGNESIUM: CPT

## 2025-06-23 RX ORDER — PREDNISONE 20 MG/1
40 TABLET ORAL DAILY
Status: DISCONTINUED | OUTPATIENT
Start: 2025-06-23 | End: 2025-06-24 | Stop reason: HOSPADM

## 2025-06-23 RX ORDER — PREDNISONE 5 MG/1
10 TABLET ORAL DAILY
Status: DISCONTINUED | OUTPATIENT
Start: 2025-07-02 | End: 2025-06-24 | Stop reason: HOSPADM

## 2025-06-23 RX ORDER — PREDNISONE 20 MG/1
20 TABLET ORAL DAILY
Status: DISCONTINUED | OUTPATIENT
Start: 2025-06-29 | End: 2025-06-24 | Stop reason: HOSPADM

## 2025-06-23 RX ADMIN — METHADONE HYDROCHLORIDE 95 MG: 10 TABLET ORAL at 09:54

## 2025-06-23 RX ADMIN — Medication 1 LOZENGE: at 14:11

## 2025-06-23 RX ADMIN — PANTOPRAZOLE SODIUM 40 MG: 40 TABLET, DELAYED RELEASE ORAL at 20:55

## 2025-06-23 RX ADMIN — ACETAMINOPHEN 650 MG: 325 TABLET ORAL at 20:55

## 2025-06-23 RX ADMIN — FLUTICASONE PROPIONATE 1 SPRAY: 50 SPRAY, METERED NASAL at 20:55

## 2025-06-23 RX ADMIN — PREDNISONE 40 MG: 20 TABLET ORAL at 10:59

## 2025-06-23 RX ADMIN — IPRATROPIUM BROMIDE 2 SPRAY: 42 SPRAY NASAL at 20:55

## 2025-06-23 RX ADMIN — IPRATROPIUM BROMIDE AND ALBUTEROL SULFATE 1 DOSE: .5; 2.5 SOLUTION RESPIRATORY (INHALATION) at 19:36

## 2025-06-23 RX ADMIN — BUDESONIDE AND FORMOTEROL FUMARATE DIHYDRATE 2 PUFF: 160; 4.5 AEROSOL RESPIRATORY (INHALATION) at 19:36

## 2025-06-23 RX ADMIN — LEVOFLOXACIN 750 MG: 500 TABLET, FILM COATED ORAL at 09:55

## 2025-06-23 RX ADMIN — RISPERIDONE 1 MG: 1 TABLET, FILM COATED ORAL at 18:38

## 2025-06-23 RX ADMIN — IPRATROPIUM BROMIDE AND ALBUTEROL SULFATE 1 DOSE: .5; 2.5 SOLUTION RESPIRATORY (INHALATION) at 16:27

## 2025-06-23 RX ADMIN — PANTOPRAZOLE SODIUM 40 MG: 40 TABLET, DELAYED RELEASE ORAL at 09:56

## 2025-06-23 RX ADMIN — Medication 1 LOZENGE: at 20:54

## 2025-06-23 RX ADMIN — GUAIFENESIN AND DEXTROMETHORPHAN HYDROBROMIDE 1 TABLET: 600; 30 TABLET, EXTENDED RELEASE ORAL at 20:55

## 2025-06-23 RX ADMIN — FLUTICASONE PROPIONATE 1 SPRAY: 50 SPRAY, METERED NASAL at 09:57

## 2025-06-23 RX ADMIN — AMLODIPINE BESYLATE 5 MG: 5 TABLET ORAL at 09:56

## 2025-06-23 RX ADMIN — IPRATROPIUM BROMIDE 2 SPRAY: 42 SPRAY NASAL at 09:57

## 2025-06-23 RX ADMIN — IPRATROPIUM BROMIDE AND ALBUTEROL SULFATE 1 DOSE: .5; 2.5 SOLUTION RESPIRATORY (INHALATION) at 07:54

## 2025-06-23 RX ADMIN — BUDESONIDE AND FORMOTEROL FUMARATE DIHYDRATE 2 PUFF: 160; 4.5 AEROSOL RESPIRATORY (INHALATION) at 07:55

## 2025-06-23 RX ADMIN — IPRATROPIUM BROMIDE AND ALBUTEROL SULFATE 1 DOSE: .5; 2.5 SOLUTION RESPIRATORY (INHALATION) at 11:48

## 2025-06-23 RX ADMIN — Medication 1 LOZENGE: at 10:15

## 2025-06-23 RX ADMIN — ENOXAPARIN SODIUM 40 MG: 100 INJECTION SUBCUTANEOUS at 09:56

## 2025-06-23 ASSESSMENT — PAIN DESCRIPTION - LOCATION: LOCATION: HEAD

## 2025-06-23 ASSESSMENT — PAIN SCALES - GENERAL: PAINLEVEL_OUTOF10: 1

## 2025-06-24 VITALS
DIASTOLIC BLOOD PRESSURE: 90 MMHG | RESPIRATION RATE: 16 BRPM | OXYGEN SATURATION: 89 % | TEMPERATURE: 98.2 F | HEART RATE: 89 BPM | HEIGHT: 70 IN | SYSTOLIC BLOOD PRESSURE: 113 MMHG | BODY MASS INDEX: 25.12 KG/M2 | WEIGHT: 175.49 LBS

## 2025-06-24 LAB
GLUCOSE BLD-MCNC: 92 MG/DL (ref 70–99)
PERFORMED ON: NORMAL

## 2025-06-24 PROCEDURE — 94680 O2 UPTK RST&XERS DIR SIMPLE: CPT

## 2025-06-24 PROCEDURE — 6370000000 HC RX 637 (ALT 250 FOR IP): Performed by: NURSE PRACTITIONER

## 2025-06-24 PROCEDURE — 99232 SBSQ HOSP IP/OBS MODERATE 35: CPT | Performed by: INTERNAL MEDICINE

## 2025-06-24 PROCEDURE — 94640 AIRWAY INHALATION TREATMENT: CPT

## 2025-06-24 PROCEDURE — 6370000000 HC RX 637 (ALT 250 FOR IP): Performed by: STUDENT IN AN ORGANIZED HEALTH CARE EDUCATION/TRAINING PROGRAM

## 2025-06-24 PROCEDURE — 94761 N-INVAS EAR/PLS OXIMETRY MLT: CPT

## 2025-06-24 PROCEDURE — 6370000000 HC RX 637 (ALT 250 FOR IP): Performed by: INTERNAL MEDICINE

## 2025-06-24 PROCEDURE — 2700000000 HC OXYGEN THERAPY PER DAY

## 2025-06-24 PROCEDURE — 6360000002 HC RX W HCPCS: Performed by: NURSE PRACTITIONER

## 2025-06-24 RX ORDER — LEVOFLOXACIN 750 MG/1
750 TABLET, FILM COATED ORAL DAILY
Qty: 3 TABLET | Refills: 0 | Status: SHIPPED | OUTPATIENT
Start: 2025-06-25 | End: 2025-06-28

## 2025-06-24 RX ORDER — PREDNISONE 10 MG/1
TABLET ORAL
Qty: 22 TABLET | Refills: 0 | Status: SHIPPED | OUTPATIENT
Start: 2025-06-25 | End: 2025-07-05

## 2025-06-24 RX ADMIN — PREDNISONE 40 MG: 20 TABLET ORAL at 10:06

## 2025-06-24 RX ADMIN — IPRATROPIUM BROMIDE AND ALBUTEROL SULFATE 1 DOSE: .5; 2.5 SOLUTION RESPIRATORY (INHALATION) at 12:30

## 2025-06-24 RX ADMIN — AMLODIPINE BESYLATE 5 MG: 5 TABLET ORAL at 10:06

## 2025-06-24 RX ADMIN — ENOXAPARIN SODIUM 40 MG: 100 INJECTION SUBCUTANEOUS at 10:05

## 2025-06-24 RX ADMIN — LEVOFLOXACIN 750 MG: 500 TABLET, FILM COATED ORAL at 10:06

## 2025-06-24 RX ADMIN — BUDESONIDE AND FORMOTEROL FUMARATE DIHYDRATE 2 PUFF: 160; 4.5 AEROSOL RESPIRATORY (INHALATION) at 09:39

## 2025-06-24 RX ADMIN — IPRATROPIUM BROMIDE 2 SPRAY: 42 SPRAY NASAL at 10:12

## 2025-06-24 RX ADMIN — FLUTICASONE PROPIONATE 1 SPRAY: 50 SPRAY, METERED NASAL at 10:07

## 2025-06-24 RX ADMIN — Medication 1 LOZENGE: at 10:05

## 2025-06-24 RX ADMIN — IPRATROPIUM BROMIDE AND ALBUTEROL SULFATE 1 DOSE: .5; 2.5 SOLUTION RESPIRATORY (INHALATION) at 09:39

## 2025-06-24 RX ADMIN — PANTOPRAZOLE SODIUM 40 MG: 40 TABLET, DELAYED RELEASE ORAL at 10:06

## 2025-06-24 RX ADMIN — METHADONE HYDROCHLORIDE 95 MG: 10 TABLET ORAL at 10:06

## 2025-06-24 NOTE — PLAN OF CARE
Problem: Discharge Planning  Goal: Discharge to home or other facility with appropriate resources  6/19/2025 1729 by Eileen Rasmussen, RN  Outcome: Progressing  Flowsheets (Taken 6/19/2025 1729)  Discharge to home or other facility with appropriate resources:   Identify barriers to discharge with patient and caregiver   Identify discharge learning needs (meds, wound care, etc)   Refer to discharge planning if patient needs post-hospital services based on physician order or complex needs related to functional status, cognitive ability or social support system   Arrange for needed discharge resources and transportation as appropriate  6/19/2025 0405 by Linda Marie, RN  Outcome: Progressing     
  Problem: Discharge Planning  Goal: Discharge to home or other facility with appropriate resources  6/19/2025 2023 by Angélica Wright, RN  Outcome: Progressing  Flowsheets (Taken 6/19/2025 1729 by Eileen Rasmussen, RN)  Discharge to home or other facility with appropriate resources:   Identify barriers to discharge with patient and caregiver   Identify discharge learning needs (meds, wound care, etc)   Refer to discharge planning if patient needs post-hospital services based on physician order or complex needs related to functional status, cognitive ability or social support system   Arrange for needed discharge resources and transportation as appropriate  6/19/2025 1729 by Eileen Rasmussen, RN  Outcome: Progressing  Flowsheets (Taken 6/19/2025 1729)  Discharge to home or other facility with appropriate resources:   Identify barriers to discharge with patient and caregiver   Identify discharge learning needs (meds, wound care, etc)   Refer to discharge planning if patient needs post-hospital services based on physician order or complex needs related to functional status, cognitive ability or social support system   Arrange for needed discharge resources and transportation as appropriate     
  Problem: Discharge Planning  Goal: Discharge to home or other facility with appropriate resources  6/20/2025 0746 by Kathy Hinds, RN  Outcome: Progressing  Flowsheets (Taken 6/20/2025 0746)  Discharge to home or other facility with appropriate resources:   Identify barriers to discharge with patient and caregiver   Identify discharge learning needs (meds, wound care, etc)  6/19/2025 2023 by Angélica Wright, RN  Outcome: Progressing  Flowsheets (Taken 6/19/2025 1729 by Eileen Rasmussen, RN)  Discharge to home or other facility with appropriate resources:   Identify barriers to discharge with patient and caregiver   Identify discharge learning needs (meds, wound care, etc)   Refer to discharge planning if patient needs post-hospital services based on physician order or complex needs related to functional status, cognitive ability or social support system   Arrange for needed discharge resources and transportation as appropriate     Problem: Skin/Tissue Integrity - Adult  Goal: Skin integrity remains intact  Outcome: Progressing  Flowsheets (Taken 6/20/2025 0746)  Skin Integrity Remains Intact:   Monitor for areas of redness and/or skin breakdown   Check visual cues for pain   Turn and reposition as indicated     Problem: Musculoskeletal - Adult  Goal: Return mobility to safest level of function  Outcome: Progressing  Flowsheets (Taken 6/20/2025 0746)  Return Mobility to Safest Level of Function:   Assess patient stability and activity tolerance for standing, transferring and ambulating with or without assistive devices   Assist with transfers and ambulation using safe patient handling equipment as needed   Instruct patient/family in ordered activity level     Problem: Infection - Adult  Goal: Absence of infection at discharge  Outcome: Progressing  Flowsheets (Taken 6/20/2025 0746)  Absence of infection at discharge:   Assess and monitor for signs and symptoms of infection   Monitor lab/diagnostic results   
  Problem: Discharge Planning  Goal: Discharge to home or other facility with appropriate resources  Outcome: Completed  Flowsheets (Taken 6/24/2025 1137)  Discharge to home or other facility with appropriate resources:   Identify barriers to discharge with patient and caregiver   Identify discharge learning needs (meds, wound care, etc)   Refer to discharge planning if patient needs post-hospital services based on physician order or complex needs related to functional status, cognitive ability or social support system   Arrange for needed discharge resources and transportation as appropriate     Problem: Skin/Tissue Integrity - Adult  Goal: Skin integrity remains intact  Outcome: Completed  Flowsheets  Taken 6/24/2025 1137 by Eileen Rasmussen RN  Skin Integrity Remains Intact:   Monitor for areas of redness and/or skin breakdown   Assess need for specialty bed   Turn and reposition as indicated   Monitor skin under medical devices   Assess vascular access sites hourly  Taken 6/23/2025 2249 by Ling Cannon RN  Skin Integrity Remains Intact: Monitor for areas of redness and/or skin breakdown     Problem: Musculoskeletal - Adult  Goal: Return mobility to safest level of function  Outcome: Completed  Flowsheets (Taken 6/24/2025 1137)  Return Mobility to Safest Level of Function:   Assess patient stability and activity tolerance for standing, transferring and ambulating with or without assistive devices   Obtain physical therapy/occupational therapy consults as needed   Instruct patient/family in ordered activity level   Assist with transfers and ambulation using safe patient handling equipment as needed     Problem: Infection - Adult  Goal: Absence of infection at discharge  6/24/2025 1137 by Eileen Rasmussen, RN  Outcome: Completed  Flowsheets (Taken 6/24/2025 1137)  Absence of infection at discharge:   Assess and monitor for signs and symptoms of infection   Monitor all insertion sites i.e., indwelling lines, tubes 
  Problem: Discharge Planning  Goal: Discharge to home or other facility with appropriate resources  Outcome: Progressing     
  Problem: Discharge Planning  Goal: Discharge to home or other facility with appropriate resources  Outcome: Progressing     Problem: Metabolic/Fluid and Electrolytes - Adult  Goal: Electrolytes maintained within normal limits  Outcome: Progressing     Problem: Pain  Goal: Verbalizes/displays adequate comfort level or baseline comfort level  Outcome: Progressing     Problem: Skin/Tissue Integrity - Adult  Goal: Skin integrity remains intact  Recent Flowsheet Documentation  Taken 6/21/2025 2225 by Ling Cannon RN  Skin Integrity Remains Intact: Monitor for areas of redness and/or skin breakdown     Problem: Safety - Adult  Goal: Free from fall injury  Recent Flowsheet Documentation  Taken 6/21/2025 2225 by Ling Cannon, RN  Free From Fall Injury: Instruct family/caregiver on patient safety     
  Problem: Discharge Planning  Goal: Discharge to home or other facility with appropriate resources  Outcome: Progressing  Flowsheets (Taken 6/23/2025 1511)  Discharge to home or other facility with appropriate resources:   Identify barriers to discharge with patient and caregiver   Identify discharge learning needs (meds, wound care, etc)   Refer to discharge planning if patient needs post-hospital services based on physician order or complex needs related to functional status, cognitive ability or social support system   Arrange for needed discharge resources and transportation as appropriate     Problem: Skin/Tissue Integrity - Adult  Goal: Skin integrity remains intact  Outcome: Progressing  Flowsheets (Taken 6/23/2025 1511)  Skin Integrity Remains Intact:   Monitor for areas of redness and/or skin breakdown   Assess vascular access sites hourly   Turn and reposition as indicated   Monitor skin under medical devices   Assess need for specialty bed     Problem: Musculoskeletal - Adult  Goal: Return mobility to safest level of function  Outcome: Progressing  Flowsheets (Taken 6/23/2025 1511)  Return Mobility to Safest Level of Function:   Assess patient stability and activity tolerance for standing, transferring and ambulating with or without assistive devices   Assist with transfers and ambulation using safe patient handling equipment as needed   Ensure adequate protection for wounds/incisions during mobilization   Obtain physical therapy/occupational therapy consults as needed   Apply continuous passive motion per provider or physical therapy orders to increase flexion toward goal   Instruct patient/family in ordered activity level     Problem: Infection - Adult  Goal: Absence of infection at discharge  Outcome: Progressing  Flowsheets (Taken 6/23/2025 1511)  Absence of infection at discharge:   Assess and monitor for signs and symptoms of infection   Monitor all insertion sites i.e., indwelling lines, tubes and 
  Problem: Infection - Adult  Goal: Absence of infection at discharge  6/23/2025 2249 by Ling Cannon RN  Outcome: Progressing  6/23/2025 1511 by Eileen Rasmussen RN  Outcome: Progressing  Flowsheets (Taken 6/23/2025 1511)  Absence of infection at discharge:   Assess and monitor for signs and symptoms of infection   Monitor all insertion sites i.e., indwelling lines, tubes and drains   Instruct and encourage patient and family to use good hand hygiene technique   Monitor lab/diagnostic results   Administer medications as ordered     Problem: Metabolic/Fluid and Electrolytes - Adult  Goal: Electrolytes maintained within normal limits  6/23/2025 2249 by Ling Cannon RN  Outcome: Progressing  6/23/2025 1511 by Eileen Rasmussen RN  Outcome: Progressing  Flowsheets (Taken 6/23/2025 1511)  Electrolytes maintained within normal limits:   Monitor labs and assess patient for signs and symptoms of electrolyte imbalances   Instruct patient on fluid and nutrition restrictions as appropriate     
  Problem: Skin/Tissue Integrity - Adult  Goal: Skin integrity remains intact  6/20/2025 2040 by Ling Cannon RN  Outcome: Progressing  Flowsheets (Taken 6/20/2025 2039)  Skin Integrity Remains Intact: Monitor for areas of redness and/or skin breakdown  6/20/2025 0746 by Kathy Hinds RN  Outcome: Progressing  Flowsheets (Taken 6/20/2025 0746)  Skin Integrity Remains Intact:   Monitor for areas of redness and/or skin breakdown   Check visual cues for pain   Turn and reposition as indicated     Problem: Musculoskeletal - Adult  Goal: Return mobility to safest level of function  6/20/2025 2040 by Ling Cannon RN  Outcome: Progressing  6/20/2025 0746 by Kathy Hinds RN  Outcome: Progressing  Flowsheets (Taken 6/20/2025 0746)  Return Mobility to Safest Level of Function:   Assess patient stability and activity tolerance for standing, transferring and ambulating with or without assistive devices   Assist with transfers and ambulation using safe patient handling equipment as needed   Instruct patient/family in ordered activity level     
  Problem: Skin/Tissue Integrity - Adult  Goal: Skin integrity remains intact  Outcome: Progressing     Problem: Infection - Adult  Goal: Absence of infection at discharge  Outcome: Progressing     Problem: Pain  Goal: Verbalizes/displays adequate comfort level or baseline comfort level  Outcome: Progressing     
Encourage patient to monitor pain and request assistance  6/21/2025 2227 by Ling Cannon, RN  Outcome: Progressing     Problem: Safety - Adult  Goal: Free from fall injury  Outcome: Progressing  Flowsheets  Taken 6/22/2025 0713 by Araceli Osei, RN  Free From Fall Injury: Instruct family/caregiver on patient safety  Taken 6/21/2025 2225 by Ling Cannon, RN  Free From Fall Injury: Instruct family/caregiver on patient safety

## 2025-06-24 NOTE — CARE COORDINATION
Home O2 orders noted  Spoke w/ pt who is agreeable to home O2  She prefers referral to [facility]  Referral made per [facility]  Will follow.  Electronically signed by Sarahy Martini on 6/24/2025 at 1:02 PM  #[phone]

## 2025-06-24 NOTE — PROGRESS NOTES
Pulmonary progress Note     Patient's name:  Nati Dover  Medical Record Number: [id number]  Patient's account/billing number: [id number]  Patient's YOB: 1960  Age: 64 y.o.  Date of Admission: 6/18/2025  8:31 PM  Date of Consult: 6/24/2025      Primary Care Physician: Hunter Barr MD      Code Status: Full Code    Reason for consult: COPD/asthma with acute exacerbation    Assessment and Plan     Pseudomonas pneumonia  COPD/asthma severe with acute exacerbation  Acute hypoxic respiratory failure      Plan:  Levaquin for 7 days  Prednisone with gradual taper   Oxygen titrate to keep sats more than 90%, home o2 evaluation  Symbicort and DuoNeb  Acapella  Follow up with DR. Chacon      Subjective;  Sob/wheezing/cough slightly better      HISTORY OF PRESENT ILLNESS:   Mr./Ms. Nati Dover is a 64 y.o. lady with past medical history stated below significant for COPD/asthma with recurrent exacerbations, was admitted to the hospital a month ago, presented with worsening shortness of breath cough productive of green-yellow/brown sputum over the last week, she stated that she was at the birthday party and her symptoms started since  Sputum culture positive for Pseudomonas        REVIEW OF SYSTEMS:  Review of Systems -   General ROS: negative  Psychological ROS: negative  Ophthalmic ROS: negative  ENT ROS: negative  Allergy and Immunology ROS: negative  Hematological and Lymphatic ROS: negative  Endocrine ROS: negative  Breast ROS: negative  Respiratory ROS: Cough, sputum, shortness of breath cardiovascular ROS: no chest pain or dyspnea on exertion  Gastrointestinal ROS:negative  Genito-Urinary ROS: negative  Musculoskeletal ROS: negative  Neurological ROS: negative  Dermatological ROS: negative        Physical Exam:    Vitals: BP (!) 113/90   Pulse 89   Temp 98.2 °F (36.8 °C)   Resp 16   Ht 1.778 m (5' 10\")   Wt 79.6 kg (175 lb 7.8 oz)  
                                                Pulmonary progress Note     Patient's name:  Nati Dover  Medical Record Number: [id number]  Patient's account/billing number: [id number]  Patient's YOB: 1960  Age: 64 y.o.  Date of Admission: 6/18/2025  8:31 PM  Date of Consult: 6/23/2025      Primary Care Physician: Hunter Barr MD      Code Status: Full Code    Reason for consult: COPD/asthma with acute exacerbation    Assessment and Plan     Pseudomonas pneumonia  COPD/asthma severe with acute exacerbation  Acute hypoxic respiratory failure      Plan:  Levaquin for 7 days  Prednisone with gradual taper   Oxygen titrate to keep sats more than 90%  Symbicort and DuoNeb  Acapella  She tried multiple biologics with side effects/treatment failure.     Subjective;  Remains sob, wheezing with severe congestions    HISTORY OF PRESENT ILLNESS:   Mr./Ms. Nati Dover is a 64 y.o. lady with past medical history stated below significant for COPD/asthma with recurrent exacerbations, was admitted to the hospital a month ago, presented with worsening shortness of breath cough productive of green-yellow/brown sputum over the last week, she stated that she was at the birthday party and her symptoms started since  Sputum culture positive for Pseudomonas        REVIEW OF SYSTEMS:  Review of Systems -   General ROS: negative  Psychological ROS: negative  Ophthalmic ROS: negative  ENT ROS: negative  Allergy and Immunology ROS: negative  Hematological and Lymphatic ROS: negative  Endocrine ROS: negative  Breast ROS: negative  Respiratory ROS: Cough, sputum, shortness of breath cardiovascular ROS: no chest pain or dyspnea on exertion  Gastrointestinal ROS:negative  Genito-Urinary ROS: negative  Musculoskeletal ROS: negative  Neurological ROS: negative  Dermatological ROS: negative        Physical Exam:    Vitals: BP (!) 142/92   Pulse 82   Temp 98.2 °F (36.8 °C)   Resp 16   Ht 1.778 m (5' 10\")   
                                                Pulmonary progress Note     Patient's name:  Nati Dover  Medical Record Number: [id number]  Patient's account/billing number: [id number]  Patient's YOB: 1960  Age: 64 y.o.  Date of Admission: 6/18/2025  8:31 PM  Date of Consult: 6/22/2025      Primary Care Physician: Hunter Barr MD      Code Status: Full Code    Reason for consult: COPD/asthma with acute exacerbation    Assessment and Plan     Pseudomonas pneumonia  COPD/asthma severe with acute exacerbation  Acute hypoxic respiratory failure      Plan:  Levaquin for 7 days  Solu-Medrol 5 days  Oxygen titrate to keep sats more than 90%  Symbicort and DuoNeb  Acapella  3% nebulized saline      HISTORY OF PRESENT ILLNESS:   Mr./Ms. Nati Dover is a 64 y.o. lady with past medical history stated below significant for COPD/asthma with recurrent exacerbations, was admitted to the hospital a month ago, presented with worsening shortness of breath cough productive of green-yellow/brown sputum over the last week, she stated that she was at the birthday party and her symptoms started since  Sputum culture positive for Pseudomonas        REVIEW OF SYSTEMS:  Review of Systems -   General ROS: negative  Psychological ROS: negative  Ophthalmic ROS: negative  ENT ROS: negative  Allergy and Immunology ROS: negative  Hematological and Lymphatic ROS: negative  Endocrine ROS: negative  Breast ROS: negative  Respiratory ROS: Cough, sputum, shortness of breath cardiovascular ROS: no chest pain or dyspnea on exertion  Gastrointestinal ROS:negative  Genito-Urinary ROS: negative  Musculoskeletal ROS: negative  Neurological ROS: negative  Dermatological ROS: negative        Physical Exam:    Vitals: BP (!) 151/90   Pulse 70   Temp 97.9 °F (36.6 °C) (Oral)   Resp 18   Ht 1.778 m (5' 10\")   Wt 79.2 kg (174 lb 9.7 oz)   LMP 09/13/2017   SpO2 96%   BMI 25.05 kg/m²     Last Body weight:   Wt Readings 
         [facility]    Respiratory Therapy   Home Oxygen Evaluation        Name: Nati Dover  Medical Record Number: [id number]  Age: 64 y.o.  Gender:  female   : 1960  Today's date: 2025  Room: B1H-1336/3128-01      Assessment        BP (!) 113/90   Pulse 89   Temp 98.2 °F (36.8 °C)   Resp 16   Ht 1.778 m (5' 10\")   Wt 79.6 kg (175 lb 7.8 oz)   LMP 2017   SpO2 (!) 89%   BMI 25.18 kg/m²     Patient Active Problem List   Diagnosis    Precordial pain    COPD with asthma (HCC)    Unstable angina (HCC)    Essential hypertension    HCV (hepatitis C virus)    Hypoxia    Smoking addiction    Thrush    Mild episode of recurrent major depressive disorder    Over weight    Severe persistent asthma without complication (HCC)    Other allergic rhinitis    Numbness in left leg    Left sided sciatica    Current chronic use of systemic steroids    Acute exacerbation of chronic obstructive pulmonary disease (HCC)    S/P thyroidectomy    Thyroid nodule    Abnormal CT of the chest    COPD with acute exacerbation (HCC)    Gastroesophageal reflux disease without esophagitis    Sepsis (HCC)    Acute respiratory failure with hypoxia (HCC)       Social History:  Social History     Tobacco Use    Smoking status: Former     Current packs/day: 0.00     Average packs/day: 0.5 packs/day for 45.0 years (22.5 ttl pk-yrs)     Types: Cigarettes     Start date: 1971     Quit date: 2016     Years since quittin.0     Passive exposure: Current    Smokeless tobacco: Never   Vaping Use    Vaping status: Never Used   Substance Use Topics    Alcohol use: No    Drug use: Not Currently     Comment: past h/o heroin on methadone--last used 5 years ago       Patient Room Air saturation at rest 92  %  Patient Room Air saturation upon ambulation 87 %  Patient ambulated 5 minutes.  (Minimum of 3 minutes unless Sp02 < 88% prior to 3 minute karli)    Oxygen saturations of 88% or less on RA qualifies 
    V2.0    [facility]      Name:  Nati Dover /Age/Sex: 1960  (64 y.o. female)   MRN & CSN:  [id number] & [id number] Encounter Date/Time: 2025 8:00 AM EDT   Location:  [id number]-3900/3128-01 PCP: Hunter Barr MD     Attending:Pola Eckert MD       Hospital Day: 4  Brief HPI  Nati Dover is a 64 y.o. female with pertinent PMHx of COPD, CKD 3, HTN, substance abuse who presented complaining of increased cough and shortness of breath.  Was admitted for COPD exacerbation.  Assessment & Plan     COPD with acute exacerbation with Pseudomonas pneumonia  - Continue with oral prednisone taper  - Symbicort twice daily  - DuoNebs every 4 hours  - Wean supplemental oxygen as tolerated, currently on 2 L via nasal cannula  Consult pulmonary    Pseudomonas pneumonia.  Respiratory culture grew Pseudomonas.  She was recently admitted for pneumonia.  Will transition from doxycycline to Levaquin.      Essential hypertension  - Continue amlodipine    Mood disorder  - Continue Risperdal    History of substance abuse  - Continue methadone    Diet ADULT DIET; Regular   DVT Prophylaxis [x] Lovenox, []  Heparin, [] SCDs, [] Ambulation,  [] Eliquis, [] Xarelto  [] Coumadin   Code Status Full Code   Disposition From: Home  Expected Disposition: Home  Estimated Date of Discharge: 2 to 3 days           Subjective:     Chief Complaint: Cough and shortness of breath    Patient still coughing wheezing and short of breath.  She recently was admitted a few weeks ago for pneumonia.  She is on doxycycline for the past 4 days and not improving.  Respiratory culture grew Pseudomonas.    Review of Systems:      Pertinent positives and negatives discussed in HPI    Objective:     Intake/Output Summary (Last 24 hours) at 2025 1211  Last data filed at 2025 0809  Gross per 24 hour   Intake 1710 ml   Output --   Net 1710 ml      Vitals:   Vitals:    25 0601 25 0658 25 0809 25 1148   BP:  
    V2.0    [facility] [place] [facility] Progress Note      Name:  Nati Dover /Age/Sex: 1960  (64 y.o. female)   MRN & CSN:  [id number] & [id number] Encounter Date/Time: 2025 8:00 AM EDT   Location:  [id number]/3128-01 PCP: Hunter Barr MD     Attending:Gamal Rider DO       Hospital Day: 3  Brief HPI  Nati Dover is a 64 y.o. female with pertinent PMHx of COPD, CKD 3, HTN, substance abuse who presented complaining of increased cough and shortness of breath.  Was admitted for COPD exacerbation.  Assessment & Plan     COPD with acute exacerbation  - Continue with oral prednisone taper  - Symbicort twice daily  - DuoNebs every 4 hours  - Wean supplemental oxygen as tolerated, currently on 2 L via nasal cannula  - Respiratory culture in process  - Continue doxycycline    Essential hypertension  - Continue amlodipine    Mood disorder  - Continue Risperdal    History of substance abuse  - Continue methadone    Independent interpretation of telemetry strip today  showing normal sinus rhythm    Diet ADULT DIET; Regular   DVT Prophylaxis [x] Lovenox, []  Heparin, [] SCDs, [] Ambulation,  [] Eliquis, [] Xarelto  [] Coumadin   Code Status Full Code   Disposition From: Home  Expected Disposition: Home  Estimated Date of Discharge: 2 to 3 days           Subjective:     Chief Complaint: Cough and shortness of breath    Patient was seen and examined today lying in bed  Says that she had a rough night last night, had a couple episodes where she felt like she could not breathe at all  Still having a lot of cough and sinus congestion  Has a sore throat today as well    Review of Systems:      Pertinent positives and negatives discussed in HPI    Objective:     Intake/Output Summary (Last 24 hours) at 2025 0826  Last data filed at 2025 0826  Gross per 24 hour   Intake 1202 ml   Output --   Net 1202 ml      Vitals:   Vitals:    25 0341 25 0342 25 0407 25 0659   BP: (!) 149/90   115/80 
    V2.0    [facility] Progress Note      Name:  Nati Dover /Age/Sex: 1960  (64 y.o. female)   MRN & CSN:  [id number] & [id number] Encounter Date/Time: 2025 12:19 PM EDT   Location:  [id number]/3128-01 PCP: Hunter Barr MD     Attending:Kimmy Kelly MD       Hospital Day: 6    Assessment and Recommendations   Nati Dover is a 64 y.o. female with pmh of COPD, CKD, hypertension, substance use disorder presented to hospital with shortness of breath.  Admitted for COPD exacerbation found to have bacterial pneumonia with Pseudomonas.     Plan:   COPD exacerbation due to Pseudomonas pneumonia.  Continue levofloxacin.  Respiratory cultures from  sensitivities pending.  Will follow-up.Pulmonary recommended gradual taper of prednisone. Continue Symbicort and DuoNebs.  Pseudomonas pneumonia, continue levofloxacin  Acute respiratory failure with hypoxia, patient was down to 1 L nasal cannula oxygen but this morning developed hypoxia in the bathroom and oxygen increased to 3 L, continue monitoring oxygen requirements and wean off as tolerated  Hypertension, continue amlodipine  Mood disorder continue risperidone  Opioid use disorder, continue methadone  Constipation, bowel regimen ordered, enema as needed  Disposition, continue monitoring inpatient for clinical improvement, plan to do home oxygen evaluation on discharge.      Diet ADULT DIET; Regular   DVT Prophylaxis [x] Lovenox, []  Heparin, [] SCDs, [] Ambulation,  [] Eliquis, [] Xarelto  [] Coumadin   Code Status Full Code             Personally reviewed Lab Studies and Imaging   EKG rhythm strip reviewed showing sinus rhythm    Medical Decision Making:  The following items were considered in medical decision making:  Discussion of patient care with other providers  Reviewed clinical lab tests  Reviewed radiology tests  Reviewed other diagnostic tests/interventions  Independent review of radiologic images  Microbiology cultures and other 
4 Eyes Skin Assessment     NAME:  Nati Dover  YOB: 1960  MEDICAL RECORD NUMBER:  [id number]    The patient is being assessed for  Admission    I agree that at least one RN has performed a thorough Head to Toe Skin Assessment on the patient. ALL assessment sites listed below have been assessed.      Areas assessed by both nurses:    Head, Face, Ears, Shoulders, Back, Chest, Arms, Elbows, Hands, Sacrum. Buttock, Coccyx, Ischium, Legs. Feet and Heels, and Under Medical Devices         Does the Patient have a Wound? No noted wound(s)       Oleg Prevention initiated by RN: No  Wound Care Orders initiated by RN: No    Pressure Injury (Stage 3,4, Unstageable, DTI, NWPT, and Complex wounds) if present, place Wound referral order by RN under : No    New Ostomies, if present place, Ostomy referral order under : No     Nurse 1 eSignature: Electronically signed by Linda Vogel RN on 6/19/25 at 4:09 AM EDT    **SHARE this note so that the co-signing nurse can place an eSignature**    Nurse 2 eSignature: Electronically signed by Angélica Wright RN on 6/19/25 at 6:35 AM EDT   
Bedside introduction complete. Patient denies any needs at this time. Updated whiteboard with RN and PCA name and number. Patient able to make needs known, using call light appropriately. Will continue to monitor and assess for needs and comfort.     
Breathing treatment order changed to match patient home schedule-pet patient  
CLINICAL PHARMACY NOTE: MEDS TO BEDS    Total # of Prescriptions Filled: 3   The following medications were delivered to the patient:  Current Discharge Medication List        START taking these medications    Details   dextromethorphan-guaiFENesin (MUCINEX DM)  MG per extended release tablet Take 1 tablet by mouth 2 times daily as needed for Cough  Qty: 10 tablet, Refills: 0      levoFLOXacin (LEVAQUIN) 750 MG tablet Take 1 tablet by mouth daily for 3 doses  Qty: 3 tablet, Refills: 0           predniSONE 10 MG tablet      Additional Documentation: Went over instructions with patient and . Left meds on bed with belongings    
Data- discharge order received, pt verbalized agreement to discharge, disposition to previous residence, no needs for HHC/DME.     Action- discharge instructions prepared and given to Patient, pt verbalized understanding. Medication information packet given r/t NEW and/or CHANGED prescriptions emphasizing name/purpose/side effects, pt verbalized understanding. Discharge instruction summary: Diet- Regular, Activity- up independently without assistive devices, Primary Care Physician as follows: Hunter Barr -[phone] f/u appointment to be scheduled after discharge for a follow up appointment post inpatient stay.    Response- Pt belongings gathered. Disposition is home (no HHC/DME needs), Supplemental O2 delivered to the hospital room and available to discharge with the patient, transported with private vehicle, taken to lobby via w/c / [facility] transport team, no complications.    
Hospitalist Progress Note  6/22/2025 9:59 AM  Subjective:   Admit Date: 6/18/2025  PCP: Hunter Barr MD Status: Inpatient   Interval History: Hospital Day: 5, admitted with COPD exacerbation with Pseudomonas pneumonia on levofloxacin.  No bowel movement since Tuesday.     History of present illness: PMHx of COPD, CKD 3, HTN, substance abuse who presented complaining of increased cough and shortness of breath. Was admitted for COPD exacerbation.  Hospitalized (5/6 - 5/11) with community acquired pneumonia.  Pneumonia panel was positive for Streptococcus pneumoniae and rhinovirus. Respiratory culture was also with light growth of Streptococcus pneumoniae.  Completed course or ceftriaxone and azithromycin, discharged on cefinir x 2 days.  She does not have oxygen at home.  Smoked 1 ppd x 50 years, quit 8 years ago.     Diet: regular    Medications:     levofloxacin  750 mg Oral Daily   fluticasone  1 spray Each Nostril BID   ipratropium  2 spray Each Nostril BID   amlodipine  5 mg Oral Daily   pantoprazole  40 mg Oral BID   risperidone  1 mg Oral QPM   enoxaparin  40 mg SubCUTAneous Daily   ipratropium-albuterol  1 Dose Inhalation 4x Daily RT   budesonide-formoterol  2 puff Inhalation BID   methadone  95 mg Oral Daily       Labs:       06/21/25  0532   WBC 8.8   HGB 13.2      MCV 90.1          K 3.8   CL 98*   CO2 30   BUN 13   CREATININE 0.9   GLUCOSE 92   CALCIUM 8.6     VBG (6/18) 7.40 / 50  hsTnT (6/18) 7 ng/L    Respiratory culture (6/19) Pseudomonas aeruginosa. Heavy growth. Sensitive to levofloxacin.   Influenza A/B (6/18) not detected  SARS-CoV-2 NAAT (6/18) not detected    Portable CXR (6/21) No acute findings.     Portable CXR (6/18) No acute cardiopulmonary process.     Objective:   Vitals:  /90   Pulse 70   Temp 97.9 °F (36.6 °C) (Oral)   Resp 18   Ht 1.778 m (5' 10\")   Wt 79.2 kg (174 lb 9.7 oz)   SpO2 96% on 1 LPM  BMI 25.05 kg/m²   General appearance: alert and cooperative 
Message received back from MD Rider. MD states that it is okay to administer PRN Tylenol early. Order also placed for PRN cepacol lozenges. See eMAR for documentation of administration.   
O2 increased to 2L for patient comfort.  
Patient c/o SOB and dyspnea when ambulating to the bathroom. O2 assessed following ambulation and found to be 89% on 1L. Increased supplemental O2 to 2L per NC for relief. Patient tolerating well without complaint. Bed in low and locked position and call light within reach.   
Patient complaint of SOB and requesting breathing treatment. Patient 94% on 1L o2 nasal cannula. This RN called respiratory and patient received the duoneb.   
Patient in bed, A&O X4. VSS. Patient has no IV access currently. MD aware. Patient denies pain. All  AM medications taken whole without complaint (see eMAR).  Patient tolerating PO intake and appetite adequate. Patient home O2 eval completed and patient will be discharging with supplemental O2 when ambulating. Patient continues with c/o SOB with ambulation, but reports ease with breathing @ rest. Patient requests PRN throat lozenge for relief of cough and sore throat. Administered per orders. No other needs verbalized at this time. Bed in low and locked position and call light within reach.         
Patient in bed, A&O X4. VSS. Patient has no IV access currently. Per Rn from previous shift, MD aware and agreeable. Patient denies pain. All  AM medications taken whole without complaint (see eMAR).  Patient tolerating PO intake and appetite adequate. Patient O2 increased to 3L per respiratory therapy. Patient continues with c/o SOB with ambulation. Patient requests PRN cough medicine for relief of symptoms. Will administer as ordered.  No other needs verbalized at this time. Bed in low and locked position and call light within reach.   
Patient resting in bed this morning with complaint of ongoing headache (did not rate). Patient requesting PRN Tylenol. Tylenol unavailable at this time r/t PRN order frequency. Message sent to MD Rider w/ request to administer Tylenol at this time for headache management. Request also made for PRN throat losenge as patient is experiencing a sore throat. Waiting for response from MD.     Scheduled morning medications + PRN Mucinex administered per orders. See eMAR for documentation of medication administration. Patient tolerated PO medications whole w/ water w/o complication. IV to the L hand flushed w/o complication and is saline locked at this time. Alcohol cap in place.     Head to toe assessment completed and charted. See flowsheets for documentation. Skin prep applied to bilateral heels. Patient placed into fresh non-skid socks.     Patient noted to have increased SOB w/ ambulation. Patient offered bedside commode to reduce the distance required to ambulate to void. Patient declines need, states that she feels like she needs to ambulate to keep her strength up. Patient encouraged to alert this RN should she change her mind regarding need for commode.     Patient updated with plan of care for the shift, denies questions. Patient denies further physical/emotional needs at this time. Call light, telephone, and bed side table are within reach. Fall precautions in place. Will continue to monitor and assess.     
Patient resting in bed upon assessment, A/Ox4 and vital signs stable, continues to complain of shortness of breath that she feels has not improved. Voices no additional needs at this time.   
Patient resting in bed upon assessment, states shortness of breath with ambulation is improving but still present, now on 1L nasal cannula.   
Patient sitting on the side of the bed, A&O X4. VSS. Left PIV flushed, dressing CDI, NS locked and capped at this time. Patient denies pain. All other AM medications taken whole without complaint (see eMAR).  Patient tolerating PO intake and appetite adequate. Patient c/o persistent coughing with productivity and request PRN medications to assist with symptom management. MD notified via Perfect Serve and new orders placed. No other needs verbalized at this time. Bed in low and locked position and call light within reach.   
Patient up to the restroom to get cleaned up at the sink. Patient notably SOB. Patient increased to 3L of O2 via NC for comfort. This RN to decrease O2 when patient recovers from activity. Patient agreeable to plan of care. Denies further needs. Will continue to monitor and assess.   
Perfect Serve sent to MD, Patient O2 sats decreased to 87% on 1L with ambulation. Respiratory therapy increased patient O2 up to 3L to recover to 92-93%. Respiratory advised to remove orders for home O2 eval if patient will not be discharged today. Patient inquiring about prednisone needs with increased SOB with ambulation. Awaiting reponse and/or additional orders.    New orders placed for prednisone. See eMAR.  
Pharmacy Medication Reconciliation Note     List of medications patient is currently taking is complete.    Source of information:   1. Patient  2. EMR    Notes regarding home medications:   1. Patient reports she took all her morning medications today as usual. Reports she also used both of her nasal sprays and nebulizer right before arrival.      Kimberly Polanco, Pharmacy Intern  6/18/2025 10:02 PM    
This RN to the bedside to evaluate respiratory status. Patient noted to be 98% on 3L of O2 via NC. Patient SOB resolved. Patient weaned to 2L, oxygen saturation remains 96%. Patient denies SOB at rest presently.     Patient denies further needs at this time. Will continue to monitor and assess.   
LABURIN  05/29/2019 02:05 PM     <50,000 CFU/ml mixed skin/urogenital jen. No further workup     Blood Cultures:   Lab Results   Component Value Date/Time    BC No Growth after 4 days of incubation. 05/07/2025 04:22 AM     Lab Results   Component Value Date/Time    BLOODCULT2 No Growth after 4 days of incubation. 05/07/2025 04:22 AM     Organism:   Lab Results   Component Value Date/Time    ORG Streptococcus pneumoniae 05/07/2025 07:41 AM    ORG Streptococcus pneumoniae DNA Detected 05/07/2025 07:41 AM    ORG Human Rhinovirus/Enterovirus by PCR 05/07/2025 07:41 AM         Electronically signed by Gamal Rider DO on 6/19/2025 at 8:01 AM

## 2025-06-24 NOTE — DISCHARGE SUMMARY
every 8 hours as needed for Nausea or Vomiting     pantoprazole 40 MG tablet  Commonly known as: PROTONIX  Take 1 tablet by mouth in the morning and at bedtime     risperiDONE 1 MG tablet  Commonly known as: RISPERDAL     Symbicort 160-4.5 MCG/ACT Aero  Generic drug: budesonide-formoterol     vitamin D 50 MCG (2000 UT) Caps capsule  Commonly known as: CHOLECALCIFEROL            STOP taking these medications      acetaminophen 500 MG tablet  Commonly known as: APAP Extra Strength               Where to Get Your Medications        These medications were sent to [facility] [address] - P [phone] - F [phone]  [address] [address] [address]      Phone: [phone]   dextromethorphan-guaiFENesin  MG per extended release tablet  levoFLOXacin 750 MG tablet  predniSONE 10 MG tablet        Objective Findings at Discharge:   BP (!) 113/90   Pulse 89   Temp 98.2 °F (36.8 °C)   Resp 16   Ht 1.778 m (5' 10\")   Wt 79.6 kg (175 lb 7.8 oz)   LMP 09/13/2017   SpO2 (!) 89%   BMI 25.18 kg/m²       Physical Exam:     General: NAD  Eyes: EOMI  ENT: neck supple  Cardiovascular: Regular rate.  Respiratory: Mild wheezing on lung bases  Gastrointestinal: Soft, non tender  Genitourinary: no suprapubic tenderness  Musculoskeletal: No edema  Skin: warm  Neuro: Alert.  Psych: Mood appropriate.         Labs and Imaging   XR CHEST PORTABLE  Result Date: 6/21/2025  EXAM: 1 VIEW XRAY OF THE CHEST 06/21/2025 12:38:42 PM COMPARISON: 06/18/2025 CLINICAL HISTORY: Cough. FINDINGS: LUNGS AND PLEURA: No focal pulmonary opacity. No pulmonary edema. No pleural effusion. No pneumothorax. HEART AND MEDIASTINUM: Stable aortic contour, mediastinal and cardiac structures. No acute abnormality. BONES AND SOFT TISSUES: Stable osseous structures. No acute osseous abnormality.     1. No acute findings.     XR CHEST PORTABLE  Result Date: 6/18/2025  EXAMINATION: ONE XRAY VIEW OF

## 2025-06-24 NOTE — CARE COORDINATION
DISCHARGE SUMMARY     DATE OF DISCHARGE: 6/24/2025    DISCHARGE DESTINATION: home    HOME CARE: No    TRANSPORTATION: Private Car    NEW DME ORDERED: yes    COMMENTS: home O2 @2l per [place]  [employer] dispensed     Electronically signed by Sarahy Martini on 6/24/2025 at 1:40 PM  #[phone]

## 2025-07-11 RX ORDER — PREDNISONE 10 MG/1
TABLET ORAL
Qty: 30 TABLET | Refills: 0 | Status: SHIPPED | OUTPATIENT
Start: 2025-07-11

## 2025-07-29 ENCOUNTER — OFFICE VISIT (OUTPATIENT)
Dept: PULMONOLOGY | Age: 65
End: 2025-07-29
Payer: MEDICAID

## 2025-07-29 VITALS
BODY MASS INDEX: 24.91 KG/M2 | TEMPERATURE: 98.3 F | OXYGEN SATURATION: 90 % | HEART RATE: 68 BPM | DIASTOLIC BLOOD PRESSURE: 72 MMHG | SYSTOLIC BLOOD PRESSURE: 110 MMHG | WEIGHT: 174 LBS | HEIGHT: 70 IN | RESPIRATION RATE: 18 BRPM

## 2025-07-29 DIAGNOSIS — Z79.52 CURRENT CHRONIC USE OF SYSTEMIC STEROIDS: ICD-10-CM

## 2025-07-29 DIAGNOSIS — K21.9 GASTROESOPHAGEAL REFLUX DISEASE WITHOUT ESOPHAGITIS: ICD-10-CM

## 2025-07-29 DIAGNOSIS — J44.89 COPD WITH ASTHMA (HCC): Primary | ICD-10-CM

## 2025-07-29 DIAGNOSIS — J30.89 OTHER ALLERGIC RHINITIS: ICD-10-CM

## 2025-07-29 PROCEDURE — 3078F DIAST BP <80 MM HG: CPT | Performed by: INTERNAL MEDICINE

## 2025-07-29 PROCEDURE — 99214 OFFICE O/P EST MOD 30 MIN: CPT | Performed by: INTERNAL MEDICINE

## 2025-07-29 PROCEDURE — 3074F SYST BP LT 130 MM HG: CPT | Performed by: INTERNAL MEDICINE

## 2025-07-29 NOTE — PROGRESS NOTES
REASON FOR CONSULTATION/CC: asthma  Chief Complaint   Patient presents with    COPD          PCP: Hunter Barr MD    HISTORY OF PRESENT ILLNESS: Nati Dover is a 64 y.o. year old female with a history of asthma who presents :               Asthma history   Nucala, failed.  Did not response  Dupexiant, side effects.    side effects to Spiriva   Hx of chronic steroids then weaned off  Returned to chronic steroids  Tespire - April 2025.  No clear this is working.  complains of bone pain / side effects.   History of similar         Currently         The patient (or guardian, if applicable) and other individuals in attendance with the patient were advised that Artificial Intelligence will be utilized during this visit to record, process the conversation to generate a clinical note, and support improvement of the AI technology. The patient (or guardian, if applicable) and other individuals in attendance at the appointment consented to the use of AI, including the recording.      History of Present Illness  The patient is a 64-year-old female with a history of COPD and eosinophilia, presenting for follow-up after a recent hospitalization.    Pseudomonas pneumonia and COPD exacerbation  - Admitted on 06/18/2025 due to Pseudomonas pneumonia and a COPD exacerbation  - Treated with Levaquin  - Reports feeling significantly better  - Attempting to reduce oxygen dependency  - Monitors oxygen levels using a pulse oximeter  - Managed without supplemental oxygen last night  - Oxygen levels dropped to 86 or 87 percent during a bathroom visit at 4:00 AM, prompting resumption of oxygen use at 2 liters  - Requires supplemental oxygen during grocery shopping when levels decrease to 84 or 86 percent    Avoidance of crowded places  - Avoiding crowded places since falling ill after attending granddaughter's birthday party    Current medication regimen  - Continues to use Breztri, DuoNebs, and chronic prednisone 10 mg daily  -

## 2025-07-31 PROBLEM — A41.9 SEPSIS (HCC): Status: RESOLVED | Noted: 2025-05-07 | Resolved: 2025-07-31

## 2025-07-31 RX ORDER — PREDNISONE 10 MG/1
TABLET ORAL
Qty: 30 TABLET | Refills: 0 | Status: SHIPPED | OUTPATIENT
Start: 2025-07-31

## 2025-08-05 DIAGNOSIS — J30.89 OTHER ALLERGIC RHINITIS: ICD-10-CM

## 2025-08-06 RX ORDER — FLUTICASONE PROPIONATE 50 MCG
2 SPRAY, SUSPENSION (ML) NASAL DAILY
Qty: 16 G | Refills: 11 | Status: SHIPPED | OUTPATIENT
Start: 2025-08-06

## 2025-08-25 RX ORDER — PREDNISONE 10 MG/1
TABLET ORAL
Qty: 30 TABLET | Refills: 0 | Status: SHIPPED | OUTPATIENT
Start: 2025-08-25

## (undated) DEVICE — SURGICAL PROC PACK SHT WEST V4

## (undated) DEVICE — APPLIER CLP L9.375IN APER 2.1MM CLS L3.8MM 20 SM TI CLP

## (undated) DEVICE — RESERVOIR,SUCTION,100CC,SILICONE: Brand: MEDLINE

## (undated) DEVICE — NEEDLE HYPO 27GA L15IN REG BVL W O SFTY FOR SYR DISPOSABLE

## (undated) DEVICE — RETRACTOR SURGICAL 3302G

## (undated) DEVICE — INTENDED FOR TISSUE SEPARATION, AND OTHER PROCEDURES THAT REQUIRE A SHARP SURGICAL BLADE TO PUNCTURE OR CUT.: Brand: BARD-PARKER ® STAINLESS STEEL BLADES

## (undated) DEVICE — COVER LT HNDL BLU PLAS

## (undated) DEVICE — GOWN SIRUS NONREIN XL W/TWL: Brand: MEDLINE INDUSTRIES, INC.

## (undated) DEVICE — SYRINGE MED 30ML STD CLR PLAS LUERLOCK TIP N CTRL DISP

## (undated) DEVICE — BLADE ES ELASTOMERIC COAT INSUL DURABLE BEND UPTO 90DEG

## (undated) DEVICE — MERCY HEALTH WEST TURNOVER: Brand: MEDLINE INDUSTRIES, INC.

## (undated) DEVICE — SNARE ENDOSCP 11 MM BRAIDED WIRE CAPTFLX DISP

## (undated) DEVICE — FORCEPS BX 240CM 2.4MM L NDL RAD JAW 4 M00513334

## (undated) DEVICE — SUTURE VCRL + SZ 4-0 L18IN ABSRB UD L19MM PS-2 3/8 CIR PRIM VCP496H

## (undated) DEVICE — STAPLER SKIN H3.9MM WIRE DIA0.58MM CRWN 6.9MM 35 STPL ROT

## (undated) DEVICE — SPONGE LAP W18XL18IN WHT COT 4 PLY FLD STRUNG RADPQ DISP ST

## (undated) DEVICE — SOLUTION SCRB 4OZ 10% POVIDONE IOD ANTIMIC BTL

## (undated) DEVICE — SPONGE,DISSECTOR,K,XRAY,9/16"X1/4",STRL: Brand: MEDLINE

## (undated) DEVICE — HOOK RETRCT L5MM E SHRP SELF RET SYS LONE STAR

## (undated) DEVICE — DISSECTOR ENDOSCP L21CM TIP CURVATURE 40DEG FN CRV JAW VES

## (undated) DEVICE — DRAIN SURG W10MMXL20CM SIL FULL PERF HUBLESS FLAT RADPQ

## (undated) DEVICE — SOLUTION IV IRRIG WATER 500ML POUR BRL ST 2F7113

## (undated) DEVICE — SKIN MARKER REGULAR TIP WITH RULER CAP AND LABELS: Brand: DEVON

## (undated) DEVICE — DRAPE,INSTRUMENT,MAGNETIC,10X16: Brand: MEDLINE

## (undated) DEVICE — POSITIONER,HEAD,RING CUSHION,9IN,32CS: Brand: MEDLINE

## (undated) DEVICE — SOLUTION PREP POVIDONE IOD FOR SKIN MUCOUS MEM PRIOR TO

## (undated) DEVICE — GLOVE ORANGE PI 7 1/2   MSG9075

## (undated) DEVICE — SOLUTION IRRIG BSS ST 500ML

## (undated) DEVICE — SUTURE PERMAHAND SZ 2-0 L12X18IN NONABSORBABLE BLK SILK A185H

## (undated) DEVICE — CONTAINER SPEC 480ML CLR POLYSTYR 10% NEUT BUFF FRMLN ZN

## (undated) DEVICE — TOWEL,OR,DSP,ST,BLUE,STD,4/PK,20PK/CS: Brand: MEDLINE

## (undated) DEVICE — SOLUTION IV IRRIG POUR BRL 0.9% SODIUM CHL 2F7124

## (undated) DEVICE — ENT I-LF: Brand: MEDLINE INDUSTRIES, INC.

## (undated) DEVICE — SYRINGE IRRIG 60ML SFT PLIABLE BLB EZ TO GRP 1 HND USE W/

## (undated) DEVICE — ENDO CARRY-ON PROCEDURE KIT: Brand: ENDO CARRY-ON PROCEDURE KIT

## (undated) DEVICE — PENCIL ES L3M BTTN SWCH S STL HEX LOK BLDE ELECTRD HOLSTER

## (undated) DEVICE — Device: Brand: MEDEX

## (undated) DEVICE — SUTURE PERMA-HAND SZ 2-0 L30IN NONABSORBABLE BLK L26MM SH K833H

## (undated) DEVICE — Device

## (undated) DEVICE — SUTURE VCRL + SZ 3-0 L18IN ABSRB UD SH 1/2 CIR TAPERCUT NDL VCP864D

## (undated) DEVICE — APPLIER LIG CLP M L11IN TI STR RNG HNDL FOR 20 CLP DISP

## (undated) DEVICE — SYRINGE TB 1ML NDL 25GA L0.625IN PLAS SLIP TIP CONVENTIONAL

## (undated) DEVICE — BITE BLOCK ENDOSCP AD 60 FR W/ ADJ STRP PLAS GRN BLOX

## (undated) DEVICE — PREMIUM DRY TRAY LF: Brand: MEDLINE INDUSTRIES, INC.

## (undated) DEVICE — 3M™ STERI-DRAPE™ INSTRUMENT POUCH 1018: Brand: STERI-DRAPE™

## (undated) DEVICE — SYRINGE MED 3ML CLR PLAS STD N CTRL LUERLOCK TIP DISP

## (undated) DEVICE — ELECTRODE PT RET AD L9FT HI MOIST COND ADH HYDRGEL CORDED

## (undated) DEVICE — FORMALIN CLEAR VIAL 20 ML 10%

## (undated) DEVICE — ENDOSCOPY KIT: Brand: MEDLINE INDUSTRIES, INC.

## (undated) DEVICE — EMG TUBE 8229707 NIM TRIVANTAGE 7.0MM ID: Brand: NIM TRIVANTAGE™

## (undated) DEVICE — GLOVE,SURG,TRIUMPH MICRO,LTX,PF,7.5: Brand: MEDLINE

## (undated) DEVICE — DILATOR ESOPHAGEAL CLEANGUIDE DISP OTW  12MM

## (undated) DEVICE — PROBE 8225825 3PK INCREMT STD PRASS ROHS

## (undated) DEVICE — GUIDEWIRE WITH SPRING TIP - SINGLE USE: Brand: SAFEGUIDE®

## (undated) DEVICE — TOTAL TRAY, DB, 100% SILI FOLEY, 16FR 10: Brand: MEDLINE

## (undated) DEVICE — TRAP POLYP ETRAP